# Patient Record
Sex: MALE | Race: WHITE | Employment: FULL TIME | URBAN - NONMETROPOLITAN AREA
[De-identification: names, ages, dates, MRNs, and addresses within clinical notes are randomized per-mention and may not be internally consistent; named-entity substitution may affect disease eponyms.]

---

## 2022-08-30 RX ORDER — TRAMADOL HYDROCHLORIDE 50 MG/1
50 TABLET ORAL EVERY 6 HOURS PRN
COMMUNITY
Start: 2022-07-27 | End: 2022-09-06

## 2022-08-30 RX ORDER — FENOFIBRATE 54 MG/1
54 TABLET ORAL DAILY
COMMUNITY
Start: 2022-07-14 | End: 2022-09-06

## 2022-09-06 ENCOUNTER — OFFICE VISIT (OUTPATIENT)
Dept: NEPHROLOGY | Age: 62
End: 2022-09-06
Payer: COMMERCIAL

## 2022-09-06 VITALS
DIASTOLIC BLOOD PRESSURE: 98 MMHG | OXYGEN SATURATION: 96 % | SYSTOLIC BLOOD PRESSURE: 168 MMHG | HEART RATE: 102 BPM | WEIGHT: 135 LBS

## 2022-09-06 DIAGNOSIS — I12.9 HYPERTENSIVE RENAL DISEASE, STAGE 1 THROUGH STAGE 4 OR UNSPECIFIED CHRONIC KIDNEY DISEASE: ICD-10-CM

## 2022-09-06 DIAGNOSIS — R80.9 PROTEINURIA, UNSPECIFIED TYPE: ICD-10-CM

## 2022-09-06 DIAGNOSIS — R79.89 ELEVATED SERUM CREATININE: Primary | ICD-10-CM

## 2022-09-06 PROBLEM — N18.30 CKD (CHRONIC KIDNEY DISEASE) STAGE 3, GFR 30-59 ML/MIN (HCC): Status: ACTIVE | Noted: 2022-01-01

## 2022-09-06 PROCEDURE — 99204 OFFICE O/P NEW MOD 45 MIN: CPT | Performed by: INTERNAL MEDICINE

## 2022-09-06 RX ORDER — CARVEDILOL 6.25 MG/1
6.25 TABLET ORAL 2 TIMES DAILY
Qty: 60 TABLET | Refills: 3 | Status: SHIPPED | OUTPATIENT
Start: 2022-09-06 | End: 2022-10-04

## 2022-09-06 NOTE — PROGRESS NOTES
[facility]  [address] [facility] [address]  Dept: [phone]  Loc: [phone]  Outpatient Consult Note  9/6/2022 1:52 PM        Pt Name:    Harini Vanegas:    1960  Primary Care Physician:  TONY Mendez     Chief Complaint: Elevated serum creatinine, HTN     Background Information/HPI   The patient is a 63 y.o. male who says he first was informed about his slow kidney and high BP when he went to get his hernia done. He was not following with any doctors until then. He reports he knew he had high BP but he had now seen anyone since 2005. He says it never used to be this high. Anyway, he has not had any medical care from 2005 until now. He was noticed to have creat of 1.6. He had 3+ protein last week, trace blood. He says he was put on cholesterol medication but he has not taken any. He reports some swelling of ankles. He used to drink in the past but none recently. He smokes 0.5 to 1 pack per day. No hx DM. No hx CVA. No known heart problems. No gross hematuria. No hx kidney stones. Takes ibuprofen. HE is in . 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 Allergies:  Patient has no known allergies.         Past Medical History:  Past Medical History:   Diagnosis Date    Bilateral inguinal hernia     CKD (chronic kidney disease) stage 3, GFR 30-59 ml/min ([facility])     Hypoalbuminemia         Past Surgical History:  Past Surgical History:   Procedure Laterality Date    APPENDECTOMY      COLONOSCOPY      HERNIA REPAIR      double    KNEE SURGERY Left     TONSILLECTOMY          Family History:  Family History   Problem Relation Age of Onset    Hypertension Mother     Hypertension Father     Cancer Father     Diabetes Paternal Grandmother         Social History:  Social History     Socioeconomic History    Marital status: Unknown     Spouse name: Not on file    Number of children: Not on file Years of education: Not on file    Highest education level: Not on file   Occupational History    Not on file   Tobacco Use    Smoking status: Every Day     Packs/day: 3.00     Types: Cigarettes    Smokeless tobacco: Never   Substance and Sexual Activity    Alcohol use: Not on file    Drug use: Not on file    Sexual activity: Not on file   Other Topics Concern    Not on file   Social History Narrative    Not on file     Social Determinants of Health     Financial Resource Strain: Not on file   Food Insecurity: Not on file   Transportation Needs: Not on file   Physical Activity: Not on file   Stress: Not on file   Social Connections: Not on file   Intimate Partner Violence: Not on file   Housing Stability: Not on file        Review of Systems:  Constitutional: no fever or chills  Head: No headaches  Eyes: no blurry vision, no discharge  Ears: no ear pain or hearing changes  Nose: no runny nose or epistaxis  Respiratory: no shortness of breath or cough or sputum production  Cardiovascular: no chest pain  GI: no nausea, no vomiting or diarrhea  : denies any discharge  Skin: no rash  Musculoskeletal: ++ joint stiffness, moves all ext  Neuro: no numbness or tingling, no slurred speech  Psychiatric: stable mood, no depression or insomnia    All other review of systems were reviewed and negative     Home Medications:  Prior to Admission medications    Not on File        Physical Examination:  VITALS:  BP (!) 168/98 (Site: Right Upper Arm, Position: Sitting, Cuff Size: Medium Adult)   Pulse (!) 102   Wt 135 lb (61.2 kg)   SpO2 96%   There is no height or weight on file to calculate BMI.   Wt Readings from Last 3 Encounters:   09/06/22 135 lb (61.2 kg)     Constitutional and General Appearance: alert and cooperative with exam, appears comfortable, no distress, not diaphoretic  Eyes: no icteric sclera in left eye or right eye  Ears and Nose: normal external appearance of left and right ear   Oral: moist oral mucus membranes  Neck: No jugular venous distention, appears symmetric, good ROM  Lungs: Air entry B/L, no crackles or rales, no use of accessory muscles or labored breathing  Heart: regular rate, S1, S2  Extremities: no pitting LE edema, no tenderness  GI: soft, non-tender, no guarding, no distention  Skin: no rash seen on exposed extremities, warm to touch  Musculo: moves all extremities  Neuro: no slurred speech  Psychiatric: Normal mood and affect, Not agitated     Laboratory & Diagnostics:  Old progress notes from referring physician reviewed. July 2022: Creat 1.63     Impression/Plan:   1. CKD III: based on old labs from July 2022. Will need further investigation- check UA, UPCR, Kidney US to evaluate echogenicity and kidney size. Advised smoking cessation, better BP control, low salt diet and low red meat intake. Advised to bring me some BP readings. 2. HTN: add coreg 6.25 mg BID. Advised to bring me some BP readings. 3. Smoking: cessation counseling  4. Proteinuria: ?etiology. Check Basic serologies including STEFANIA, Complement levels, SPEP and JAIRO   5. Dyslipidemia  6.. Proteinuria: ?GN disorder vs accelerated HTN. Advised low salt diet. Pt concerned about cost of these labs tests and kidney US. I advised him to check with his labs and he agreed. Orders Placed This Encounter   Procedures    US RENAL COMPLETE    Basic Metabolic Panel    CBC    Urinalysis    Protein / creatinine ratio, urine    PTH, Intact    Phosphorus    STEFANIA Screen with Reflex    C4 Complement    C3 Complement     Return in about 4 weeks (around 10/4/2022).     Thought process was discussed with the patient  Thank you for the referral  Please do not hesitate to contact me if you have any questions or concerns  I will make further recommendations depending on clinical course and lab/diagnostic results      Kaylee Zhao MD  [facility]

## 2022-09-08 ENCOUNTER — TELEPHONE (OUTPATIENT)
Dept: NEPHROLOGY | Age: 62
End: 2022-09-08

## 2022-09-08 NOTE — TELEPHONE ENCOUNTER
I called and informed pt to stop carvedilol. He will keep track of his BP twice per day along with pulse and call on Monday w/readings. Pt understood.

## 2022-09-08 NOTE — TELEPHONE ENCOUNTER
Pt called and states that since he started the carvedilol he has been lightheaded and dizzy while walking around. He states his BP is in the 269'E systolic and diastolic in the mid 33'A. His pulse rate is in the 80-90's. What do you want to do?

## 2022-09-12 NOTE — TELEPHONE ENCOUNTER
9/8/22 120/84 p 84  9/9/22 129/88 p 85  119/85 p 86  9/10/22 132/98 p 96  113/83 p 93  9/11/22 135/93 p 85  130/94 p 89  9/12/22 134/99 p 84

## 2022-09-13 NOTE — TELEPHONE ENCOUNTER
Patient said he was getting dizzy with the 6.25 mg of Coreg bid. Would you like him to cut it in half and take it afternoon and bedtime or just take 1 in the late evening before bed? He works day shift and cannot go in there dizzy.

## 2022-09-26 NOTE — TELEPHONE ENCOUNTER
Pt states he did stop the Coreg. He has an appt on 10/04/22. He will bring BP readings w/him at that time.

## 2022-10-04 ENCOUNTER — OFFICE VISIT (OUTPATIENT)
Dept: NEPHROLOGY | Age: 62
End: 2022-10-04
Payer: COMMERCIAL

## 2022-10-04 VITALS
OXYGEN SATURATION: 97 % | SYSTOLIC BLOOD PRESSURE: 147 MMHG | HEART RATE: 92 BPM | DIASTOLIC BLOOD PRESSURE: 96 MMHG | WEIGHT: 138 LBS

## 2022-10-04 DIAGNOSIS — R80.9 NEPHROTIC RANGE PROTEINURIA: ICD-10-CM

## 2022-10-04 DIAGNOSIS — F19.90 EXCESSIVE USE OF NONSTEROIDAL ANTI-INFLAMMATORY DRUG (NSAID): ICD-10-CM

## 2022-10-04 DIAGNOSIS — N18.32 STAGE 3B CHRONIC KIDNEY DISEASE (HCC): Primary | ICD-10-CM

## 2022-10-04 DIAGNOSIS — I12.9 HYPERTENSIVE RENAL DISEASE, STAGE 1 THROUGH STAGE 4 OR UNSPECIFIED CHRONIC KIDNEY DISEASE: ICD-10-CM

## 2022-10-04 PROCEDURE — 99214 OFFICE O/P EST MOD 30 MIN: CPT | Performed by: INTERNAL MEDICINE

## 2022-10-04 RX ORDER — LOSARTAN POTASSIUM 25 MG/1
25 TABLET ORAL DAILY
Qty: 90 TABLET | Refills: 1 | Status: SHIPPED | OUTPATIENT
Start: 2022-10-04 | End: 2022-10-26

## 2022-10-04 NOTE — PROGRESS NOTES
BP at home has been running 128/84. Has a rash on his body and wants to make sure that it is not his kidneys causing it. Primary doctor put his on antifungal medication and it did not help. He is using Smoothing Hydration Vaseline lotion for now for the itch.

## 2022-10-04 NOTE — PROGRESS NOTES
[facility]  [address] [facility] [address]  Dept: [phone]  Loc: [phone]  Office Progress Note  10/4/2022 1:35 PM      Pt Name:    Yamileth Camarillo  YOB: 1960  Primary Care Physician:  TONY Lowe     Chief Complaint:   Chief Complaint   Patient presents with    Chronic Kidney Disease     Stage 3        Background Information/Interval History:   The patient is a 63 y.o. male who says he first was informed about his slow kidney and high BP when he went to get his hernia done. He was not following with any doctors until then. He reports he knew he had high BP but he had now seen anyone since 2005. He says it never used to be this high. Anyway, he has not had any medical care from 2005 until now. He was noticed to have creat of 1.6. He had 3+ protein last week, trace blood. He says he was put on cholesterol medication but he has not taken any. Takes ibuprofen as needed. HE is in . 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 Pt is here for follow-up. He says he was dizzy with coreg and did not tolerate. BP at home is in 130-140 range per patient. He smokes about 0.75 pack per day. No leg swelling. He says he took 2 pills of ibuprofen this morning. He says he takes it seldomly. Pt says he has noticed rash diffusely present. Was put on anti-fungal by PCP with minimal relief. Now trying to see dermatology.       Past History:  Past Medical History:   Diagnosis Date    Bilateral inguinal hernia     CKD (chronic kidney disease) stage 3, GFR 30-59 ml/min ([facility])     Hypoalbuminemia      Past Surgical History:   Procedure Laterality Date    APPENDECTOMY      COLONOSCOPY      HERNIA REPAIR      double    KNEE SURGERY Left     TONSILLECTOMY          VITALS:  BP (!) 147/96 (Site: Right Upper Arm, Position: Sitting, Cuff Size: Medium Adult)   Pulse 92   Wt 138 lb (62.6 kg)   SpO2 97%   Wt Readings from Last 3 Encounters:   10/04/22 138 lb (62.6 kg)   09/06/22 135 lb (61.2 kg)     There is no height or weight on file to calculate BMI. General Appearance: alert and cooperative with exam, appears comfortable, no distress  Oral: moist oral mucus membranes  Neck: No jugular venous distention  Lungs: Air entry B/L, no crackles or rales, no use of accessory muscles  Heart: S1, S2 heard  GI: soft, non-tender, no guarding  Extremities: No sig LE edema     Medications:  Current Outpatient Medications   Medication Sig Dispense Refill    carvedilol (COREG) 6.25 MG tablet Take 1 tablet by mouth 2 times daily (Patient not taking: Reported on 10/4/2022) 60 tablet 3     No current facility-administered medications for this visit. Laboratory & Diagnostics:  Old progress notes from referring physician reviewed. July 2022: Creat 1.63    US: R 11.2, L 10.5  Sept 2022: UPCR 16 g/g  UA: 3-5 RBC, 3+ protein, 1+ blood, Hb 14.9  BMP: BUN 31, creat 2.2  STEFANIA: 1:40, small titer  C3, C4 normal     Impression/Plan:   1. CKD III: based on old labs from July 2022. He has nephrotic range proteinuria. Unclear etiology. ?FSGS vs Membranous vs other entities. I discussed in detail with patient. Will send all serological work-up and plan to proceed with kidney biopsy. Discussed risks, benefits, alternatives discussed with patient. Discussed indication of kidney biopsy. Advised to stop smoking. Will check lipid panel. 2. Nephrotic range proteinuria: send all serologies- see below, plan to set up kidney biopsy for him. Discussed in detail with patient. 3. HTN: add losartan 25 mg daily. He says he did not tolerate coreg. Advised low salt diet. 4. Smoking: advised cessation  5. Dyslipidemia: check lipid panel again. Pt says PCP prescribed cholesterol medication but he did not tolerate- lost weight and appetite.    6. Skin rash    Orders Placed This Encounter   Procedures    CT BIOPSY RENAL    Beta 2 Microglobulin, Serum Immunofixation electrophoresis    Kappa/Lambda Quantitative Free Light Chains, Serum    Electrophoresis Protein, Serum    Immunofixation, urine    Anti-Neutrophilic Cytoplasmic Antibody    Glomerular Basement Membrane (GBM) Antibody IgG    Anti-DNA Antibody, Double-Stranded    Hepatitis B Surface Antigen    Hepatitis C Antibody    Basic Metabolic Panel    Lipid Panel    Albumin    Hepatic Function Panel     Return in about 4 weeks (around 11/1/2022).         Venessa Holstein, MD  [facility]

## 2022-10-05 ENCOUNTER — TELEPHONE (OUTPATIENT)
Dept: NEPHROLOGY | Age: 62
End: 2022-10-05

## 2022-10-05 NOTE — TELEPHONE ENCOUNTER
Pt states he took the new BP medication and he went to work. He started to feel lightheaded and dizzy. The employees noticed that he was white. He took his BP several times over the next hour. 7:10 112/87  91  7:23 111/80  90  7:40 112/79  87  8:00 122/89  86 He got up and went the the bathroom. 8:15 108/83  89  8:30 110/86  89    What do you want to do?

## 2022-10-05 NOTE — TELEPHONE ENCOUNTER
He can try reducing dose of losartan 12.5 mg daily  And call with some BP readings.    BP is not that low   If he still has these symptoms then go to ED

## 2022-10-06 LAB — ANTIGEN INTERPRETATION: NEGATIVE

## 2022-10-07 ENCOUNTER — TELEPHONE (OUTPATIENT)
Dept: NEPHROLOGY | Age: 62
End: 2022-10-07

## 2022-10-07 NOTE — TELEPHONE ENCOUNTER
----- Message from Tamera Hernández MD sent at 10/6/2022  9:18 PM EDT -----  When is his kidney biopsy scheduled for ? I had ordered it when I saw him in office but dont see anything scheduled.      ----- Message -----  From: Malathi Dockery CMA  Sent: 10/6/2022   4:33 PM EDT  To: Tamera Hernández MD

## 2022-10-13 NOTE — RESULT ENCOUNTER NOTE
Pls inform patient that his labs show he has abnormal proteins made by blood disorder for which I'd like him to see hematologist/oncologist.   I will discuss with him further when I see him in 2 weeks but for now Please set him up to see hematology/oncology. He lives in [facility]. Please refer him to see [address] [place] [address] hematology/Oncology in [facility]. I believe Dr. Estefania Logan is seeing patients there. If he has any questions then please let me know.

## 2022-10-14 ENCOUNTER — TELEPHONE (OUTPATIENT)
Dept: NEPHROLOGY | Age: 62
End: 2022-10-14

## 2022-10-14 DIAGNOSIS — D47.2 MONOCLONAL GAMMOPATHY: Primary | ICD-10-CM

## 2022-10-14 NOTE — TELEPHONE ENCOUNTER
----- Message from Karmen Lui MD sent at 10/13/2022  7:49 PM EDT -----  Pls inform patient that his labs show he has abnormal proteins made by blood disorder for which I'd like him to see hematologist/oncologist.   I will discuss with him further when I see him in 2 weeks but for now Please set him up to see hematology/oncology. He lives in [facility]. [facility] in [facility]. I believe Dr. Mitzi Apple is seeing patients there. If he has any questions then please let me know.
Left message informing pt of the referral to Dr. Aixa Santiago Hematologist due to abnormal proteins made by blood disorder. Asked for a call back if he has any questions. Referral pending.
22-Nov-2020 09:59

## 2022-10-17 ENCOUNTER — TELEPHONE (OUTPATIENT)
Dept: NEPHROLOGY | Age: 62
End: 2022-10-17

## 2022-10-17 NOTE — TELEPHONE ENCOUNTER
Stop losartan completely  Pls let him know that his biopsy which is already scheduled for Oct 19 will tell us why he has so much proteinuria which is likely the reason he has some ankle swelling. But for now ok to stop losartan.  If his BP does not improve and he is still lightheaded after stopping losartan then he should come to ED for evaluation

## 2022-10-17 NOTE — TELEPHONE ENCOUNTER
Called and informed patient to stop Losartan and he voiced understanding. Patient agreeed to go to ER if Light headed continues once stopping the [place].  Patient voices understanding about the further testing and swelling

## 2022-10-17 NOTE — TELEPHONE ENCOUNTER
PT states that he was put on new medication called losartan for his BP. He states that he took his medication in the morning and got real lighted and white as a ghost at work, bp was 110/79. He went home after this. Started taking medication in afternoons instead of mornings. Sunday morning it was 87/62 p 93 at 9:30 am, highest it got Sunday 101/70 p 86 at 2:30 pm. His BP this morning was 129/91 p 97 at 4 am, went to work and started feeling bad 92/67 p 92 at 6:30 am, 105/65 p 90 at 7:30 am and he went home when his BP was 87/48 p 67 at 8:30 am. Since then his bp has been 9:30 am 103/72 p 87, and 10:30 am 110/81 p 86. He does not know what to do? He does not want to keep missing work due to BP is low and it will cause a hazard at work. Work mentioned to him about going on short term disability so he does not keep burning his vacation time. Last time he took a Losartan was Saturday at 5:30 pm. Also, his ankles are starting to swell up again.

## 2022-10-18 RX ORDER — SODIUM CHLORIDE 450 MG/100ML
INJECTION, SOLUTION INTRAVENOUS CONTINUOUS
Status: CANCELLED | OUTPATIENT
Start: 2022-10-18

## 2022-10-18 RX ORDER — MIDAZOLAM HYDROCHLORIDE 1 MG/ML
1 INJECTION INTRAMUSCULAR; INTRAVENOUS ONCE
Status: CANCELLED | OUTPATIENT
Start: 2022-10-18 | End: 2022-10-18

## 2022-10-18 RX ORDER — FENTANYL CITRATE 50 UG/ML
50 INJECTION, SOLUTION INTRAMUSCULAR; INTRAVENOUS ONCE
Status: CANCELLED | OUTPATIENT
Start: 2022-10-18 | End: 2022-10-18

## 2022-10-19 ENCOUNTER — HOSPITAL ENCOUNTER (OUTPATIENT)
Dept: CT IMAGING | Age: 62
Discharge: HOME | End: 2022-10-19

## 2022-10-19 DIAGNOSIS — R80.9 NEPHROTIC RANGE PROTEINURIA: ICD-10-CM

## 2022-10-19 DIAGNOSIS — N18.32 STAGE 3B CHRONIC KIDNEY DISEASE (HCC): ICD-10-CM

## 2022-10-19 NOTE — PROGRESS NOTES
Vipul Crowder ambulated into room for renal bx. Pt rights and responsibilities offered to pt. I asked pt if he had a ride for today and he states no, he states his rides are 3 hours away and he is living at home alone. I told him he needs to have a  today. He said \"fine reschedule me\". I called IR schedulers and told them he was not told that he needed a  he states. I talked with Kirill Eubanks RN in IR and let her know patient is rescheduling due to no .

## 2022-10-26 ENCOUNTER — HOSPITAL ENCOUNTER (OUTPATIENT)
Dept: CT IMAGING | Age: 62
Discharge: HOME | End: 2022-10-26
Payer: COMMERCIAL

## 2022-10-26 VITALS
OXYGEN SATURATION: 97 % | HEART RATE: 84 BPM | DIASTOLIC BLOOD PRESSURE: 82 MMHG | TEMPERATURE: 97.6 F | HEIGHT: 70 IN | RESPIRATION RATE: 14 BRPM | SYSTOLIC BLOOD PRESSURE: 113 MMHG | WEIGHT: 135 LBS | BODY MASS INDEX: 19.33 KG/M2

## 2022-10-26 DIAGNOSIS — N18.32 STAGE 3B CHRONIC KIDNEY DISEASE (HCC): ICD-10-CM

## 2022-10-26 DIAGNOSIS — R80.9 NEPHROTIC RANGE PROTEINURIA: ICD-10-CM

## 2022-10-26 LAB
ERYTHROCYTE [DISTWIDTH] IN BLOOD BY AUTOMATED COUNT: 19.2 % (ref 11.5–14.5)
ERYTHROCYTE [DISTWIDTH] IN BLOOD BY AUTOMATED COUNT: 63.5 FL (ref 35–45)
HCT VFR BLD CALC: 43.5 % (ref 42–52)
HEMOGLOBIN: 14.2 GM/DL (ref 14–18)
MCH RBC QN AUTO: 30.2 PG (ref 26–33)
MCHC RBC AUTO-ENTMCNC: 32.6 GM/DL (ref 32.2–35.5)
MCV RBC AUTO: 92.6 FL (ref 80–94)
PLATELET # BLD: 226 THOU/MM3 (ref 130–400)
PMV BLD AUTO: 11.7 FL (ref 9.4–12.4)
RBC # BLD: 4.7 MILL/MM3 (ref 4.7–6.1)
WBC # BLD: 5.5 THOU/MM3 (ref 4.8–10.8)

## 2022-10-26 PROCEDURE — 77012 CT SCAN FOR NEEDLE BIOPSY: CPT

## 2022-10-26 PROCEDURE — 6360000002 HC RX W HCPCS: Performed by: RADIOLOGY

## 2022-10-26 PROCEDURE — 88348 ELECTRON MICROSCOPY DX: CPT

## 2022-10-26 PROCEDURE — 50200 RENAL BIOPSY PERQ: CPT

## 2022-10-26 PROCEDURE — 85027 COMPLETE CBC AUTOMATED: CPT

## 2022-10-26 PROCEDURE — 88305 TISSUE EXAM BY PATHOLOGIST: CPT

## 2022-10-26 PROCEDURE — 88313 SPECIAL STAINS GROUP 2: CPT

## 2022-10-26 PROCEDURE — 88346 IMFLUOR 1ST 1ANTB STAIN PX: CPT

## 2022-10-26 PROCEDURE — 88350 IMFLUOR EA ADDL 1ANTB STN PX: CPT

## 2022-10-26 PROCEDURE — 2580000003 HC RX 258: Performed by: RADIOLOGY

## 2022-10-26 PROCEDURE — 6370000000 HC RX 637 (ALT 250 FOR IP): Performed by: RADIOLOGY

## 2022-10-26 RX ORDER — MIDAZOLAM HYDROCHLORIDE 1 MG/ML
1 INJECTION INTRAMUSCULAR; INTRAVENOUS ONCE
Status: COMPLETED | OUTPATIENT
Start: 2022-10-26 | End: 2022-10-26

## 2022-10-26 RX ORDER — SODIUM CHLORIDE 450 MG/100ML
INJECTION, SOLUTION INTRAVENOUS CONTINUOUS
Status: DISCONTINUED | OUTPATIENT
Start: 2022-10-26 | End: 2022-10-27 | Stop reason: HOSPADM

## 2022-10-26 RX ORDER — FENTANYL CITRATE 50 UG/ML
50 INJECTION, SOLUTION INTRAMUSCULAR; INTRAVENOUS ONCE
Status: DISCONTINUED | OUTPATIENT
Start: 2022-10-26 | End: 2022-10-26

## 2022-10-26 RX ORDER — BACITRACIN, NEOMYCIN, POLYMYXIN B 400; 3.5; 5 [USP'U]/G; MG/G; [USP'U]/G
OINTMENT TOPICAL ONCE
Status: COMPLETED | OUTPATIENT
Start: 2022-10-26 | End: 2022-10-26

## 2022-10-26 RX ADMIN — MIDAZOLAM 0.5 MG: 1 INJECTION INTRAMUSCULAR; INTRAVENOUS at 08:59

## 2022-10-26 RX ADMIN — SODIUM CHLORIDE: 4.5 INJECTION, SOLUTION INTRAVENOUS at 07:27

## 2022-10-26 RX ADMIN — BACITRACIN, NEOMYCIN, POLYMYXIN B 1 G: 400; 3.5; 5 OINTMENT TOPICAL at 09:33

## 2022-10-26 RX ADMIN — HYDROMORPHONE HYDROCHLORIDE 0.5 MG: 1 INJECTION, SOLUTION INTRAMUSCULAR; INTRAVENOUS; SUBCUTANEOUS at 08:59

## 2022-10-26 ASSESSMENT — PAIN SCALES - GENERAL
PAINLEVEL_OUTOF10: 0

## 2022-10-26 NOTE — PROGRESS NOTES
9986: Patient back from procedure, tolerated well. Vitals as charted. Bandaid clean, dry, intact. No redness or edema noted around site. Provided with snack and beverage. 1015:  Vitals as charted. Bandaid clean, dry, intact. No redness or edema noted around site. 1030: Vitals as charted. Bandaid clean, dry, intact. No redness or edema noted around site. 1045: Vitals as charted. Bandaid clean, dry, intact. No redness or edema noted around site. 1100: Vitals as charted. Bandaid clean, dry, intact. No redness or edema noted around site. 1115:Vitals as charted. Bandaid clean, dry, intact. No redness or edema noted around site. 1130: Vitals as charted. Bandaid clean, dry, intact. No redness or edema noted around site. 1200: Vitals as charted. Bandaid clean, dry, intact. No redness or edema noted around site. 1230: Vitals as charted. Bandaid clean, dry, intact. No redness or edema noted around site. 1300:Vitals as charted. Bandaid clean, dry, intact. No redness or edema noted around site. 1330: AVS reviewed with patient, voiced understanding. Patient discharged in wheelchair.

## 2022-10-26 NOTE — H&P
[address] [address]  Sedation/Analgesia History & Physical    Pt Name: Karlos Kang  MRN: [id number]  YOB: 1960  Provider Performing Procedure: Federico Romo MD, MD  Primary Care Physician: David Cavazos MD    PRE-PROCEDURE   DNR-CCA/DNR-CC []Yes [x]No  Brief History/Pre-Procedure Diagnosis: Nephrotic range proteinuria, chronic kidney disease          MEDICAL HISTORY  []CAD/Valve  []Liver Disease  []Lung Disease []Diabetes  []Hypertension []Renal Disease  []Additional information:       has a past medical history of Bilateral inguinal hernia, CKD (chronic kidney disease) stage 3, GFR 30-59 ml/min ([facility] [address] and Hypoalbuminemia. SURGICAL HISTORY   has a past surgical history that includes Hernia repair; knee surgery (Left); Appendectomy; Colonoscopy; and Tonsillectomy. Additional information:       ALLERGIES   Allergies as of 10/26/2022    (No Known Allergies)     Additional information:       MEDICATIONS   Coumadin Use Last 5 Days [x]No []Yes  Antiplatelet drug therapy use last 5 days  [x]No []Yes  Other anticoagulant use last 5 days  [x]No []Yes  No current outpatient medications on file.     Current Facility-Administered Medications:     0.45 % sodium chloride infusion, , IntraVENous, Continuous, Benny Vasquez DO, Last Rate: 20 mL/hr at 10/26/22 0727, New Bag at 10/26/22 0727    fentaNYL (SUBLIMAZE) injection 50 mcg, 50 mcg, IntraVENous, Once, Benny Vasquez DO    midazolam (VERSED) injection 1 mg, 1 mg, IntraVENous, Once, Benny Vasquez DO  Prior to Admission medications    Not on File     Additional information:       VITAL SIGNS   Vitals:    10/26/22 0852   BP: 113/84   Pulse: 84   Resp: 11   Temp:    SpO2: 97%       PHYSICAL:   Heart:  [x]Regular rate and rhythm  []Other:    Lungs:  [x]Clear    []Other:    Abdomen: [x]Soft    []Other:    Mental Status: [x]Alert & Oriented  []Other:      PLANNED PROCEDURE   [x]Biospy []Arteriogram              []Drainage   []Mediport Insertion  []Fistulogram []IV access       []Vertebroplasty / Augmentation  []IVC filter []Dialysis catheter []Biliary drainage  []Other: []CAPD Catheter []Nephrostomy Tube / Stent  SEDATION  Planned agent:[x]Midazolam []Meperidine []Sublimaze [x]Dilaudid []Morphine     []Diazepam  []Other:     ASA Classification:  []1 [x]2 []3 []4 []5  Class 1: A normal healthy patient  Class 2: Pt with mild to moderate systemic disease  Class 3: Severe systemic disease or disturbance  Class 4: Severe systemic disorders that are already life threatening. Class 5: Moribund pt with little chances of survival, for more than 24 hours. Mallampati I Airway Classification:   []1 [x]2 []3 []4    [x]Pre-procedure diagnostic studies complete and results available. Comment:    [x]Previous sedation/anesthesia experiences assessed. Comment:    [x]The patient is an appropriate candidate to undergo the planned procedure sedation and anesthesia. (Refer to nursing sedation/analgesia documentation record)  [x]Formulation and discussion of sedation/procedure plan, risks, and expectations with patient and/or responsible adult completed. [x]Patient examined immediately prior to the procedure.  (Refer to nursing sedation/analgesia documentation record)    Tolu Fletcher MD, MD  Electronically signed 10/26/2022 at 8:57 AM

## 2022-10-26 NOTE — PROGRESS NOTES
4862 Pt arrived to CT scan for CT guided random renal biopsy under conscious sedation. Girlfriend remains in OPN per patient. 5594 Procedure explained using teach back method. Pt states understanding. 4848 Dr Melody Cazares into assess patient and explain procedure. This procedure has been fully reviewed with the patient and written informed consent has been obtained. 6038 Patient assisted to table in prone position. Monitor attached to patient. Comfort ensured. 4751 Pre-procedure images obtained.  [id number] Procedure begins. 5073 Call placed to lab Elvia Alonso in histology) to notify that samples are being sent for analysis. 5 Samples obtained and sent to lab for analysis. 0930 Pathology ([facility]ah) called CT to verify samples are acceptable. 3183 Procedure complete. Collagen injected to left kidney per Dr Melody Cazares. 0932 Post-procedure images obtained. 2836 Monitor removed. 0132 Patient assisted to cart in semi fowlers position. Comfort ensured. 6465 Patient transported to [facility] in stable condition.

## 2022-10-26 NOTE — H&P
Formulation and discussion of sedation / procedure plans, risks, benefits, side effects and alternatives with patient and/or responsible adult completed. History and Physical reviewed and unchanged.     Electronically signed by Whitney Gutierrez MD on 10/26/22 at 8:57 AM EDT

## 2022-10-26 NOTE — PROGRESS NOTES
0700: Patient arrived ambulatory for Renal biopsy. Patient denies any blood thinner usage. Has been NPO since last night. Procedure explained to patient, voiced understanding. Patient rights and responsibilities offered to patient. Blood work collected and sent to lab. IV hydration started.                         _m___ Safety:       (Environmental)  [place] to environment  Ensure ID band is correct and in place/ allergy band as needed  Assess for fall risk  Initiate fall precautions as applicable (fall band, side rails, etc.)  Call light within reach  Bed in low position/ wheels locked    _m___ Pain:       Assess pain level and characteristics  Administer analgesics as ordered  Assess effectiveness of pain management and report to MD as needed    _m___ Knowledge Deficit:  Assess baseline knowledge  Provide teaching at level of understanding  Provide teaching via preferred learning method  Evaluate teaching effectiveness    _m___ Hemodynamic/Respiratory Status:       (Pre and Post Procedure Monitoring)  Assess/Monitor vital signs and LOC  Assess Baseline SpO2 prior to any sedation  Obtain weight/height  Assess vital signs/ LOC until patient meets discharge criteria  Monitor procedure site and notify MD of any issues

## 2022-10-26 NOTE — DISCHARGE INSTRUCTIONS
POST BIOPSY DISCHARGE INSTRUCTION SHEET    DIET:As tolerated    ACTIVITY:  Rest at home on sofa, bed or recliner today. Bathroom privileges only today. Limit any exertion (pushing or pulling) today. No lifting for 3 days. No driving today. Check biopsy site frequently today. Resume any blood thinners in 24 hours. Keep band aid clean & dry - replace as needed, may remove in 48 hours. RETURN TO NEAREST EMERGENCY ROOM IF YOU HAVE ANY OF THE FOLLOWING:  Sign of bleeding, swelling, drainage from biopsy site or severe pain (slight discomfort to be expected) around biopsy site. Repeated nausea/vomiting/abdominal pain. Elevated temperature above 101 degrees. Shortness of breath. Chest pain. Keep scheduled appointment with your physician. If sedation given, follow post sedation instruction sheet. SEDATION/ANALGESIA INFORMATION HOME GOING ADVICE    Review the following information with the patient prior to the procedure. Sedation/agalgesia is used during short medical procedures under controlled supervision. The medication will produce a strong relaxation. You will be able to hear, speak and follow instructions, but you memory and alertness will be decreased. You will be able to swallow and breathe on your own. During sedation/analgesia you blood pressure, hear and breathing will be watched closely. After the procedure, you may not remember what was said or done. Procedure:renal biopsy   Date:10/26/22  You may have the following effects from the medication. Drowsiness, dizziness, sleepiness or confusion. Difficulty remembering or delayed reaction times. Loss of fine muscle control or difficulty with your balance especially while walking. Difficulty focusing or blurred vision. You may not be aware of slight changes in your behavior and/or your reaction time because of the medication used during the procedure. Therefore you should follow these instructions.   Have someone responsible help you with your care. Do not drive for 24 hours. Do not operate equipment for 24 hours (lawnmowers, power tools, kitchen accessories, stove). Do not drink any alcoholic beverages for a minimum of 24 hours. Do not make important personal, legal or business decisions for 24 hours. You may experience dizziness or lightheadedness. Move slowly and carefully, do not make sudden position changes. Drink extra amounts of fluids today. Increase your diet as tolerated (unless you have received specific instructions from your doctor). If you feel nauseated, continue with liquids until nausea is gone  Notify your physician if you have not urinated within 8 hours after the procedure. Resume your medications unless otherwise instructed. contact your physician if you have any questions or concerns. I have been informed and understand how to care for myself/the patient at home. i know who to call if Cassie Len question or concerns.     F YOU REPORT TO AN EMERGENCY ROOM, DOCTOR'S OFFICE OR HOSPITAL WITHIN 24 HRS AFTER PROCEDURE, BRING THIS SHEET WITH YOU AND GIVE IT THE PHYSICIAN OR NURSE ATTENDING YOU.

## 2022-10-27 PROBLEM — D12.6 ADENOMATOUS COLON POLYP: Status: ACTIVE | Noted: 2022-09-20

## 2022-10-27 PROBLEM — K40.20 NON-RECURRENT BILATERAL INGUINAL HERNIA WITHOUT OBSTRUCTION OR GANGRENE: Status: ACTIVE | Noted: 2022-09-20

## 2022-10-27 NOTE — PROGRESS NOTES
10/27/22 @ [phone] Pt phoned for follow up post random renal biopsy. Phone rang, did not go to voicemail.

## 2022-11-01 ENCOUNTER — OFFICE VISIT (OUTPATIENT)
Dept: NEPHROLOGY | Age: 62
End: 2022-11-01
Payer: COMMERCIAL

## 2022-11-01 VITALS
DIASTOLIC BLOOD PRESSURE: 80 MMHG | HEART RATE: 87 BPM | SYSTOLIC BLOOD PRESSURE: 114 MMHG | OXYGEN SATURATION: 97 % | BODY MASS INDEX: 19.23 KG/M2 | WEIGHT: 134 LBS

## 2022-11-01 DIAGNOSIS — E85.81 AL AMYLOIDOSIS (HCC): Primary | ICD-10-CM

## 2022-11-01 DIAGNOSIS — N18.32 STAGE 3B CHRONIC KIDNEY DISEASE (HCC): ICD-10-CM

## 2022-11-01 DIAGNOSIS — D47.2 MONOCLONAL GAMMOPATHY: ICD-10-CM

## 2022-11-01 DIAGNOSIS — R80.9 NEPHROTIC RANGE PROTEINURIA: ICD-10-CM

## 2022-11-01 LAB — MISC REFERENCE: NORMAL

## 2022-11-01 PROCEDURE — 99214 OFFICE O/P EST MOD 30 MIN: CPT | Performed by: INTERNAL MEDICINE

## 2022-11-01 NOTE — PROGRESS NOTES
[facility]  [address] [facility] [address]  Dept: [phone]  Loc: [phone]  Office Progress Note  11/1/2022 12:27 PM      Pt Name:    Case Gallego  YOB: 1960  Primary Care Physician:  Alex Brown DO     Chief Complaint:   Chief Complaint   Patient presents with    Chronic Kidney Disease     Stage 3        Background Information/Interval History:   The patient is a 63 y.o. male who says he first was informed about his slow kidney and high BP when he went to get his hernia done. He was not following with any doctors until then. He reports he knew he had high BP but he had now seen anyone since 2005. He says it never used to be this high. Anyway, he has not had any medical care from 2005 until now. He was noticed to have creat of 1.6. He had 3+ protein. Has taken  ibuprofen as needed. He is here for follow-up. Work-up showed nephrotic rnage proteinuria and creat of over 2. 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 Here for follow-up. Awaiting to be seen by hematology. Feels weak and tired. No chest pain. No urinary complaints. Reports some weight loss. BP runs in 110 range at home.         Past History:  Past Medical History:   Diagnosis Date    Bilateral inguinal hernia     CKD (chronic kidney disease) stage 3, GFR 30-59 ml/min ([facility])     Hypoalbuminemia      Past Surgical History:   Procedure Laterality Date    APPENDECTOMY      COLONOSCOPY      CT BIOPSY RENAL  10/26/2022    CT BIOPSY RENAL [facility] CT SCAN    CT BIOPSY RENAL  10/26/2022    CT BIOPSY RENAL 10/26/2022 [facility] CT SCAN    HERNIA REPAIR      double    KNEE SURGERY Left     TONSILLECTOMY          VITALS:  /80 (Site: Right Upper Arm, Position: Sitting, Cuff Size: Medium Adult)   Pulse 87   Wt 134 lb (60.8 kg)   SpO2 97%   BMI 19.23 kg/m²   Wt Readings from Last 3 Encounters:   11/01/22 134 lb (60.8 kg)   10/26/22 135 lb (61.2 kg)   10/04/22 138 lb (62.6 kg)     Body mass index is 19.23 kg/m². General Appearance: alert and cooperative with exam, appears comfortable, no distress  Oral: moist oral mucus membranes  Neck: No jugular venous distention  Lungs: Air entry B/L, no crackles or rales, no use of accessory muscles  Heart: S1, S2 heard  GI: soft, non-tender, no guarding  Extremities: No sig LE edema     Medications:  No current outpatient medications on file. No current facility-administered medications for this visit. Laboratory & Diagnostics:  Old progress notes from referring physician reviewed. July 2022: Creat 1.63    US: R 11.2, L 10.5  Sept 2022: UPCR 16 g/g  UA: 3-5 RBC, 3+ protein, 1+ blood, Hb 14.9  BMP: BUN 31, creat 2.2  STEFANIA: 1:40, small titer  C3, C4 normal    Beta 2 microglobulin: 9.35  JAIRO: IgM lambda monoclonal  K/L ratio  Kidney Biopsy: AL amyloidosis  Oct 2022: Creat 2.3, K 4.3, Bicar 20, ca 8.5, Hb 14.2    Media Information  Document Information    Pathology Requisition   10.26.22 renal bx path report [facility]   10/31/2022 00:00   Attached To:   Abstract on 10/31/22 with Trish Hooks MD     Source Information    Cony Nails, NIKOLAI  [facility]        Impression/Plan:   1. CKD III: based on old labs from July 2022. He has nephrotic range proteinuria. Kidney biopsy shows AL amyloidosis secondary to likely monoclonal gammopathy. Kidney biopsy report is under media tab. JAIRO shows IgM monoclonal lambda protein. Needs to see hematology/oncology asap. Referral was given three weeks ago. He is scheduled to see hematology on Nov 21. We will request hematology to see him sooner. My office is trying to get him in to see hematology preferably this week or early next week. D/W patient what all of this means. He also understands that he is at risk of progressive CKD needing possibly dialysis in the future. 2. Monoclonal gammopathy: AL amyloidosis biopsy proven.  Elevated Beta 2 microglobulin. Send quantititative Ig Level. Needs to see hematology ASAP  3. Mild met acidosis: monitor, may need to add sodium bicarbonate  4. HTN: BP is stable. He is orthostatic at times which can be seen secondary to amyloidosis neuropathy. Amyloidosis can cause autonomic dysfunction  5. Smoking: advised cessation  6. Dyslipidemia: Pt says PCP prescribed cholesterol medication but he did not tolerate- lost weight and appetite. 7. Skin rash: resolved. Orders Placed This Encounter   Procedures    IgE    IgM    IgG    IgA    Immunofixation, Urine    CBC    Comprehensive Metabolic Panel    Magnesium    Phosphorus     Return in about 5 weeks (around 12/6/2022).     Shy Mendez MD  [facility]

## 2022-11-01 NOTE — PROGRESS NOTES
Having issues with lightheadedness, he checks his BP when this happens and it is usually 121/86. He is currently off work due to the lightheadedness. He has pain in abdomen, feels bloated and has no appetite.     Lipid panel, albumin and hepatic function labs were not done

## 2022-11-08 ENCOUNTER — HOSPITAL ENCOUNTER (OUTPATIENT)
Dept: INFUSION THERAPY | Age: 62
Discharge: HOME | End: 2022-11-08
Payer: COMMERCIAL

## 2022-11-08 ENCOUNTER — TELEMEDICINE (OUTPATIENT)
Dept: ONCOLOGY | Age: 62
End: 2022-11-08
Payer: COMMERCIAL

## 2022-11-08 ENCOUNTER — OFFICE VISIT (OUTPATIENT)
Dept: ONCOLOGY | Age: 62
End: 2022-11-08
Payer: COMMERCIAL

## 2022-11-08 VITALS
RESPIRATION RATE: 20 BRPM | TEMPERATURE: 97.3 F | DIASTOLIC BLOOD PRESSURE: 76 MMHG | BODY MASS INDEX: 18.64 KG/M2 | SYSTOLIC BLOOD PRESSURE: 103 MMHG | OXYGEN SATURATION: 97 % | HEIGHT: 70 IN | HEART RATE: 88 BPM | WEIGHT: 130.2 LBS

## 2022-11-08 VITALS
BODY MASS INDEX: 18.64 KG/M2 | WEIGHT: 130.2 LBS | TEMPERATURE: 97.3 F | RESPIRATION RATE: 20 BRPM | SYSTOLIC BLOOD PRESSURE: 103 MMHG | HEIGHT: 70 IN | OXYGEN SATURATION: 97 % | HEART RATE: 88 BPM | DIASTOLIC BLOOD PRESSURE: 76 MMHG

## 2022-11-08 DIAGNOSIS — E85.81 LIGHT CHAIN (AL) AMYLOIDOSIS (HCC): Primary | ICD-10-CM

## 2022-11-08 DIAGNOSIS — R80.9 NEPHROTIC RANGE PROTEINURIA: ICD-10-CM

## 2022-11-08 DIAGNOSIS — E85.81 LIGHT CHAIN (AL) AMYLOIDOSIS (HCC): ICD-10-CM

## 2022-11-08 DIAGNOSIS — D47.2 MONOCLONAL GAMMOPATHY: ICD-10-CM

## 2022-11-08 DIAGNOSIS — F19.90 EXCESSIVE USE OF NONSTEROIDAL ANTI-INFLAMMATORY DRUG (NSAID): ICD-10-CM

## 2022-11-08 DIAGNOSIS — N18.32 STAGE 3B CHRONIC KIDNEY DISEASE (HCC): ICD-10-CM

## 2022-11-08 DIAGNOSIS — E85.81 AL AMYLOIDOSIS (HCC): ICD-10-CM

## 2022-11-08 DIAGNOSIS — I12.9 HYPERTENSIVE RENAL DISEASE, STAGE 1 THROUGH STAGE 4 OR UNSPECIFIED CHRONIC KIDNEY DISEASE: ICD-10-CM

## 2022-11-08 LAB
ALBUMIN SERPL-MCNC: 2.4 G/DL (ref 3.5–5.1)
ALP BLD-CCNC: 421 U/L (ref 38–126)
ALT SERPL-CCNC: 34 U/L (ref 11–66)
ANION GAP SERPL CALCULATED.3IONS-SCNC: 12 MEQ/L (ref 8–16)
AST SERPL-CCNC: 42 U/L (ref 5–40)
BASOPHILS # BLD: 0.9 %
BASOPHILS ABSOLUTE: 0.1 THOU/MM3 (ref 0–0.1)
BILIRUB SERPL-MCNC: 0.4 MG/DL (ref 0.3–1.2)
BILIRUBIN DIRECT: < 0.2 MG/DL (ref 0–0.3)
BUN BLDV-MCNC: 37 MG/DL (ref 7–22)
CALCIUM SERPL-MCNC: 9.1 MG/DL (ref 8.5–10.5)
CHLORIDE BLD-SCNC: 98 MEQ/L (ref 98–111)
CO2: 21 MEQ/L (ref 23–33)
CREAT SERPL-MCNC: 2.7 MG/DL (ref 0.4–1.2)
EOSINOPHIL # BLD: 0.8 %
EOSINOPHILS ABSOLUTE: 0.1 THOU/MM3 (ref 0–0.4)
ERYTHROCYTE [DISTWIDTH] IN BLOOD BY AUTOMATED COUNT: 18 % (ref 11.5–14.5)
ERYTHROCYTE [DISTWIDTH] IN BLOOD BY AUTOMATED COUNT: 58.2 FL (ref 35–45)
GFR SERPL CREATININE-BSD FRML MDRD: 26 ML/MIN/1.73M2
GLUCOSE BLD-MCNC: 107 MG/DL (ref 70–108)
HCT VFR BLD CALC: 43 % (ref 42–52)
HEMOGLOBIN: 14.6 GM/DL (ref 14–18)
IMMATURE GRANS (ABS): 0.03 THOU/MM3 (ref 0–0.07)
IMMATURE GRANULOCYTES: 0.3 %
LYMPHOCYTES # BLD: 22 %
LYMPHOCYTES ABSOLUTE: 2 THOU/MM3 (ref 1–4.8)
MAGNESIUM: 2.2 MG/DL (ref 1.6–2.4)
MCH RBC QN AUTO: 30.1 PG (ref 26–33)
MCHC RBC AUTO-ENTMCNC: 34 GM/DL (ref 32.2–35.5)
MCV RBC AUTO: 88.7 FL (ref 80–94)
MONOCYTES # BLD: 5.1 %
MONOCYTES ABSOLUTE: 0.5 THOU/MM3 (ref 0.4–1.3)
NUCLEATED RED BLOOD CELLS: 0 /100 WBC
PHOSPHORUS: 5.6 MG/DL (ref 2.4–4.7)
PLATELET # BLD: 379 THOU/MM3 (ref 130–400)
PMV BLD AUTO: 12.3 FL (ref 9.4–12.4)
POTASSIUM SERPL-SCNC: 4.3 MEQ/L (ref 3.5–5.2)
RBC # BLD: 4.85 MILL/MM3 (ref 4.7–6.1)
SEG NEUTROPHILS: 70.9 %
SEGMENTED NEUTROPHILS ABSOLUTE COUNT: 6.5 THOU/MM3 (ref 1.8–7.7)
SODIUM BLD-SCNC: 131 MEQ/L (ref 135–145)
TOTAL PROTEIN: 5.9 G/DL (ref 6.1–8)
TROPONIN T: 0.27 NG/ML
WBC # BLD: 9.1 THOU/MM3 (ref 4.8–10.8)

## 2022-11-08 PROCEDURE — 84484 ASSAY OF TROPONIN QUANT: CPT

## 2022-11-08 PROCEDURE — 99999 PR OFFICE/OUTPT VISIT,PROCEDURE ONLY: CPT | Performed by: INTERNAL MEDICINE

## 2022-11-08 PROCEDURE — 83883 ASSAY NEPHELOMETRY NOT SPEC: CPT

## 2022-11-08 PROCEDURE — 99443 PR PHYS/QHP TELEPHONE EVALUATION 21-30 MIN: CPT | Performed by: INTERNAL MEDICINE

## 2022-11-08 PROCEDURE — 82232 ASSAY OF BETA-2 PROTEIN: CPT

## 2022-11-08 PROCEDURE — 99211 OFF/OP EST MAY X REQ PHY/QHP: CPT

## 2022-11-08 PROCEDURE — 82784 ASSAY IGA/IGD/IGG/IGM EACH: CPT

## 2022-11-08 PROCEDURE — 84100 ASSAY OF PHOSPHORUS: CPT

## 2022-11-08 PROCEDURE — 85025 COMPLETE CBC W/AUTO DIFF WBC: CPT

## 2022-11-08 PROCEDURE — 82785 ASSAY OF IGE: CPT

## 2022-11-08 PROCEDURE — 82248 BILIRUBIN DIRECT: CPT

## 2022-11-08 PROCEDURE — 80053 COMPREHEN METABOLIC PANEL: CPT

## 2022-11-08 PROCEDURE — 83735 ASSAY OF MAGNESIUM: CPT

## 2022-11-08 PROCEDURE — 84165 PROTEIN E-PHORESIS SERUM: CPT

## 2022-11-08 PROCEDURE — 84155 ASSAY OF PROTEIN SERUM: CPT

## 2022-11-08 NOTE — PROGRESS NOTES
[facility] CANCER CENTER  CANCER NETWORK OF [facility]  [facility] [address] [facility] [address]  [address] [place] [address]  [address]  Dept: [phone]  Dept Fax: [phone]: [phone]     Encounter Date: 11/8/2022     Primary Provider: Kirill Wu DO     HPI:  Delfina Carballo is a very pleasant 63 y.o. male   referred for evaluation of AL amyloidosis. Patient reports that he started having problems after his hernia surgery July 2022. He has lost approximately 35 pounds. Further work-up confirmed proteinuria for which he underwent kidney biopsy. Kidney biopsy 10/26/2022 confirmed AL amyloidosis lambda light chain restriction. Patient is here to discuss further management. He does report on and off shortness of breath, chest discomfort, ankle swelling. No alteration in bowel movements reported    Review of Systems  Constitutional: Negative. HENT: Negative. Eyes: Negative. Respiratory: Negative. Cardiovascular: Negative. Gastrointestinal: Negative. Genitourinary: Negative. Musculoskeletal: Negative. Skin: Negative. Neurological: Negative. Hematological: Negative. Psychiatric/Behavioral: Negative. Past Medical History   has a past medical history of Bilateral inguinal hernia, CKD (chronic kidney disease) stage 3, GFR 30-59 ml/min ([facility]), Hypoalbuminemia, and Monoclonal gammopathy. Surgical History   has a past surgical history that includes Hernia repair; knee surgery (Left); Appendectomy; Colonoscopy; Tonsillectomy; CT BIOPSY RENAL (10/26/2022); and CT BIOPSY RENAL (10/26/2022). Home Medications  currently has no medications in their medication list.    Allergies  No Known Allergies    Social History   reports that he has been smoking cigarettes. He has a 120.00 pack-year smoking history. He has never used smokeless tobacco. He reports that he does not currently use alcohol.  He reports that he does not use drugs. Family History  family history includes Cancer in his father; Diabetes in his paternal grandmother; Hypertension in his father and mother. Labs: Reviewed    Physical Exam     General: Non-ill appearing. HEENT: NC/AT,nonicteric, perrla,eom intact, no mucosal lesions     Assessment/Plan:   1. Light chain (AL) amyloidosis (HCC)    - External Referral To Interventional Radiology  - CBC with Auto Differential; Future  - POC PANEL BMP W/IOCA; Future  - Hepatic Function Panel; Future  - Electrophoresis Protein, Serum  - Kappa/Lambda Quantitative Free Light Chains, Serum; Future  - Immunofixation, Urine; Future  - Protein Electrophoresis, Urine  - Troponin; Future  - Beta 2 Microglobulin, Serum; Future  - PET CT Skull Base Mid Thigh Restage; Future  - ECHO Complete 2D W Doppler W Color; Future       Patient Instructions   Labs today  BM Biopsy  Echo  PET   RTC  2-3 weeks      Leah Martinez MD  11/8/2022      **This report has been created using voice recognition software. It may contain minor errors which are inherent in voice recognition technology. **

## 2022-11-09 LAB
IGA: 288 MG/DL (ref 70–400)
IGE: 53 IU/ML
IGG: 436 MG/DL (ref 700–1600)
IGM: 1185 MG/DL (ref 40–230)

## 2022-11-10 LAB — BETA-2 MICROGLOBULIN: 10.5 MG/L (ref 0.6–2.4)

## 2022-11-11 LAB — KAPPA/LAMBDA FREE LIGHT CHAINS: NORMAL

## 2022-11-12 LAB — PROTEIN ELECTROPHORESIS, SERUM: NORMAL

## 2022-11-16 ENCOUNTER — HOSPITAL ENCOUNTER (OUTPATIENT)
Dept: CT IMAGING | Age: 62
Discharge: HOME | End: 2022-11-16
Payer: COMMERCIAL

## 2022-11-16 VITALS
RESPIRATION RATE: 18 BRPM | SYSTOLIC BLOOD PRESSURE: 126 MMHG | WEIGHT: 130 LBS | OXYGEN SATURATION: 97 % | TEMPERATURE: 97.4 F | HEIGHT: 70 IN | DIASTOLIC BLOOD PRESSURE: 84 MMHG | BODY MASS INDEX: 18.61 KG/M2 | HEART RATE: 84 BPM

## 2022-11-16 DIAGNOSIS — E85.9 AMYLOIDOSIS, UNSPECIFIED TYPE (HCC): ICD-10-CM

## 2022-11-16 DIAGNOSIS — Z98.890 HISTORY OF BONE MARROW BIOPSY: ICD-10-CM

## 2022-11-16 LAB
CREAT SERPL-MCNC: 2.5 MG/DL (ref 0.4–1.2)
ERYTHROCYTE [DISTWIDTH] IN BLOOD BY AUTOMATED COUNT: 17.3 % (ref 11.5–14.5)
ERYTHROCYTE [DISTWIDTH] IN BLOOD BY AUTOMATED COUNT: 55.5 FL (ref 35–45)
GFR SERPL CREATININE-BSD FRML MDRD: 29 ML/MIN/1.73M2
HCT VFR BLD CALC: 43.5 % (ref 42–52)
HEMOGLOBIN: 14.9 GM/DL (ref 14–18)
MCH RBC QN AUTO: 30.1 PG (ref 26–33)
MCHC RBC AUTO-ENTMCNC: 34.3 GM/DL (ref 32.2–35.5)
MCV RBC AUTO: 87.9 FL (ref 80–94)
PLATELET # BLD: 374 THOU/MM3 (ref 130–400)
PMV BLD AUTO: 11.4 FL (ref 9.4–12.4)
RBC # BLD: 4.95 MILL/MM3 (ref 4.7–6.1)
SCAN OF BLOOD SMEAR: NORMAL
WBC # BLD: 7.2 THOU/MM3 (ref 4.8–10.8)

## 2022-11-16 PROCEDURE — 2580000003 HC RX 258: Performed by: RADIOLOGY

## 2022-11-16 PROCEDURE — 77012 CT SCAN FOR NEEDLE BIOPSY: CPT

## 2022-11-16 PROCEDURE — 38221 DX BONE MARROW BIOPSIES: CPT

## 2022-11-16 PROCEDURE — 6360000002 HC RX W HCPCS: Performed by: RADIOLOGY

## 2022-11-16 PROCEDURE — 6370000000 HC RX 637 (ALT 250 FOR IP): Performed by: RADIOLOGY

## 2022-11-16 RX ORDER — MIDAZOLAM HYDROCHLORIDE 1 MG/ML
1 INJECTION INTRAMUSCULAR; INTRAVENOUS ONCE
Status: COMPLETED | OUTPATIENT
Start: 2022-11-16 | End: 2022-11-16

## 2022-11-16 RX ORDER — SODIUM CHLORIDE 450 MG/100ML
INJECTION, SOLUTION INTRAVENOUS CONTINUOUS
Status: DISCONTINUED | OUTPATIENT
Start: 2022-11-16 | End: 2022-11-17 | Stop reason: HOSPADM

## 2022-11-16 RX ORDER — FENTANYL CITRATE 50 UG/ML
50 INJECTION, SOLUTION INTRAMUSCULAR; INTRAVENOUS ONCE
Status: COMPLETED | OUTPATIENT
Start: 2022-11-16 | End: 2022-11-16

## 2022-11-16 RX ORDER — IBUPROFEN 200 MG
TABLET ORAL ONCE
Status: COMPLETED | OUTPATIENT
Start: 2022-11-16 | End: 2022-11-16

## 2022-11-16 RX ADMIN — MIDAZOLAM 0.5 MG: 1 INJECTION INTRAMUSCULAR; INTRAVENOUS at 10:07

## 2022-11-16 RX ADMIN — FENTANYL CITRATE 25 MCG: 0.05 INJECTION, SOLUTION INTRAMUSCULAR; INTRAVENOUS at 10:07

## 2022-11-16 RX ADMIN — BACITRACIN ZINC, NEOMYCIN, POLYMYXIN B 0.9 G: 400; 3.5; 5 OINTMENT TOPICAL at 10:30

## 2022-11-16 RX ADMIN — SODIUM CHLORIDE: 4.5 INJECTION, SOLUTION INTRAVENOUS at 08:46

## 2022-11-16 ASSESSMENT — PAIN - FUNCTIONAL ASSESSMENT: PAIN_FUNCTIONAL_ASSESSMENT: NONE - DENIES PAIN

## 2022-11-16 ASSESSMENT — PAIN SCALES - GENERAL: PAINLEVEL_OUTOF10: 0

## 2022-11-16 NOTE — H&P
[address] [address]  Sedation/Analgesia History & Physical    Pt Name: Danisha Willis  MRN: [id number]  YOB: 1960  Provider Performing Procedure: Gayle Manuel MD, MD  Primary Care Physician: Hemalatha Jackman, DO    PRE-PROCEDURE   DNR-CCA/DNR-CC []Yes [x]No  Brief History/Pre-Procedure Diagnosis: Amyloidosis          MEDICAL HISTORY  []CAD/Valve  []Liver Disease  []Lung Disease []Diabetes  []Hypertension []Renal Disease  []Additional information:       has a past medical history of Bilateral inguinal hernia, CKD (chronic kidney disease) stage 3, GFR 30-59 ml/min ([facility] [address] Hypoalbuminemia, and Monoclonal gammopathy. SURGICAL HISTORY   has a past surgical history that includes Hernia repair; knee surgery (Left); Appendectomy; Colonoscopy; Tonsillectomy; CT BIOPSY RENAL (10/26/2022); and CT BIOPSY RENAL (10/26/2022). Additional information:       ALLERGIES   Allergies as of 11/16/2022    (No Known Allergies)     Additional information:       MEDICATIONS   Coumadin Use Last 5 Days [x]No []Yes  Antiplatelet drug therapy use last 5 days  [x]No []Yes  Other anticoagulant use last 5 days  [x]No []Yes  No current outpatient medications on file.     Current Facility-Administered Medications:     0.45 % sodium chloride infusion, , IntraVENous, Continuous, Gayle Manuel MD, Last Rate: 20 mL/hr at 11/16/22 0846, New Bag at 11/16/22 0846    fentaNYL (SUBLIMAZE) injection 50 mcg, 50 mcg, IntraVENous, Once, Gayle Manuel MD    midazolam (VERSED) injection 1 mg, 1 mg, IntraVENous, Once, Gayle Manuel MD    neomycin-bacitracin-polymyxin (NEOSPORIN) ointment, , Topical, Once, Gayle Manuel MD  Prior to Admission medications    Not on File     Additional information:       VITAL SIGNS   Vitals:    11/16/22 1003   BP: 109/71   Pulse: 82   Resp: 15   Temp:    SpO2: 100%       PHYSICAL:   Heart:  [x]Regular rate and rhythm  []Other:    Lungs:  [x]Clear []Other:    Abdomen: [x]Soft    []Other:    Mental Status: [x]Alert & Oriented  []Other:      PLANNED PROCEDURE   [x]Biospy []Arteriogram              []Drainage   []Mediport Insertion  []Fistulogram []IV access       []Vertebroplasty / Augmentation  []IVC filter []Dialysis catheter []Biliary drainage  []Other: []CAPD Catheter []Nephrostomy Tube / Stent  SEDATION  Planned agent:[x]Midazolam []Meperidine [x]Sublimaze []Dilaudid []Morphine     []Diazepam  []Other:     ASA Classification:  []1 [x]2 []3 []4 []5  Class 1: A normal healthy patient  Class 2: Pt with mild to moderate systemic disease  Class 3: Severe systemic disease or disturbance  Class 4: Severe systemic disorders that are already life threatening. Class 5: Moribund pt with little chances of survival, for more than 24 hours. Mallampati I Airway Classification:   []1 [x]2 []3 []4    [x]Pre-procedure diagnostic studies complete and results available. Comment:    [x]Previous sedation/anesthesia experiences assessed. Comment:    [x]The patient is an appropriate candidate to undergo the planned procedure sedation and anesthesia. (Refer to nursing sedation/analgesia documentation record)  [x]Formulation and discussion of sedation/procedure plan, risks, and expectations with patient and/or responsible adult completed. [x]Patient examined immediately prior to the procedure.  (Refer to nursing sedation/analgesia documentation record)    Carmela Turner MD, MD  Electronically signed 11/16/2022 at 10:08 AM

## 2022-11-16 NOTE — PROGRESS NOTES
4891 Pt in CT imaging for CT guided bone marrow biopsy. Discussed procedure with pt and friend and both verbalizes understanding. 8873 Dr Raman Zuluaga to speak to pt. Consent signed. 1001 Pt positioned prone on table and attached to monitor. Pre biopsy scans taken. 1022 Bone marrow aspirate obtained. 1025 Additional aspirate obtained. 1028 Biopsy complete. Pt tolerated well. 1029 Needle removed and pressure applied to site. Post images taken. 1030 Triple antibiotic ointment applied to site with band-aid. Site without redness, swelling or hematoma. 1035 Pt positioned on cart for comfort. Denies any pain. 1036 Transferred to [facility] per cart and report called to [facility].

## 2022-11-16 NOTE — DISCHARGE INSTRUCTIONS
SEDATION/ANALGESIA INFORMATION HOME GOING ADVICE    Review the following information with the patient prior to the procedure. Sedation/agalgesia is used during short medical procedures under controlled supervision. The medication will produce a strong relaxation. You will be able to hear, speak and follow instructions, but you memory and alertness will be decreased. You will be able to swallow and breathe on your own. During sedation/analgesia you blood pressure, hear and breathing will be watched closely. After the procedure, you may not remember what was said or done. Procedure: bone marrow biopsy        Date: 11/16/22  You may have the following effects from the medication. Drowsiness, dizziness, sleepiness or confusion. Difficulty remembering or delayed reaction times. Loss of fine muscle control or difficulty with your balance especially while walking. Difficulty focusing or blurred vision. You may not be aware of slight changes in your behavior and/or your reaction time because of the medication used during the procedure. Therefore you should follow these instructions. Have someone responsible help you with your care. Do not drive for 24 hours. Do not operate equipment for 24 hours (lawnmowers, power tools, kitchen accessories, stove). Do not drink any alcoholic beverages for a minimum of 24 hours. Do not make important personal, legal or business decisions for 24 hours. You may experience dizziness or lightheadedness. Move slowly and carefully, do not make sudden position changes. Drink extra amounts of fluids today. Increase your diet as tolerated (unless you have received specific instructions from your doctor). If you feel nauseated, continue with liquids until nausea is gone  Notify your physician if you have not urinated within 8 hours after the procedure. Resume your medications unless otherwise instructed. contact your physician if you have any questions or concerns.   I have been informed and understand how to care for myself/the patient at home. i know who to call if [place] question or concerns. The adult responsible for my discharge care is: family        You have received the sedation/analgesia medications/s: fentanyl,versed         IF YOU REPORT TO AN EMERGENCY ROOM, DOCTOR'S OFFICE OR HOSPITAL WITHIN 24 HRS AFTER PROCEDURE, BRING THIS SHEET WITH YOU AND GIVE IT THE PHYSICIAN OR NURSE ATTENDING YOU. POST BIOPSY DISCHARGE INSTRUCTION SHEET    DIET:  As tolerated    ACTIVITY:  Rest at home on sofa, bed or recliner today. Bathroom privileges only today. Limit any exertion (pushing or pulling) today. No lifting for 3 days. No driving today. Check biopsy site frequently today. Resume any blood thinners in 24 hours. Keep band aid clean & dry - replace as needed, may remove in 48 hours. RETURN TO NEAREST EMERGENCY ROOM IF YOU HAVE ANY OF THE FOLLOWING:  Sign of bleeding, swelling, drainage from biopsy site or severe pain (slight discomfort to be expected) around biopsy site. Repeated nausea/vomiting/abdominal pain. Elevated temperature above 101 degrees. Shortness of breath. Chest pain. Keep scheduled appointment with your physician. If sedation given, follow post sedation instruction sheet.       _

## 2022-11-16 NOTE — H&P
Formulation and discussion of sedation / procedure plans, risks, benefits, side effects and alternatives with patient and/or responsible adult completed. History and Physical reviewed and unchanged.     Electronically signed by Humberto Meng MD on 11/16/22 at 10:08 AM EST

## 2022-11-20 LAB — LEUKEMIA/LYMPHOMA PHENO BONE MARROW: NORMAL

## 2022-11-23 ENCOUNTER — HOSPITAL ENCOUNTER (OUTPATIENT)
Dept: PET IMAGING | Age: 62
Discharge: HOME | End: 2022-11-23
Payer: COMMERCIAL

## 2022-11-23 ENCOUNTER — HOSPITAL ENCOUNTER (OUTPATIENT)
Dept: NON INVASIVE DIAGNOSTICS | Age: 62
Discharge: HOME | End: 2022-11-23
Payer: COMMERCIAL

## 2022-11-23 ENCOUNTER — TELEPHONE (OUTPATIENT)
Dept: ONCOLOGY | Age: 62
End: 2022-11-23

## 2022-11-23 DIAGNOSIS — E85.81 LIGHT CHAIN (AL) AMYLOIDOSIS (HCC): ICD-10-CM

## 2022-11-23 PROCEDURE — 78815 PET IMAGE W/CT SKULL-THIGH: CPT

## 2022-11-23 PROCEDURE — A9552 F18 FDG: HCPCS | Performed by: INTERNAL MEDICINE

## 2022-11-23 PROCEDURE — 3430000000 HC RX DIAGNOSTIC RADIOPHARMACEUTICAL: Performed by: INTERNAL MEDICINE

## 2022-11-23 PROCEDURE — 93306 TTE W/DOPPLER COMPLETE: CPT

## 2022-11-23 RX ORDER — FLUDEOXYGLUCOSE F 18 200 MCI/ML
16.7 INJECTION, SOLUTION INTRAVENOUS
Status: COMPLETED | OUTPATIENT
Start: 2022-11-23 | End: 2022-11-23

## 2022-11-23 RX ADMIN — FLUDEOXYGLUCOSE F 18 16.7 MILLICURIE: 200 INJECTION, SOLUTION INTRAVENOUS at 08:08

## 2022-11-23 NOTE — TELEPHONE ENCOUNTER
Patient has been having stomach pain and swelling for a couple weeks. He's tried Tums and Gas-x with no relief. He's still having bowel movements like normal. Said he's not eating much anymore because of it. Please advise.

## 2022-11-30 ENCOUNTER — OFFICE VISIT (OUTPATIENT)
Dept: ONCOLOGY | Age: 62
End: 2022-11-30
Payer: COMMERCIAL

## 2022-11-30 ENCOUNTER — HOSPITAL ENCOUNTER (OUTPATIENT)
Dept: INFUSION THERAPY | Age: 62
Discharge: HOME | End: 2022-11-30
Payer: COMMERCIAL

## 2022-11-30 VITALS
OXYGEN SATURATION: 99 % | WEIGHT: 133.6 LBS | BODY MASS INDEX: 19.13 KG/M2 | SYSTOLIC BLOOD PRESSURE: 96 MMHG | RESPIRATION RATE: 20 BRPM | DIASTOLIC BLOOD PRESSURE: 66 MMHG | TEMPERATURE: 97.9 F | HEIGHT: 70 IN | HEART RATE: 86 BPM

## 2022-11-30 VITALS
RESPIRATION RATE: 20 BRPM | BODY MASS INDEX: 19.13 KG/M2 | WEIGHT: 133.6 LBS | SYSTOLIC BLOOD PRESSURE: 96 MMHG | DIASTOLIC BLOOD PRESSURE: 66 MMHG | TEMPERATURE: 97.9 F | OXYGEN SATURATION: 99 % | HEIGHT: 70 IN | HEART RATE: 86 BPM

## 2022-11-30 DIAGNOSIS — E85.81 LIGHT CHAIN (AL) AMYLOIDOSIS (HCC): Primary | ICD-10-CM

## 2022-11-30 PROCEDURE — 99215 OFFICE O/P EST HI 40 MIN: CPT | Performed by: INTERNAL MEDICINE

## 2022-11-30 PROCEDURE — 99211 OFF/OP EST MAY X REQ PHY/QHP: CPT

## 2022-11-30 NOTE — PROGRESS NOTES
[facility] CANCER CENTER  CANCER NETWORK OF [facility]  [facility] [address] [facility] [address]  [address] [facility] [address]  [address]  Dept: [phone]  Dept Fax: [phone]: [phone]     Encounter Date: 11/30/2022     Primary Provider: Damien Mccabe DO     HPI:  Massiel Ascencio is a very pleasant 63 y.o. male  initially referred for evaluation of AL amyloidosis. He has lost approximately 35 pounds over the last 3 months  Kidney biopsy 10/26/2022 confirmed AL amyloidosis lambda light chain restriction. He is having diarrhea ++,  Colonoscopy 8/10/22 . .. TA . Amyloid staining was not done     Subsequently Bone marrow biopsy 11/16/22 showed Normocellular marrow for age (approximately 30-40%) with trilineage hematopoiesis. The identical lambda light chain restriction among both the B-cell and plasma cell populations raises the possibility of a B-cell non-Hodgkin lymphoma with plasmacytic differentiation. MYD88 mutation was positive. 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 UPEP showing lambda free LC. Patient is here to discuss further management. Review of Systems  Constitutional: Negative. HENT: Negative. Eyes: Negative. Respiratory: Negative. Cardiovascular: Negative. Gastrointestinal: Negative. Genitourinary: Negative. Musculoskeletal: Negative. Skin: Negative. Neurological: Negative. Hematological: Negative. Psychiatric/Behavioral: Negative. Past Medical History   has a past medical history of Bilateral inguinal hernia, CKD (chronic kidney disease) stage 3, GFR 30-59 ml/min ([facility]), Hypoalbuminemia, and Monoclonal gammopathy. Surgical History   has a past surgical history that includes Hernia repair; knee surgery (Left); Appendectomy; Colonoscopy;  Tonsillectomy; CT BIOPSY RENAL (10/26/2022); CT BIOPSY RENAL (10/26/2022); and CT BIOPSY BONE MARROW (11/16/2022). Home Medications  currently has no medications in their medication list.    Allergies  No Known Allergies    Social History   reports that he has been smoking cigarettes. He has a 120.00 pack-year smoking history. He has never used smokeless tobacco. He reports that he does not currently use alcohol. He reports that he does not use drugs. Family History  family history includes Cancer in his father; Diabetes in his paternal grandmother; Hypertension in his father and mother. Labs: Reviewed    Physical Exam     General: Non-ill appearing. 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 PET unremarkable except ascites  Diarrhea ++ refuses repeat colonoscopy. Unclear if related to amyloid GI involvement   We had initially discussed potential use of VCD. BM bx has however confirmed MYD88 (+) LPL. We will refer pt to [facility] for second opinion   RTC  2 weeks         Patient Instructions   Refer to [address] [place] [address] UPEP (24 hr)   RTC  2 weeks     Tae Kay MD  11/30/2022      **This report has been created using voice recognition software. It may contain minor errors which are inherent in voice recognition technology. **

## 2022-12-06 ENCOUNTER — OFFICE VISIT (OUTPATIENT)
Dept: NEPHROLOGY | Age: 62
End: 2022-12-06
Payer: COMMERCIAL

## 2022-12-06 VITALS
DIASTOLIC BLOOD PRESSURE: 87 MMHG | OXYGEN SATURATION: 98 % | SYSTOLIC BLOOD PRESSURE: 120 MMHG | HEART RATE: 89 BPM | BODY MASS INDEX: 19.23 KG/M2 | WEIGHT: 134 LBS

## 2022-12-06 DIAGNOSIS — E87.20 METABOLIC ACIDOSIS: ICD-10-CM

## 2022-12-06 DIAGNOSIS — N08 RENAL AMYLOIDOSIS (HCC): ICD-10-CM

## 2022-12-06 DIAGNOSIS — E85.4 RENAL AMYLOIDOSIS (HCC): ICD-10-CM

## 2022-12-06 DIAGNOSIS — N18.4 CKD (CHRONIC KIDNEY DISEASE), STAGE IV (HCC): Primary | ICD-10-CM

## 2022-12-06 PROCEDURE — 99213 OFFICE O/P EST LOW 20 MIN: CPT | Performed by: INTERNAL MEDICINE

## 2022-12-06 RX ORDER — FUROSEMIDE 40 MG/1
40 TABLET ORAL DAILY
Qty: 3 TABLET | Refills: 0 | Status: SHIPPED | OUTPATIENT
Start: 2022-12-06

## 2022-12-06 RX ORDER — SODIUM BICARBONATE 650 MG/1
650 TABLET ORAL 2 TIMES DAILY
Qty: 60 TABLET | Refills: 3 | Status: SHIPPED | OUTPATIENT
Start: 2022-12-06

## 2022-12-06 NOTE — PROGRESS NOTES
[facility]  [address] [facility] [address]  Dept: [phone]  Loc: [phone]  Office Progress Note  12/6/2022 3:36 PM      Pt Name:    Fe Nunes  YOB: 1960  Primary Care Physician:  Raman Herrera DO     Chief Complaint: CKD, proteinuria, Amyloidosis       Background Information/Interval History:   The patient is a 63 y.o. male who says he first was informed about his slow kidney and high BP when he went to get his hernia done. He was not following with any doctors until then. He reports he knew he had high BP but he had now seen anyone since 2005. He says it never used to be this high. Anyway, he has not had any medical care from 2005 until now. He was noticed to have creat of 1.6. He had 3+ protein. Has taken  ibuprofen as needed. Work-up showed nephrotic rnage proteinuria and creat of over 2. 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 Was referred to [facility] hematology and they have referred him to [facility] oncology. Scheduled to see hematology at [facility] tomorrow. Feels weak, tired. Here with his daughter. Still smoking 0.5 pack per day.         Past History:  Past Medical History:   Diagnosis Date    Bilateral inguinal hernia     CKD (chronic kidney disease) stage 3, GFR 30-59 ml/min ([facility])     Hypoalbuminemia     Monoclonal gammopathy      Past Surgical History:   Procedure Laterality Date    APPENDECTOMY      COLONOSCOPY      CT BIOPSY RENAL  10/26/2022    CT BIOPSY RENAL [facility] CT SCAN    CT BIOPSY RENAL  10/26/2022    CT BIOPSY RENAL 10/26/2022 [facility] CT SCAN    CT BONE MARROW BIOPSY  11/16/2022    CT BONE MARROW BIOPSY 11/16/2022 [facility] CT SCAN    HERNIA REPAIR      double    KNEE SURGERY Left     TONSILLECTOMY          VITALS:  /87 (Site: Right Upper Arm, Position: Sitting, Cuff Size: Medium Adult)   Pulse 89   Wt 134 lb (60.8 kg)   SpO2 98%   BMI 19.23 kg/m²   Wt Readings from bicarbonate. 4. HTN: BP is stable  5. Smoking: advised cessation  6. Dyslipidemia: Pt says PCP prescribed cholesterol medication but he did not tolerate- lost weight and appetite. 7. Edema: lasix x 3 days  D/W daughter. Orders Placed This Encounter   Procedures    Basic Metabolic Panel    Basic Metabolic Panel    PTH, Intact    Phosphorus    Vitamin D 25 Hydroxy     Return in about 6 weeks (around 1/17/2023).       Adri Carter MD  [facility]

## 2022-12-13 ENCOUNTER — OFFICE VISIT (OUTPATIENT)
Dept: ONCOLOGY | Age: 62
End: 2022-12-13
Payer: COMMERCIAL

## 2022-12-13 ENCOUNTER — HOSPITAL ENCOUNTER (OUTPATIENT)
Dept: INFUSION THERAPY | Age: 62
Discharge: HOME | End: 2022-12-13
Payer: COMMERCIAL

## 2022-12-13 VITALS
RESPIRATION RATE: 16 BRPM | BODY MASS INDEX: 19.47 KG/M2 | OXYGEN SATURATION: 98 % | HEIGHT: 70 IN | SYSTOLIC BLOOD PRESSURE: 100 MMHG | WEIGHT: 136 LBS | TEMPERATURE: 97.6 F | HEART RATE: 90 BPM | DIASTOLIC BLOOD PRESSURE: 76 MMHG

## 2022-12-13 VITALS
TEMPERATURE: 97.6 F | DIASTOLIC BLOOD PRESSURE: 76 MMHG | RESPIRATION RATE: 16 BRPM | HEART RATE: 90 BPM | SYSTOLIC BLOOD PRESSURE: 100 MMHG

## 2022-12-13 DIAGNOSIS — E85.81 LIGHT CHAIN (AL) AMYLOIDOSIS (HCC): Primary | ICD-10-CM

## 2022-12-13 PROCEDURE — 99214 OFFICE O/P EST MOD 30 MIN: CPT | Performed by: INTERNAL MEDICINE

## 2022-12-13 PROCEDURE — 99211 OFF/OP EST MAY X REQ PHY/QHP: CPT

## 2022-12-13 RX ORDER — DEXAMETHASONE 0.5 MG/1
20 TABLET ORAL ONCE
OUTPATIENT
Start: 2023-01-09 | End: 2023-01-09

## 2022-12-13 RX ORDER — ACETAMINOPHEN 325 MG/1
650 TABLET ORAL ONCE
OUTPATIENT
Start: 2023-01-09 | End: 2023-01-09

## 2022-12-13 RX ORDER — MEPERIDINE HYDROCHLORIDE 50 MG/ML
12.5 INJECTION INTRAMUSCULAR; INTRAVENOUS; SUBCUTANEOUS PRN
OUTPATIENT
Start: 2023-01-09

## 2022-12-13 RX ORDER — SODIUM CHLORIDE 9 MG/ML
5-40 INJECTION INTRAVENOUS PRN
OUTPATIENT
Start: 2023-01-02

## 2022-12-13 RX ORDER — DIPHENHYDRAMINE HCL 25 MG
25 TABLET ORAL ONCE
OUTPATIENT
Start: 2022-12-19 | End: 2022-12-19

## 2022-12-13 RX ORDER — DIPHENHYDRAMINE HYDROCHLORIDE 50 MG/ML
50 INJECTION INTRAMUSCULAR; INTRAVENOUS
OUTPATIENT
Start: 2022-12-26

## 2022-12-13 RX ORDER — FAMOTIDINE 10 MG/ML
20 INJECTION, SOLUTION INTRAVENOUS
OUTPATIENT
Start: 2023-01-02

## 2022-12-13 RX ORDER — MEPERIDINE HYDROCHLORIDE 50 MG/ML
12.5 INJECTION INTRAMUSCULAR; INTRAVENOUS; SUBCUTANEOUS PRN
OUTPATIENT
Start: 2022-12-19

## 2022-12-13 RX ORDER — ACETAMINOPHEN 325 MG/1
650 TABLET ORAL
OUTPATIENT
Start: 2022-12-19

## 2022-12-13 RX ORDER — ONDANSETRON 2 MG/ML
8 INJECTION INTRAMUSCULAR; INTRAVENOUS
OUTPATIENT
Start: 2022-12-26

## 2022-12-13 RX ORDER — FAMOTIDINE 10 MG/ML
20 INJECTION, SOLUTION INTRAVENOUS
OUTPATIENT
Start: 2023-01-09

## 2022-12-13 RX ORDER — ACETAMINOPHEN 325 MG/1
650 TABLET ORAL
OUTPATIENT
Start: 2023-01-02

## 2022-12-13 RX ORDER — EPINEPHRINE 1 MG/ML
0.3 INJECTION, SOLUTION, CONCENTRATE INTRAVENOUS PRN
OUTPATIENT
Start: 2023-01-09

## 2022-12-13 RX ORDER — DIPHENHYDRAMINE HYDROCHLORIDE 50 MG/ML
50 INJECTION INTRAMUSCULAR; INTRAVENOUS
OUTPATIENT
Start: 2023-01-02

## 2022-12-13 RX ORDER — DIPHENHYDRAMINE HYDROCHLORIDE 50 MG/ML
50 INJECTION INTRAMUSCULAR; INTRAVENOUS
OUTPATIENT
Start: 2023-01-09

## 2022-12-13 RX ORDER — DEXAMETHASONE 0.5 MG/1
20 TABLET ORAL ONCE
OUTPATIENT
Start: 2022-12-19 | End: 2022-12-19

## 2022-12-13 RX ORDER — SODIUM CHLORIDE 9 MG/ML
5-250 INJECTION, SOLUTION INTRAVENOUS PRN
OUTPATIENT
Start: 2022-12-26

## 2022-12-13 RX ORDER — MONTELUKAST SODIUM 10 MG/1
10 TABLET ORAL ONCE
OUTPATIENT
Start: 2022-12-19 | End: 2022-12-19

## 2022-12-13 RX ORDER — EPINEPHRINE 1 MG/ML
0.3 INJECTION, SOLUTION, CONCENTRATE INTRAVENOUS PRN
OUTPATIENT
Start: 2023-01-02

## 2022-12-13 RX ORDER — ACYCLOVIR 400 MG/1
400 TABLET ORAL 2 TIMES DAILY
Qty: 60 TABLET | Refills: 3 | Status: SHIPPED | OUTPATIENT
Start: 2022-12-13 | End: 2023-01-12

## 2022-12-13 RX ORDER — ONDANSETRON 4 MG/1
8 TABLET, ORALLY DISINTEGRATING ORAL
OUTPATIENT
Start: 2023-01-09

## 2022-12-13 RX ORDER — DIPHENHYDRAMINE HCL 25 MG
25 TABLET ORAL ONCE
OUTPATIENT
Start: 2022-12-26 | End: 2022-12-26

## 2022-12-13 RX ORDER — ONDANSETRON 4 MG/1
8 TABLET, ORALLY DISINTEGRATING ORAL
OUTPATIENT
Start: 2022-12-19

## 2022-12-13 RX ORDER — ALBUTEROL SULFATE 90 UG/1
4 AEROSOL, METERED RESPIRATORY (INHALATION) PRN
OUTPATIENT
Start: 2022-12-26

## 2022-12-13 RX ORDER — EPINEPHRINE 1 MG/ML
0.3 INJECTION, SOLUTION, CONCENTRATE INTRAVENOUS PRN
OUTPATIENT
Start: 2022-12-26

## 2022-12-13 RX ORDER — PROCHLORPERAZINE MALEATE 10 MG
10 TABLET ORAL EVERY 6 HOURS PRN
Qty: 60 TABLET | Refills: 3 | Status: SHIPPED | OUTPATIENT
Start: 2022-12-13

## 2022-12-13 RX ORDER — ONDANSETRON 4 MG/1
8 TABLET, ORALLY DISINTEGRATING ORAL
OUTPATIENT
Start: 2022-12-26

## 2022-12-13 RX ORDER — SODIUM CHLORIDE 9 MG/ML
5-250 INJECTION, SOLUTION INTRAVENOUS PRN
OUTPATIENT
Start: 2022-12-19

## 2022-12-13 RX ORDER — ESOMEPRAZOLE MAGNESIUM 20 MG/1
20 FOR SUSPENSION ORAL DAILY
COMMUNITY

## 2022-12-13 RX ORDER — DEXAMETHASONE 0.5 MG/1
20 TABLET ORAL ONCE
OUTPATIENT
Start: 2023-01-02 | End: 2023-01-02

## 2022-12-13 RX ORDER — DIPHENHYDRAMINE HYDROCHLORIDE 50 MG/ML
50 INJECTION INTRAMUSCULAR; INTRAVENOUS
OUTPATIENT
Start: 2022-12-19

## 2022-12-13 RX ORDER — ACETAMINOPHEN 325 MG/1
650 TABLET ORAL ONCE
OUTPATIENT
Start: 2023-01-02 | End: 2023-01-02

## 2022-12-13 RX ORDER — ACETAMINOPHEN 325 MG/1
650 TABLET ORAL ONCE
OUTPATIENT
Start: 2022-12-26 | End: 2022-12-26

## 2022-12-13 RX ORDER — MEPERIDINE HYDROCHLORIDE 50 MG/ML
12.5 INJECTION INTRAMUSCULAR; INTRAVENOUS; SUBCUTANEOUS PRN
OUTPATIENT
Start: 2022-12-26

## 2022-12-13 RX ORDER — SODIUM CHLORIDE 9 MG/ML
INJECTION, SOLUTION INTRAVENOUS CONTINUOUS
OUTPATIENT
Start: 2023-01-09

## 2022-12-13 RX ORDER — SODIUM CHLORIDE 9 MG/ML
5-40 INJECTION INTRAVENOUS PRN
OUTPATIENT
Start: 2023-01-09

## 2022-12-13 RX ORDER — SODIUM CHLORIDE 9 MG/ML
5-40 INJECTION INTRAVENOUS PRN
OUTPATIENT
Start: 2022-12-26

## 2022-12-13 RX ORDER — DIPHENHYDRAMINE HCL 25 MG
25 TABLET ORAL ONCE
OUTPATIENT
Start: 2023-01-09 | End: 2023-01-09

## 2022-12-13 RX ORDER — ALBUTEROL SULFATE 90 UG/1
4 AEROSOL, METERED RESPIRATORY (INHALATION) PRN
OUTPATIENT
Start: 2023-01-02

## 2022-12-13 RX ORDER — SODIUM CHLORIDE 9 MG/ML
5-40 INJECTION INTRAVENOUS PRN
OUTPATIENT
Start: 2022-12-19

## 2022-12-13 RX ORDER — HEPARIN SODIUM (PORCINE) LOCK FLUSH IV SOLN 100 UNIT/ML 100 UNIT/ML
500 SOLUTION INTRAVENOUS PRN
OUTPATIENT
Start: 2022-12-26

## 2022-12-13 RX ORDER — SODIUM CHLORIDE 9 MG/ML
5-250 INJECTION, SOLUTION INTRAVENOUS PRN
OUTPATIENT
Start: 2023-01-09

## 2022-12-13 RX ORDER — SODIUM CHLORIDE 9 MG/ML
INJECTION, SOLUTION INTRAVENOUS CONTINUOUS
OUTPATIENT
Start: 2023-01-02

## 2022-12-13 RX ORDER — MEPERIDINE HYDROCHLORIDE 50 MG/ML
12.5 INJECTION INTRAMUSCULAR; INTRAVENOUS; SUBCUTANEOUS PRN
OUTPATIENT
Start: 2023-01-02

## 2022-12-13 RX ORDER — DIPHENHYDRAMINE HCL 25 MG
25 TABLET ORAL ONCE
OUTPATIENT
Start: 2023-01-02 | End: 2023-01-02

## 2022-12-13 RX ORDER — HEPARIN SODIUM (PORCINE) LOCK FLUSH IV SOLN 100 UNIT/ML 100 UNIT/ML
500 SOLUTION INTRAVENOUS PRN
OUTPATIENT
Start: 2023-01-02

## 2022-12-13 RX ORDER — ONDANSETRON 2 MG/ML
8 INJECTION INTRAMUSCULAR; INTRAVENOUS
OUTPATIENT
Start: 2023-01-09

## 2022-12-13 RX ORDER — ONDANSETRON 2 MG/ML
8 INJECTION INTRAMUSCULAR; INTRAVENOUS
OUTPATIENT
Start: 2022-12-19

## 2022-12-13 RX ORDER — HEPARIN SODIUM (PORCINE) LOCK FLUSH IV SOLN 100 UNIT/ML 100 UNIT/ML
500 SOLUTION INTRAVENOUS PRN
OUTPATIENT
Start: 2022-12-19

## 2022-12-13 RX ORDER — SODIUM CHLORIDE 9 MG/ML
INJECTION, SOLUTION INTRAVENOUS CONTINUOUS
OUTPATIENT
Start: 2022-12-19

## 2022-12-13 RX ORDER — ACETAMINOPHEN 325 MG/1
650 TABLET ORAL
OUTPATIENT
Start: 2022-12-26

## 2022-12-13 RX ORDER — SODIUM CHLORIDE 9 MG/ML
5-250 INJECTION, SOLUTION INTRAVENOUS PRN
OUTPATIENT
Start: 2023-01-02

## 2022-12-13 RX ORDER — ACETAMINOPHEN 325 MG/1
650 TABLET ORAL ONCE
OUTPATIENT
Start: 2022-12-19 | End: 2022-12-19

## 2022-12-13 RX ORDER — FAMOTIDINE 10 MG/ML
20 INJECTION, SOLUTION INTRAVENOUS
OUTPATIENT
Start: 2022-12-26

## 2022-12-13 RX ORDER — EPINEPHRINE 1 MG/ML
0.3 INJECTION, SOLUTION, CONCENTRATE INTRAVENOUS PRN
OUTPATIENT
Start: 2022-12-19

## 2022-12-13 RX ORDER — HEPARIN SODIUM (PORCINE) LOCK FLUSH IV SOLN 100 UNIT/ML 100 UNIT/ML
500 SOLUTION INTRAVENOUS PRN
OUTPATIENT
Start: 2023-01-09

## 2022-12-13 RX ORDER — ALBUTEROL SULFATE 90 UG/1
4 AEROSOL, METERED RESPIRATORY (INHALATION) PRN
OUTPATIENT
Start: 2023-01-09

## 2022-12-13 RX ORDER — ALBUTEROL SULFATE 90 UG/1
4 AEROSOL, METERED RESPIRATORY (INHALATION) PRN
OUTPATIENT
Start: 2022-12-19

## 2022-12-13 RX ORDER — ONDANSETRON 2 MG/ML
8 INJECTION INTRAMUSCULAR; INTRAVENOUS
OUTPATIENT
Start: 2023-01-02

## 2022-12-13 RX ORDER — ACETAMINOPHEN 325 MG/1
650 TABLET ORAL
OUTPATIENT
Start: 2023-01-09

## 2022-12-13 RX ORDER — FAMOTIDINE 20 MG/1
20 TABLET, FILM COATED ORAL ONCE
OUTPATIENT
Start: 2022-12-19 | End: 2022-12-19

## 2022-12-13 RX ORDER — ONDANSETRON 4 MG/1
8 TABLET, ORALLY DISINTEGRATING ORAL
OUTPATIENT
Start: 2023-01-02

## 2022-12-13 RX ORDER — DEXAMETHASONE 0.5 MG/1
20 TABLET ORAL ONCE
OUTPATIENT
Start: 2022-12-26 | End: 2022-12-26

## 2022-12-13 RX ORDER — SODIUM CHLORIDE 9 MG/ML
INJECTION, SOLUTION INTRAVENOUS CONTINUOUS
OUTPATIENT
Start: 2022-12-26

## 2022-12-13 RX ORDER — FAMOTIDINE 10 MG/ML
20 INJECTION, SOLUTION INTRAVENOUS
OUTPATIENT
Start: 2022-12-19

## 2022-12-13 NOTE — PROGRESS NOTES
New chemotherapy validation note:    Diagnosis for chemotherapy: Amloidosis    Regimen ordered: Perla-CyBor D      Reference or literature used for validation: Nccn     Date literature or guideline last updated 2022     Deviation from literature or guideline used: none    Summary of any verbal or telephone information obtained: n/a     Raul HADLEY Ph.  12/13/2022  4:32 PM

## 2022-12-13 NOTE — PROGRESS NOTES
[facility]  CANCER NETWORK OF [facility]  [facility] [address] [facility]  [address] [address]  Dept: [phone]  Dept Fax: [phone]: [phone]     Encounter Date: 12/13/2022    Primary Provider: Dom Mares DO     HPI:  Ankit Chu is a very pleasant 63 y.o. male seen in continued follow-up for amyloidosis. His major issue is fatigue. He feels worse towards the end of the day. He cannot lift weights as he used to   He denies orthopnea or PND. He denies chest pain or palpitations. He does endorse abdominal pain, \"foamy urine\". He has been having diarrhea. He has 3-6 BMs per day. Symptoms are better with imodium. Oncologic history  Pt started having problems after his hernia surgery July 2022. He has lost approximately 35 pounds. Further work-up confirmed proteinuria for which he underwent kidney biopsy. Kidney biopsy 10/26/2022 confirmed AL amyloidosis lambda light chain restriction. He was then referred to hematology for further evaluation. Labs done on 11/8/22: IgG 436, igM 1185, igE 53, IgA 288, B2 microglobulin 10.5 , M protein 0.83kappa 56, lambda 251, K/L ratio 0.23,  Bone marrow biopsy showed 10% PCs. FISH positive for MYD88    Review of Systems  Constitutional: Negative. HENT: Negative. Eyes: Negative. Respiratory: Negative. Cardiovascular: Negative. Gastrointestinal: Negative. Genitourinary: Negative. Musculoskeletal: Negative. Skin: Negative. Neurological: Negative. Hematological: Negative. Psychiatric/Behavioral: Negative. Past Medical History   has a past medical history of Bilateral inguinal hernia, CKD (chronic kidney disease) stage 3, GFR 30-59 ml/min ([facility]), Hypoalbuminemia, and Monoclonal gammopathy. Surgical History   has a past surgical history that includes Hernia repair; knee surgery (Left);  Appendectomy; Colonoscopy; Tonsillectomy; CT BIOPSY RENAL (10/26/2022); CT BIOPSY RENAL (10/26/2022); and CT BIOPSY BONE MARROW (11/16/2022). Home Medications  has a current medication list which includes the following prescription(s): esomeprazole magnesium, acyclovir, prochlorperazine, and sodium bicarbonate. Allergies  No Known Allergies    Social History   reports that he has been smoking cigarettes. He has a 20.00 pack-year smoking history. He has never used smokeless tobacco. He reports that he does not currently use alcohol. He reports that he does not use drugs. Family History  family history includes Cancer in his father; Diabetes in his paternal grandmother; Hypertension in his father and mother; Pancreatic Cancer in his brother. Labs: Reviewed    Physical Exam  Vitals:    12/13/22 0846   BP: 100/76   Pulse: 90   Resp: 16   Temp: 97.6 °F (36.4 °C)   SpO2: 98%      General: Non-ill appearing. HEENT: NC/AT,nonicteric, perrla,eom intact, no mucosal lesions  Neck: normal thyroid, no masses  Nodes: No adenopathy  Lungs/chest: clear, no rales,rhonchi or wheezing, lung bases clear  CV: rrr, no rubs ,gallops or murmurs  Abdomen: soft, non-tender,bowel sounds normal , no palpable hepatosplenomegaly  Back: normal curvature, No midline tenderness. flanks nontender  : Not Examined  Extremities: no cyanosis,clubbing or edema. Skin: unremarkable  Neuro: A and O x 4, CN exam nonfocal, Motor- no deficits, Sensory- no deficits, gait-nl, speech- fluent, no ataxia. Assessment/Plan:   Light chain (AL) amyloidosis (HCC) confirmed on Kidney biopsy 10/26/2022   Labs done on 11/8/22: IgG 436, igM 1185, igE 53, IgA 288, B2 microglobulin 10.5 , M protein 0.83kappa 56, lambda 251, K/L ratio 0.23,  Bone marrow biopsy showed 10% PCs. FISH positive for MYD88    Patient seen at [facility] 12/7/2022.   Recommendations reviewed  Suggest first-line therapy: Daratumumab/ cyclophosphamide / bortezomib /dexamethasone   Potential side effects discussed  We will proceed with getting authorization and start ASAP    Nutrition counseling  He may benefit from Megace or Marinol      Patient Instructions   Auth teach start daratumumab, cyclophosphamide, bortezomib and dexamethasone ASAP    RTC per treatment plan     Chris Cain MD  12/13/2022      **This report has been created using voice recognition software. It may contain minor errors which are inherent in voice recognition technology. **

## 2022-12-13 NOTE — PATIENT INSTRUCTIONS
Auth teach start daratumumab, cyclophosphamide, bortezomib and dexamethasone ASAP    RTC per treatment plan

## 2022-12-19 ENCOUNTER — TELEPHONE (OUTPATIENT)
Dept: INFUSION THERAPY | Age: 62
End: 2022-12-19

## 2022-12-19 ENCOUNTER — TELEPHONE (OUTPATIENT)
Dept: ONCOLOGY | Age: 62
End: 2022-12-19

## 2022-12-19 NOTE — TELEPHONE ENCOUNTER
Patient is continuing to have abdominal pain and bloating. He saw you about this on 11/30. He said it's terrible in the morning and he isn't eating because of it. Please advise.

## 2022-12-19 NOTE — TELEPHONE ENCOUNTER
Financial Navigator called patient and reviewed insurance, treatment, and any financial concerns. Fede Palma has [employer] and this is his only form of coverage. His insurance is effective 08-01-22 through 08-01-23. Fede Palma has an in network deductible of $800 and an OOP of $3500, both of which have been met. [facility] oncology treatment plan is still open with insurance and pending denial or approval.  Financial Navigator has sent high priority email to [employer] team to check on the status of his authorization. Fede Palma is a single and is not working at this time. He is receiving short term disability from his employer, [employer]. He receives $335 per week. Fede Palma is going to check with his employer if he could apply for  long term disability if needed once his short term is up. He rents his home and pays $450 per month. He does have a girlfriend that helps with costs. FN will be assisting Fede Palma with  applications to possibly receive Velcade and Darzalex at no cost.  There are no Amyloidosis grants open at this time but FN will continue to monitor.   Fede Palma will be signing applications at his 38-63

## 2022-12-19 NOTE — TELEPHONE ENCOUNTER
Signed        Financial Navigator called patient and reviewed insurance, treatment, and any financial concerns. Loraine Holstein has [employer] and this is his only form of coverage. His insurance is effective 08-01-22 through 08-01-23. Loraine Holstein has an in network deductible of $800 and an OOP of $3500, both of which have been met. [facility] treatment plan is still open with insurance and pending denial or approval.  Financial Navigator has sent high priority email to [employer] team to check on the status of his authorization. Loraine Holstein is a single and is not working at this time. He is receiving short term disability from his employer, [employer]. He receives $335 per week. Loraine Holstein is going to check with his employer if he could apply for  long term disability if needed once his short term is up. He rents his home and pays $450 per month. He does have a girlfriend that helps with costs. FN will be assisting Loraine Holstein with  applications to possibly receive Velcade and Darzalex at no cost.  There are no Amyloidosis grants open at this time but FN will continue to monitor. Loraine Holstein will be signing applications at his 27-13 chemo teaching appointment and will bring in proof of income. Referral made to Oncology SW for Medicaid application. Loraine Holstein has no other concerns or questions at this time. Understands insurance authorization process as well as application process.   FN will be monitoring for insurance authorization or denial.

## 2022-12-20 ENCOUNTER — HOSPITAL ENCOUNTER (OUTPATIENT)
Dept: INFUSION THERAPY | Age: 62
Discharge: HOME | End: 2022-12-20
Payer: COMMERCIAL

## 2022-12-20 VITALS — OXYGEN SATURATION: 95 % | SYSTOLIC BLOOD PRESSURE: 75 MMHG | HEART RATE: 70 BPM | DIASTOLIC BLOOD PRESSURE: 50 MMHG

## 2022-12-20 DIAGNOSIS — E85.81 LIGHT CHAIN (AL) AMYLOIDOSIS (HCC): Primary | ICD-10-CM

## 2022-12-20 DIAGNOSIS — E85.81 LIGHT CHAIN (AL) AMYLOIDOSIS (HCC): ICD-10-CM

## 2022-12-20 LAB
ABSOLUTE IMMATURE GRANULOCYTE: 0.01 THOU/MM3 (ref 0–0.07)
ALBUMIN SERPL-MCNC: 2 G/DL (ref 3.5–5.1)
ALP BLD-CCNC: 936 U/L (ref 38–126)
ALT SERPL-CCNC: 103 U/L (ref 11–66)
AST SERPL-CCNC: 46 U/L (ref 5–40)
BASINOPHIL, AUTOMATED: 0 % (ref 0–3)
BASOPHILS ABSOLUTE: 0 THOU/MM3 (ref 0–0.1)
BILIRUB SERPL-MCNC: < 0.2 MG/DL (ref 0.3–1.2)
BILIRUBIN DIRECT: < 0.2 MG/DL (ref 0–0.3)
BUN, WHOLE BLOOD: 65 MG/DL (ref 8–26)
CHLORIDE, WHOLE BLOOD: 110 MEQ/L (ref 98–109)
CREATININE, WHOLE BLOOD: 5.8 MG/DL (ref 0.5–1.2)
EOSINOPHILS ABSOLUTE: 0.1 THOU/MM3 (ref 0–0.4)
EOSINOPHILS RELATIVE PERCENT: 1 % (ref 0–4)
GFR, ESTIMATED ,CON: 10 ML/MIN/1.73M2
GLUCOSE, WHOLE BLOOD: 83 MG/DL (ref 70–108)
HCT VFR BLD CALC: 32.1 % (ref 42–52)
HEMOGLOBIN: 11.5 GM/DL (ref 14–18)
IMMATURE GRANULOCYTES: 0 %
IONIZED CALCIUM, WHOLE BLOOD: 1.01 MMOL/L (ref 1.12–1.32)
LYMPHOCYTES # BLD: 23 % (ref 15–47)
LYMPHOCYTES ABSOLUTE: 1.7 THOU/MM3 (ref 1–4.8)
MCH RBC QN AUTO: 30.4 PG (ref 26–33)
MCHC RBC AUTO-ENTMCNC: 35.8 GM/DL (ref 32.2–35.5)
MCV RBC AUTO: 85 FL (ref 80–94)
MONOCYTES ABSOLUTE: 0.3 THOU/MM3 (ref 0.4–1.3)
MONOCYTES: 5 % (ref 0–12)
PDW BLD-RTO: 18.1 % (ref 11.5–14.5)
PLATELET # BLD: 286 THOU/MM3 (ref 130–400)
PMV BLD AUTO: 11.7 FL (ref 9.4–12.4)
POTASSIUM, WHOLE BLOOD: 3.8 MEQ/L (ref 3.5–4.9)
RBC # BLD: 3.78 MILL/MM3 (ref 4.7–6.1)
SEG NEUTROPHILS: 71 % (ref 43–75)
SEGMENTED NEUTROPHILS ABSOLUTE COUNT: 5.1 THOU/MM3 (ref 1.8–7.7)
SODIUM, WHOLE BLOOD: 135 MEQ/L (ref 138–146)
TOTAL CO2, WHOLE BLOOD: 15 MEQ/L (ref 23–33)
TOTAL PROTEIN: 4.9 G/DL (ref 6.1–8)
WBC # BLD: 7.2 THOU/MM3 (ref 4.8–10.8)

## 2022-12-20 PROCEDURE — 80076 HEPATIC FUNCTION PANEL: CPT

## 2022-12-20 PROCEDURE — 80047 BASIC METABLC PNL IONIZED CA: CPT

## 2022-12-20 PROCEDURE — 99213 OFFICE O/P EST LOW 20 MIN: CPT

## 2022-12-20 PROCEDURE — 85025 COMPLETE CBC W/AUTO DIFF WBC: CPT

## 2022-12-20 NOTE — PROGRESS NOTES
Patient came in today for chemotherapy teaching. Patient complaining of fatigue, shortness of breath, abdominal pain and lightheadedness. Patient abdomen very distended, tight. States is having loose stools, passing gas. Skin is very pale warm and dry. States he was told to take tylenol for pain, but it is not working. Poor appetite. Chemotherapy teaching completed, see notes. Sherryle Addison R NP notified of above and hypotension. Labs ordered and drawn. Came and saw patient. Patient instructed to go to [facility] ER for evaluation due to increasing abdominal distention, increasing creatinine and hypotension. Spouse states she will drive him there. Discharged to go to ER. Report called to [address] Ramos RN at [facility] ER.

## 2022-12-20 NOTE — PLAN OF CARE
Problem: Safety - Adult  Goal: Free from fall injury  Outcome: Adequate for Discharge  Flowsheets (Taken 12/20/2022 1511)  Free From Fall Injury: Instruct family/caregiver on patient safety  Note: Patient free of falls this visit. Fall risks assessed. Precautions discussed. Call light within reach. Problem: Discharge Planning  Goal: Discharge to home or other facility with appropriate resources  Outcome: Adequate for Discharge  Flowsheets (Taken 12/20/2022 1511)  Discharge to home or other facility with appropriate resources:   Identify barriers to discharge with patient and caregiver   Arrange for needed discharge resources and transportation as appropriate  Note: Patient verbalizes understanding of discharge instructions, follow up appointment, and when to call physician if needed      Problem: Chronic Conditions and Co-morbidities  Goal: Patient's chronic conditions and co-morbidity symptoms are monitored and maintained or improved  Outcome: Adequate for Discharge  Flowsheets (Taken 12/20/2022 1511)  Care Plan - Patient's Chronic Conditions and Co-Morbidity Symptoms are Monitored and Maintained or Improved:   Monitor and assess patient's chronic conditions and comorbid symptoms for stability, deterioration, or improvement   Collaborate with multidisciplinary team to address chronic and comorbid conditions and prevent exacerbation or deterioration  Note: Patient verbalizes understanding to verbal information given on darzalex faspro, velcade and cytoxan, including action and possible side effects. Aware to call MD if develop complications.       Chemotherapy Teaching     What is Chemotherapy   Drug action [x]   Method of Administration [x]   Handouts given [x]     Side Effects  Nausea/vomiting [x]   Diarrhea [x]   Fatigue [x]   Signs / Symptoms of infection [x]   Neutropenia [x]   Thrombocytopenia [x]   Alopecia [x]   neuropathy [x]   [place] diet &  the importance of fluids [x]       Micellaneous  Importance of nutrition [x]   Importance of oral hygiene [x]   When to call the MD [x]   Monitoring labs [x]   Use of supportive services [x]     Explanation of Drug Regimen / Frequency  Darzalex faspro, cytoxan and velcade      Comments  Verbalized understanding to drug,action,side effects and when to call MD      Care plan reviewed with patient. Patient verbalizes understanding of the plan of care and contributes to goal setting.

## 2022-12-20 NOTE — PROGRESS NOTES
Chemotherapy/Immunotherapy Teaching Checklist    Treatment Plan: Velcade darzalex fast pro and cytoxan  Frequency: weekly  Patient accompanied by  spouse      Day of chemo instructions:  [x] Must have    [x] Eat light breakfast  [x] Bring pain medications/other routine medications scheduled during appointment time  [] Hold blood pressure medications morning of treatment    Treatment process:  [x] Flow of appointment  [x] IV access Peripheral start  or  PORT access process [x] EMLA cream  [x] Premedication/ Hydration  [x] Length of treatment  [x] Tour of clinic    Diet and hydration:  [x] Discuss [place] diet    [x] Eating Hints book provided  [x] Importance of hydration 48- 64 ounces daily   [x] Hydration handout provided     Side Effects:  [x] Chemotherapy and you book provided  [x] Side effects and management discussed   [x] Diarrhea[x]Nausea/Vomiting []home antiemetic [x]hair loss[x]Neuropathy[x] Constipation[x] Fatigue[x]Mouth sores[x] Dehydration[x] Skin/Nail Changes []Pneumonitis []Colitis [] Hepatitis []Thyroid changes  []Fertility issues (birth control, sperm/egg banking issues)    Labs:  [x]BMP- renal function and electrolytes discussed  [x] CBC- RBC, WBC with ANC, platelet counts discussed  [x]Understanding your Blood hand out given   [x]Neutropenia handout/Reduce infection handout [x]Thrombocytopenia handout   [x]Mt discussed    Complications that require immediate attention from physician:  [x]Fever 100.3 [x]signs of infection- chills, fever, burning with urination, worsening cough   [x]Uncontrolled vomiting [x]Uncontrolled diarrhea/constipation   [x]Unable to drink 48 ounces [x]Uncontrolled pain  [x] When to notify provider handout given  [x] After hours contact process discussed    Support Services:  [] Financial navigator   []RN navigator explained  []Local cancer society  []     Patient and family verbalized understanding and all  questions answered.  Encouraged to call for any concerns. Approximately 75 minutes spent with patient and family.

## 2022-12-21 ENCOUNTER — HOSPITAL ENCOUNTER (OUTPATIENT)
Dept: INFUSION THERAPY | Age: 62
End: 2022-12-21

## 2022-12-22 ENCOUNTER — TELEPHONE (OUTPATIENT)
Dept: ONCOLOGY | Age: 62
End: 2022-12-22

## 2022-12-22 NOTE — TELEPHONE ENCOUNTER
Oncology Social Work    Date: 12/22/2022  Time: 11:11 AM  Name: Antony Morocho  MRN: [id number]     Contact Type: Telephone    Note:    attempted phone call to the patient to discuss applying for Medicaid as  received a referral that the patient was interested in applying for Medicaid. Arjun Lopez answered the phone and stated that he was currently at [place] with the doctor.  to attempt to reach out to the patient on a better day.  will attempt to reach again. NEGRITO Marin, [facility]  Oncology Social Worker      Electronically signed by Souleymane Parks.  NEGRITO Cardenas, [place] on 12/22/2022 at 11:11 AM

## 2022-12-28 ENCOUNTER — HOSPITAL ENCOUNTER (OUTPATIENT)
Dept: INFUSION THERAPY | Age: 62
Discharge: HOME | End: 2022-12-28

## 2022-12-30 RX ORDER — SODIUM BICARBONATE 650 MG/1
TABLET ORAL
Qty: 60 TABLET | Refills: 3 | OUTPATIENT
Start: 2022-12-30

## 2023-01-01 ENCOUNTER — TELEPHONE (OUTPATIENT)
Dept: INFUSION THERAPY | Age: 63
End: 2023-01-01

## 2023-01-01 RX ORDER — MIDODRINE HYDROCHLORIDE 10 MG/1
TABLET ORAL
Qty: 90 TABLET | Refills: 6 | OUTPATIENT
Start: 2023-01-01

## 2023-01-16 ENCOUNTER — HOSPITAL ENCOUNTER (INPATIENT)
Age: 63
LOS: 5 days | Discharge: HOME | End: 2023-01-21
Attending: INTERNAL MEDICINE | Admitting: INTERNAL MEDICINE
Payer: COMMERCIAL

## 2023-01-16 PROBLEM — N17.9 ARF (ACUTE RENAL FAILURE) (HCC): Status: ACTIVE | Noted: 2023-01-01

## 2023-01-16 PROCEDURE — 2000000000 HC ICU R&B

## 2023-01-16 PROCEDURE — 2500000003 HC RX 250 WO HCPCS

## 2023-01-16 PROCEDURE — 87500 VANOMYCIN DNA AMP PROBE: CPT

## 2023-01-16 PROCEDURE — 87641 MR-STAPH DNA AMP PROBE: CPT

## 2023-01-16 PROCEDURE — 87070 CULTURE OTHR SPECIMN AEROBIC: CPT

## 2023-01-16 RX ADMIN — Medication 5 MCG/MIN: at 23:07

## 2023-01-16 ASSESSMENT — PAIN DESCRIPTION - LOCATION: LOCATION: ABDOMEN

## 2023-01-16 ASSESSMENT — PAIN SCALES - GENERAL: PAINLEVEL_OUTOF10: 2

## 2023-01-16 ASSESSMENT — PAIN DESCRIPTION - DESCRIPTORS: DESCRIPTORS: DISCOMFORT

## 2023-01-16 NOTE — PROGRESS NOTES
Patient is a 63year old from [facility] ED with Dr Clotilde Clemente transferring. Patient with history of hypotension, CKD on hemodialysis and requiring frequent paracentesis for ascites, arrived to them from their dialysis center with c/o hypotension. Patient is on midodrine 10mg TID (recently increased from 2.5mg) but also still on Coreg. BP upon arrival 61/46 with HR 80, K+ 3.2, calcium 7.5, liver function tests are elevated but at his baseline. Lactic normal, cxr normal, afebrile. Patient was given 500 ml NS, extra midodrine dose and hydrocortisone. Patient now is on Levophed gtt.  Accepted as an ICU transfer

## 2023-01-17 ENCOUNTER — APPOINTMENT (OUTPATIENT)
Dept: ULTRASOUND IMAGING | Age: 63
End: 2023-01-17
Attending: INTERNAL MEDICINE
Payer: COMMERCIAL

## 2023-01-17 PROBLEM — D63.1 ANEMIA IN ESRD (END-STAGE RENAL DISEASE) (HCC): Status: ACTIVE | Noted: 2023-01-17

## 2023-01-17 PROBLEM — N18.6 ESRD (END STAGE RENAL DISEASE) (HCC): Status: ACTIVE | Noted: 2023-01-01

## 2023-01-17 PROBLEM — N08 RENAL AMYLOIDOSIS (HCC): Status: ACTIVE | Noted: 2023-01-01

## 2023-01-17 PROBLEM — E85.4 RENAL AMYLOIDOSIS (HCC): Status: ACTIVE | Noted: 2023-01-17

## 2023-01-17 PROBLEM — I95.9 HYPOTENSION: Status: ACTIVE | Noted: 2023-01-16

## 2023-01-17 PROBLEM — K76.7 HEPATORENAL SYNDROME (HCC): Status: ACTIVE | Noted: 2023-01-16

## 2023-01-17 PROBLEM — N18.6 ANEMIA IN ESRD (END-STAGE RENAL DISEASE) (HCC): Status: ACTIVE | Noted: 2023-01-01

## 2023-01-17 LAB
ALBUMIN SERPL-MCNC: 2.1 G/DL (ref 3.5–5.1)
ALP BLD-CCNC: 2040 U/L (ref 38–126)
ALT SERPL-CCNC: 110 U/L (ref 11–66)
AMMONIA: 36 UMOL/L (ref 11–60)
ANION GAP SERPL CALCULATED.3IONS-SCNC: 17 MEQ/L (ref 8–16)
AST SERPL-CCNC: 67 U/L (ref 5–40)
BASOPHILS # BLD: 0.1 %
BASOPHILS ABSOLUTE: 0 THOU/MM3 (ref 0–0.1)
BILIRUB SERPL-MCNC: 1 MG/DL (ref 0.3–1.2)
BILIRUBIN DIRECT: 0.6 MG/DL (ref 0–0.3)
BUN BLDV-MCNC: 26 MG/DL (ref 7–22)
CALCIUM IONIZED: 0.94 MMOL/L (ref 1.12–1.32)
CALCIUM SERPL-MCNC: 7.7 MG/DL (ref 8.5–10.5)
CHLORIDE BLD-SCNC: 97 MEQ/L (ref 98–111)
CO2: 22 MEQ/L (ref 23–33)
CORTISOL COLLECTION INFO: NORMAL
CORTISOL: 61.41 UG/DL
CREAT SERPL-MCNC: 4.1 MG/DL (ref 0.4–1.2)
EKG ATRIAL RATE: 69 BPM
EKG ATRIAL RATE: 70 BPM
EKG P AXIS: 49 DEGREES
EKG P AXIS: 56 DEGREES
EKG P-R INTERVAL: 174 MS
EKG P-R INTERVAL: 184 MS
EKG Q-T INTERVAL: 502 MS
EKG Q-T INTERVAL: 594 MS
EKG QRS DURATION: 66 MS
EKG QRS DURATION: 70 MS
EKG QTC CALCULATION (BAZETT): 542 MS
EKG QTC CALCULATION (BAZETT): 636 MS
EKG R AXIS: 41 DEGREES
EKG R AXIS: 52 DEGREES
EKG T AXIS: 82 DEGREES
EKG T AXIS: 91 DEGREES
EKG VENTRICULAR RATE: 69 BPM
EKG VENTRICULAR RATE: 70 BPM
EOSINOPHIL # BLD: 0 %
EOSINOPHILS ABSOLUTE: 0 THOU/MM3 (ref 0–0.4)
ERYTHROCYTE [DISTWIDTH] IN BLOOD BY AUTOMATED COUNT: 19 % (ref 11.5–14.5)
ERYTHROCYTE [DISTWIDTH] IN BLOOD BY AUTOMATED COUNT: 57.8 FL (ref 35–45)
GFR SERPL CREATININE-BSD FRML MDRD: 16 ML/MIN/1.73M2
GLUCOSE BLD-MCNC: 133 MG/DL (ref 70–108)
HCT VFR BLD CALC: 31.6 % (ref 42–52)
HEMOGLOBIN: 11.1 GM/DL (ref 14–18)
IMMATURE GRANS (ABS): 0.04 THOU/MM3 (ref 0–0.07)
IMMATURE GRANULOCYTES: 0.4 %
INR BLD: 0.92 (ref 0.85–1.13)
LACTIC ACID: 2.2 MMOL/L (ref 0.5–2)
LYMPHOCYTES # BLD: 22.4 %
LYMPHOCYTES ABSOLUTE: 2.2 THOU/MM3 (ref 1–4.8)
MAGNESIUM: 2.7 MG/DL (ref 1.6–2.4)
MCH RBC QN AUTO: 30.9 PG (ref 26–33)
MCHC RBC AUTO-ENTMCNC: 35.1 GM/DL (ref 32.2–35.5)
MCV RBC AUTO: 88 FL (ref 80–94)
MONOCYTES # BLD: 6.2 %
MONOCYTES ABSOLUTE: 0.6 THOU/MM3 (ref 0.4–1.3)
MRSA SCREEN RT-PCR: NEGATIVE
NUCLEATED RED BLOOD CELLS: 1 /100 WBC
PHOSPHORUS: 5.2 MG/DL (ref 2.4–4.7)
PLATELET # BLD: 158 THOU/MM3 (ref 130–400)
PMV BLD AUTO: ABNORMAL FL (ref 9.4–12.4)
POTASSIUM REFLEX MAGNESIUM: 4.5 MEQ/L (ref 3.5–5.2)
RBC # BLD: 3.59 MILL/MM3 (ref 4.7–6.1)
SEG NEUTROPHILS: 70.9 %
SEGMENTED NEUTROPHILS ABSOLUTE COUNT: 7 THOU/MM3 (ref 1.8–7.7)
SODIUM BLD-SCNC: 136 MEQ/L (ref 135–145)
TOTAL PROTEIN: 5.5 G/DL (ref 6.1–8)
VANCOMYCIN RESISTANT ENTEROCOCCUS: NEGATIVE
WBC # BLD: 9.9 THOU/MM3 (ref 4.8–10.8)

## 2023-01-17 PROCEDURE — 82533 TOTAL CORTISOL: CPT

## 2023-01-17 PROCEDURE — 36415 COLL VENOUS BLD VENIPUNCTURE: CPT

## 2023-01-17 PROCEDURE — 85610 PROTHROMBIN TIME: CPT

## 2023-01-17 PROCEDURE — P9047 ALBUMIN (HUMAN), 25%, 50ML: HCPCS | Performed by: STUDENT IN AN ORGANIZED HEALTH CARE EDUCATION/TRAINING PROGRAM

## 2023-01-17 PROCEDURE — 99291 CRITICAL CARE FIRST HOUR: CPT | Performed by: INTERNAL MEDICINE

## 2023-01-17 PROCEDURE — 6360000002 HC RX W HCPCS: Performed by: STUDENT IN AN ORGANIZED HEALTH CARE EDUCATION/TRAINING PROGRAM

## 2023-01-17 PROCEDURE — 82330 ASSAY OF CALCIUM: CPT

## 2023-01-17 PROCEDURE — 6370000000 HC RX 637 (ALT 250 FOR IP): Performed by: NURSE PRACTITIONER

## 2023-01-17 PROCEDURE — 80048 BASIC METABOLIC PNL TOTAL CA: CPT

## 2023-01-17 PROCEDURE — 76705 ECHO EXAM OF ABDOMEN: CPT

## 2023-01-17 PROCEDURE — 83735 ASSAY OF MAGNESIUM: CPT

## 2023-01-17 PROCEDURE — 84100 ASSAY OF PHOSPHORUS: CPT

## 2023-01-17 PROCEDURE — 80076 HEPATIC FUNCTION PANEL: CPT

## 2023-01-17 PROCEDURE — 2140000000 HC CCU INTERMEDIATE R&B

## 2023-01-17 PROCEDURE — 82140 ASSAY OF AMMONIA: CPT

## 2023-01-17 PROCEDURE — 93010 ELECTROCARDIOGRAM REPORT: CPT | Performed by: INTERNAL MEDICINE

## 2023-01-17 PROCEDURE — 6370000000 HC RX 637 (ALT 250 FOR IP): Performed by: STUDENT IN AN ORGANIZED HEALTH CARE EDUCATION/TRAINING PROGRAM

## 2023-01-17 PROCEDURE — 2580000003 HC RX 258: Performed by: NURSE PRACTITIONER

## 2023-01-17 PROCEDURE — 85025 COMPLETE CBC W/AUTO DIFF WBC: CPT

## 2023-01-17 PROCEDURE — 99222 1ST HOSP IP/OBS MODERATE 55: CPT | Performed by: INTERNAL MEDICINE

## 2023-01-17 PROCEDURE — 93005 ELECTROCARDIOGRAM TRACING: CPT | Performed by: STUDENT IN AN ORGANIZED HEALTH CARE EDUCATION/TRAINING PROGRAM

## 2023-01-17 PROCEDURE — 6360000002 HC RX W HCPCS: Performed by: NURSE PRACTITIONER

## 2023-01-17 PROCEDURE — 83605 ASSAY OF LACTIC ACID: CPT

## 2023-01-17 RX ORDER — SODIUM CHLORIDE 0.9 % (FLUSH) 0.9 %
5-40 SYRINGE (ML) INJECTION PRN
Status: DISCONTINUED | OUTPATIENT
Start: 2023-01-17 | End: 2023-01-21 | Stop reason: HOSPADM

## 2023-01-17 RX ORDER — SODIUM CHLORIDE 0.9 % (FLUSH) 0.9 %
5-40 SYRINGE (ML) INJECTION EVERY 12 HOURS SCHEDULED
Status: DISCONTINUED | OUTPATIENT
Start: 2023-01-17 | End: 2023-01-21 | Stop reason: HOSPADM

## 2023-01-17 RX ORDER — HEPARIN SODIUM 5000 [USP'U]/ML
5000 INJECTION, SOLUTION INTRAVENOUS; SUBCUTANEOUS EVERY 8 HOURS SCHEDULED
Status: DISCONTINUED | OUTPATIENT
Start: 2023-01-17 | End: 2023-01-21 | Stop reason: HOSPADM

## 2023-01-17 RX ORDER — CARVEDILOL 6.25 MG/1
6.25 TABLET ORAL 2 TIMES DAILY
Status: DISCONTINUED | OUTPATIENT
Start: 2023-01-17 | End: 2023-01-21 | Stop reason: HOSPADM

## 2023-01-17 RX ORDER — SPIRONOLACTONE 25 MG/1
25 TABLET ORAL DAILY
Status: DISCONTINUED | OUTPATIENT
Start: 2023-01-17 | End: 2023-01-18

## 2023-01-17 RX ORDER — ONDANSETRON 4 MG/1
4 TABLET, ORALLY DISINTEGRATING ORAL EVERY 8 HOURS PRN
Status: DISCONTINUED | OUTPATIENT
Start: 2023-01-17 | End: 2023-01-21 | Stop reason: HOSPADM

## 2023-01-17 RX ORDER — ACETAMINOPHEN 325 MG/1
650 TABLET ORAL EVERY 6 HOURS PRN
Status: DISCONTINUED | OUTPATIENT
Start: 2023-01-17 | End: 2023-01-19

## 2023-01-17 RX ORDER — POLYETHYLENE GLYCOL 3350 17 G/17G
17 POWDER, FOR SOLUTION ORAL DAILY PRN
Status: DISCONTINUED | OUTPATIENT
Start: 2023-01-17 | End: 2023-01-21 | Stop reason: HOSPADM

## 2023-01-17 RX ORDER — CARVEDILOL 6.25 MG/1
6.25 TABLET ORAL 2 TIMES DAILY
Status: ON HOLD | COMMUNITY
Start: 2022-12-09 | End: 2023-01-21 | Stop reason: HOSPADM

## 2023-01-17 RX ORDER — ONDANSETRON 2 MG/ML
4 INJECTION INTRAMUSCULAR; INTRAVENOUS EVERY 6 HOURS PRN
Status: DISCONTINUED | OUTPATIENT
Start: 2023-01-17 | End: 2023-01-21 | Stop reason: HOSPADM

## 2023-01-17 RX ORDER — MAGNESIUM SULFATE IN WATER 40 MG/ML
2000 INJECTION, SOLUTION INTRAVENOUS ONCE
Status: COMPLETED | OUTPATIENT
Start: 2023-01-17 | End: 2023-01-17

## 2023-01-17 RX ORDER — SODIUM CHLORIDE 9 MG/ML
INJECTION, SOLUTION INTRAVENOUS PRN
Status: DISCONTINUED | OUTPATIENT
Start: 2023-01-17 | End: 2023-01-21 | Stop reason: HOSPADM

## 2023-01-17 RX ORDER — ESOMEPRAZOLE MAGNESIUM 20 MG/1
20 FOR SUSPENSION ORAL DAILY
Status: DISCONTINUED | OUTPATIENT
Start: 2023-01-17 | End: 2023-01-17

## 2023-01-17 RX ORDER — ACETAMINOPHEN 500 MG
1000 TABLET ORAL ONCE
Status: COMPLETED | OUTPATIENT
Start: 2023-01-17 | End: 2023-01-17

## 2023-01-17 RX ORDER — LOPERAMIDE HYDROCHLORIDE 2 MG/1
2 CAPSULE ORAL 2 TIMES DAILY PRN
Status: DISCONTINUED | OUTPATIENT
Start: 2023-01-17 | End: 2023-01-21 | Stop reason: HOSPADM

## 2023-01-17 RX ORDER — ESOMEPRAZOLE MAGNESIUM 20 MG/1
20 TABLET, DELAYED RELEASE ORAL DAILY
Status: ON HOLD | COMMUNITY
End: 2023-01-17

## 2023-01-17 RX ORDER — MIDODRINE HYDROCHLORIDE 5 MG/1
15 TABLET ORAL 3 TIMES DAILY
Status: ON HOLD | COMMUNITY
Start: 2022-12-26 | End: 2023-01-21 | Stop reason: HOSPADM

## 2023-01-17 RX ORDER — ALBUMIN (HUMAN) 12.5 G/50ML
25 SOLUTION INTRAVENOUS EVERY 6 HOURS
Status: COMPLETED | OUTPATIENT
Start: 2023-01-17 | End: 2023-01-18

## 2023-01-17 RX ORDER — LOPERAMIDE HYDROCHLORIDE 2 MG/1
2 CAPSULE ORAL PRN
Status: ON HOLD | COMMUNITY
Start: 2022-12-26 | End: 2023-01-21 | Stop reason: HOSPADM

## 2023-01-17 RX ORDER — ACETAMINOPHEN 650 MG/1
650 SUPPOSITORY RECTAL EVERY 6 HOURS PRN
Status: DISCONTINUED | OUTPATIENT
Start: 2023-01-17 | End: 2023-01-19

## 2023-01-17 RX ORDER — PANTOPRAZOLE SODIUM 40 MG/1
40 TABLET, DELAYED RELEASE ORAL
Status: DISCONTINUED | OUTPATIENT
Start: 2023-01-17 | End: 2023-01-21 | Stop reason: HOSPADM

## 2023-01-17 RX ADMIN — MIDODRINE HYDROCHLORIDE 15 MG: 10 TABLET ORAL at 15:07

## 2023-01-17 RX ADMIN — ALBUMIN (HUMAN) 25 G: 0.25 INJECTION, SOLUTION INTRAVENOUS at 13:30

## 2023-01-17 RX ADMIN — HEPARIN SODIUM 5000 UNITS: 5000 INJECTION INTRAVENOUS; SUBCUTANEOUS at 22:47

## 2023-01-17 RX ADMIN — MIDODRINE HYDROCHLORIDE 15 MG: 10 TABLET ORAL at 09:14

## 2023-01-17 RX ADMIN — ACETAMINOPHEN 1000 MG: 500 TABLET ORAL at 03:29

## 2023-01-17 RX ADMIN — SPIRONOLACTONE 25 MG: 25 TABLET ORAL at 13:24

## 2023-01-17 RX ADMIN — PANTOPRAZOLE SODIUM 40 MG: 40 TABLET, DELAYED RELEASE ORAL at 07:52

## 2023-01-17 RX ADMIN — MAGNESIUM SULFATE HEPTAHYDRATE 2000 MG: 40 INJECTION, SOLUTION INTRAVENOUS at 04:26

## 2023-01-17 RX ADMIN — SODIUM CHLORIDE, PRESERVATIVE FREE 10 ML: 5 INJECTION INTRAVENOUS at 20:08

## 2023-01-17 RX ADMIN — ALBUMIN (HUMAN) 25 G: 0.25 INJECTION, SOLUTION INTRAVENOUS at 20:09

## 2023-01-17 RX ADMIN — MIDODRINE HYDROCHLORIDE 15 MG: 10 TABLET ORAL at 22:05

## 2023-01-17 ASSESSMENT — PAIN SCALES - GENERAL
PAINLEVEL_OUTOF10: 0
PAINLEVEL_OUTOF10: 3
PAINLEVEL_OUTOF10: 3

## 2023-01-17 ASSESSMENT — PAIN DESCRIPTION - DESCRIPTORS
DESCRIPTORS: DISCOMFORT;HEAVINESS
DESCRIPTORS: DISCOMFORT
DESCRIPTORS: HEAVINESS

## 2023-01-17 ASSESSMENT — PAIN DESCRIPTION - LOCATION
LOCATION: ABDOMEN

## 2023-01-17 NOTE — PROGRESS NOTES
Patient arrived to unit from [facility] ED via EMS Transport stretcher. Patient transferred to ICU bed and placed on continuous ICU bedside monitor. Patient admitted for ARF (acute renal failure) ([facility] [address] [N17.9]. Vitals obtained. See flowsheets. Patient's IV access includes right and left 20 gauge iv's in the [facility]. Current infusions and rates of infusion include levophed at 1.5mcg/kg. Assessment completed by veronica sherwood rn. Two nurse skin assessment completed by veronica ta and latha rn. See flowsheets for assessment details. Policies and procedures of ICU able to be explained to patient at this time. Patient rights explained to family member(s)/representatives and patient, as able. Patient Declined to have physician notified of their admission. All questions posed by patient answered at this time.

## 2023-01-17 NOTE — CARE COORDINATION
Case Management Assessment  Initial Evaluation    Date/Time of Evaluation: 1/17/2023 2:51 PM  Assessment Completed by: Xavier Morrell RN    If patient is discharged prior to next notation, then this note serves as note for discharge by case management. Patient Name: Elvia Pyle                   YOB: 1960  Diagnosis: ARF (acute renal failure) ([facility] [address] [N17.9]                   Date / Time: 1/16/2023 10:55 PM  Location: [address] [place] [address]     Patient Admission Status: Inpatient   Readmission Risk (Low < 19, Mod (19-27), High > 27): Readmission Risk Score: 16.1    Current PCP: Radha Preciado, DO  PCP verified by CM? Yes    Chart Reviewed: Yes      History Provided by: Patient  Patient Orientation: Alert and Oriented    Patient Cognition: Alert    Hospitalization in the last 30 days (Readmission):  No    If yes, Readmission Assessment in CM Navigator will be completed. Advance Directives:      Code Status: Full Code   Patient's Primary Decision Maker is: Patient Declined (Legal Next of Kin Remains as Decision Maker)      Discharge Planning:    Patient lives with: Spouse/Significant Other Type of Home: House  Primary Care Giver: Self  Patient Support Systems include: Spouse/Significant Other, Children   Current Financial resources: Other (Comment) ([employer] Commercial)  Current community resources: Other (Comment) (HD at [address] chair time)  Current services prior to admission: [address] (Has cane and walker if needed)            Current DME: Yanet Aquino            Type of Home Care services:  None    ADLS  Prior functional level: Independent in ADLs/IADLs  Current functional level: Independent in ADLs/IADLs    Family can provide assistance at DC: Yes  Would you like Case Management to discuss the discharge plan with any other family members/significant others, and if so, who?  No  Plans to Return to Present Housing: Yes  Other Identified Issues/Barriers to RETURNING to current housing: none  Potential Assistance needed at discharge: N/A            Potential DME:    Patient expects to discharge to: [facility] for transportation at discharge: Other (see comment) (Girlfriend)    Financial    Payor: BCBS / Plan: BCBS - [place] PPO / Product Type: *No Product type* /     Does insurance require precert for SNF: Yes    Potential assistance Purchasing Medications: No  Meds-to-Beds request: Yes      [id number] [address] [facility], [address] [address]  Phone: [phone] Fax: [phone]      Notes:    Factors facilitating achievement of predicted outcomes: Family support, Cooperative, and Pleasant    Barriers to discharge: Medical complications and Medication managment    Additional Case Management Notes: Presented to [facility] ED from dialysis d/t hypotension. Received 500 ml fluid bolus and started on levophed drip. Transferred to [facility] ICU. Nephrology consulted for ESRD on HD. Plan for HD tomorrow. Levophed weaned off this morning. US GB/RUQ ordered. Afebrile. NSR. On room air. Ox4. SCDs. Midodrine, protonix, aldactone. Received 2g mag sulfate x1 today. BUN 26, Creat 4.1, AG 17, Ical 0.94, Mg 2.7, LA 2.2, phos 5.2, alb 2.1, alk phos 2040, alt 110, ast 67, direct bili 0.6, total pro 5.5, hgb 11.1. Procedure: none    The Plan for Transition of Care is related to the following treatment goals of ARF (acute renal failure) ([facility] [address] [N17.9]    Patient Goals/Plan/Treatment Preferences: Home with girlfriend. Denies needs, declines HH. Has cane and walker if needed. HD MWF at [facility] with [address] chair time. Transportation/Food Security/Housekeeping Addressed: No issues identified.      Anna Liriano RN  Case Management Department

## 2023-01-17 NOTE — H&P
CRITICAL CARE H & P    Patient:  Ras Brewster    Unit/Bed:4D-17/017-A  YOB: 1960  MRN: [id number]   PCP: Tejas Horton DO  Date of Admission: 1/16/2023  Chief Complaint:- Hypotension    Assessment and Plan:    Hypotension: MAP 50s prior to transfer, home midodrine increased from 10 to 15 TID prior to transfer to our care. Presumed to be multifactorial secondary to amyloidosis, renal failure, cannot rule out hepatorenal syndrome. Chronically on midodrine which is resumed. Increased to 15 mg TID prior to arrival at outside facility. Systemic Amyloidosis with Light Chain Disease: Follows outpatient, reports that he was supposed to start treatments in November 2022 but due to hypotension, oncologist did not start. Was sent to GI for cirrhosis workup. Concerns for hepatorenal now. ESRD on HD: Secondary to light chain amyloidosis, recently exacerbated with hypoperfusion, and possible hepatorenal syndrome. Gets dialysis prn per nephrology who have been consulted. Normocytic Anemia: Secondary to ACD. Hgb baseline ~11, continue to monitor with daily CBC. HAGMA 2/2 Lactic Acidosis: LA 2.2, in setting of hypotension and ESRD. Increase perfusion with midodrine. Nephro on board to dialyze 01/18  Hypocalcemia: Repleting. Decompensated Liver Disease, Undifferentiated: Presented with diffuse ascites, hypoalbuminemia, transaminitis, ALP >2000. Following outpatient with [facility] for workup, some concerns for hepatorenal syndrome at outside facility. No official imaging done yet. Chronically on midodrine. Dose was just increased from 10 mg TID to 15 mg TID at outside facility prior to transfer to our care. Plan for paracentesis with labs, albumin repletement, low dose aldactone, RUQ ultrasound.       INITIAL H AND P AND ICU COURSE:  Ras Brewster is a 63year old gentleman with hx systemic light chain amyloidosis complicated by multiorgan failure including ESRD, chronic hypotension, cirrhosis, following with Oncology in [place], [place] who was transferred to [facility] ICU overnight on 01/16 for evaluation and treatment of his hypotension. Patient reports he was approved for treatment for his amyloidosis back in November 2022 but due to his hypotension, treatment could not be started and he was referred to GI for further workup to assess for cirrhosis. He underwent two paracentesis, one week apart back in December 21, and 27 2022, has not had any since but clearly is in a decompensated state with hypoalbuminemia 2.0, ascites with diffuse fluid shift, hypotension. He has not been worked up for cirrhosis yet nor officially diagnosed for it but suspicion is high for a new hepatorenal. He denies a hx of heavy alcohol use. Does admit to 30-py smoking history. For his ESRD he gets MWF dialysis in [facility]. Patient is Aox3 on arrival doing well, little complaints or concerns besides abdominal pain, says he feels tight and asking for us to provide him a paracentesis. Otherwise no complaints or concerns. Denies chest pain, SOB, n/v/d, headache, diaphoresis. Past Medical History:  light chain amyloidosis complicated by multiorgan failure including ESRD, chronic hypotension, cirrhosis  Family History: Diabetes in father   Social History:  Drinks socially, 30 pack year smoking hx, quit December 2022    ROS   As noted per HPI. Scheduled Meds:   [Held by provider] carvedilol  6.25 mg Oral BID    midodrine  15 mg Oral TID    pantoprazole  40 mg Oral QAM AC     Continuous Infusions:   norepinephrine 3 mcg/min (01/17/23 0603)     PHYSICAL EXAMINATION:  T:  97.7 .  P:  69. RR:  14. B/P:  94/70 mmHg. O2 Sat:  94%. Body mass index is 19.87 kg/m². GCS:  15  General:  Age appropriate gentleman laying in bed comfortably in NAD  HEENT:  normocephalic and atraumatic. No scleral icterus. PERR  Neck: supple. No Thyromegaly. Lungs: clear to auscultation. No retractions  Cardiac: RRR. No JVD.   Abdomen: Taunt, mildly TTP, distended with positive fluid shift. Nontender. Extremities:  No clubbing, cyanosis, or edema x 4. Vasculature: capillary refill < 3 seconds. Palpable dorsalis pedis pulses. Skin:  warm and dry. Psych:  Alert and oriented x3. Affect appropriate  Lymph:  No supraclavicular adenopathy. Neurologic:  No focal deficit. No seizures. Data: (All radiographs, tracings, PFTs, and imaging are personally viewed and interpreted unless otherwise noted). Sodium sodium 136, potassium 4.5, chloride 97, CO2 22, BUN 26, creatinine 4.1, anion gap 17, ionized calcium 0.94, magnesium 2.7, GFR 16, glucose 133, phosphorus 5.2, total protein 5.5  Lactic acid 2.2  Albumin 2.1, ALP 2040, , AST 67, bilirubin 1.0  WBC 9.9, RBC 3.58, Hgb 11.1, MCV 88, platelets 557  Telemetry shows NSR    Seen with multidisciplinary ICU team.  Meets Continued ICU Level Care Criteria:    [x] Yes   [] No - Transfer Planned to listed location:  [] HOSPITALIST CONTACTED- DR     Case and plan discussed with Dr. Lux Valencia. Electronically signed by Kay Wells DO  [address]   Patient seen by me including key components of medical care. Case discussed with resident physician. See significant event. Italicized font, if present,  represents changes to the note made by me. CC time 35 minutes. Time was discontiguous. Time does not include procedure. Time does include my direct assessment of the patient and coordination of care. Time represents more than 50% of the time involved with patient care by the [address] [place] [address] team.  Electronically signed by Kathia Jones.  Lux Valencia MD.

## 2023-01-17 NOTE — SIGNIFICANT EVENT
norepinephrine Stopped (01/17/23 1049)      [Held by provider] carvedilol  6.25 mg Oral BID    midodrine  15 mg Oral TID    pantoprazole  40 mg Oral QAM AC    spironolactone  25 mg Oral Daily    albumin human  25 g IntraVENous Q6H    Patient seen by me including key components of medical care. Case discussed with resident physician. ESRD and dialysis dependent. Now with elevated liver enzymes. Renal US pending. Liver US pending. History of recurrent ascites with Renal biopsy results from 10/26/22 were sent to [place] for assessment. Pathology shows arterial nephrosclerosis and amyloid with lambda light chain disease. Admitted to ICU for hypotension. Levophed weaned off as midodrine increase. Adrenal insufficiency excluded. Italicized font, if present,  represents changes to the note made by me. CC time 35 minutes. Time was discontiguous. Time does not include procedure. Time does include my direct assessment of the patient and coordination of care. Time represents more than 50% of the time involved with patient care by the [address] [place] [address] team.  Electronically signed by Kathia Jones.  Lux Valencia MD.

## 2023-01-17 NOTE — PROGRESS NOTES
Pt was in bed and was alone at the time of the visit. He was hopeful and blessed. 01/17/23 1505   Encounter Summary   Encounter Overview/Reason  Initial Encounter   Service Provided For: Patient   Referral/Consult From: [facility]   Support System Significant other   Last Encounter  01/17/23   Complexity of Encounter Low   Begin Time 1056   End Time  1102   Total Time Calculated 6 min   Spiritual/Emotional needs   Type Spiritual Support   Assessment/Intervention/Outcome   Assessment Calm   Intervention Empowerment; Active listening

## 2023-01-17 NOTE — PLAN OF CARE
Problem: Discharge Planning  Goal: Discharge to home or other facility with appropriate resources  Outcome: Progressing  Flowsheets (Taken 1/17/2023 0122)  Discharge to home or other facility with appropriate resources:   Identify barriers to discharge with patient and caregiver   Refer to discharge planning if patient needs post-hospital services based on physician order or complex needs related to functional status, cognitive ability or social support system   Identify discharge learning needs (meds, wound care, etc)   Arrange for needed discharge resources and transportation as appropriate   Arrange for interpreters to assist at discharge as needed     Problem: Pain  Goal: Verbalizes/displays adequate comfort level or baseline comfort level  Outcome: Progressing  Flowsheets (Taken 1/17/2023 0122)  Verbalizes/displays adequate comfort level or baseline comfort level:   Encourage patient to monitor pain and request assistance   Administer analgesics based on type and severity of pain and evaluate response   Consider cultural and social influences on pain and pain management   Assess pain using appropriate pain scale   Notify Licensed Independent Practitioner if interventions unsuccessful or patient reports new pain   Implement non-pharmacological measures as appropriate and evaluate response     Problem: Skin/Tissue Integrity  Goal: Absence of new skin breakdown  Description: 1. Monitor for areas of redness and/or skin breakdown  2. Assess vascular access sites hourly  3. Every 4-6 hours minimum:  Change oxygen saturation probe site  4. Every 4-6 hours:  If on nasal continuous positive airway pressure, respiratory therapy assess nares and determine need for appliance change or resting period. Outcome: Progressing  Note: Reminding patient to turn q2 hours to offload pressure on coccyx.  Protective barrier as needed     Problem: Nutrition Deficit:  Goal: Optimize nutritional status  Outcome: Progressing  Flowsheets (Taken 1/17/2023 0122)  Nutrient intake appropriate for improving, restoring, or maintaining nutritional needs:   Assess nutritional status and recommend course of action   Recommend appropriate diets, oral nutritional supplements, and vitamin/mineral supplements   Recommend, monitor, and adjust tube feedings and TPN/PPN based on assessed needs   Monitor oral intake, labs, and treatment plans   Order, calculate, and assess calorie counts as needed   Provide specific nutrition education to patient or family as appropriate

## 2023-01-17 NOTE — CONSULTS
[facility]    [address]  [facility]  1/17/2023 9:24 AM    Pt Name:    Stephani Shi  MRN:     [id number]   [id number]  YOB: 1960  Admit Date:    1/16/2023 10:55 PM  Primary Care Physician:  Anahi Alfred DO    [facility] Number:   [id number]    Reason for Consult:  ESRD, dialysis  Requesting provider:  ICU    History:   The patient is a 63 y.o. with history of ESRD secondary to biopsy-proven amyloidosis secondary to light chain amyloidosis, chronic hypotension who was transferred from outside ER where he was sent from his dialysis unit for evaluation of hypotension. Patient apparently dropped his blood pressure in systolic 65U at the dialysis unit. He was given IV fluids with some improvement. However he continued to have blood pressure in the 80s. Patient also reported some abdominal discomfort from worsening ascites. He was sent to the emergency room. Upon arrival to outside emergency room patient blood pressure was systolic 82S. He was started on Levophed drip. He was then transferred to [facility] ICU. Patient is currently on room air. Nephrology consulted for management of ESRD and dialysis. Patient completed dialysis treatment yesterday. Currently on room air. Denies any chest pain or any shortness of breath. Reports some abdominal distention.     Past Medical History:  Past Medical History:   Diagnosis Date    Bilateral inguinal hernia     CKD (chronic kidney disease) stage 3, GFR 30-59 ml/min ([facility])     Hypoalbuminemia     Monoclonal gammopathy        Past Surgical History:  Past Surgical History:   Procedure Laterality Date    APPENDECTOMY      COLONOSCOPY      CT BIOPSY RENAL  10/26/2022    CT BIOPSY RENAL [facility] CT SCAN    CT BIOPSY RENAL  10/26/2022    CT BIOPSY RENAL 10/26/2022 [facility] CT SCAN    CT BONE MARROW BIOPSY  11/16/2022    CT BONE MARROW BIOPSY 11/16/2022 [facility] CT SCAN    HERNIA REPAIR      double    KNEE SURGERY Left     TONSILLECTOMY         Family History:  Family History   Problem Relation Age of Onset    Hypertension Mother     Hypertension Father     Cancer Father         liver    Pancreatic Cancer Brother     Diabetes Paternal Grandmother        Social History:  Social History     Socioeconomic History    Marital status: Single     Spouse name: Not on file    Number of children: Not on file    Years of education: Not on file    Highest education level: Not on file   Occupational History    Not on file   Tobacco Use    Smoking status: Every Day     Packs/day: 0.50     Years: 40.00     Pack years: 20.00     Types: Cigarettes    Smokeless tobacco: Never    Tobacco comments:     Weaning per self 12/20/22   Vaping Use    Vaping Use: Never used   Substance and Sexual Activity    Alcohol use: Not Currently    Drug use: Never    Sexual activity: Not on file   Other Topics Concern    Not on file   Social History Narrative    Not on file     Social Determinants of Health     Financial Resource Strain: Not on file   Food Insecurity: Not on file   Transportation Needs: Not on file   Physical Activity: Not on file   Stress: Not on file   Social Connections: Not on file   Intimate Partner Violence: Not on file   Housing Stability: Not on file       Home Meds:  Prior to Admission medications    Medication Sig Start Date End Date Taking?  Authorizing Provider   midodrine (PROAMATINE) 5 MG tablet Take 15 mg by mouth in the morning, at noon, and at bedtime 12/26/22  Yes Historical Provider, MD   carvedilol (COREG) 6.25 MG tablet Take 6.25 mg by mouth 2 times daily 12/9/22   Historical Provider, MD   loperamide (IMODIUM) 2 MG capsule Take 2 mg by mouth as needed 12/26/22   Historical Provider, MD   esomeprazole Magnesium (NEXIUM) 20 MG PACK Take 20 mg by mouth daily    Historical Provider, MD   prochlorperazine (COMPAZINE) 10 MG tablet Take 1 tablet by mouth every 6 hours as needed (nausea) 12/13/22 Govind Lopez MD   sodium bicarbonate 650 MG tablet Take 1 tablet by mouth 2 times daily 12/6/22   Kirby Ramirez MD       Review of Systems:  Constitutional: Positive for generalized weakness, fatigue, abdominal distention  Head: Negative for headaches  Eyes: Negative for blurry vision or discharge  Ears: Negative for ear pain or hearing changes  Nose: Negative for runny nose or epistaxis  Respiratory: Negative for shortness of breath. Negative for cough or sputum production. Negative for hemoptysis  Cardiovascular: Negative for chest pain  GI: Negative for nausea, vomiting and diarrhea. Negative for hematochezia and melena  Skin: Negative for rash  Musculoskeletal: Negative for joint pain, moves all ext  Neuro: Negative for numbness or tingling, negative for slurred speech  Psychiatric: Reports stable mood, negative for depression or insomnia    All other review of systems were reviewed and negative    Current Meds:  Infusion:    norepinephrine 1 mcg/min (01/17/23 0916)     Meds:    [Held by provider] carvedilol  6.25 mg Oral BID    midodrine  15 mg Oral TID    pantoprazole  40 mg Oral QAM AC     Meds prn: [Held by provider] loperamide     Allergies/Intolerances: ALLERGIES: Patient has no known allergies. 24HR INTAKE/OUTPUT:    Intake/Output Summary (Last 24 hours) at 1/17/2023 0924  Last data filed at 1/17/2023 0916  Gross per 24 hour   Intake 73.75 ml   Output --   Net 73.75 ml     I/O last 3 completed shifts: In: 60 [I.V.:20; IV Piggyback:40.1]  Out: -   I/O this shift:  In: 13.7 [I.V.:7.4; IV Piggyback:6.3]  Out: -   Admission weight: 138 lb 7.2 oz (62.8 kg)  Wt Readings from Last 3 Encounters:   01/16/23 138 lb 7.2 oz (62.8 kg)   12/13/22 136 lb (61.7 kg)   12/06/22 134 lb (60.8 kg)     Body mass index is 19.87 kg/m².     Physical Examination:  VITALS:   Vitals:    01/17/23 0545 01/17/23 0600 01/17/23 0730 01/17/23 0735   BP: 104/69  111/81    Pulse: 72 71 71    Resp: (!) 9 10 16    Temp:    97.8 °F (36.6 °C)   TempSrc:    Oral   SpO2: 93% 93% 96%    Weight:         Weight:   Wt Readings from Last 3 Encounters:   01/16/23 138 lb 7.2 oz (62.8 kg)   12/13/22 136 lb (61.7 kg)   12/06/22 134 lb (60.8 kg)     Constitutional and General Appearance: alert and cooperative with exam, appears comfortable, no distress, not diaphoretic, cachectic with some temporal muscle wasting  Eyes: no icteric sclera in left eye or right eye,  no pallor conjunctiva in left or right eye, no discharge seen from left eye or right eye  Ears and Nose: normal external appearance of left and right ear. Both ear lobules are nontender to palpation. Normal external appearance of nose. No active drainage from nose. Oral: moist oral mucus membranes  Neck: No jugular venous distention, appears symmetric, good ROM  Lungs: Air entry diminished, no use of accessory muscles or labored breathing  Chest: No chest wall tenderness  Heart: regular rate, S1, S2  Extremities: No pitting LE edema, no tenderness  GI: soft, non-tender, no guarding, no distention  Skin: no rash seen on exposed extremities, warm to touch  Musculo: moves all extremities  Neuro: no slurred speech, no facial drooping  Psychiatric: Normal mood and affect, Not agitated    Lab Data  CBC:   Recent Labs     01/17/23  0647   WBC 9.9   HGB 11.1*   HCT 31.6*        BMP:  Recent Labs     01/17/23  0647      K 4.5   CL 97*   CO2 22*   BUN 26*   CREATININE 4.1*   GLUCOSE 133*   CALCIUM 7.7*   MG 2.7*     Hepatic:   Recent Labs     01/17/23  0647   LABALBU 2.1*   AST 67*   *   BILITOT 1.0   ALKPHOS 2,040*           Old tests reviewed. Echo: LVEF 55 to 19%, grade 1 diastolic dysfunction        Impression and Plan:  ESRD secondary to AL amyloidosis. Biopsy-proven renal amyloidosis. Work-up sent by me as outpatient showed abnormal protein electrophoresis and free light chain assay. Subsequently kidney biopsy revealed amyloidosis AL type.   Was referred to hematology oncology. Currently dialysis dependent. Continue dialysis Mondays, Wednesdays and Fridays. Electrolytes and volume status stable at this time and there is no indication for urgent dialysis treatment today. Continue dialysis MWF  AL amyloidosis  Acute on chronic hypotension. Currently on Levophed, continue with midodrine  Anemia in ESRD. H&H stable  Electrolytes overall stable  Medications reviewed  Discussed with patient and RN  Thank you for the consult. Please feel free to call me if you have any questions. Kirby Ramirez MD  [facility]    This report has been created using voice recognition software. It may contain minor errors which are inherent in voice recognition technology.

## 2023-01-18 LAB
AEROBIC CULTURE: NORMAL
ALBUMIN FLUID: 0.7 GM/DL
ALBUMIN SERPL-MCNC: 2.9 G/DL (ref 3.5–5.1)
ALP BLD-CCNC: 1877 U/L (ref 38–126)
ALT SERPL-CCNC: 93 U/L (ref 11–66)
AMYLASE FLUID: 11 U/L
ANION GAP SERPL CALCULATED.3IONS-SCNC: 14 MEQ/L (ref 8–16)
AST SERPL-CCNC: 82 U/L (ref 5–40)
BASOPHILS # BLD: 0.1 %
BASOPHILS ABSOLUTE: 0 THOU/MM3 (ref 0–0.1)
BILIRUB SERPL-MCNC: 1.2 MG/DL (ref 0.3–1.2)
BUN BLDV-MCNC: 33 MG/DL (ref 7–22)
CALCIUM IONIZED: 0.86 MMOL/L (ref 1.12–1.32)
CALCIUM SERPL-MCNC: 7.8 MG/DL (ref 8.5–10.5)
CHLORIDE BLD-SCNC: 97 MEQ/L (ref 98–111)
CO2: 25 MEQ/L (ref 23–33)
CREAT SERPL-MCNC: 5 MG/DL (ref 0.4–1.2)
EKG ATRIAL RATE: 74 BPM
EKG P AXIS: 67 DEGREES
EKG P-R INTERVAL: 168 MS
EKG Q-T INTERVAL: 414 MS
EKG QRS DURATION: 70 MS
EKG QTC CALCULATION (BAZETT): 459 MS
EKG R AXIS: 26 DEGREES
EKG T AXIS: 81 DEGREES
EKG VENTRICULAR RATE: 74 BPM
EOSINOPHIL # BLD: 0.7 %
EOSINOPHILS ABSOLUTE: 0.1 THOU/MM3 (ref 0–0.4)
ERYTHROCYTE [DISTWIDTH] IN BLOOD BY AUTOMATED COUNT: 18.8 % (ref 11.5–14.5)
ERYTHROCYTE [DISTWIDTH] IN BLOOD BY AUTOMATED COUNT: 60.4 FL (ref 35–45)
GFR SERPL CREATININE-BSD FRML MDRD: 12 ML/MIN/1.73M2
GLUCOSE BLD-MCNC: 78 MG/DL (ref 70–108)
HCT VFR BLD CALC: 29.2 % (ref 42–52)
HEMOGLOBIN: 9.9 GM/DL (ref 14–18)
IMMATURE GRANS (ABS): 0.03 THOU/MM3 (ref 0–0.07)
IMMATURE GRANULOCYTES: 0.3 %
INR BLD: 0.96 (ref 0.85–1.13)
LD, FLUID: 74 U/L
LYMPHOCYTES # BLD: 23.1 %
LYMPHOCYTES ABSOLUTE: 2.1 THOU/MM3 (ref 1–4.8)
MAGNESIUM: 2.7 MG/DL (ref 1.6–2.4)
MCH RBC QN AUTO: 30.8 PG (ref 26–33)
MCHC RBC AUTO-ENTMCNC: 33.9 GM/DL (ref 32.2–35.5)
MCV RBC AUTO: 91 FL (ref 80–94)
MONOCYTES # BLD: 5.7 %
MONOCYTES ABSOLUTE: 0.5 THOU/MM3 (ref 0.4–1.3)
NUCLEATED RED BLOOD CELLS: 0 /100 WBC
PLATELET # BLD: 156 THOU/MM3 (ref 130–400)
PMV BLD AUTO: ABNORMAL FL (ref 9.4–12.4)
POTASSIUM SERPL-SCNC: 4 MEQ/L (ref 3.5–5.2)
PROTEIN FLUID: 1 GM/DL
RBC # BLD: 3.21 MILL/MM3 (ref 4.7–6.1)
SEG NEUTROPHILS: 70.1 %
SEGMENTED NEUTROPHILS ABSOLUTE COUNT: 6.4 THOU/MM3 (ref 1.8–7.7)
SODIUM BLD-SCNC: 136 MEQ/L (ref 135–145)
TOTAL PROTEIN: 5 G/DL (ref 6.1–8)
WBC # BLD: 9.2 THOU/MM3 (ref 4.8–10.8)

## 2023-01-18 PROCEDURE — 88112 CYTOPATH CELL ENHANCE TECH: CPT

## 2023-01-18 PROCEDURE — 49083 ABD PARACENTESIS W/IMAGING: CPT | Performed by: INTERNAL MEDICINE

## 2023-01-18 PROCEDURE — 83735 ASSAY OF MAGNESIUM: CPT

## 2023-01-18 PROCEDURE — 88108 CYTOPATH CONCENTRATE TECH: CPT

## 2023-01-18 PROCEDURE — 80053 COMPREHEN METABOLIC PANEL: CPT

## 2023-01-18 PROCEDURE — 85610 PROTHROMBIN TIME: CPT

## 2023-01-18 PROCEDURE — 93005 ELECTROCARDIOGRAM TRACING: CPT | Performed by: INTERNAL MEDICINE

## 2023-01-18 PROCEDURE — 2580000003 HC RX 258: Performed by: NURSE PRACTITIONER

## 2023-01-18 PROCEDURE — P9047 ALBUMIN (HUMAN), 25%, 50ML: HCPCS | Performed by: STUDENT IN AN ORGANIZED HEALTH CARE EDUCATION/TRAINING PROGRAM

## 2023-01-18 PROCEDURE — 36415 COLL VENOUS BLD VENIPUNCTURE: CPT

## 2023-01-18 PROCEDURE — 6370000000 HC RX 637 (ALT 250 FOR IP): Performed by: NURSE PRACTITIONER

## 2023-01-18 PROCEDURE — 2500000003 HC RX 250 WO HCPCS: Performed by: NURSE PRACTITIONER

## 2023-01-18 PROCEDURE — 85025 COMPLETE CBC W/AUTO DIFF WBC: CPT

## 2023-01-18 PROCEDURE — 87070 CULTURE OTHR SPECIMN AEROBIC: CPT

## 2023-01-18 PROCEDURE — 82042 OTHER SOURCE ALBUMIN QUAN EA: CPT

## 2023-01-18 PROCEDURE — 6360000002 HC RX W HCPCS: Performed by: STUDENT IN AN ORGANIZED HEALTH CARE EDUCATION/TRAINING PROGRAM

## 2023-01-18 PROCEDURE — 84157 ASSAY OF PROTEIN OTHER: CPT

## 2023-01-18 PROCEDURE — 2580000003 HC RX 258: Performed by: STUDENT IN AN ORGANIZED HEALTH CARE EDUCATION/TRAINING PROGRAM

## 2023-01-18 PROCEDURE — 99291 CRITICAL CARE FIRST HOUR: CPT | Performed by: INTERNAL MEDICINE

## 2023-01-18 PROCEDURE — 6370000000 HC RX 637 (ALT 250 FOR IP): Performed by: STUDENT IN AN ORGANIZED HEALTH CARE EDUCATION/TRAINING PROGRAM

## 2023-01-18 PROCEDURE — 0W9G3ZZ DRAINAGE OF PERITONEAL CAVITY, PERCUTANEOUS APPROACH: ICD-10-PCS

## 2023-01-18 PROCEDURE — 6360000002 HC RX W HCPCS: Performed by: NURSE PRACTITIONER

## 2023-01-18 PROCEDURE — 89050 BODY FLUID CELL COUNT: CPT

## 2023-01-18 PROCEDURE — 87205 SMEAR GRAM STAIN: CPT

## 2023-01-18 PROCEDURE — 82330 ASSAY OF CALCIUM: CPT

## 2023-01-18 PROCEDURE — 2100000000 HC CCU R&B

## 2023-01-18 PROCEDURE — 2500000003 HC RX 250 WO HCPCS

## 2023-01-18 PROCEDURE — 87075 CULTR BACTERIA EXCEPT BLOOD: CPT

## 2023-01-18 PROCEDURE — 93010 ELECTROCARDIOGRAM REPORT: CPT | Performed by: INTERNAL MEDICINE

## 2023-01-18 PROCEDURE — 6360000002 HC RX W HCPCS: Performed by: INTERNAL MEDICINE

## 2023-01-18 PROCEDURE — 99232 SBSQ HOSP IP/OBS MODERATE 35: CPT | Performed by: INTERNAL MEDICINE

## 2023-01-18 PROCEDURE — 83615 LACTATE (LD) (LDH) ENZYME: CPT

## 2023-01-18 PROCEDURE — 82150 ASSAY OF AMYLASE: CPT

## 2023-01-18 PROCEDURE — P9047 ALBUMIN (HUMAN), 25%, 50ML: HCPCS | Performed by: INTERNAL MEDICINE

## 2023-01-18 PROCEDURE — 88305 TISSUE EXAM BY PATHOLOGIST: CPT

## 2023-01-18 RX ORDER — LIDOCAINE HYDROCHLORIDE 10 MG/ML
INJECTION, SOLUTION EPIDURAL; INFILTRATION; INTRACAUDAL; PERINEURAL
Status: COMPLETED
Start: 2023-01-18 | End: 2023-01-18

## 2023-01-18 RX ORDER — ALBUMIN (HUMAN) 12.5 G/50ML
25 SOLUTION INTRAVENOUS ONCE
Status: COMPLETED | OUTPATIENT
Start: 2023-01-18 | End: 2023-01-18

## 2023-01-18 RX ADMIN — CALCIUM GLUCONATE 2000 MG: 20 INJECTION, SOLUTION INTRAVENOUS at 10:35

## 2023-01-18 RX ADMIN — PANTOPRAZOLE SODIUM 40 MG: 40 TABLET, DELAYED RELEASE ORAL at 05:48

## 2023-01-18 RX ADMIN — LIDOCAINE HYDROCHLORIDE: 10 INJECTION, SOLUTION EPIDURAL; INFILTRATION; INTRACAUDAL; PERINEURAL at 17:45

## 2023-01-18 RX ADMIN — HEPARIN SODIUM 5000 UNITS: 5000 INJECTION INTRAVENOUS; SUBCUTANEOUS at 14:05

## 2023-01-18 RX ADMIN — MIDODRINE HYDROCHLORIDE 15 MG: 10 TABLET ORAL at 21:23

## 2023-01-18 RX ADMIN — ACETAMINOPHEN 650 MG: 325 TABLET ORAL at 16:25

## 2023-01-18 RX ADMIN — EPOETIN ALFA-EPBX 2000 UNITS: 2000 INJECTION, SOLUTION INTRAVENOUS; SUBCUTANEOUS at 11:05

## 2023-01-18 RX ADMIN — ALBUMIN (HUMAN) 25 G: 0.25 INJECTION, SOLUTION INTRAVENOUS at 08:55

## 2023-01-18 RX ADMIN — SODIUM CHLORIDE, PRESERVATIVE FREE 10 ML: 5 INJECTION INTRAVENOUS at 21:23

## 2023-01-18 RX ADMIN — MIDODRINE HYDROCHLORIDE 15 MG: 10 TABLET ORAL at 05:48

## 2023-01-18 RX ADMIN — ALBUMIN (HUMAN) 25 G: 0.25 INJECTION, SOLUTION INTRAVENOUS at 10:00

## 2023-01-18 RX ADMIN — Medication 5 MCG/MIN: at 10:13

## 2023-01-18 RX ADMIN — MIDODRINE HYDROCHLORIDE 15 MG: 10 TABLET ORAL at 14:05

## 2023-01-18 RX ADMIN — HEPARIN SODIUM 5000 UNITS: 5000 INJECTION INTRAVENOUS; SUBCUTANEOUS at 21:28

## 2023-01-18 RX ADMIN — CEFTRIAXONE SODIUM 1000 MG: 1 INJECTION, POWDER, FOR SOLUTION INTRAMUSCULAR; INTRAVENOUS at 18:16

## 2023-01-18 RX ADMIN — HEPARIN SODIUM 5000 UNITS: 5000 INJECTION INTRAVENOUS; SUBCUTANEOUS at 05:47

## 2023-01-18 RX ADMIN — ALBUMIN (HUMAN) 25 G: 0.25 INJECTION, SOLUTION INTRAVENOUS at 11:10

## 2023-01-18 RX ADMIN — CALCIUM GLUCONATE 2000 MG: 20 INJECTION, SOLUTION INTRAVENOUS at 21:22

## 2023-01-18 RX ADMIN — ACETAMINOPHEN 650 MG: 325 TABLET ORAL at 08:50

## 2023-01-18 RX ADMIN — ALBUMIN (HUMAN) 25 G: 0.25 INJECTION, SOLUTION INTRAVENOUS at 02:37

## 2023-01-18 RX ADMIN — ALBUMIN (HUMAN) 25 G: 0.25 INJECTION, SOLUTION INTRAVENOUS at 18:45

## 2023-01-18 RX ADMIN — EPOETIN ALFA-EPBX 3000 UNITS: 3000 INJECTION, SOLUTION INTRAVENOUS; SUBCUTANEOUS at 11:05

## 2023-01-18 ASSESSMENT — PAIN - FUNCTIONAL ASSESSMENT
PAIN_FUNCTIONAL_ASSESSMENT: PREVENTS OR INTERFERES WITH MANY ACTIVE NOT PASSIVE ACTIVITIES
PAIN_FUNCTIONAL_ASSESSMENT: PREVENTS OR INTERFERES SOME ACTIVE ACTIVITIES AND ADLS
PAIN_FUNCTIONAL_ASSESSMENT: PREVENTS OR INTERFERES SOME ACTIVE ACTIVITIES AND ADLS

## 2023-01-18 ASSESSMENT — PAIN DESCRIPTION - LOCATION
LOCATION: ABDOMEN

## 2023-01-18 ASSESSMENT — PAIN SCALES - GENERAL
PAINLEVEL_OUTOF10: 10
PAINLEVEL_OUTOF10: 0
PAINLEVEL_OUTOF10: 7
PAINLEVEL_OUTOF10: 7

## 2023-01-18 ASSESSMENT — PAIN DESCRIPTION - FREQUENCY
FREQUENCY: CONTINUOUS

## 2023-01-18 ASSESSMENT — PAIN DESCRIPTION - ONSET
ONSET: ON-GOING

## 2023-01-18 ASSESSMENT — PAIN DESCRIPTION - PAIN TYPE
TYPE: CHRONIC PAIN

## 2023-01-18 ASSESSMENT — PAIN DESCRIPTION - DESCRIPTORS
DESCRIPTORS: OTHER (COMMENT)
DESCRIPTORS: OTHER (COMMENT)
DESCRIPTORS: ACHING;DISCOMFORT;DULL;PRESSURE

## 2023-01-18 ASSESSMENT — PAIN DESCRIPTION - ORIENTATION
ORIENTATION: RIGHT;LEFT;UPPER;LOWER
ORIENTATION: RIGHT;LEFT;UPPER;LOWER
ORIENTATION: RIGHT;LOWER;UPPER;LEFT;MID

## 2023-01-18 NOTE — SIGNIFICANT EVENT
At [id number], patient alarmed on telemetry monitor and demonstrated sustained ventricular tachycardia for greater than thirty seconds. Encouraged patient to bear down and hold breath. Patient converted to normal sinus rhythm with frequent PVC's. Pacer pads applied to patient and crash cart placed outside of patient room. STAT EKG and magnesium ordered. Patient states he \"felt his heart race\" but was otherwise asymptomatic. Levophed at 5mcg and calcium gluconate infusing at 50 ml/hour at time of incident. Delta Santiago updated on patient condition and will assess patient shortly.

## 2023-01-18 NOTE — PLAN OF CARE
Received from ICU: Patient is a [facility] on 1/16/2023. Patient has history of systemic light chain amyloidosis complicated by multiorgan failure, including ESRD, chronic hypotension, cirrhosis. He follows with oncology. Patient was transferred to [address] [place] [address] with hypotension. He typically receives dialysis on Monday Wednesday Friday. Patient was on Levophed which was discontinued this morning. Has required paracentesis in the past and is currently complaining of abdominal distention. Likely will need paracentesis tomorrow. Patient was assessed at bedside. Denies chest pain, shortness of breath, lightheadedness or dizziness, nausea/vomiting. Does report abdominal fullness and distention. Blood pressure while in the room was 91/62. Heart Rate 78. Agree with critical care note from same day. Right upper quadrant ultrasound does show ascites and gallbladder wall thickening which can be seen in acute and chronic cholecystitis. We will continue albumin and midodrine. Nephrology has been consulted with plans for dialysis tomorrow. Likely need GI consult with possible paracentesis tomorrow. Hospitalist to assume care. Notified approximately [phone] the patient was hypotensive, BP 79/55. Notified at approximately [phone], that patient was hypotensive, manual BP 72/50. Patient asymptomatic. Received dose of albumin around 8 pm, dose of midodrine given around 2205. Repeat manual blood pressure at approximately 2325 was 71/51. Spoke with ICU provider in regards to patient case. ICU provider will assume care again of this patient and order Levophed.

## 2023-01-18 NOTE — CARE COORDINATION
1/18/23, 4:46 PM EST    DISCHARGE ON GOING EVALUATION    [facility] day: 2  Location: 3A-10/010-A Reason for admit: ARF (acute renal failure) ([facility] [address] [N17.9]   Procedure:   1/17 US GB/RUQ: Gallbladder sludge and probable cholelithiasis; Thickened gallbladder wall which may be seen in acute and chronic cholecystitis; Ascites    Barriers to Discharge: HD today cancelled d/t patient having runs of sustained VTach prior to initiating dialysis. Plan for HD tomorrow. Afebrile. NSR. On room air. Ox4. Intensivist and Nephrology following. Telemetry, SCDs. Levo @ 2.5 mcg/min, Ca+ gluconate Q12H, sq retacrit, sq heparin, Midodrine, protonix. Received IV albumin today. BUN 33, Creat 5, Ical 0.86, Mg 2.7, alb 2.9, alk phos 1877, alt 93, ast 82, total pro 5, hgb 9.9. PCP: Dalia Calderon DO  Readmission Risk Score: 17.6%  Patient Goals/Plan/Treatment Preferences: Home with girlfriend. Denies needs, declines HH. Has cane and walker if needed. HD MWF at [address] [place] [address] with 3409 chair time.

## 2023-01-18 NOTE — PLAN OF CARE
Problem: Discharge Planning  Goal: Discharge to home or other facility with appropriate resources  Outcome: Progressing  Flowsheets  Taken 1/18/2023 0314  Discharge to home or other facility with appropriate resources:   Identify barriers to discharge with patient and caregiver   Arrange for needed discharge resources and transportation as appropriate  Taken 1/17/2023 2000  Discharge to home or other facility with appropriate resources:   Identify barriers to discharge with patient and caregiver   Arrange for needed discharge resources and transportation as appropriate     Problem: Pain  Goal: Verbalizes/displays adequate comfort level or baseline comfort level  Outcome: Progressing  Flowsheets (Taken 1/18/2023 0314)  Verbalizes/displays adequate comfort level or baseline comfort level:   Encourage patient to monitor pain and request assistance   Assess pain using appropriate pain scale     Problem: Skin/Tissue Integrity  Goal: Absence of new skin breakdown  Description: 1. Monitor for areas of redness and/or skin breakdown  2. Assess vascular access sites hourly  3. Every 4-6 hours minimum:  Change oxygen saturation probe site  4. Every 4-6 hours:  If on nasal continuous positive airway pressure, respiratory therapy assess nares and determine need for appliance change or resting period.   Outcome: Progressing     Problem: Nutrition Deficit:  Goal: Optimize nutritional status  Outcome: Progressing  Flowsheets (Taken 1/18/2023 0314)  Nutrient intake appropriate for improving, restoring, or maintaining nutritional needs:   Assess nutritional status and recommend course of action   Recommend appropriate diets, oral nutritional supplements, and vitamin/mineral supplements   Monitor oral intake, labs, and treatment plans     Problem: Safety - Adult  Goal: Free from fall injury  Outcome: Progressing  Flowsheets (Taken 1/18/2023 0314)  Free From Fall Injury:   Instruct family/caregiver on patient safety   Based on caregiver fall risk screen, instruct family/caregiver to ask for assistance with transferring infant if caregiver noted to have fall risk factors

## 2023-01-18 NOTE — PLAN OF CARE
Problem: Discharge Planning  Goal: Discharge to home or other facility with appropriate resources  1/18/2023 1528 by Perla Calle RN  Outcome: Progressing  Flowsheets (Taken 1/18/2023 0830)  Discharge to home or other facility with appropriate resources:   Identify barriers to discharge with patient and caregiver   Arrange for needed discharge resources and transportation as appropriate   Identify discharge learning needs (meds, wound care, etc)   Refer to discharge planning if patient needs post-hospital services based on physician order or complex needs related to functional status, cognitive ability or social support system     Problem: Pain  Goal: Verbalizes/displays adequate comfort level or baseline comfort level  1/18/2023 1528 by Perla Calle RN  Outcome: Progressing  Flowsheets (Taken 1/18/2023 0830)  Verbalizes/displays adequate comfort level or baseline comfort level:   Encourage patient to monitor pain and request assistance   Assess pain using appropriate pain scale   Administer analgesics based on type and severity of pain and evaluate response   Implement non-pharmacological measures as appropriate and evaluate response   Notify Licensed Independent Practitioner if interventions unsuccessful or patient reports new pain     Problem: Skin/Tissue Integrity  Goal: Absence of new skin breakdown  Description: 1. Monitor for areas of redness and/or skin breakdown  2. Assess vascular access sites hourly  3. Every 4-6 hours minimum:  Change oxygen saturation probe site  4. Every 4-6 hours:  If on nasal continuous positive airway pressure, respiratory therapy assess nares and determine need for appliance change or resting period.   1/18/2023 1528 by Perla Calle RN  Outcome: Progressing     Problem: Nutrition Deficit:  Goal: Optimize nutritional status  1/18/2023 1528 by Perla Calle RN  Outcome: Progressing     Problem: Safety - Adult  Goal: Free from fall injury  1/18/2023 1528 by Teresa Landa RN  Outcome: Progressing     Care plan reviewed with patient and family. Patient and family verbalize understanding of the plan of care and contribute to goal setting.

## 2023-01-18 NOTE — PROGRESS NOTES
CRITICAL CARE PROGRESS NOTE    Patient:  Natali Katz    Unit/Bed:3A-10/010-A  YOB: 1960  MRN: [id number]   PCP: Vesta Bravo DO  Date of Admission: 1/16/2023  Chief Complaint:- Hypotension    Assessment and Plan:    Hypotension: MAP 50s prior to transfer, home midodrine increased from 10 to 15 TID prior to transfer to our care. Presumed to be multifactorial secondary to amyloidosis, renal failure, cannot rule out hepatorenal syndrome. Chronically on midodrine which is resumed. Increased to 15 mg TID prior to arrival at outside facility. Systemic Amyloidosis with Light Chain Disease: Follows outpatient, reports that he was supposed to start treatments in November 2022 but due to hypotension, oncologist did not start. Was sent to GI for cirrhosis workup. Concerns for hepatorenal now. Has significant disease, has been having episodes of Vtach throughout stay, one as long as 20 seconds. EKG with extremely low voltage. Unfortunately a poor prognosis   ESRD on HD: Secondary to light chain amyloidosis, recently exacerbated with hypoperfusion, and possible hepatorenal syndrome. Gets dialysis prn per nephrology who have been consulted. Normocytic Anemia: Secondary to ACD. Hgb baseline ~11, continue to monitor with daily CBC. HAGMA 2/2 Lactic Acidosis: LA 2.2, in setting of hypotension and ESRD. Increase perfusion with midodrine. Nephro on board to dialyze 01/18  Hypocalcemia: Repleting. Decompensated Liver Disease, Undifferentiated: Presented with diffuse ascites, hypoalbuminemia, transaminitis, ALP >2000. Following outpatient with [facility] for workup, some concerns for hepatorenal syndrome at outside facility. Chronically on midodrine. Dose was just increased from 10 mg TID to 15 mg TID at outside facility prior to transfer to our care. Paracentesis with labs performed 01/18 at bedside with 4.5 L removed, albumin repletement, low dose aldactone.       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 Patient reports he was approved for treatment for his amyloidosis back in November 2022 but due to his hypotension, treatment could not be started and he was referred to GI for further workup to assess for cirrhosis. He underwent two paracentesis, one week apart back in December 21, and 27 2022, has not had any since but clearly is in a decompensated state with hypoalbuminemia 2.0, ascites with diffuse fluid shift, hypotension. He has not been worked up for cirrhosis yet nor officially diagnosed for it but suspicion is high for a new hepatorenal. He denies a hx of heavy alcohol use. Does admit to 30-py smoking history. For his ESRD he gets MWF dialysis in [facility]. Patient is Aox3 on arrival doing well, little complaints or concerns besides abdominal pain, says he feels tight and asking for us to provide him a paracentesis. Otherwise no complaints or concerns. Denies chest pain, SOB, n/v/d, headache, diaphoresis. Interval Hx (01/18/23): Patient was laying in bed in a lot of discomfort this morning from abdominal pain/ascites, having hiccups, shortness of breath. Some episodes of VT ranging 5-20 seconds secondary to his amyloidosis disease. Patient aware of his poor prognosis. Remains on 2-mcg levophed. Wants palliative paracentesis done. 4.5 L clear dark yellow ascites removed at bedside. Nephrology to dialyze tomorrow. Past Medical History:  light chain amyloidosis complicated by multiorgan failure including ESRD, chronic hypotension, cirrhosis  Family History: Diabetes in father   Social History:  Drinks socially, 30 pack year smoking hx, quit December 2022    ROS   As noted per HPI.      Scheduled Meds:   calcium gluconate IVPB  2,000 mg IntraVENous Q12H    epoetin jose-epbx  3,000 Units SubCUTAneous Once per day on Mon Wed Fri    And    epoetin jose-epbx  2,000 Units SubCUTAneous Once per day on Mon Wed Fri    lidocaine PF        [Held by provider] carvedilol  6.25 mg Oral BID    pantoprazole  40 mg Oral QAM AC    midodrine  15 mg Oral q8h    sodium chloride flush  5-40 mL IntraVENous 2 times per day    heparin (porcine)  5,000 Units SubCUTAneous 3 times per day     Continuous Infusions:   norepinephrine 2.5 mcg/min (01/18/23 1411)    sodium chloride       PHYSICAL EXAMINATION:  T:  97.9  P: 81  RR: 20  B/P:  109/82 mmHg. O2 Sat: 92%. Body mass index is 19.93 kg/m². GCS:  15  General:  Age appropriate gentleman laying in bed comfortably in NAD  HEENT:  normocephalic and atraumatic. No scleral icterus. PERR  Neck: supple. No Thyromegaly. Lungs: clear to auscultation. No retractions  Cardiac: RRR. No JVD. Abdomen: Taunt, mildly TTP, severely distended with positive fluid shift. Now s/p paracentesis. Extremities:  No clubbing, cyanosis, or edema x 4. Vasculature: capillary refill < 3 seconds. Palpable dorsalis pedis pulses. Skin:  warm and dry. Psych:  Alert and oriented x3. Affect appropriate  Lymph:  No supraclavicular adenopathy. Neurologic:  No focal deficit. No seizures. Data: (All radiographs, tracings, PFTs, and imaging are personally viewed and interpreted unless otherwise noted).    Sodium 136, potassium 4.0, chloride 97, CO2 25, BUN 33, creatinine 5.0, anion gap 14, ionized calcium 0.86, magnesium 2.7, GFR 12, glucose 78, phosphorus 5.2, total protein 5.0  Lactic acid 2.2  Albumin 2.9, ALP 1877, ALT 93, AST 82, bilirubin 1.2  WBC 9.2, RBC 3.21, Hgb 9.9, MCV 91, platelets 677  Telemetry shows NSR               Seen with multidisciplinary ICU team.  Meets Continued ICU Level Care Criteria:    [x] Yes   [] No - Transfer Planned to listed location:  [] HOSPITALIST CONTACTED-      Case and plan discussed with Dr. Corrinne Angry. Electronically signed by Yuliya Bello DO  [address]   Patient seen by me including key components of medical care. Case discussed with resident physician. Patient with abdominal pain to palpation. Start antibiotics. Paracentesis completed 1/18/23. Wean pressors. Italicized font, if present,  represents changes to the note made by me. CC time 35 minutes. Time was discontiguous. Time does not include procedure. Time does include my direct assessment of the patient and coordination of care. Time represents more than 50% of the time involved with patient care by the [address] [place] [address] team.  Electronically signed by [facility].  Corrinne Angry MD.

## 2023-01-18 NOTE — PROCEDURES
PARACENTESIS:    Risks and benefits to the procedure were discussed. Alternatives and their risks were discussed as well. INDICATION: Severe ascites, distention, pain    US Interpretation:  Ultrasound was utilized to identify pocket of fluid, and area was marked. COMPLICATIONS:  None    FLUID:  Dark yellow ascites, 4.5 L    PROCEDURE:  After informed consent was obtained, and area was marked for procedure, patient was prepped with chlorhexidine prep and draped with sterile or towels. Maintaining sterile technique with sterile gloves, patient received lidocaine locally for anesthesia. A small incision was made in the skin. Utilizing a number 8 Ethiopian catheter was advanced into the peritoneal cavity as noted by fluid return. Catheter was threaded over introducer needle. Introducer needle was removed leaving the catheter in place. Fluid was removed until no more flow occurred. Catheter was removed and Band-Aid was placed at the insertion site. Rocephin added         I proctored the resident throughout the procedure. Electronically signed by Yolanda Garland MD.

## 2023-01-18 NOTE — PROGRESS NOTES
[facility]   Nephrology progress note  1/18/2023, 9:15 AM      Pt Name:    Elvia Pyle  MRN:     [id number]     YOB: 1960  Admit Date:    1/16/2023 10:55 PM    Chief Complaint: Nephrology following for ESRD and hemodialysis    Subjective:  Patient was seen and examined this morning  No chest pain or shortness of breath  Feels okay      Objective:  24HR INTAKE/OUTPUT:    Intake/Output Summary (Last 24 hours) at 1/18/2023 0915  Last data filed at 1/18/2023 0438  Gross per 24 hour   Intake 509.81 ml   Output --   Net 509.81 ml         I/O last 3 completed shifts: In: 569.8 [P.O.:220; I.V.:28.8; IV Piggyback:321]  Out: -   No intake/output data recorded.    Admission weight: 138 lb 7.2 oz (62.8 kg)  Wt Readings from Last 3 Encounters:   01/18/23 138 lb 14.2 oz (63 kg)   12/13/22 136 lb (61.7 kg)   12/06/22 134 lb (60.8 kg)        Vitals :   Vitals:    01/18/23 0500 01/18/23 0530 01/18/23 0600 01/18/23 0830   BP: 91/64 90/66 86/62    Pulse: 69 71 72    Resp: 10 11 15 18   Temp:    98 °F (36.7 °C)   TempSrc:    Oral   SpO2:       Weight:   138 lb 14.2 oz (63 kg)        Physical examination  General Appearance: alert and cooperative with exam, appears comfortable, no distress  Chronically ill-appearing, cachectic, severe muscle wasting  Mouth/Throat: Oral mucosa moist  Neck: No JVD  Lungs: Air entry diminished, poor effort  Heart:  S1, S2 heard  GI: Some distention, no guarding  Extremities: No significant LE edema    Medications:  Infusion:    norepinephrine Stopped (01/18/23 0030)    sodium chloride       Meds:    calcium gluconate IVPB  2,000 mg IntraVENous Q12H    [Held by provider] carvedilol  6.25 mg Oral BID    pantoprazole  40 mg Oral QAM AC    spironolactone  25 mg Oral Daily    albumin human  25 g IntraVENous Q6H    midodrine  15 mg Oral q8h    sodium chloride flush  5-40 mL IntraVENous 2 times per day    heparin (porcine)  5,000 Units SubCUTAneous 3 times per day     Meds prn: [Held by provider] loperamide, sodium chloride flush, sodium chloride, ondansetron **OR** ondansetron, polyethylene glycol, acetaminophen **OR** acetaminophen     Lab Data :  CBC:   Recent Labs     01/17/23  0647 01/18/23 0416   WBC 9.9 9.2   HGB 11.1* 9.9*   HCT 31.6* 29.2*    156     CMP:  Recent Labs     01/17/23  0647 01/18/23 0416    136   K 4.5 4.0   CL 97* 97*   CO2 22* 25   BUN 26* 33*   CREATININE 4.1* 5.0*   GLUCOSE 133* 78   CALCIUM 7.7* 7.8*   MG 2.7*  --    PHOS 5.2*  --      Hepatic:   Recent Labs     01/17/23  0647 01/18/23 0416   LABALBU 2.1* 2.9*   AST 67* 82*   * 93*   BILITOT 1.0 1.2   ALKPHOS 2,040* 1,877*         Assessment and Plan:    1. ESRD on hemodialysis secondary to renal amyloidosis, biopsy-proven  Continue dialysis MWF  Plan hemodialysis treatment today with ultrafiltration as tolerated    2. Anemia in ESRD. Add Retacrit  3. Chronic hypotension. Patient with underlying amyloidosis. Cannot rule out autonomic dysfunction from amyloidosis itself. Continue with midodrine. We will give IV albumin before dialysis today. Stop Aldactone  4. AL amyloidosis  5. Renal osteodystrophy/hyperphosphatemia. Will monitor      D/W patient     Arturo Arriaga MD  [facility]    This report has been created using voice recognition software.  It may contain minor errors which are inherent in voice recognition technology

## 2023-01-18 NOTE — DIALYSIS
Spoke with Dr. Anamaria España regarding patient having sustained Vtach prior to initiating dialysis. Per Dr. Anamaria España will hold off on dialysis today unless something changes and run tomorrow 1/19/23. Primary RN updated.

## 2023-01-19 PROBLEM — I47.20 VENTRICULAR TACHYCARDIA (HCC): Status: ACTIVE | Noted: 2023-01-01

## 2023-01-19 LAB
ANION GAP SERPL CALCULATED.3IONS-SCNC: 17 MEQ/L (ref 8–16)
BODY FLUID RBC: 2000 /CUMM
BUN BLDV-MCNC: 37 MG/DL (ref 7–22)
CALCIUM SERPL-MCNC: 8.4 MG/DL (ref 8.5–10.5)
CHARACTER, BODY FLUID: CLEAR
CHLORIDE BLD-SCNC: 97 MEQ/L (ref 98–111)
CO2: 22 MEQ/L (ref 23–33)
COLOR: YELLOW
CREAT SERPL-MCNC: 5.9 MG/DL (ref 0.4–1.2)
ERYTHROCYTE [DISTWIDTH] IN BLOOD BY AUTOMATED COUNT: 18.9 % (ref 11.5–14.5)
ERYTHROCYTE [DISTWIDTH] IN BLOOD BY AUTOMATED COUNT: 59.9 FL (ref 35–45)
FERRITIN: 1475 NG/ML (ref 22–322)
GFR SERPL CREATININE-BSD FRML MDRD: 10 ML/MIN/1.73M2
GLUCOSE BLD-MCNC: 95 MG/DL (ref 70–108)
HCT VFR BLD CALC: 30.1 % (ref 42–52)
HEMOGLOBIN: 10.3 GM/DL (ref 14–18)
IRON SATURATION: 31 % (ref 20–50)
IRON: 28 UG/DL (ref 65–195)
MAGNESIUM: 2.5 MG/DL (ref 1.6–2.4)
MCH RBC QN AUTO: 30.9 PG (ref 26–33)
MCHC RBC AUTO-ENTMCNC: 34.2 GM/DL (ref 32.2–35.5)
MCV RBC AUTO: 90.4 FL (ref 80–94)
MESOTHELIAL CELLS BODY FLUID: NORMAL
MONONUCLEAR CELLS BODY FLUID: 92.9 %
PATHOLOGIST REVIEW: NORMAL
PHOSPHORUS: 5 MG/DL (ref 2.4–4.7)
PLATELET # BLD: 151 THOU/MM3 (ref 130–400)
PMV BLD AUTO: ABNORMAL FL (ref 9.4–12.4)
POLYMORPHONUCLEAR CELLS BODY FLUID: 7.1 %
POTASSIUM SERPL-SCNC: 3.6 MEQ/L (ref 3.5–5.2)
RBC # BLD: 3.33 MILL/MM3 (ref 4.7–6.1)
SCAN OF BLOOD SMEAR: NORMAL
SODIUM BLD-SCNC: 136 MEQ/L (ref 135–145)
SPECIMEN: NORMAL
TOTAL IRON BINDING CAPACITY: 89 UG/DL (ref 171–450)
TOTAL NUCLEATED CELLS BODY FLUID: 86 /CUMM (ref 0–500)
TOTAL VOLUME RECEIVED BODY FLUID: 22 ML
WBC # BLD: 10.6 THOU/MM3 (ref 4.8–10.8)

## 2023-01-19 PROCEDURE — P9047 ALBUMIN (HUMAN), 25%, 50ML: HCPCS | Performed by: INTERNAL MEDICINE

## 2023-01-19 PROCEDURE — 90935 HEMODIALYSIS ONE EVALUATION: CPT

## 2023-01-19 PROCEDURE — 6360000002 HC RX W HCPCS: Performed by: NURSE PRACTITIONER

## 2023-01-19 PROCEDURE — 80048 BASIC METABOLIC PNL TOTAL CA: CPT

## 2023-01-19 PROCEDURE — 6360000002 HC RX W HCPCS: Performed by: INTERNAL MEDICINE

## 2023-01-19 PROCEDURE — 83540 ASSAY OF IRON: CPT

## 2023-01-19 PROCEDURE — 84100 ASSAY OF PHOSPHORUS: CPT

## 2023-01-19 PROCEDURE — 99232 SBSQ HOSP IP/OBS MODERATE 35: CPT | Performed by: PHYSICIAN ASSISTANT

## 2023-01-19 PROCEDURE — 2140000000 HC CCU INTERMEDIATE R&B

## 2023-01-19 PROCEDURE — 99232 SBSQ HOSP IP/OBS MODERATE 35: CPT | Performed by: INTERNAL MEDICINE

## 2023-01-19 PROCEDURE — 99223 1ST HOSP IP/OBS HIGH 75: CPT | Performed by: NUCLEAR MEDICINE

## 2023-01-19 PROCEDURE — 83550 IRON BINDING TEST: CPT

## 2023-01-19 PROCEDURE — 5A1D70Z PERFORMANCE OF URINARY FILTRATION, INTERMITTENT, LESS THAN 6 HOURS PER DAY: ICD-10-PCS | Performed by: INTERNAL MEDICINE

## 2023-01-19 PROCEDURE — 6360000002 HC RX W HCPCS: Performed by: STUDENT IN AN ORGANIZED HEALTH CARE EDUCATION/TRAINING PROGRAM

## 2023-01-19 PROCEDURE — 2580000003 HC RX 258: Performed by: NURSE PRACTITIONER

## 2023-01-19 PROCEDURE — 36415 COLL VENOUS BLD VENIPUNCTURE: CPT

## 2023-01-19 PROCEDURE — 83735 ASSAY OF MAGNESIUM: CPT

## 2023-01-19 PROCEDURE — 6370000000 HC RX 637 (ALT 250 FOR IP): Performed by: STUDENT IN AN ORGANIZED HEALTH CARE EDUCATION/TRAINING PROGRAM

## 2023-01-19 PROCEDURE — 82728 ASSAY OF FERRITIN: CPT

## 2023-01-19 PROCEDURE — 85027 COMPLETE CBC AUTOMATED: CPT

## 2023-01-19 PROCEDURE — 6370000000 HC RX 637 (ALT 250 FOR IP): Performed by: NURSE PRACTITIONER

## 2023-01-19 RX ORDER — ALBUMIN (HUMAN) 12.5 G/50ML
25 SOLUTION INTRAVENOUS ONCE
Status: COMPLETED | OUTPATIENT
Start: 2023-01-19 | End: 2023-01-19

## 2023-01-19 RX ORDER — ACETAMINOPHEN 500 MG
500 TABLET ORAL EVERY 6 HOURS PRN
Status: DISCONTINUED | OUTPATIENT
Start: 2023-01-19 | End: 2023-01-21 | Stop reason: HOSPADM

## 2023-01-19 RX ORDER — LANOLIN ALCOHOL/MO/W.PET/CERES
3 CREAM (GRAM) TOPICAL NIGHTLY PRN
Status: DISCONTINUED | OUTPATIENT
Start: 2023-01-20 | End: 2023-01-21 | Stop reason: HOSPADM

## 2023-01-19 RX ADMIN — CALCIUM GLUCONATE 2000 MG: 20 INJECTION, SOLUTION INTRAVENOUS at 12:09

## 2023-01-19 RX ADMIN — ALBUMIN (HUMAN) 25 G: 0.25 INJECTION, SOLUTION INTRAVENOUS at 10:55

## 2023-01-19 RX ADMIN — HEPARIN SODIUM 5000 UNITS: 5000 INJECTION INTRAVENOUS; SUBCUTANEOUS at 14:29

## 2023-01-19 RX ADMIN — MIDODRINE HYDROCHLORIDE 15 MG: 10 TABLET ORAL at 14:29

## 2023-01-19 RX ADMIN — HEPARIN SODIUM 5000 UNITS: 5000 INJECTION INTRAVENOUS; SUBCUTANEOUS at 05:45

## 2023-01-19 RX ADMIN — SODIUM CHLORIDE, PRESERVATIVE FREE 10 ML: 5 INJECTION INTRAVENOUS at 09:30

## 2023-01-19 RX ADMIN — PANTOPRAZOLE SODIUM 40 MG: 40 TABLET, DELAYED RELEASE ORAL at 05:45

## 2023-01-19 RX ADMIN — SODIUM CHLORIDE, PRESERVATIVE FREE 10 ML: 5 INJECTION INTRAVENOUS at 21:00

## 2023-01-19 RX ADMIN — MIDODRINE HYDROCHLORIDE 15 MG: 10 TABLET ORAL at 23:22

## 2023-01-19 RX ADMIN — MIDODRINE HYDROCHLORIDE 15 MG: 10 TABLET ORAL at 05:45

## 2023-01-19 NOTE — PROGRESS NOTES
Critical Care Progress Note      Patient:  Amy Lloyd    Unit/Bed:3A-10/010-A  MRN: [id number]   PCP: Pedrito Humphrey DO  Date of Admission: 1/16/2023    Chief Complaint: Hypotension    Assessment and Plan (All pulmonary edema, renal failure, PE, and respiratory failure diagnoses are acute in nature unless otherwise specified):        Hypotension: Prior to transfer to ICU patient had MAP in 50s. Patient's home midodrine was increased from 10-15 3 times daily. Etiology assume multifactorial secondary to amyloidosis, renal failure, potential hepatorenal syndrome with acute increase in ascites. Patient required no Levophed overnight and has had no further episodes of ventricular tachycardia. Systemic amyloidosis with light chain disease: Patient follows outpatient with oncologist Dr. Leatha Hatch. Patient states that he was scheduled to begin chemotherapy in [place] starting this Tuesday. Previous attempts of starting chemotherapy had been delayed due to his hypotension. Patient has had nonsustained runs of ventricular tachycardia throughout his hospital stay with longest recorded 20 seconds. EKG shows very low voltage. ESRD on HD: Secondary to light chain amyloidosis, recently exacerbated with hypoperfusion and possible hepatorenal syndrome. Patient scheduled for dialysis MW. Patient states he began dialysis on December 23, 2022 and has scheduled follow-up in [facility]. Nephrology consulted and following with Dr. Lizandro Mosqueda who plans for dialysis today as patient was not a candidate yesterday due to ventricular tachycardia. HAG MA 2/2 lactic acidosis: LA 2.2, in setting of hypotension and ESRD. Increased perfusion with midodrine. Nephrology planning to dialyze today. Hypocalcemia: Replete per protocol ordered. Decompensated liver disease, undifferentiated: Presented with diffuse ascites, hypoalbuminemia, transaminitis, ALP greater than 2000.   Following outpatient with [facility] for work-up, some concerns for hepatorenal syndrome at outside facility. Chronically on midodrine. Dose was increased from 10 mg 3 times daily to 15 mg 3 times daily at outside facility prior to transfer to ICU. Paracentesis with labs performed 118 at bedside with 4.5 L straw-colored fluid removed, albumin repleted, low-dose Aldactone. Hospitalist team notified with recommendation for GI consult while patient is being taken care of in stepdown. Patient's abdomen soft, nontender, nondistended-significant symptomatic improvement status post paracentesis yesterday. HPI: \"David Khan is a 63year old gentleman with hx systemic light chain amyloidosis complicated by multiorgan failure including ESRD, chronic hypotension, cirrhosis, following with Oncology in [place], [place] who was transferred to [facility] ICU overnight on 01/16 for evaluation and treatment of his hypotension. Patient reports he was approved for treatment for his amyloidosis back in November 2022 but due to his hypotension, treatment could not be started and he was referred to GI for further workup to assess for cirrhosis. He underwent two paracentesis, one week apart back in December 21, and 27 2022, has not had any since but clearly is in a decompensated state with hypoalbuminemia 2.0, ascites with diffuse fluid shift, hypotension. He has not been worked up for cirrhosis yet nor officially diagnosed for it but suspicion is high for a new hepatorenal. He denies a hx of heavy alcohol use. Does admit to 30-py smoking history. For his ESRD he gets MWF dialysis in [facility]. Patient is Aox3 on arrival doing well, little complaints or concerns besides abdominal pain, says he feels tight and asking for us to provide him a paracentesis. Otherwise no complaints or concerns. Denies chest pain, SOB, n/v/d, headache, diaphoresis. Interval Hx (01/18/23): Patient was laying in bed in a lot of discomfort this morning from abdominal pain/ascites, having hiccups, shortness of breath.  Some episodes of VT ranging 5-20 seconds secondary to his amyloidosis disease. Patient aware of his poor prognosis. Remains on 2-mcg levophed. Wants palliative paracentesis done. 4.5 L clear dark yellow ascites removed at bedside. Nephrology to dialyze tomorrow. \"  Per initial H&P and previous ICU course. (01/19/23): Patient lying in bed in no acute distress this morning. Abdomen soft nontender nondistended. Patient has been off of Levophed with no episodes of ventricular tachycardia, no palpitations felt by patient. Spoke to Dr. Geo Morel patient to be dialyzed today. Patient request to see gastroenterology Dr. Guaman Child for scheduled follow-up ideally in [place] [id number]. Patient seen by Dr. Jenny Rey oncology scheduled to begin chemo in [place] starting Tuesday. Patient requested that his Nexium be prescribed by physician to decrease his financial burden previously prescribed by [facility].     ROS: Pertinent positives as noted in the HPI. All other systems reviewed and negative. PMH:        Diagnosis Date    Bilateral inguinal hernia     CKD (chronic kidney disease) stage 3, GFR 30-59 ml/min ([facility])     Hypoalbuminemia     Monoclonal gammopathy      SHX:        Procedure Laterality Date    APPENDECTOMY      COLONOSCOPY      CT BIOPSY RENAL  10/26/2022    CT BIOPSY RENAL [facility] CT SCAN    CT BIOPSY RENAL  10/26/2022    CT BIOPSY RENAL 10/26/2022 [facility] CT SCAN    CT BONE MARROW BIOPSY  11/16/2022    CT BONE MARROW BIOPSY 11/16/2022 [facility] CT SCAN    HERNIA REPAIR      double    KNEE SURGERY Left     TONSILLECTOMY       FHX:       Problem Relation Age of Onset    Hypertension Mother     Hypertension Father     Cancer Father         liver    Pancreatic Cancer Brother     Diabetes Paternal Grandmother      SOC:  reports that he has been smoking cigarettes. He has a 20.00 pack-year smoking history. He has never used smokeless tobacco. He reports that he does not currently use alcohol. He reports that he does not use drugs.   Allergies: Patient has no known allergies. Diet: ADULT DIET; Regular; Low Sodium (2 gm)    MEDICATIONS:  Scheduled Meds:   calcium gluconate IVPB  2,000 mg IntraVENous Q12H    epoetin jose-epbx  3,000 Units SubCUTAneous Once per day on Mon Wed Fri    And    epoetin jose-epbx  2,000 Units SubCUTAneous Once per day on Mon Wed Fri    cefTRIAXone (ROCEPHIN) IV  1,000 mg IntraVENous Q24H    [Held by provider] carvedilol  6.25 mg Oral BID    pantoprazole  40 mg Oral QAM AC    midodrine  15 mg Oral q8h    sodium chloride flush  5-40 mL IntraVENous 2 times per day    heparin (porcine)  5,000 Units SubCUTAneous 3 times per day     Continuous Infusions:   norepinephrine 2.5 mcg/min (01/19/23 0327)    sodium chloride           VITAL SIGNS:   Patient Vitals for the past 3 hrs:   BP Temp Temp src Pulse Resp SpO2 Weight   01/19/23 0630 98/65 -- -- 74 25 93 % --   01/19/23 0600 101/69 -- -- 76 (!) 9 94 % 134 lb 11.2 oz (61.1 kg)   01/19/23 0530 90/69 -- -- 80 16 93 % --   01/19/23 0500 100/68 -- -- 77 12 92 % --   01/19/23 0430 110/71 -- -- 78 17 92 % --   01/19/23 0400 117/88 98 °F (36.7 °C) Oral 72 (!) 9 94 % --      I/O last 3 completed shifts: In: 1120.1 [P.O.:220; I.V.:329.1; IV Piggyback:571]  Out: -   Wt Readings from Last 3 Encounters:   01/19/23 134 lb 11.2 oz (61.1 kg)   12/13/22 136 lb (61.7 kg)   12/06/22 134 lb (60.8 kg)     Body mass index is 19.33 kg/m². CAM-ICU: RASS 0  GCS: 15    PHYSICAL EXAMINATION:  General: Male appears stated age, lying supine in no acute distress. HEENT:  Normocephalic and atraumatic. No scleral icterus. PERR  Neck: Supple. No Thyromegaly. Lungs: Clear to auscultation bilaterally. No retractions  Cardiac: RRR. No JVD. Right subclavian temporary dialysis catheter in place dressing CDI. Abdomen: Soft. Nontender. Nondistended, much improved from yesterday. No leakage of ascites from site of paracentesis. Extremities:  No clubbing, cyanosis, or edema x 4.     Vasculature: Capillary refill < 3 seconds. Palpable dorsalis pedis pulses. Skin:  Warm and dry. Psych:  Alert and oriented x3. Affect appropriate. Lymph:  No supraclavicular adenopathy. Neurologic:  No focal deficit. No seizures.     CURRENT PARENTERAL VASOACTIVE / INOTROPIC AGENTS:  [x] None Vasopressors:  [] Norepinephrine  [] Vasopressin   [] Epinephrine   [] Dopamine  [] Phenylephrine  [] Other: Sedation/Pain: [] None  [] Dexmedetomidine (Precedex)  [] Propofol []PRN []Sched []gtt   [] Ketamine []PRN []Sched []gtt  [] Fentanyl []PRN []Sched []gtt  [] Dilaudid []PRN []Sched []gtt  [] Phenobarbital   [] Versed    [] Paralytics [] Nimbex Antihypertensives gtt  [] Ca Channel Antagonist:  [] Beta-blocker:  [] Nitroglycerin  [] Nitroprusside       SUPPORT DEVICES:  [] Nasal Canula  [] HFNC  [] CPAP  [] BILEVEL-PAP   [] ETT MODE:- []PRVC         Additional Respiratory Assessments  Heart Rate: 74  Resp: 25  SpO2: 93 %  Oxygen Delivery - O2 Flow Rate (L/min): 2 L/min    ABGs:   No results found for: PH, PCO2, PO2, HCO3, O2SAT  No results found for: IFIO2, MODE, SETTIDVOL, SETPEEP    NUTRITION:  [x] PO [] OG  [] NG  []PEG [] Tube Feeds  [] TPN    CENTRAL LINES/CHEMOPORT/TUNNEL CATH:    [] No   [] Yes   (Date )          If yes - [] Right IJ   [] Left IJ [] Right Subclavian [] Left Subclavian         [] Right Femoral   [] Left Femoral       [x] Temporary Dialysis Catheter Right subclavian [] Tunneled Dialysis Catheter [] Chemo Port  [] PICC Line  [] Right Brachial [] Left Brachial  [x] Peripheral IV access  [] Arterial Line [] Right Radial [] Left Radial [] Left Femoral [] Right Femoral   [] Right Brachial [] Left Brachial    CAPPS CATHETER:   [x] No  [] Yes  (Date  )     ICU PROPHYLAXIS/THERAPY:  VAP prophylaxis:  [] Peridex  Stress ulcer:  [] PPI Agent  [] H2RA [] Sucralfate [] Other:   VTE:   [] Enoxaparin    [] Warfarin [] NOAC [] SCD:Bilat LE           [] Heparin: [] Subcut / [] IV   Sleep: [] Zolpidem (Ambien) [] Trazodone [] Ziprasidone (Geodon)   [] Olanzapine (Zyprexa) [] Quetiapine (Seroquel)    LABS/RADIOLOGY:  Recent Labs     01/19/23  0347   WBC 10.6   HGB 10.3*   HCT 30.1*        Recent Labs     01/19/23  0347      K 3.6   CL 97*   CO2 22*   BUN 37*   CREATININE 5.9*   CALCIUM 8.4*   PHOS 5.0*     Recent Labs     01/17/23  0647 01/18/23  0416   AST 67* 82*   * 93*   BILIDIR 0.6*  --    BILITOT 1.0 1.2   ALKPHOS 2,040* 1,877*     Recent Labs     01/18/23  0416   INR 0.96     No results for input(s): CKTOTAL, TROPONINI in the last 72 hours. No results for input(s): PROCAL in the last 72 hours. Recent Labs     01/17/23  0647   LACTA 2.2*        MICROBIOLOGY:    Blood culture #1: No results found for: [facility]  Blood culture #2:No results found for: [facility] Bravo  Organism:No results found for: [facility]      Lab Results   Component Value Date/Time    LABGRAM  01/18/2023 05:00 PM     Rare segmented neutrophils observed. No organisms observed. performed on cytospun specimen     MRSA culture only:No results found for: [facility]  Urine culture: No results found for: LABURIN  Respiratory culture: No results found for: CULTRESP  Aerobic and Anaerobic :  Lab Results   Component Value Date/Time    LABAERO No Acinetobacter species isolated. 01/16/2023 11:15 PM     No results found for: LABANAE    URINALYSIS:   No results found for: NITRU, WBCUA, BACTERIA, RBCUA, BLOODU, SPECGRAV, GLUCOSEU    RADIOLOGY: (All radiographs, tracings, PFTs, and imaging are personally viewed and interpreted unless otherwise noted). US GALLBLADDER RUQ   Final Result   1. Gallbladder sludge and probable cholelithiasis. 2. Thickened gallbladder wall which may be seen in acute and chronic cholecystitis. 3. Ascites.       Final report electronically signed by Dr. Sally Buck on 1/17/2023 4:07 PM          CONSULTS:  [] Cardiology  [x] Nephrology  [] GI     [] Pulm [] Endocrine  [] ID  [] Heme/Onc [] Rad/Onc     [] Neuro   [] Neuro Surgery []CV Surgery   [] ENT    [] Gen Surgery [] Trauma Surgery [] Ortho [] Podiatry  [] Psych   [] Urology      [] Palliative  [] Hospice [] Pain management   []      []TCU (Rehab)   []RT [] PT/OT  [] SLP    OTHERS:    CODE STATUS:  Full Code     Seen with multidisciplinary ICU team.    Meets Continued ICU Level Care Criteria:    [] Yes   [x] No - Transfer Planned to listed location: Stepdown  [x] HOSPITALIST CONTACTED-  (TONY Raphael)    Parts of this transcriptions may have been dictated by use of voice recognition software and electronically transcribed. The transcription may contain errors not detected in proofreading. Please refer to my supervising physician's documentation if my documentation differs. Electronically signed by Pamella Bean MD on 1/19/2023 at 6:50 AM    Critical Care Service             Patient seen by me including key components of medical care. Case discussed with resident physician. Patient feels better since paracentesis. No abdominal pain. Ok to transfer to medical floor. Italicized font, if present,  represents changes to the note made by me. CC time 15 minutes. Time was discontiguous. Time does not include procedure. Time does include my direct assessment of the patient and coordination of care. Time represents more than 50% of the time involved with patient care by the [address] [place] [address] team.  Electronically signed by Valentino Friedlander.  Victor M Lopez MD.

## 2023-01-19 NOTE — PLAN OF CARE
Problem: Discharge Planning  Goal: Discharge to home or other facility with appropriate resources  1/19/2023 0716 by Tammy Sosa RN  Outcome: Progressing  Flowsheets (Taken 1/18/2023 0830 by Tavo Spencer, RN)  Discharge to home or other facility with appropriate resources:   Identify barriers to discharge with patient and caregiver   Arrange for needed discharge resources and transportation as appropriate   Identify discharge learning needs (meds, wound care, etc)   Refer to discharge planning if patient needs post-hospital services based on physician order or complex needs related to functional status, cognitive ability or social support system  1/18/2023 2315 by Rodrick Wan RN  Outcome: Progressing     Problem: Pain  Goal: Verbalizes/displays adequate comfort level or baseline comfort level  1/19/2023 0716 by Tammy Sosa RN  Outcome: Progressing  Flowsheets (Taken 1/18/2023 0830 by Tavo Spencer, RN)  Verbalizes/displays adequate comfort level or baseline comfort level:   Encourage patient to monitor pain and request assistance   Assess pain using appropriate pain scale   Administer analgesics based on type and severity of pain and evaluate response   Implement non-pharmacological measures as appropriate and evaluate response   Notify Licensed Independent Practitioner if interventions unsuccessful or patient reports new pain  1/18/2023 2315 by Rodrick Wan RN  Outcome: Progressing     Problem: Skin/Tissue Integrity  Goal: Absence of new skin breakdown  Description: 1. Monitor for areas of redness and/or skin breakdown  2. Assess vascular access sites hourly  3. Every 4-6 hours minimum:  Change oxygen saturation probe site  4. Every 4-6 hours:  If on nasal continuous positive airway pressure, respiratory therapy assess nares and determine need for appliance change or resting period.   1/19/2023 0716 by Tammy Sosa RN  Outcome: Progressing  Note: Skin is kept clean and dry.  1/18/2023 2315 by Betina Silverio RN  Outcome: Progressing     Problem: Nutrition Deficit:  Goal: Optimize nutritional status  1/19/2023 0716 by Fred Ferrer RN  Outcome: Progressing  Flowsheets (Taken 1/18/2023 0314 by Alma Rosa Hernández RN)  Nutrient intake appropriate for improving, restoring, or maintaining nutritional needs:   Assess nutritional status and recommend course of action   Recommend appropriate diets, oral nutritional supplements, and vitamin/mineral supplements   Monitor oral intake, labs, and treatment plans  1/18/2023 2315 by Betina Silverio RN  Outcome: Progressing     Problem: Safety - Adult  Goal: Free from fall injury  1/19/2023 0716 by Fred Ferrer RN  Outcome: Progressing  1/18/2023 2315 by Betina Silverio RN  Outcome: Progressing

## 2023-01-19 NOTE — PROGRESS NOTES
[facility]   Nephrology progress note  1/19/2023, 8:20 AM      Pt Name:    Stephani Shi  MRN:     [id number]     YOB: 1960  Admit Date:    1/16/2023 10:55 PM    Chief Complaint: Nephrology following for ESRD and hemodialysis    Subjective:  Patient was seen and examined this morning  Seen and examined earlier today during rounds  No chest pain  No shortness of breath        Objective:  24HR INTAKE/OUTPUT:    Intake/Output Summary (Last 24 hours) at 1/19/2023 0820  Last data filed at 1/19/2023 0717  Gross per 24 hour   Intake 1126.04 ml   Output --   Net 1126.04 ml           I/O last 3 completed shifts: In: 5040 [P.O.:250;  I.V.:351.2; IV Piggyback:704.8]  Out: -   I/O this shift:  In: 2 [I.V.:2]  Out: -    Admission weight: 138 lb 7.2 oz (62.8 kg)  Wt Readings from Last 3 Encounters:   01/19/23 134 lb 11.2 oz (61.1 kg)   12/13/22 136 lb (61.7 kg)   12/06/22 134 lb (60.8 kg)        Vitals :   Vitals:    01/19/23 0500 01/19/23 0530 01/19/23 0600 01/19/23 0630   BP: 100/68 90/69 101/69 98/65   Pulse: 77 80 76 74   Resp: 12 16 (!) 9 25   Temp:       TempSrc:       SpO2: 92% 93% 94% 93%   Weight:   134 lb 11.2 oz (61.1 kg)        Physical examination  General Appearance: alert and cooperative with exam, appears comfortable, no distress  Chronically ill-appearing, cachectic, severe muscle wasting  Mouth/Throat: Oral mucosa moist  Neck: No JVD  Lungs: Air entry diminished, poor effort  Heart:  S1, S2 heard  GI: Some distention, no guarding  Extremities: No significant LE edema    Medications:  Infusion:    sodium chloride       Meds:    calcium gluconate IVPB  2,000 mg IntraVENous Q12H    epoetin jose-epbx  3,000 Units SubCUTAneous Once per day on Mon Wed Fri    And    epoetin jose-epbx  2,000 Units SubCUTAneous Once per day on Mon Wed Fri    cefTRIAXone (ROCEPHIN) IV  1,000 mg IntraVENous Q24H    [Held by provider] carvedilol  6.25 mg Oral BID    pantoprazole  40 mg Oral QAM AC midodrine  15 mg Oral q8h    sodium chloride flush  5-40 mL IntraVENous 2 times per day    heparin (porcine)  5,000 Units SubCUTAneous 3 times per day     Meds prn: [Held by provider] loperamide, sodium chloride flush, sodium chloride, ondansetron **OR** ondansetron, polyethylene glycol, acetaminophen **OR** acetaminophen     Lab Data :  CBC:   Recent Labs     01/17/23  0647 01/18/23 0416 01/19/23 0347   WBC 9.9 9.2 10.6   HGB 11.1* 9.9* 10.3*   HCT 31.6* 29.2* 30.1*    156 151       CMP:  Recent Labs     01/17/23  0647 01/18/23  0416 01/18/23  1102 01/19/23 0347    136  --  136   K 4.5 4.0  --  3.6   CL 97* 97*  --  97*   CO2 22* 25  --  22*   BUN 26* 33*  --  37*   CREATININE 4.1* 5.0*  --  5.9*   GLUCOSE 133* 78  --  95   CALCIUM 7.7* 7.8*  --  8.4*   MG 2.7*  --  2.7* 2.5*   PHOS 5.2*  --   --  5.0*       Hepatic:   Recent Labs     01/17/23  0647 01/18/23 0416   LABALBU 2.1* 2.9*   AST 67* 82*   * 93*   BILITOT 1.0 1.2   ALKPHOS 2,040* 1,877*           Assessment and Plan:    1. ESRD on hemodialysis secondary to renal amyloidosis, biopsy-proven  Patient is doing well overall  Plan to dialyze him today  No acute distress  Hemodynamically more stable    2. Anemia in ESRD. On Retacrit  3. Chronic hypotension. Patient with underlying amyloidosis. Cannot rule out autonomic dysfunction from amyloidosis itself. Continue with midodrine. IV albumin if needed  4. AL amyloidosis  5. Renal osteodystrophy/hyperphosphatemia. Will monitor  6. Ascites status post paracentesis: Hospitalist has consulted GI. D/W patient     Roxana Guy MD  [facility]    This report has been created using voice recognition software.  It may contain minor errors which are inherent in voice recognition technology

## 2023-01-19 NOTE — FLOWSHEET NOTE
01/19/23 1145   Vital Signs   BP 87/64   Temp 97.7 °F (36.5 °C)   Heart Rate 76   Resp 19   SpO2 94 %   Weight 132 lb 15 oz (60.3 kg)   Weight Method Bed scale   Percent Weight Change -1.31   Post-Hemodialysis Assessment   Post-Treatment Procedures Blood returned;Catheter Capped, clamped with Saline x2 ports   Machine Disinfection Process Acid/Vinegar Clean;Heat Disinfect; Exterior Machine Disinfection   Blood Volume Processed (Liters) 77.8 l/min   Dialyzer Clearance Moderately streaked   Duration of Treatment (minutes) 210 minutes   Heparin Amount Administered During Treatment (mL) 0 mL   Hemodialysis Intake (ml) 400 ml   Hemodialysis Output (ml) 1200 ml   NET Removed (ml) 800   Tolerated Treatment Good     3.5 hour treatment completed. 800 ml of fluid removed. Patient hypotensive throughout treatment, albumin 25g given with dialysis. Cath lines flushed with 10 ml of 0.9 NS, clamped and tego secured. Report given to primary RN. Charting printed and placed in bin to be scanned into EMR.

## 2023-01-19 NOTE — CARE COORDINATION
1/19/23, 4:32 PM EST    DISCHARGE ON GOING EVALUATION    [facility] day: 3  Location: 3A-10/010-A Reason for admit: ARF (acute renal failure) ([address] [N17.9]   Procedure:   1/17 US GB/RUQ: Gallbladder sludge and probable cholelithiasis; Thickened gallbladder wall which may be seen in acute and chronic cholecystitis; Ascites  1/18 Paracentesis: 4.5L removed    Barriers to Discharge: Paracentesis done yesterday with 4.5L removed. Levo weaned off early this morning. HD today. Cardiology consulted for sustained monomorphic, polymorphic Vtach. EP consulted for arrhythmia in setting of systemic amyloidosis, consider ICD? GI consulted for liver failure, hx of amyloidosis, asictes. Plan for Liver biopsy. Echo done - not read yet. Patient transferred to stepdown status today. Afebrile. NSR. On room air. Ox4. Intensivist and Nephrology following. Telemetry, SCDs. Sq retacrit, sq heparin, Midodrine, protonix. Received IV albumin x1 today. PCP: Abdoulaye Jean DO  Readmission Risk Score: 17.4%  Patient Goals/Plan/Treatment Preferences: Home with girlfriend. Denies needs, declines HH. Has cane and walker if needed. HD MWF at [address] [place] [address] with [id number] chair time.

## 2023-01-19 NOTE — CONSULTS
[facility]    Patient's Name/Date of Birth: Howard Bowden / 1960 (63 y.o.)    Date: January 19, 2023     Referring Provider: Lazarus Isles, PA-C    CHIEF COMPLAINT: Hypotension, sustained monomorphic, polymorphic ventricular tachycardia      HPI: This is a pleasant 63 y.o. male with PMH of systemic amyloidosis with light chain disease, ESRD on HD, Decompensated liver disease who presents with hypotension. Patient previously was in the ICU requiring Levophed. Levophed was weaned yesterday and patient was moved to stepdown unit. On Wednesday morning at approximately 1040 telemetry showed sustained ventricular tachycardia for greater than 30 seconds. Patient then converted to normal sinus rhythm with frequent PVC's. Patient states that at that time he felt chest pressure for 10-15 seconds. Patient denies any palpitations at that time. Since that occurrence patient has had multiple episodes of nonsustained ventricular tachycardia. Patient denies any chest pressure, chest pain, or palpitations since the event on Wednesday. Prior to diagnosis with systemic amyloidosis, patient denies any previous cardiac history. Patient denies any rhythm irregularities previously. Patient denies any SOB post paracentesis yesterday. Echo: 11/23/2022  Summary   Left ventricle size is normal.   Moderately increased left ventricle wall thickness. Moderate concentric left ventricular hypertrophy. There were no regional wall motion abnormalities. Ejection fraction is visually estimated in the range of 55% to 60%. Doppler parameters were consistent with abnormal left ventricular relaxation (grade 1 diastolic dysfunction).    Moderately reduced Global longitudinal strain with some apical sparing-   Average GLS -11.5 %   Consider Cardiac Amyloidisis       All labs, EKG's, diagnostic testing and images as well as cardiac cath, stress testing were reviewed during this encounter    Past Medical History:   Diagnosis Date    Bilateral inguinal hernia     CKD (chronic kidney disease) stage 3, GFR 30-59 ml/min ([facility])     Hypoalbuminemia     Monoclonal gammopathy      Past Surgical History:   Procedure Laterality Date    APPENDECTOMY      COLONOSCOPY      CT BIOPSY RENAL  10/26/2022    CT BIOPSY RENAL [facility] SCAN    CT BIOPSY RENAL  10/26/2022    CT BIOPSY RENAL 10/26/2022 [facility] CT SCAN    CT BONE MARROW BIOPSY  11/16/2022    CT BONE MARROW BIOPSY 11/16/2022 [facility] CT SCAN    HERNIA REPAIR      double    KNEE SURGERY Left     TONSILLECTOMY       Current Facility-Administered Medications   Medication Dose Route Frequency Provider Last Rate Last Admin    acetaminophen (TYLENOL) tablet 500 mg  500 mg Oral Q6H PRN ESA Osei CNP        Or    acetaminophen (TYLENOL) suppository 325 mg  325 mg Rectal Q6H PRN ESA Osei CNP        epoetin jose-epbx (RETACRIT) injection 3,000 Units  3,000 Units SubCUTAneous Once per day on Mon Wed Fri Joy Main MD   3,000 Units at 01/18/23 1105    And    epoetin jose-epbx (RETACRIT) injection 2,000 Units  2,000 Units SubCUTAneous Once per day on Mon Wed Fri Joy Main MD   2,000 Units at 01/18/23 1105    [Held by provider] carvedilol (COREG) tablet 6.25 mg  6.25 mg Oral BID Palma Oh MD        [facility]] loperamide (IMODIUM) capsule 2 mg  2 mg Oral BID PRN Palma Oh MD        pantoprazole (PROTONIX) tablet 40 mg  40 mg Oral QAM AC Palma Oh MD   40 mg at 01/19/23 0545    midodrine (PROAMATINE) tablet 15 mg  15 mg Oral q8h ESA Jaeger - CNP   15 mg at 01/19/23 1429    sodium chloride flush 0.9 % injection 5-40 mL  5-40 mL IntraVENous 2 times per day ESA Jaeger - CNP   10 mL at 01/19/23 0930    sodium chloride flush 0.9 % injection 5-40 mL  5-40 mL IntraVENous PRN ESA Jaeger - CNP        0.9 % sodium chloride infusion   IntraVENous PRN Lillian Gutierrez ESA Silvestre CNP        ondansetron (ZOFRAN-ODT) disintegrating tablet 4 mg  4 mg Oral Q8H PRN ESA Richards CNP        Or    ondansetron (ZOFRAN) injection 4 mg  4 mg IntraVENous Q6H PRN ESA Richards CNP        polyethylene glycol (GLYCOLAX) packet 17 g  17 g Oral Daily PRN ESA Richards CNP        heparin (porcine) injection 5,000 Units  5,000 Units SubCUTAneous 3 times per day ESA Richards CNP   5,000 Units at 01/19/23 1429     Prior to Admission medications    Medication Sig Start Date End Date Taking?  Authorizing Provider   midodrine (PROAMATINE) 5 MG tablet Take 15 mg by mouth in the morning, at noon, and at bedtime 12/26/22  Yes Historical Provider, MD   carvedilol (COREG) 6.25 MG tablet Take 6.25 mg by mouth 2 times daily 12/9/22   Historical Provider, MD   loperamide (IMODIUM) 2 MG capsule Take 2 mg by mouth as needed 12/26/22   Historical Provider, MD   esomeprazole Magnesium (NEXIUM) 20 MG PACK Take 20 mg by mouth daily    Historical Provider, MD   prochlorperazine (COMPAZINE) 10 MG tablet Take 1 tablet by mouth every 6 hours as needed (nausea) 12/13/22   Nena Perez MD   sodium bicarbonate 650 MG tablet Take 1 tablet by mouth 2 times daily 12/6/22   [place] Jorge MD   Scheduled Meds:   epoetin jose-epbx  3,000 Units SubCUTAneous Once per day on Mon Wed Fri    And    epoetin jose-epbx  2,000 Units SubCUTAneous Once per day on Mon Wed Fri    [Held by provider] carvedilol  6.25 mg Oral BID    pantoprazole  40 mg Oral QAM AC    midodrine  15 mg Oral q8h    sodium chloride flush  5-40 mL IntraVENous 2 times per day    heparin (porcine)  5,000 Units SubCUTAneous 3 times per day     Continuous Infusions:   sodium chloride       PRN Meds:.acetaminophen **OR** acetaminophen, [Held by provider] loperamide, sodium chloride flush, sodium chloride, ondansetron **OR** ondansetron, polyethylene glycol    No Known Allergies  Family History   Problem Relation Age of Onset    Hypertension Mother     Hypertension Father     Cancer Father         liver    Pancreatic Cancer Brother     Diabetes Paternal Grandmother      Social History     Socioeconomic History    Marital status: Single     Spouse name: Not on file    Number of children: Not on file    Years of education: Not on file    Highest education level: Not on file   Occupational History    Not on file   Tobacco Use    Smoking status: Every Day     Packs/day: 0.50     Years: 40.00     Pack years: 20.00     Types: Cigarettes    Smokeless tobacco: Never    Tobacco comments:     Weaning per self 12/20/22   Vaping Use    Vaping Use: Never used   Substance and Sexual Activity    Alcohol use: Not Currently    Drug use: Never    Sexual activity: Not on file   Other Topics Concern    Not on file   Social History Narrative    Not on file     Social Determinants of Health     Financial Resource Strain: Not on file   Food Insecurity: Not on file   Transportation Needs: Not on file   Physical Activity: Not on file   Stress: Not on file   Social Connections: Not on file   Intimate Partner Violence: Not on file   Housing Stability: Not on file     ROS:   Constitutional: Denies any recent wt change. Eyes:  Denies any blurring or double vision, no glaucoma  Ears/Nose/Mouth/Throat:  Denies any chronic sinus/rhinitis, bleeding gums  Cardiovascular:  As described above. Respiratory:  Denies any frequent cough, wheezing or coughing up blood  Genitourinary:  Denies difficulty with urination and kidney stones  Gastrointestinal:  Denies any chronic problems with abdominal pain, nausea, vomiting or diarrhea  Musculoskeletal:  Denies any joint pain, back pain, or difficulty walking  Integumentary:  Denies any rash  Neurological:  No numbness or tingling  Endocrine:  Denies any polydipsia. Hematologic/Lymphatic:  Denies any hemorrhage or lymphatic drainage problems.   Labs:  CBC:   Recent Labs 01/17/23  0647 01/18/23  0416 01/19/23  0347   WBC 9.9 9.2 10.6   HGB 11.1* 9.9* 10.3*   HCT 31.6* 29.2* 30.1*   MCV 88.0 91.0 90.4    156 151     BMP:   Recent Labs     01/17/23  0647 01/18/23  0416 01/18/23  1102 01/19/23  0347    136  --  136   K 4.5 4.0  --  3.6   CL 97* 97*  --  97*   CO2 22* 25  --  22*   PHOS 5.2*  --   --  5.0*   BUN 26* 33*  --  37*   CREATININE 4.1* 5.0*  --  5.9*   MG 2.7*  --  2.7* 2.5*     Accucheck Glucoses: No results for input(s): POCGLU in the last 72 hours. Cardiac Enzymes: No results for input(s): CKTOTAL, CKMB, CKMBINDEX, TROPONINI in the last 72 hours. PT/INR:   Recent Labs     01/17/23  1020 01/18/23 0416   INR 0.92 0.96     APTT: No results for input(s): APTT in the last 72 hours.   Liver Profile:  Lab Results   Component Value Date/Time    AST 82 01/18/2023 04:16 AM    ALT 93 01/18/2023 04:16 AM    BILIDIR 0.6 01/17/2023 06:47 AM    BILITOT 1.2 01/18/2023 04:16 AM    ALKPHOS 1,877 01/18/2023 04:16 AM   No results found for: CHOL, HDL, TRIG  TSH: No results found for: TSH  UA:   Lab Results   Component Value Date/Time    COLORU YELLOW 01/18/2023 05:00 PM         Physical Exam:  Vitals:    01/19/23 1500   BP: 98/67   Pulse: 76   Resp: 13   Temp:    SpO2: 94%      Intake/Output Summary (Last 24 hours) at 1/19/2023 1532  Last data filed at 1/19/2023 1145  Gross per 24 hour   Intake 915.74 ml   Output 1200 ml   Net -284.26 ml      General:  Chronically ill appearing  Neck: Supple, no JVD, no carotid bruits  Heart: Regular rhythm normal S1 and S2, no rubs, murmurs or gallops  Lungs: clear to ascultation no rales, wheezes, or rhonchi  Abdomen: positive bowel sounds, soft, non-tender, non-distended, no bruits, no masses  Extremities:no clubbing, cyanosis or edema  Neurologic: alert and oriented x 3, cranial nerves 2-12 grossly intact, motor and sensory intact, moving all extremities  Skin: No rashes  Psych: AO x 3, no depression/татьяна, no pressured speech, normal affect  Lymph: No obvious LAD      Assessment & Plan:  Ventricular Tachycardia: Telemetry significant for multiple episodes of sustained monomorphic ventricular tachycardia and non-sustained polymorphic tachycardia. Patient denies any symptoms associated with these events. Echo on 11/23/2022 shows moderate concentric left ventricular hypertrophy and to consider cardiac amyloidosis. Continue low dose beta blocker as blood pressure allows. Electrophysiology consult for arrhythmia in setting of systemic amyloidosis, consider ICD. Systemic amyloidosis with light chain disease: Patient has not currently started therapy due to hypotension. Primary following. Hypotension: Continue midodrine. Primary following. ESRD on HD: Primary and nephrology following. Thank you for allowing us to participate in the care of this patient. Please do not hesitate to call us with questions. 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  Please don't hesitate to contact me regarding any further issues related to the patient care    Pari Muhammad MD

## 2023-01-19 NOTE — CONSULTS
Consult History & Physical      Patient:  Linda Nguyen  YOB: 1960  MRN: [id number]     Acct: [de-identified]    Chief Complaint:  Liver failure, h/o amyloidosis, ascites      Date of Service: Pt seen/examined in consultation on 1/19/2023    History Of Present Illness:      63 y.o. male who we are asked to see/evaluate by Med Escamilla PA-C for medical management of liver failure, h/o amyloidosis, ascites. HPI from patient and chart review. He was admitted 01/16/23 as transfer from OSH for hypotension. He was weaned off pressor support 01/18/23 and is to be transferred to a step down unit. He was diagnosed with light chain amyloidosis and follows with oncology in [facility]. He has not started chemotherapy yet due to his hypotension, but states he is supposed to start it this month. He has a history of of ESRD on HD secondary to renal amyloidosis which was proven by biopsy. He was referred to GI recently for ascites and elevated LFTs, established care 01/12/23. He was to have some blood work done and an [address] liver which he was unable to complete prior to this admission. He had 2 paracenteses in December and had a paracentesis yesterday with 4.5 L removed. Ascites fluid was negative for SBP. He is noted to have mildly elevated ALT & AST, bilirubin WNL. Alk phos is significantly elevated at 1877. Noted to have hypoalbuminemia, normal INR, and normal platelet count. US RUQ demonstrated ascites and a liver normal in echotexture. Today he denies abdominal pain, nausea, vomiting, constipation, melena, and hematochezia.  He has a \"little diarrhea\" which he states he always has when \"he is in the hospital.\"    Past Medical History:    Past Medical History:   Diagnosis Date    Bilateral inguinal hernia     CKD (chronic kidney disease) stage 3, GFR 30-59 ml/min ([facility])     Hypoalbuminemia     Monoclonal gammopathy        Home Medications:  Prior to Admission medications    Medication Sig Start Date End Date Taking? Authorizing Provider   midodrine (PROAMATINE) 5 MG tablet Take 15 mg by mouth in the morning, at noon, and at bedtime 12/26/22  Yes Historical Provider, MD   carvedilol (COREG) 6.25 MG tablet Take 6.25 mg by mouth 2 times daily 12/9/22   Historical Provider, MD   loperamide (IMODIUM) 2 MG capsule Take 2 mg by mouth as needed 12/26/22   Historical Provider, MD   esomeprazole Magnesium (NEXIUM) 20 MG PACK Take 20 mg by mouth daily    Historical Provider, MD   prochlorperazine (COMPAZINE) 10 MG tablet Take 1 tablet by mouth every 6 hours as needed (nausea) 12/13/22   Miri Do MD   sodium bicarbonate 650 MG tablet Take 1 tablet by mouth 2 times daily 12/6/22   Rain Pimentel MD       Surgical History:  Past Surgical History:   Procedure Laterality Date    APPENDECTOMY      COLONOSCOPY      CT BIOPSY RENAL  10/26/2022    CT BIOPSY RENAL [facility] CT SCAN    CT BIOPSY RENAL  10/26/2022    CT BIOPSY RENAL 10/26/2022 [facility] CT SCAN    CT BONE MARROW BIOPSY  11/16/2022    CT BONE MARROW BIOPSY 11/16/2022 [facility] CT SCAN    HERNIA REPAIR      double    KNEE SURGERY Left     TONSILLECTOMY         Family History:  Family History   Problem Relation Age of Onset    Hypertension Mother     Hypertension Father     Cancer Father         liver    Pancreatic Cancer Brother     Diabetes Paternal Grandmother        Past GI History:  Diverticulosis, colon polyp, colonoscopy, paracentesis, elevated LFTs  Last colonoscopy 08/2022- descending colon polyp which was removed, diverticulosis in the descending & sigmoid colon  Dr. Rose Mary Schofield patient    Allergies:  Patient has no known allergies. Social History:   TOBACCO:   reports that he has been smoking cigarettes. He has a 20.00 pack-year smoking history. He has never used smokeless tobacco.  ETOH:   reports that he does not currently use alcohol.     Review Of Systems  GENERAL: +weight loss  EYES:  No blurred vision, double vision   CARDIOVASCULAR: No chest pain or palpitations. RESPIRATORY:  No dyspnea or cough. GI:  See HPI  MUSCULOSKELETAL: No new painful or swollen joints or myalgias. :   No dysuria or hematuria. SKIN:  No rashes or jaundice. NEUROLOGIC:  No headaches or seizures, numbness or tingling of arms, or legs. PSYCH:  No anxiety or depression. ENDOCRINE:  No polyuria or polydipsia. BLOOD:  +anemia    PHYSICAL EXAM:  BP 88/61   Pulse 72   Temp 98.3 °F (36.8 °C) (Oral)   Resp 14   Wt 134 lb 11.2 oz (61.1 kg)   SpO2 96%   BMI 19.33 kg/m²     General appearance: Chronically ill appearing male, malnourished, cachectic   HEENT: Normal cephalic, atraumatic without obvious deformity. Pupils equal, round, and reactive to light. Neck: Supple, with full range of motion. No jugular venous distention. Trachea midline. Respiratory:  Normal respiratory effort. Rales noted in the right upper lobe. Cardiovascular: Regular rate and rhythm without murmurs, rubs or gallops. Abdomen: Soft, non-tender, non-distended with active bowel sounds. Musculoskeletal: No clubbing, cyanosis or edema bilaterally. Skin: Pink, warm, dry. No rashes or lesions.   Neurologic: Alert and oriented, thought content appropriate, normal insight    Labs:   Recent Labs     01/19/23  0347   WBC 10.6   HGB 10.3*   HCT 30.1*        Recent Labs     01/19/23  0347      K 3.6   CL 97*   CO2 22*   BUN 37*   CREATININE 5.9*   CALCIUM 8.4*   PHOS 5.0*     Recent Labs     01/17/23  0647 01/18/23  0416   AST 67* 82*   * 93*   BILIDIR 0.6*  --    BILITOT 1.0 1.2   ALKPHOS 2,040* 1,877*     Recent Labs     01/18/23  0416   INR 0.96      Latest Reference Range & Units 1/18/23 17:00   Character, Body Fluid  CLEAR   LD, Fluid U/L 74   Protein, Fluid gm/dl 1.0   Amylase, Fluid U/L 11   Albumin, Fluid gm/dl 0.7   Body Fluid RBC /cumm 2000   Mesothelial Cells Body Fluid  1+   Mononuclear Cells Body Fluid % 92.9   Polymorphonuclear Cells Body Fluid % 7.1   Total Nucleated cells Body Fluid 0 - 500 /cumm 86   Total Volume Received Body Fluid ml 22.0       Radiology:   US RUQ 01/17/23  FINDINGS: There is layering echogenic material in the lumen of the gallbladder and indistinct more defined echogenic structures. The gallbladder is contracted. The gallbladder wall is thickened, measuring 1.0 cm. The common bile duct is at the upper    limits of normal in size. There is no intrahepatic biliary ductal dilation. The liver is normal in echotexture. Ascites is present in the upper abdomen. Impression   1. Gallbladder sludge and probable cholelithiasis. 2. Thickened gallbladder wall which may be seen in acute and chronic cholecystitis. 3. Ascites. Code Status: Full Code    ASSESSMENT:  Acute on chronic hypotension- on Midodrine PTA, Midodrine increased this admission- autonomic dysfunction from amyloidosis ?   Recurrent ascites- s/p para 01/18/23 negative for SBP, SAAG 1.2, total protein 5.0 suspect cardiac in etiology  Elevated transaminases- cardiac vs cirrhosis  Elevated alk phos- cardiac vs cirrhosis  Light chain amyloidosis- follows with oncology, waiting to start chemotherapy  ESRD on HD 2/2 to renal amyloidosis proven with biopsy  Anemia in ESRD- on Retacrit, no GI bleeding noted  Hypoalbuminemia  Sustained ventricular tachycardia- cardiology consulted    PLAN:    Monitor H & H, transfuse prn  Monitor HFP  No further paracentesis at this time, patient will likely need to be on a weekly paracentesis schedule OP  Continue Midodrine TID  2g sodium diet, add 2000 mL fluid restriction  Daily weights  Strict I & O  Avoid hepatotoxic meds, okay for Tylenol 2g limit  Hepatopathy labs  Fractionated alk phos  Echocardiogram to evaluate for HF & cardiac amyloidosis   Hold Heparin for biopsy  Liver biopsy  Cardiology consulted by primary  Nephrology on board  Supportive care per primary team  Will follow       Case reviewed and impression/plan reviewed in collaboration with  Bill  Electronically signed by Gilford Sovereign, APRN - CNP on 1/19/2023 at 12:05 PM    [facility]  Thank you for the consultation.

## 2023-01-19 NOTE — PROGRESS NOTES
Hospitalist Progress Note    Patient:  Amaury Branch    Unit/Bed:3A-10/010-A  YOB: 1960  MRN: [id number]   Acct: [de-identified]   PCP: Adele Dozier DO  Code Status: Full Code    Date of Service: Pt seen/examined on 01/19/23    Chief Complaint: hypotension    Assessment/Plan:    Hypotension  Secondary to decompensated liver failure, possible dysautonomia secondary to amyloidosis  Weaned off Levophed overnight  Continue midodrine 15mg three times daily  Albumin prior to dialysis per nephrology  ESRD on HD  Secondary to biopsy proven amyloidosis  Dialysis MWF. Nephrology following. Dialysis deferred on 1/18/2023 due to ventricular tachycardia. Dialysis treatment 1/19/2023. Liver disease/Ascites  Laboratory workup signficant for transaminitis, hypoalbuminemia, ascites, elevated alk phos. Right upper quadrant ultrasound with normal liver parenchyma, gallbladder sludge, cholelithiasis, and findings consistent with acute/chronic cholecystitis. Patient has no right upper quadrant pain. No formal diagnosis of cirrhosis or hepatic amyloid involvement. Underwent paracentesis on 1/18/2023. Labs without evidence of SBP. GI consult. AL amyloidosis with renal involvement   Diagnosed on renal biopsy 10/2022. Follows with Dr. Spencer Farris. Evaluated at [facility] hematology who recommended initiation of multidrug chemotherapy including daratumumab, cyclophosphamide, bortezomib and dexamethasone. Anemia in ESRD  Hemoglobin 10.3 today. Retacrit added per nephrology. Sustained ventricular tachycardia  Telemetry significant for multiple episodes of sustained monomorphic ventricular tachycardia and non-sustained polymorphic tachycardia. Initial  ms on 1/17/2023. QTc 459 on 1/18/2023. Keep mag greater than 2 and potassium greater than 4. Replace calcium per protocol. Cardiology consult. Goals of care  Discussed with patient.  Wishes to remain full code at this time and deferred palliative care consultation. Hospital Course:  \"David Araiza is a 63year old gentleman with hx systemic light chain amyloidosis complicated by multiorgan failure including ESRD, chronic hypotension, cirrhosis, following with Oncology in [place], [place] who was transferred to [facility] ICU overnight on 01/16 for evaluation and treatment of his hypotension. Patient reports he was approved for treatment for his amyloidosis back in November 2022 but due to his hypotension, treatment could not be started and he was referred to GI for further workup to assess for cirrhosis. He underwent two paracentesis, one week apart back in December 21, and 27 2022, has not had any since but clearly is in a decompensated state with hypoalbuminemia 2.0, ascites with diffuse fluid shift, hypotension. He has not been worked up for cirrhosis yet nor officially diagnosed for it but suspicion is high for a new hepatorenal. He denies a hx of heavy alcohol use. Does admit to 30-py smoking history. For his ESRD he gets MWF dialysis in [facility]. Patient is Aox3 on arrival doing well, little complaints or concerns besides abdominal pain, says he feels tight and asking for us to provide him a paracentesis. Otherwise no complaints or concerns. Denies chest pain, SOB, n/v/d, headache, diaphoresis. \"     Interval history 1/19/23: The patient is doing well today. He was weaned off Levophed last evening. He underwent a paracentesis yesterday and states he had significant improvement in his abdominal pain. He denies any chest pain, palpitations, respiratory symptoms, abdominal pain, nausea, vomiting, or diarrhea today. The patient is currently undergoing dialysis. Review of Systems: Pertinent positives as noted in the HPI. All other systems reviewed and negative.     Past Medical History:        Diagnosis Date    Bilateral inguinal hernia     CKD (chronic kidney disease) stage 3, GFR 30-59 ml/min (HCC)     Hypoalbuminemia     Monoclonal gammopathy        Past Surgical History:        Procedure Laterality Date    APPENDECTOMY      COLONOSCOPY      CT BIOPSY RENAL  10/26/2022    CT BIOPSY RENAL [facility] CT SCAN    CT BIOPSY RENAL  10/26/2022    CT BIOPSY RENAL 10/26/2022 [facility] CT SCAN    CT BONE MARROW BIOPSY  11/16/2022    CT BONE MARROW BIOPSY 11/16/2022 [facility] CT SCAN    HERNIA REPAIR      double    KNEE SURGERY Left     TONSILLECTOMY         Home Medications:   No current facility-administered medications on file prior to encounter. Current Outpatient Medications on File Prior to Encounter   Medication Sig Dispense Refill    midodrine (PROAMATINE) 5 MG tablet Take 15 mg by mouth in the morning, at noon, and at bedtime      carvedilol (COREG) 6.25 MG tablet Take 6.25 mg by mouth 2 times daily      loperamide (IMODIUM) 2 MG capsule Take 2 mg by mouth as needed      esomeprazole Magnesium (NEXIUM) 20 MG PACK Take 20 mg by mouth daily      prochlorperazine (COMPAZINE) 10 MG tablet Take 1 tablet by mouth every 6 hours as needed (nausea) 60 tablet 3    sodium bicarbonate 650 MG tablet Take 1 tablet by mouth 2 times daily 60 tablet 3       Allergies:    Patient has no known allergies. Social History:    reports that he has been smoking cigarettes. He has a 20.00 pack-year smoking history. He has never used smokeless tobacco. He reports that he does not currently use alcohol. He reports that he does not use drugs. Family History:       Problem Relation Age of Onset    Hypertension Mother     Hypertension Father     Cancer Father         liver    Pancreatic Cancer Brother     Diabetes Paternal Grandmother        Diet:  ADULT DIET; Regular; Low Sodium (2 gm)      Physical Exam:  BP 98/65   Pulse 74   Temp 98 °F (36.7 °C) (Oral)   Resp 25   Wt 134 lb 11.2 oz (61.1 kg)   SpO2 93%   BMI 19.33 kg/m²   General: Chronically ill-appearing, cachectic, no acute distress  HEENT:  Normocephalic and atraumatic. Temporal muscle wasting. No scleral icterus.   Pupils equal, round, and reactive to light. External nares without deformity. External ears normal.  Lungs: On room air. Respiratory rate and effort normal.  Lungs clear to auscultation bilaterally. Cardiac: Regular rate and rhythm. No murmur, rubs, or gallops. Abdomen: Nondistended, soft, no tenderness to palpation, guarding, rigidity. Bowel sounds hypoactive. Extremities:  No clubbing, cyanosis, or lower extremity edema. Vasculature: capillary refill < 3 seconds. Lower extremity pulses 2+ bilaterally  Skin:  Warm and dry. No rashes or lesions. Psych: Mood normal.  Affect appropriate. Neurologic: Alert and oriented x4. No cranial nerve deficits. No extremity weakness. Data: (All radiographs, tracings, PFTs, and imaging are personally viewed and interpreted unless otherwise noted)  Labs:   Recent Labs     01/17/23  0647 01/18/23 0416 01/19/23  0347   WBC 9.9 9.2 10.6   HGB 11.1* 9.9* 10.3*   HCT 31.6* 29.2* 30.1*    156 151     Recent Labs     01/17/23  0647 01/18/23  0416 01/19/23  0347    136 136   K 4.5 4.0 3.6   CL 97* 97* 97*   CO2 22* 25 22*   BUN 26* 33* 37*   CREATININE 4.1* 5.0* 5.9*   CALCIUM 7.7* 7.8* 8.4*   PHOS 5.2*  --  5.0*     Recent Labs     01/17/23  0647 01/18/23  0416   AST 67* 82*   * 93*   BILIDIR 0.6*  --    BILITOT 1.0 1.2   ALKPHOS 2,040* 1,877*     Recent Labs     01/17/23  1020 01/18/23  0416   INR 0.92 0.96     No results for input(s): CKTOTAL, TROPONINI in the last 72 hours. Urinalysis:   No results found for: Tu Blare, BACTERIA, RBCUA, BLOODU, [address], [address] [place] [address]    Radiology:  US GALLBLADDER RUQ   Final Result   1. Gallbladder sludge and probable cholelithiasis. 2. Thickened gallbladder wall which may be seen in acute and chronic cholecystitis. 3. Ascites.       Final report electronically signed by Dr. Gricel Fraser on 1/17/2023 4:07 PM          Tele:   [x] yes             [] no      Thank you Anthony Sanon DO for the opportunity to be involved in this patient's care.     Electronically signed by Jan Carmona PA-C on 1/19/2023 at 9:17 AM

## 2023-01-19 NOTE — PLAN OF CARE
Problem: Discharge Planning  Goal: Discharge to home or other facility with appropriate resources  1/18/2023 2315 by Vikas Olivares RN  Outcome: Progressing  1/18/2023 1528 by Yuniel Alcala RN  Outcome: Progressing  Flowsheets (Taken 1/18/2023 0830)  Discharge to home or other facility with appropriate resources:   Identify barriers to discharge with patient and caregiver   Arrange for needed discharge resources and transportation as appropriate   Identify discharge learning needs (meds, wound care, etc)   Refer to discharge planning if patient needs post-hospital services based on physician order or complex needs related to functional status, cognitive ability or social support system     Problem: Pain  Goal: Verbalizes/displays adequate comfort level or baseline comfort level  1/18/2023 2315 by Vikas Olivares RN  Outcome: Progressing  1/18/2023 1528 by Yuniel Alcala RN  Outcome: Progressing  Flowsheets (Taken 1/18/2023 0830)  Verbalizes/displays adequate comfort level or baseline comfort level:   Encourage patient to monitor pain and request assistance   Assess pain using appropriate pain scale   Administer analgesics based on type and severity of pain and evaluate response   Implement non-pharmacological measures as appropriate and evaluate response   Notify Licensed Independent Practitioner if interventions unsuccessful or patient reports new pain     Problem: Skin/Tissue Integrity  Goal: Absence of new skin breakdown  Description: 1. Monitor for areas of redness and/or skin breakdown  2. Assess vascular access sites hourly  3. Every 4-6 hours minimum:  Change oxygen saturation probe site  4. Every 4-6 hours:  If on nasal continuous positive airway pressure, respiratory therapy assess nares and determine need for appliance change or resting period.   1/18/2023 2315 by Vikas Olivares RN  Outcome: Progressing  1/18/2023 1528 by Yuniel Alcala RN  Outcome: Progressing     Problem: Nutrition Deficit:  Goal: Optimize nutritional status  1/18/2023 2315 by Eduard Sandra RN  Outcome: Progressing  1/18/2023 1528 by Rose Pierce RN  Outcome: Progressing     Problem: Safety - Adult  Goal: Free from fall injury  1/18/2023 2315 by Eduard Sandra RN  Outcome: Progressing  1/18/2023 1528 by Rose Pierce RN  Outcome: Progressing

## 2023-01-20 LAB
25(OH)D3 SERPL-MCNC: 8 NG/ML (ref 30–100)
A1AT SERPL-MCNC: 162 MG/DL (ref 90–200)
ALBUMIN SERPL BCG-MCNC: 3 G/DL (ref 3.5–5.1)
ALP SERPL-CCNC: 1759 U/L (ref 38–126)
ALT SERPL W/O P-5'-P-CCNC: 75 U/L (ref 11–66)
ANION GAP SERPL CALC-SCNC: 14 MEQ/L (ref 8–16)
AST SERPL-CCNC: 59 U/L (ref 5–40)
BACTERIA SPEC ANAEROBE CULT: NORMAL
BACTERIA SPEC BFLD CULT: NORMAL
BASOPHILS ABSOLUTE: 0 THOU/MM3 (ref 0–0.1)
BASOPHILS NFR BLD AUTO: 0.2 %
BILIRUB SERPL-MCNC: 1.8 MG/DL (ref 0.3–1.2)
BUN SERPL-MCNC: 19 MG/DL (ref 7–22)
CA-I BLD ISE-SCNC: 1.03 MMOL/L (ref 1.12–1.32)
CALCIUM SERPL-MCNC: 8.3 MG/DL (ref 8.5–10.5)
CHLORIDE SERPL-SCNC: 100 MEQ/L (ref 98–111)
CO2 SERPL-SCNC: 24 MEQ/L (ref 23–33)
CREAT SERPL-MCNC: 3.9 MG/DL (ref 0.4–1.2)
DEPRECATED RDW RBC AUTO: 61.9 FL (ref 35–45)
EOSINOPHIL NFR BLD AUTO: 0.8 %
EOSINOPHILS ABSOLUTE: 0.1 THOU/MM3 (ref 0–0.4)
ERYTHROCYTE [DISTWIDTH] IN BLOOD BY AUTOMATED COUNT: 19.3 % (ref 11.5–14.5)
GFR SERPL CREATININE-BSD FRML MDRD: 17 ML/MIN/1.73M2
GGT SERPL-CCNC: 373 U/L (ref 8–69)
GLUCOSE SERPL-MCNC: 83 MG/DL (ref 70–108)
GRAM STN SPEC: NORMAL
HAV IGM SER QL: NEGATIVE
HBV CORE IGM SERPL QL IA: NEGATIVE
HBV SURFACE AG SERPL QL IA: NEGATIVE
HCT VFR BLD AUTO: 32 % (ref 42–52)
HCV IGG SERPL QL IA: NEGATIVE
HGB BLD-MCNC: 10.9 GM/DL (ref 14–18)
IMM GRANULOCYTES # BLD AUTO: 0.03 THOU/MM3 (ref 0–0.07)
IMM GRANULOCYTES NFR BLD AUTO: 0.3 %
IRON SATN MFR SERPL: 30 % (ref 20–50)
IRON SERPL-MCNC: 32 UG/DL (ref 65–195)
LV EF: 50 %
LVEF MODALITY: NORMAL
LYMPHOCYTES ABSOLUTE: 1.9 THOU/MM3 (ref 1–4.8)
LYMPHOCYTES NFR BLD AUTO: 20.3 %
MAGNESIUM SERPL-MCNC: 2.4 MG/DL (ref 1.6–2.4)
MCH RBC QN AUTO: 31 PG (ref 26–33)
MCHC RBC AUTO-ENTMCNC: 34.1 GM/DL (ref 32.2–35.5)
MCV RBC AUTO: 90.9 FL (ref 80–94)
MONOCYTES ABSOLUTE: 0.5 THOU/MM3 (ref 0.4–1.3)
MONOCYTES NFR BLD AUTO: 5.1 %
NEUTROPHILS NFR BLD AUTO: 73.3 %
NRBC BLD AUTO-RTO: 0 /100 WBC
PATHOLOGIST REVIEW: ABNORMAL
PHOSPHATE SERPL-MCNC: 3.4 MG/DL (ref 2.4–4.7)
PLATELET # BLD AUTO: 141 THOU/MM3 (ref 130–400)
PLATELET BLD QL SMEAR: ADEQUATE
PMV BLD AUTO: ABNORMAL FL (ref 9.4–12.4)
POLYCHROMASIA BLD QL SMEAR: ABNORMAL
POTASSIUM SERPL-SCNC: 3.7 MEQ/L (ref 3.5–5.2)
PROT SERPL-MCNC: 5.3 G/DL (ref 6.1–8)
RBC # BLD AUTO: 3.52 MILL/MM3 (ref 4.7–6.1)
SCAN OF BLOOD SMEAR: NORMAL
SEGMENTED NEUTROPHILS ABSOLUTE COUNT: 6.7 THOU/MM3 (ref 1.8–7.7)
SODIUM SERPL-SCNC: 138 MEQ/L (ref 135–145)
TARGETS BLD QL SMEAR: ABNORMAL
TIBC SERPL-MCNC: 108 UG/DL (ref 171–450)
WBC # BLD AUTO: 9.2 THOU/MM3 (ref 4.8–10.8)

## 2023-01-20 PROCEDURE — 99232 SBSQ HOSP IP/OBS MODERATE 35: CPT | Performed by: STUDENT IN AN ORGANIZED HEALTH CARE EDUCATION/TRAINING PROGRAM

## 2023-01-20 PROCEDURE — 82103 ALPHA-1-ANTITRYPSIN TOTAL: CPT

## 2023-01-20 PROCEDURE — P9047 ALBUMIN (HUMAN), 25%, 50ML: HCPCS | Performed by: INTERNAL MEDICINE

## 2023-01-20 PROCEDURE — 6370000000 HC RX 637 (ALT 250 FOR IP): Performed by: NURSE PRACTITIONER

## 2023-01-20 PROCEDURE — 2580000003 HC RX 258: Performed by: NURSE PRACTITIONER

## 2023-01-20 PROCEDURE — 6360000002 HC RX W HCPCS: Performed by: INTERNAL MEDICINE

## 2023-01-20 PROCEDURE — 2060000000 HC ICU INTERMEDIATE R&B

## 2023-01-20 PROCEDURE — 84080 ASSAY ALKALINE PHOSPHATASES: CPT

## 2023-01-20 PROCEDURE — 82105 ALPHA-FETOPROTEIN SERUM: CPT

## 2023-01-20 PROCEDURE — 84100 ASSAY OF PHOSPHORUS: CPT

## 2023-01-20 PROCEDURE — 93307 TTE W/O DOPPLER COMPLETE: CPT

## 2023-01-20 PROCEDURE — 99221 1ST HOSP IP/OBS SF/LOW 40: CPT | Performed by: INTERNAL MEDICINE

## 2023-01-20 PROCEDURE — 86376 MICROSOMAL ANTIBODY EACH: CPT

## 2023-01-20 PROCEDURE — 6370000000 HC RX 637 (ALT 250 FOR IP): Performed by: STUDENT IN AN ORGANIZED HEALTH CARE EDUCATION/TRAINING PROGRAM

## 2023-01-20 PROCEDURE — 6370000000 HC RX 637 (ALT 250 FOR IP)

## 2023-01-20 PROCEDURE — 86256 FLUORESCENT ANTIBODY TITER: CPT

## 2023-01-20 PROCEDURE — 85025 COMPLETE CBC W/AUTO DIFF WBC: CPT

## 2023-01-20 PROCEDURE — 82330 ASSAY OF CALCIUM: CPT

## 2023-01-20 PROCEDURE — 84075 ASSAY ALKALINE PHOSPHATASE: CPT

## 2023-01-20 PROCEDURE — 99232 SBSQ HOSP IP/OBS MODERATE 35: CPT | Performed by: INTERNAL MEDICINE

## 2023-01-20 PROCEDURE — 36415 COLL VENOUS BLD VENIPUNCTURE: CPT

## 2023-01-20 PROCEDURE — 80053 COMPREHEN METABOLIC PANEL: CPT

## 2023-01-20 PROCEDURE — 83550 IRON BINDING TEST: CPT

## 2023-01-20 PROCEDURE — 86255 FLUORESCENT ANTIBODY SCREEN: CPT

## 2023-01-20 PROCEDURE — 83516 IMMUNOASSAY NONANTIBODY: CPT

## 2023-01-20 PROCEDURE — 83735 ASSAY OF MAGNESIUM: CPT

## 2023-01-20 PROCEDURE — 80074 ACUTE HEPATITIS PANEL: CPT

## 2023-01-20 PROCEDURE — 99232 SBSQ HOSP IP/OBS MODERATE 35: CPT | Performed by: PHYSICIAN ASSISTANT

## 2023-01-20 PROCEDURE — 82977 ASSAY OF GGT: CPT

## 2023-01-20 PROCEDURE — 82306 VITAMIN D 25 HYDROXY: CPT

## 2023-01-20 PROCEDURE — 83540 ASSAY OF IRON: CPT

## 2023-01-20 RX ORDER — ERGOCALCIFEROL 1.25 MG/1
50000 CAPSULE ORAL WEEKLY
Status: DISCONTINUED | OUTPATIENT
Start: 2023-01-20 | End: 2023-01-21 | Stop reason: HOSPADM

## 2023-01-20 RX ORDER — MIDODRINE HYDROCHLORIDE 10 MG/1
10 TABLET ORAL ONCE
Status: COMPLETED | OUTPATIENT
Start: 2023-01-20 | End: 2023-01-20

## 2023-01-20 RX ORDER — ALBUMIN (HUMAN) 12.5 G/50ML
25 SOLUTION INTRAVENOUS
Status: COMPLETED | OUTPATIENT
Start: 2023-01-20 | End: 2023-01-20

## 2023-01-20 RX ORDER — ERGOCALCIFEROL 1.25 MG/1
50000 CAPSULE ORAL WEEKLY
Status: DISCONTINUED | OUTPATIENT
Start: 2023-01-20 | End: 2023-01-20

## 2023-01-20 RX ORDER — LANOLIN ALCOHOL/MO/W.PET/CERES
3 CREAM (GRAM) TOPICAL ONCE
Status: COMPLETED | OUTPATIENT
Start: 2023-01-20 | End: 2023-01-20

## 2023-01-20 RX ADMIN — MIDODRINE HYDROCHLORIDE 10 MG: 10 TABLET ORAL at 10:54

## 2023-01-20 RX ADMIN — Medication 3 MG: at 00:37

## 2023-01-20 RX ADMIN — POTASSIUM BICARBONATE 20 MEQ: 782 TABLET, EFFERVESCENT ORAL at 11:58

## 2023-01-20 RX ADMIN — MIDODRINE HYDROCHLORIDE 15 MG: 10 TABLET ORAL at 06:29

## 2023-01-20 RX ADMIN — ALBUMIN (HUMAN) 25 G: 0.25 INJECTION, SOLUTION INTRAVENOUS at 12:36

## 2023-01-20 RX ADMIN — ALBUMIN (HUMAN) 25 G: 0.25 INJECTION, SOLUTION INTRAVENOUS at 13:44

## 2023-01-20 RX ADMIN — ERGOCALCIFEROL 50000 UNITS: 1.25 CAPSULE ORAL at 18:08

## 2023-01-20 RX ADMIN — PANTOPRAZOLE SODIUM 40 MG: 40 TABLET, DELAYED RELEASE ORAL at 06:29

## 2023-01-20 RX ADMIN — SODIUM CHLORIDE, PRESERVATIVE FREE 10 ML: 5 INJECTION INTRAVENOUS at 20:05

## 2023-01-20 RX ADMIN — MIDODRINE HYDROCHLORIDE 15 MG: 10 TABLET ORAL at 18:08

## 2023-01-20 RX ADMIN — SODIUM CHLORIDE, PRESERVATIVE FREE 10 ML: 5 INJECTION INTRAVENOUS at 09:00

## 2023-01-20 RX ADMIN — MIDODRINE HYDROCHLORIDE 15 MG: 10 TABLET ORAL at 22:34

## 2023-01-20 ASSESSMENT — PAIN SCALES - GENERAL
PAINLEVEL_OUTOF10: 0

## 2023-01-20 NOTE — CARE COORDINATION
1/20/23, 8:55 AM EST    DISCHARGE ON GOING EVALUATION    [facility] day: 4  Location: 3A-10/010-A Reason for admit: ARF (acute renal failure) ([facility] [address] [N17.9]   Procedure:   1/17 US GB/RUQ: Gallbladder sludge and probable cholelithiasis; Thickened gallbladder wall which may be seen in acute and chronic cholecystitis; Ascites  1/18 Paracentesis: 4.5L removed  1/20 ECHO  Barriers to Discharge: Nephrology following for known HD, Cardiology, EP, GI consults, Midodrine, Protonix. PCP: Roseanne Morales DO  Readmission Risk Score: 16.7%  Patient Goals/Plan/Treatment Preferences:  Home with girlfriend. Denies needs, declines HH. Has cane and walker if needed. HD MWF at [address] [place] [address] with 3287 chair time.

## 2023-01-20 NOTE — PROGRESS NOTES
[facility]   Nephrology progress note  1/20/2023, 10:57 AM      Pt Name:    Tristan Munoz  MRN:     [id number]     YOB: 1960  Admit Date:    1/16/2023 10:55 PM    Chief Complaint: Nephrology following for ESRD and hemodialysis    Subjective:  Patient was seen and examined this morning  Seen and examined earlier today during rounds  No chest pain  No shortness of breath    Objective:  24HR INTAKE/OUTPUT:    Intake/Output Summary (Last 24 hours) at 1/20/2023 1057  Last data filed at 1/20/2023 0105  Gross per 24 hour   Intake 770 ml   Output 1200 ml   Net -430 ml           I/O last 3 completed shifts: In: 1285.7 [P.O.:500; I.V.:32.9; IV Piggyback:352.8]  Out: 1200   No intake/output data recorded.    Admission weight: 138 lb 7.2 oz (62.8 kg)  Wt Readings from Last 3 Encounters:   01/19/23 132 lb 15 oz (60.3 kg)   12/13/22 136 lb (61.7 kg)   12/06/22 134 lb (60.8 kg)        Vitals :   Vitals:    01/20/23 0300 01/20/23 0400 01/20/23 0500 01/20/23 0600   BP: 105/73 97/71 103/73 94/68   Pulse: 77 75 78 79   Resp: 15 11 18 13   Temp:  97.3 °F (36.3 °C)     TempSrc:  Oral     SpO2: 95% 93% 95% 93%   Weight:           Physical examination  General Appearance: alert and cooperative with exam, appears comfortable, no distress  Chronically ill-appearing, cachectic, severe muscle wasting  Mouth/Throat: Oral mucosa moist  Neck: No JVD  Lungs: No use of accessory muscle use, no labored breathing  GI: Some distention, no guarding  Extremities: No significant LE edema    Medications:  Infusion:    sodium chloride       Meds:    potassium bicarb-citric acid  20 mEq Oral Once    [Held by provider] epoetin jose-epbx  3,000 Units SubCUTAneous Once per day on Mon Wed Fri    And    [Held by provider] epoetin jose-epbx  2,000 Units SubCUTAneous Once per day on Mon Wed Fri    [Held by provider] carvedilol  6.25 mg Oral BID    pantoprazole  40 mg Oral QAM AC    midodrine  15 mg Oral q8h    sodium chloride flush  5-40 mL IntraVENous 2 times per day    [Held by provider] heparin (porcine)  5,000 Units SubCUTAneous 3 times per day     Meds prn: acetaminophen **OR** acetaminophen, melatonin, [Held by provider] loperamide, sodium chloride flush, sodium chloride, ondansetron **OR** ondansetron, polyethylene glycol     Lab Data :  CBC:   Recent Labs     01/18/23 0416 01/19/23 0347 01/20/23  0408   WBC 9.2 10.6 9.2   HGB 9.9* 10.3* 10.9*   HCT 29.2* 30.1* 32.0*    151 141       CMP:  Recent Labs     01/18/23  0416 01/18/23  1102 01/19/23 0347 01/20/23  0408     --  136 138   K 4.0  --  3.6 3.7   CL 97*  --  97* 100   CO2 25  --  22* 24   BUN 33*  --  37* 19   CREATININE 5.0*  --  5.9* 3.9*   GLUCOSE 78  --  95 83   CALCIUM 7.8*  --  8.4* 8.3*   MG  --  2.7* 2.5* 2.4   PHOS  --   --  5.0*  --        Hepatic:   Recent Labs     01/18/23 0416 01/20/23  0408   LABALBU 2.9* 3.0*   AST 82* 59*   ALT 93* 75*   BILITOT 1.2 1.8*   ALKPHOS 1,877* 1,759*           Assessment and Plan:    1. ESRD on hemodialysis secondary to renal amyloidosis, biopsy-proven  Patient is doing better overall  Continue dialysis Mondays, Wednesdays and Fridays  For hemodialysis treatment today    2. Anemia in ESRD. On Retacrit  3. Chronic hypotension. Patient with underlying amyloidosis. Cannot rule out autonomic dysfunction from amyloidosis itself. Continue with midodrine. IV albumin if needed  4. AL amyloidosis  5. Renal osteodystrophy/hyperphosphatemia. Will monitor  6. Ascites status post paracentesis: GI following. They suspect possible cardiac etiology     D/W patient and nursing staff    Keshav Ramos MD  [facility]    This report has been created using voice recognition software.  It may contain minor errors which are inherent in voice recognition technology

## 2023-01-20 NOTE — PROGRESS NOTES
Updated dialysis RN patient bp given dose of midodrine now and will monitor bp to eval for treatment today.

## 2023-01-20 NOTE — FLOWSHEET NOTE
01/20/23 1225 01/20/23 1619   Vital Signs   BP (!) 89/51 86/61   Temp 97.4 °F (36.3 °C) 98.7 °F (37.1 °C)   Heart Rate 76 86   Resp 18 19   SpO2 97 % 96 %   Weight 129 lb 10.1 oz (58.8 kg) 129 lb 10.1 oz (58.8 kg)   Weight Method Bed scale Bed scale   Percent Weight Change -2.49 0   Post-Hemodialysis Assessment   Post-Treatment Procedures  --  Blood returned;Catheter Capped, clamped with Saline x2 ports   Machine Disinfection Process  --  Acid/Vinegar Clean;Heat Disinfect; Exterior Machine Disinfection   Blood Volume Processed (Liters)  --  69.4 l/min   Dialyzer Clearance  --  Moderately streaked   Duration of Treatment (minutes)  --  210 minutes   Heparin Amount Administered During Treatment (mL)  --  0 mL   Hemodialysis Intake (ml)  --  600 ml   Hemodialysis Output (ml)  --  0 ml   NET Removed (ml)  --  -600   Tolerated Treatment  --  Good   stable 3.5 hour treatment. 0 net uf as he is under edw. 50 gm albumin administered with treatment. report called to primary nurse.

## 2023-01-20 NOTE — PROGRESS NOTES
Retacrit Monitoring    Current Retacrit Dose: 5,000 units MWF    Recent Labs     01/18/23  0416 01/19/23  0347 01/20/23  0408   HGB 9.9* 10.3* 10.9*   HCT 29.2* 30.1* 32.0*    151 141     Retacrit not appropriate due to Hgb 10.9, notified provider Dr. Hermilo Damon held per protocol.     Desire Acosta RPh  1/20/2023 8:03 AM

## 2023-01-20 NOTE — PROGRESS NOTES
Gastroenterology Progress Note:     Patient Name:  Joi Dumas   MRN: [id number]  [id number]  YOB: 1960  Admit Date: 1/16/2023 10:55 PM  Primary Care Physician: Juan F Willingham DO   3A-10/010-A     Patient seen and examined. 24 hours events and chart reviewed. Subjective: Patient off the floor for dialysis, daughter in the room. Daughter updated on plan of care & all questions answered. Patient seen in dialysis. Denies abd pain, n/v. He states he was \"up this morning to go to the bathroom. \" Liver biopsy discussed. Objective:  BP 94/68   Pulse 79   Temp 97.3 °F (36.3 °C) (Oral)   Resp 13   Wt 132 lb 15 oz (60.3 kg)   SpO2 93%   BMI 19.07 kg/m²     Physical Exam:    General:  Chronically ill appearing male, cachectic   HEENT: Atraumatic, normocephalic. Moist oral mucous membranes. Neck: Supple without adenopathy, JVD, thyromegaly or masses. Trachea midline. CV: Heart RRR, no murmurs, rubs, gallops. Resp: Even, easy without cough or accessory use. Lungs clear to ascultation bilaterally. Abd: Round, soft, non-tender. No hepatosplenomegaly or mass present. Active bowel sounds heard. Soft distention noted. Ext:  Without cyanosis, clubbing, edema. Skin: Pink, warm, dry  Neuro:  Alert, oriented x 3 with no obvious deficits.        Rectal: deferred    Labs:   CBC:   Lab Results   Component Value Date/Time    WBC 9.2 01/20/2023 04:08 AM    HGB 10.9 01/20/2023 04:08 AM    HCT 32.0 01/20/2023 04:08 AM    MCV 90.9 01/20/2023 04:08 AM     01/20/2023 04:08 AM     BMP:   Lab Results   Component Value Date/Time     01/20/2023 04:08 AM    K 3.7 01/20/2023 04:08 AM     01/20/2023 04:08 AM    CO2 24 01/20/2023 04:08 AM    PHOS 3.4 01/20/2023 04:08 AM    BUN 19 01/20/2023 04:08 AM    CREATININE 3.9 01/20/2023 04:08 AM    CALCIUM 8.3 01/20/2023 04:08 AM     PT/INR:   Lab Results   Component Value Date/Time    INR 0.96 01/18/2023 04:16 AM     Lipids:   Lab Results   Component Value Date/Time    [facility] 1,759 01/20/2023 04:08 AM    ALT 75 01/20/2023 04:08 AM    AST 59 01/20/2023 04:08 AM    BILITOT 1.8 01/20/2023 04:08 AM    BILIDIR 0.6 01/17/2023 06:47 AM    LABALBU 3.0 01/20/2023 04:08 AM     Significant Diagnostic Studies: none    Current Meds:  Scheduled Meds:   albumin human  25 g IntraVENous Q30 Min    [Held by provider] epoetin jose-epbx  3,000 Units SubCUTAneous Once per day on Mon Wed Fri    And    [Held by provider] epoetin jose-epbx  2,000 Units SubCUTAneous Once per day on Mon Wed Fri    [Held by provider] carvedilol  6.25 mg Oral BID    pantoprazole  40 mg Oral QAM AC    midodrine  15 mg Oral q8h    sodium chloride flush  5-40 mL IntraVENous 2 times per day    [Held by provider] heparin (porcine)  5,000 Units SubCUTAneous 3 times per day     Continuous Infusions:   sodium chloride         Assessment:  62 yo M admitted 01/16/23 as a transfer from OSH for hypotension. H/O light chain amyloidosis, follows with Dr. Aaron Shannon in [facility], waiting to start chemo. H/O ESRD on HD secondary to renal amyloidosis which was proven by biopsy. GI consulted for elevated LFTs & recurring ascites. US RUQ demonstrated ascites and a liver normal in echotexture.      Acute on chronic hypotension- on Midodrine PTA, Midodrine increased this admission- most likely autonomic dysfunction from amyloidosis, NOT hepatorenal syndrome   Recurrent ascites- s/p para 01/18/23 negative for SBP, SAAG 1.2, total protein 5.0- cardiac etiology  Elevated transaminases- suspect amyloidosis in the liver vs cardiac vs less likely cirrhosis  Elevated alk phos- suspect amyloidosis in the liver vs cardiac vs less likely cirrhosis  Light chain amyloidosis- follows with oncology, waiting to start chemotherapy  ESRD on HD 2/2 to renal amyloidosis proven with biopsy  Anemia in ESRD- on Retacrit, no GI bleeding noted  Hypoalbuminemia  Sustained ventricular tachycardia- cardiology consulted  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  Poor prognosis    Plan:    Monitor H & H, transfuse prn  Monitor HFP  No further paracentesis at this time, patient will likely need to be on a weekly paracentesis schedule OP  Continue Midodrine TID  2g sodium diet, add 2000 mL fluid restriction  Daily weights  Strict I & O  Avoid hepatotoxic meds, okay for Tylenol 2g limit  Hepatopathy labs pending  Fractionated alk phos pending  Echocardiogram pending  Hold Heparin for biopsy  Liver biopsy  Cardiology & nephrology on board  Supportive care per primary team  Will follow    Case reviewed and impression/plan reviewed in collaboration with Dr. Heber Wilhelm  Electronically signed by ESA Meyers - CNP on 1/20/2023 at 1:10 PM    [address]    If there are any questions or concerns this weekend, please call Dr. Polina Roldan as he is covering for [address].

## 2023-01-20 NOTE — PROGRESS NOTES
Pt admitted to  45 via cart/stretcher. Complaints: hypotension. IV none infusing into the antecubital right, condition patent and no redness at a rate of 0 mls/ hour with about 0   mls in the bag still. IV site free of s/s of infection or infiltration. Vital signs obtained. Assessment and data collection initiated. Two nurse skin assessment performed by Jonathon Ramirez RN RN and Holden Veloz RN. Oriented to room. Policies and procedures for  explained. All questions answered with no further questions at this time. Fall prevention and safety brochure discussed with patient. Bed alarm on. Call light in reach. Would you like your Primary Care Physician notified? yes  The best day to schedule a follow up Dr appointment is:  Monday p.m.

## 2023-01-20 NOTE — FLOWSHEET NOTE
01/20/23 1744   Safe Environment   Safety Measures Other (comment)  (Virtual nurse round complete)   Virtual nurse introduced via audio. Patient permits camera. Patient in bed, awake. Family at bedside. Patient recent transfer. Did complete pending admission documents with patient. Patient alert, oriented and cooperative. Virtual nurse services explained to patient. Patient agrees to services. Denies any needs at this time. Staff in to room at the end of this call.

## 2023-01-20 NOTE — PROGRESS NOTES
Cardiology Progress Note      Patient:  Kvng Chen  YOB: 1960  MRN: [id number]   Acct: [de-identified]  Admit Date:  1/16/2023  Primary Cardiologist:  none    Note per dr Germán Bentley \"CHIEF COMPLAINT: Hypotension, sustained monomorphic, polymorphic ventricular tachycardia        HPI: This is a pleasant 63 y.o. male with PMH of systemic amyloidosis with light chain disease, ESRD on HD, Decompensated liver disease who presents with hypotension. Patient previously was in the ICU requiring Levophed. Levophed was weaned yesterday and patient was moved to stepdown unit. On Wednesday morning at approximately 1040 telemetry showed sustained ventricular tachycardia for greater than 30 seconds. Patient then converted to normal sinus rhythm with frequent PVC's. Patient states that at that time he felt chest pressure for 10-15 seconds. Patient denies any palpitations at that time. Since that occurrence patient has had multiple episodes of nonsustained ventricular tachycardia. Patient denies any chest pressure, chest pain, or palpitations since the event on Wednesday. Prior to diagnosis with systemic amyloidosis, patient denies any previous cardiac history. Patient denies any rhythm irregularities previously. Patient denies any SOB post paracentesis yesterday. \"    Subjective (Events in last 24 hours): pt awake and alert. NAD. No cp or sob. No edema or orthopnea.   No complaints  On RA      Objective:   BP 94/68   Pulse 79   Temp 97.3 °F (36.3 °C) (Oral)   Resp 13   Wt 132 lb 15 oz (60.3 kg)   SpO2 93%   BMI 19.07 kg/m²        TELEMETRY: nsr, no vt overnight    Physical Exam:  General Appearance: alert and oriented to person, place and time, in no acute distress  Cardiovascular: normal rate, regular rhythm, normal S1 and S2, no murmurs, rubs, clicks, or gallops, distal pulses intact, no carotid bruits, no JVD  Pulmonary/Chest: clear to auscultation bilaterally- no wheezes, rales or rhonchi, normal air movement, no respiratory distress  Abdomen: soft, non-tender, non-distended, normal bowel sounds, no masses Extremities: no cyanosis, clubbing or edema, pulse   Skin: warm and dry  Head: normocephalic and atraumatic  Eyes: pupils equal, round, and reactive to light  Neck: supple and non-tender without mass, no thyromegaly   Neurological: alert, oriented, normal speech, no focal findings or movement disorder noted    Medications:    [Held by provider] epoetin jose-epbx  3,000 Units SubCUTAneous Once per day on Mon Wed Fri    And    [Held by provider] epoetin jose-epbx  2,000 Units SubCUTAneous Once per day on Mon Wed Fri    [Held by provider] carvedilol  6.25 mg Oral BID    pantoprazole  40 mg Oral QAM AC    midodrine  15 mg Oral q8h    sodium chloride flush  5-40 mL IntraVENous 2 times per day    [Held by provider] heparin (porcine)  5,000 Units SubCUTAneous 3 times per day      sodium chloride       acetaminophen, 500 mg, Q6H PRN   Or  acetaminophen, 325 mg, Q6H PRN  melatonin, 3 mg, Nightly PRN  [Held by provider] loperamide, 2 mg, BID PRN  sodium chloride flush, 5-40 mL, PRN  sodium chloride, , PRN  ondansetron, 4 mg, Q8H PRN   Or  ondansetron, 4 mg, Q6H PRN  polyethylene glycol, 17 g, Daily PRN        Lab Data:    Cardiac Enzymes:  No results for input(s): CKTOTAL, CKMB, CKMBINDEX, TROPONINI in the last 72 hours.     CBC:   Lab Results   Component Value Date/Time    WBC 9.2 01/20/2023 04:08 AM    RBC 3.52 01/20/2023 04:08 AM    HGB 10.9 01/20/2023 04:08 AM    HCT 32.0 01/20/2023 04:08 AM     01/20/2023 04:08 AM       CMP:    Lab Results   Component Value Date/Time     01/20/2023 04:08 AM    K 3.7 01/20/2023 04:08 AM     01/20/2023 04:08 AM    CO2 24 01/20/2023 04:08 AM    BUN 19 01/20/2023 04:08 AM    CREATININE 3.9 01/20/2023 04:08 AM    LABGLOM 17 01/20/2023 04:08 AM    GLUCOSE 83 01/20/2023 04:08 AM    CALCIUM 8.3 01/20/2023 04:08 AM       Hepatic Function Panel:    Lab Results   Component Value Date/Time    ALKPHOS 1,759 01/20/2023 04:08 AM    ALT 75 01/20/2023 04:08 AM    AST 59 01/20/2023 04:08 AM    PROT 5.3 01/20/2023 04:08 AM    BILITOT 1.8 01/20/2023 04:08 AM    BILIDIR 0.6 01/17/2023 06:47 AM    LABALBU 3.0 01/20/2023 04:08 AM       Magnesium:    Lab Results   Component Value Date/Time    MG 2.4 01/20/2023 04:08 AM       PT/INR:    Lab Results   Component Value Date/Time    INR 0.96 01/18/2023 04:16 AM       HgBA1c:  No results found for: LABA1C    FLP:  No results found for: TRIG, HDL, LDLCALC, LDLDIRECT, LABVLDL    TSH:  No results found for: TSH      Assessment:    VT/NSVT  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

## 2023-01-20 NOTE — CONSULTS
[address] ([place]) [address] [facility]  Dept: [phone]  Cardiac Electrophysiology: Inpatient Consultation Note  Patient's demographics:  Date:   1/20/2023  Patient name:              Kvng Chen  YOB: 1960  Sex:    male   MRN:   [id number]    Primary Care Physician:  Tyson Moreno DO    Consulting Physician:  Rebel Collazo MD    Reason for Consultation:  Ventricular tachycardia    Clinical Summary:  62/M was instantly noted to have normal kidney functions. July 2022 during perioperative evaluation of bilateral inguinal hernia raphe. Upon evaluation he was noted to have significant proteinuria, end-stage renal disease requiring dialysis via right subclavian catheter and biopsy confirmed amyloidosis of the kidneys as a cause of his renal failure. He saw an oncologist at [facility] status to hospital and was planned to have chemotherapy (not received yet) and had a discussion about stem cell transplantation. The patient was admitted with recurrent abdominal swelling from ascites from underlying liver disease, presumed to be from amyloidosis was noted to have runs of runs of wide QRS complex tachycardia, morphological features suggestive of VT (longest run around 28 seconds). The patient apparently was lying in the bed at that time he did not report any symptoms. His electrolytes were within normal limits so was the QT interval.  Cardiac biomarkers were negative. Echocardiogram showed moderate left medical hypertrophy with features suggestive of cardiac amyloidosis, small pericardial effusion and preserved LVEF. Patient was resting comfortably in the bed. Appears masticated. Never had any cardiac issues in the past.  Denies chest pain, lower extremity swelling, palpitation or syncope. Does report shortness of breath with exertion but denies orthopnea or paroxysmal nocturnal dyspnea.  Medical Hx: Monoclonal gammopathy, AL amyloidosis, bx proven renal amyloid with ESRD on  hemodialysis via right subclavian catheter, HFrEF chronic hypotension (? Autonomic neuropathy), on midodrine, liver disease (likely amyloid related) with ascitics, chronic anemia and malnutrition. Review of systems:  Constitutional: Negative for chills and fever. Loss of appetite. HENT: Negative for congestion, sinus pressure, sneezing and sore throat. Eyes: Negative for pain, discharge, redness and itching. Respiratory: Negative for apnea, cough  Gastrointestinal: Positive for abdominal distension. Endocrine: Negative for cold intolerance, heat intolerance, polydipsia. Genitourinary: Negative for dysuria, enuresis, flank pain and hematuria. Musculoskeletal: Negative for arthralgias, joint swelling and neck pain. Neurological: Negative for numbness and headaches. Psychiatric/Behavioral: Negative for agitation, confusion, decreased concentration and dysphoric mood.       Past Medical History[de-identified]  Past Medical History:   Diagnosis Date    Bilateral inguinal hernia     CKD (chronic kidney disease) stage 3, GFR 30-59 ml/min ([facility])     Hypoalbuminemia     Monoclonal gammopathy        Past Surgical History:  Past Surgical History:   Procedure Laterality Date    APPENDECTOMY      COLONOSCOPY      CT BIOPSY RENAL  10/26/2022    CT BIOPSY RENAL [facility] CT SCAN    CT BIOPSY RENAL  10/26/2022    CT BIOPSY RENAL 10/26/2022 [facility] CT SCAN    CT BONE MARROW BIOPSY  11/16/2022    CT BONE MARROW BIOPSY 11/16/2022 [facility] CT SCAN    HERNIA REPAIR      double    KNEE SURGERY Left     TONSILLECTOMY         Family History:  Family History   Problem Relation Age of Onset    Hypertension Mother     Hypertension Father     Cancer Father         liver    Pancreatic Cancer Brother     Diabetes Paternal Grandmother         Social History:  Social History     Socioeconomic History    Marital status: Single     Spouse name: None    Number of children: None    Years of education: None Highest education level: None   Tobacco Use    Smoking status: Every Day     Packs/day: 0.50     Years: 40.00     Pack years: 20.00     Types: Cigarettes    Smokeless tobacco: Never    Tobacco comments:     Weaning per self 12/20/22   Vaping Use    Vaping Use: Never used   Substance and Sexual Activity    Alcohol use: Not Currently    Drug use: Never        Allergies:  No Known Allergies     Medications:  Current Facility-Administered Medications   Medication Dose Route Frequency Provider Last Rate Last Admin    acetaminophen (TYLENOL) tablet 500 mg  500 mg Oral Q6H PRN ESA Carvajal - CNP        Or    acetaminophen (TYLENOL) suppository 325 mg  325 mg Rectal Q6H PRN Kayleigh Ax, APRN - CNP        melatonin tablet 3 mg  3 mg Oral Nightly PRN Gildardo Duane, APRN - CNP        [Held by provider] epoetin jose-epbx (RETACRIT) injection 3,000 Units  3,000 Units SubCUTAneous Once per day on Mon Wed Fri Samantha Ricks MD   3,000 Units at 01/18/23 1105    And    [Held by provider] epoetin jose-epbx (RETACRIT) injection 2,000 Units  2,000 Units SubCUTAneous Once per day on Mon Wed Fri Samantha Ricks MD   2,000 Units at 01/18/23 1105    [Held by provider] carvedilol (COREG) tablet 6.25 mg  6.25 mg Oral BID Radha Quintana MD        [facility] [place] by provider] loperamide (IMODIUM) capsule 2 mg  2 mg Oral BID PRN Radha Quintana MD        pantoprazole (PROTONIX) tablet 40 mg  40 mg Oral QAM AC Radha Quintana MD   40 mg at 01/20/23 0629    midodrine (PROAMATINE) tablet 15 mg  15 mg Oral q8h ESA Welch CNP   15 mg at 01/20/23 1734    sodium chloride flush 0.9 % injection 5-40 mL  5-40 mL IntraVENous 2 times per day ESA Welch CNP   10 mL at 01/20/23 0900    sodium chloride flush 0.9 % injection 5-40 mL  5-40 mL IntraVENous PRN ESA Welch - CNP        0.9 % sodium chloride infusion   IntraVENous PRN ESA Welch CNP        ondansetron (ZOFRAN-ODT) disintegrating tablet 4 mg  4 mg Oral Q8H PRN ESA Gonzales CNP        Or    ondansetron (ZOFRAN) injection 4 mg  4 mg IntraVENous Q6H PRN ESA Gonzales CNP        polyethylene glycol (GLYCOLAX) packet 17 g  17 g Oral Daily PRN ESA Gonzales CNP        [Held by provider] heparin (porcine) injection 5,000 Units  5,000 Units SubCUTAneous 3 times per day ESA Gonzales CNP   5,000 Units at 01/19/23 1429       Physical Examination:  BP (!) 89/51   Pulse 76   Temp 97.4 °F (36.3 °C)   Resp 18   Wt 129 lb 10.1 oz (58.8 kg)   SpO2 97%   BMI 18.60 kg/m²     Intake/Output Summary (Last 24 hours) at 1/20/2023 1541  Last data filed at 1/20/2023 0105  Gross per 24 hour   Intake 370 ml   Output --   Net 370 ml     Patient Vitals for the past 96 hrs (Last 3 readings):   Weight   01/20/23 1225 129 lb 10.1 oz (58.8 kg)   01/19/23 1145 132 lb 15 oz (60.3 kg)   01/19/23 0600 134 lb 11.2 oz (61.1 kg)     GENERAL: Alert and oriented. No distress. Undernourished  EYES: No pallor or icterus. ENT: No cyanosis. No thyromegaly or cervical LAP. VESSELS: No jugular venous distension or carotid bruits. HEART: Normal S1/S2. LVS4 present. No murmur, rub or gallop. LUNGS: Clear to auscultation. ABDOMEN: Distension with positive shifting dullness. Non-tender and no palpable organomegaly. EXTREMITIES: No lower extremity edema. Feet are warm.    NEUROLOGICAL: Grossly normal.     Laboratory And Diagnostic Data  I have personally reviewed and interpreted the results of the following diagnostic testing    Lab Results   Component Value Date    WBC 9.2 01/20/2023    HGB 10.9 (L) 01/20/2023    HCT 32.0 (L) 01/20/2023     01/20/2023    ALT 75 (H) 01/20/2023    AST 59 (H) 01/20/2023     01/20/2023    K 3.7 01/20/2023     01/20/2023    CREATININE 3.9 (HH) 01/20/2023    BUN 19 01/20/2023    CO2 24 01/20/2023    INR 0.96 01/18/2023       Echocardiogram LVEF 50%, LVWT 1.4 cm, LAD/MAGGIE 24. No significant valvular abnormalities. ECG Sinus rhythm, low voltage ECG. Telemetry events reviewed. Wide QRS tachycardia, suggestive of VT (rate 220 BPM)      Impression:  Nonsustained monomorphic ventricular tachycardia, longest run 28 seconds. HFpEF, high suspicion of cardiac sarcoidosis. Systemic AL amyloidosis, following oncologist interval history resting hospital.  Plan to have chemotherapy and possible stem cell transplantation. Renal amyloidosis with ESRD on dialysis. Chronic hypertension, possible amyloid elated to autonomic neuropathy. Liver disease with ascites, likely related to amyloidosis. Undernourished. Assessment And Recommendations: The patient's ventricular arrhythmia is most likely related to underlying cardiac sarcoidosis. Monomorphic scar related. He is at risk of sudden cardiac death and has an indication for AICD implantation. However, the patient appears to have multisystem involvement from amyloidosis and will consider ICD implantation only if his life expectancy is more than a year. Unfortunately, he is not a candidate for class Ic or III antiarrhythmic agents. We will try metoprolol tartrate small dose if his hemodynamics have. Patient be discharged home on LifeVest and decision regarding ICD implantation will be made during his outpatient visit with me in the EP clinic after he discusses the overall prognosis and management of his amyloidosis. Discussed the plan with the patient and his daughter. He was in agreement. Questions answered. Thank you for allowing me to participate in the care of your patient. Please call me if you have any questions. **This report has been created using voice recognition software. It may contain minor errors which are inherent in voice recognition technology. **       Kianna Crandall MD, MRCP, [facility], [facility] on 1/20/2023 at 6:16 PM

## 2023-01-21 VITALS
SYSTOLIC BLOOD PRESSURE: 91 MMHG | HEIGHT: 70 IN | HEART RATE: 80 BPM | RESPIRATION RATE: 17 BRPM | OXYGEN SATURATION: 96 % | TEMPERATURE: 98.1 F | WEIGHT: 129.8 LBS | DIASTOLIC BLOOD PRESSURE: 65 MMHG | BODY MASS INDEX: 18.58 KG/M2

## 2023-01-21 PROBLEM — E43 SEVERE MALNUTRITION (HCC): Chronic | Status: ACTIVE | Noted: 2023-01-01

## 2023-01-21 PROBLEM — E85.3: Status: ACTIVE | Noted: 2023-01-01

## 2023-01-21 PROBLEM — R18.8 OTHER ASCITES: Status: ACTIVE | Noted: 2023-01-21

## 2023-01-21 LAB
AFP SERPL-MCNC: 1.3 UG/L
ALBUMIN SERPL BCG-MCNC: 3.1 G/DL (ref 3.5–5.1)
ALP SERPL-CCNC: 1641 U/L (ref 38–126)
ALT SERPL W/O P-5'-P-CCNC: 72 U/L (ref 11–66)
ANION GAP SERPL CALC-SCNC: 11 MEQ/L (ref 8–16)
AST SERPL-CCNC: 62 U/L (ref 5–40)
BASOPHILS ABSOLUTE: 0 THOU/MM3 (ref 0–0.1)
BASOPHILS NFR BLD AUTO: 0.2 %
BILIRUB SERPL-MCNC: 2 MG/DL (ref 0.3–1.2)
BUN SERPL-MCNC: 12 MG/DL (ref 7–22)
CALCIUM SERPL-MCNC: 7.9 MG/DL (ref 8.5–10.5)
CHLORIDE SERPL-SCNC: 100 MEQ/L (ref 98–111)
CO2 SERPL-SCNC: 27 MEQ/L (ref 23–33)
CREAT SERPL-MCNC: 2.8 MG/DL (ref 0.4–1.2)
DEPRECATED RDW RBC AUTO: 63 FL (ref 35–45)
EOSINOPHIL NFR BLD AUTO: 0.7 %
EOSINOPHILS ABSOLUTE: 0.1 THOU/MM3 (ref 0–0.4)
ERYTHROCYTE [DISTWIDTH] IN BLOOD BY AUTOMATED COUNT: 19.9 % (ref 11.5–14.5)
GFR SERPL CREATININE-BSD FRML MDRD: 25 ML/MIN/1.73M2
GLUCOSE SERPL-MCNC: 83 MG/DL (ref 70–108)
HCT VFR BLD AUTO: 30.5 % (ref 42–52)
HGB BLD-MCNC: 10.6 GM/DL (ref 14–18)
IMM GRANULOCYTES # BLD AUTO: 0.03 THOU/MM3 (ref 0–0.07)
IMM GRANULOCYTES NFR BLD AUTO: 0.4 %
LKM AB TITR SER IF: NORMAL {TITER}
LYMPHOCYTES ABSOLUTE: 1.6 THOU/MM3 (ref 1–4.8)
LYMPHOCYTES NFR BLD AUTO: 19 %
MAGNESIUM SERPL-MCNC: 2.1 MG/DL (ref 1.6–2.4)
MCH RBC QN AUTO: 31.9 PG (ref 26–33)
MCHC RBC AUTO-ENTMCNC: 34.8 GM/DL (ref 32.2–35.5)
MCV RBC AUTO: 91.9 FL (ref 80–94)
MONOCYTES ABSOLUTE: 0.5 THOU/MM3 (ref 0.4–1.3)
MONOCYTES NFR BLD AUTO: 6.1 %
NEUTROPHILS NFR BLD AUTO: 73.6 %
NRBC BLD AUTO-RTO: 0 /100 WBC
PHOSPHATE SERPL-MCNC: 2.4 MG/DL (ref 2.4–4.7)
PLATELET # BLD AUTO: 130 THOU/MM3 (ref 130–400)
PLATELET BLD QL SMEAR: ADEQUATE
PMV BLD AUTO: ABNORMAL FL (ref 9.4–12.4)
POTASSIUM SERPL-SCNC: 4.1 MEQ/L (ref 3.5–5.2)
POTASSIUM SERPL-SCNC: 4.1 MEQ/L (ref 3.5–5.2)
PROT SERPL-MCNC: 4.9 G/DL (ref 6.1–8)
RBC # BLD AUTO: 3.32 MILL/MM3 (ref 4.7–6.1)
SCAN OF BLOOD SMEAR: NORMAL
SEGMENTED NEUTROPHILS ABSOLUTE COUNT: 6.3 THOU/MM3 (ref 1.8–7.7)
SODIUM SERPL-SCNC: 138 MEQ/L (ref 135–145)
TARGETS BLD QL SMEAR: ABNORMAL
VARIANT LYMPHS BLD QL SMEAR: ABNORMAL %
WBC # BLD AUTO: 8.5 THOU/MM3 (ref 4.8–10.8)

## 2023-01-21 PROCEDURE — 99232 SBSQ HOSP IP/OBS MODERATE 35: CPT | Performed by: INTERNAL MEDICINE

## 2023-01-21 PROCEDURE — 85025 COMPLETE CBC W/AUTO DIFF WBC: CPT

## 2023-01-21 PROCEDURE — 6370000000 HC RX 637 (ALT 250 FOR IP): Performed by: STUDENT IN AN ORGANIZED HEALTH CARE EDUCATION/TRAINING PROGRAM

## 2023-01-21 PROCEDURE — 84100 ASSAY OF PHOSPHORUS: CPT

## 2023-01-21 PROCEDURE — 36415 COLL VENOUS BLD VENIPUNCTURE: CPT

## 2023-01-21 PROCEDURE — 2580000003 HC RX 258: Performed by: NURSE PRACTITIONER

## 2023-01-21 PROCEDURE — 6370000000 HC RX 637 (ALT 250 FOR IP): Performed by: NURSE PRACTITIONER

## 2023-01-21 PROCEDURE — 83735 ASSAY OF MAGNESIUM: CPT

## 2023-01-21 PROCEDURE — 80053 COMPREHEN METABOLIC PANEL: CPT

## 2023-01-21 PROCEDURE — 99239 HOSP IP/OBS DSCHRG MGMT >30: CPT | Performed by: STUDENT IN AN ORGANIZED HEALTH CARE EDUCATION/TRAINING PROGRAM

## 2023-01-21 RX ORDER — ERGOCALCIFEROL 1.25 MG/1
50000 CAPSULE ORAL WEEKLY
Qty: 5 CAPSULE | Refills: 1 | Status: SHIPPED | OUTPATIENT
Start: 2023-01-27 | End: 2023-02-26

## 2023-01-21 RX ORDER — MIDODRINE HYDROCHLORIDE 5 MG/1
15 TABLET ORAL EVERY 8 HOURS
Qty: 90 TABLET | Refills: 3 | Status: SHIPPED | OUTPATIENT
Start: 2023-01-21

## 2023-01-21 RX ADMIN — MIDODRINE HYDROCHLORIDE 15 MG: 10 TABLET ORAL at 15:23

## 2023-01-21 RX ADMIN — PANTOPRAZOLE SODIUM 40 MG: 40 TABLET, DELAYED RELEASE ORAL at 06:13

## 2023-01-21 RX ADMIN — MIDODRINE HYDROCHLORIDE 15 MG: 10 TABLET ORAL at 06:13

## 2023-01-21 RX ADMIN — SODIUM CHLORIDE, PRESERVATIVE FREE 10 ML: 5 INJECTION INTRAVENOUS at 09:07

## 2023-01-21 NOTE — PLAN OF CARE
Problem: Discharge Planning  Goal: Discharge to home or other facility with appropriate resources  Outcome: Progressing  Flowsheets (Taken 1/21/2023 0135)  Discharge to home or other facility with appropriate resources:   Identify barriers to discharge with patient and caregiver   Arrange for needed discharge resources and transportation as appropriate   Identify discharge learning needs (meds, wound care, etc)   Refer to discharge planning if patient needs post-hospital services based on physician order or complex needs related to functional status, cognitive ability or social support system     Problem: Pain  Goal: Verbalizes/displays adequate comfort level or baseline comfort level  Outcome: Progressing  Flowsheets (Taken 1/21/2023 0135)  Verbalizes/displays adequate comfort level or baseline comfort level:   Encourage patient to monitor pain and request assistance   Assess pain using appropriate pain scale   Administer analgesics based on type and severity of pain and evaluate response   Implement non-pharmacological measures as appropriate and evaluate response     Problem: Skin/Tissue Integrity  Goal: Absence of new skin breakdown  Description: 1. Monitor for areas of redness and/or skin breakdown  2. Assess vascular access sites hourly  3. Every 4-6 hours minimum:  Change oxygen saturation probe site  4. Every 4-6 hours:  If on nasal continuous positive airway pressure, respiratory therapy assess nares and determine need for appliance change or resting period.   Outcome: Progressing  Note: No new skin breakdown noted     Problem: Nutrition Deficit:  Goal: Optimize nutritional status  Outcome: Progressing  Flowsheets (Taken 1/21/2023 0135)  Nutrient intake appropriate for improving, restoring, or maintaining nutritional needs:   Assess nutritional status and recommend course of action   Recommend appropriate diets, oral nutritional supplements, and vitamin/mineral supplements   Order, calculate, and assess calorie counts as needed     Problem: Safety - Adult  Goal: Free from fall injury  Outcome: Progressing  Flowsheets (Taken 1/21/2023 0135)  Free From Fall Injury: Instruct family/caregiver on patient safety

## 2023-01-21 NOTE — PROGRESS NOTES
Internal Medicine Resident Progress Note    Patient:  Srinivas Beaver    YOB: 1960  Unit/Bed:4K-25/025-A  MRN: [id number]    Acct: [de-identified]   PCP: Janae Henley DO    Date of Admission: 1/16/2023      Assessment/Plan:  Asymptomatic hypotension, present on admission, requiring pressor support, improved  - Likely secondary to autonomic dysfunction secondary to amyloidosis affecting cardiac muscle  - Patient requiring pressor support and transferred to [facility]; pressors discontinued  - Patient is now on midodrine 10 mg 3 times daily  - Patient received one-time dose of additional 10 mg to facilitate dialysis on 01/20  - Patient likely to be discharged on midodrine with follow-up in the outpatient setting with cardiology and electrophysiology for advanced care recommendations  - Recommendation to keep magnesium greater than 2 and potassium greater than 4 in the context of regularly scheduled dialysis  - Previous documentation indicates that patient had been on carvedilol 6.25 mg oral twice daily in the past    Repeated episodes of sustained ventricular tachycardia  -01/18/2023 patient had episode of sustained ventricular tachycardia for greater than 30 seconds at which point patient felt his heart racing but denied any other symptoms  -Patient seen and evaluated by cardiology on 01/19 who assessed the rhythm aberrations to be secondary to amyloid hypertrophic cardiomyopathy with recommendation for EP evaluation and use of beta-blockers as blood pressure allows  - Patient seen and evaluated by electrophysiology on 01/20 who assessed arrhythmia to be secondary to underlying cardiac sarcoidosis with a risk of sudden cardiac death with recommendation for AICD implantation.  - Patient is not a candidate for class Ic or III antiarrhythmics  - Recommendation for metoprolol tartrate if hemodynamically stable  - Recommendation for patient to be discharged on LifeVest with outpatient follow-up with EP clinic to decide about ICD implantation in the context of patient's amyloidosis  - Recommendation to keep magnesium greater than 2 and potassium greater than 4 in the context of regularly scheduled dialysis    Newly diagnosed diastolic heart failure with preserved ejection fraction, EF 55-60%  - Not in acute exacerbation  - Identified on 11/23/2022 echo with accompanying concentric left ventricular hypertrophy    Acute normocytic anemia  Iron deficiency anemia  - Likely secondary to chronic disease  - Retacrit dosing per nephrology and pharmacy team  - Monitor for signs and symptoms of bleeding  - Consider transfusion per hematology, nephrology recommendations in the context of planned interventions by cardiology, gastroenterology  - Outpatient iron supplementation per hematology/oncology    Recurrent ascites  - Patient has had multiple paracentesis since December 2022  - Ascitic fluid from previous paracentesis negative for SBP  - Concurrent alkaline phosphatase elevation noted with hypoalbuminemia, normal INR, normal platelet count  - Gastroenterology concern for possible amyloidosis in the liver  - Patient seen and evaluated by gastroenterology on 01/19 with recommendation for weekly paracentesis in the outpatient setting, continuation of midodrine 3 times daily  - Hepatopathy labs ordered by GI and sent out  - Patient's heparin held for liver biopsy; SCDs in place    ESRD, s/p tunneled dialysis catheter placement December 2022, MWF schedule  - Likely secondary to renal amyloidosis, biopsy-proven  - Patient following with Dr. Quang Gallardo with MWF schedule for dialysis  - Dialysis last performed on 01/20 with no net ultrafiltrate  - Continue to replace electrolytes in the context of dialysis per nephrology recommendations  - Nephrology recommending to continue midodrine dosing IV albumin as needed  - Trend BMP  - Retacrit dosing per nephrology and inpatient pharmacy team    Vitamin D deficiency  - Likely secondary to renal dysfunction  - Vitamin D level 8  - Supplementation ordered    Hypoalbuminemia  - Likely secondary to poor oral nutrition as well as chronic disease  - Albumin infusion per nephrologist  - Dietitian following with recommendations in place    AL amyloidosis  - Confirmed on biopsy in October 2022  - Patient following with Dr. Emily Lim outpatient  - Patient has not started any chemotherapy regimen  - We will refrain from consulting oncology in the inpatient setting and recommend follow-up outpatient for advanced care recommendations after stabilization    Hypertrophic cardiomyopathy  - 01/20 limited echo showing mild to moderate concentric left ventricular hypertrophy, EF 50%  - Previous consideration from 11/23/2022 echo  - Cardiology and electrophysiology following    Tobacco use disorder  - Patient counseled on tobacco abstinence at discharge  Cholelithiasis  - Identified on gallbladder ultrasound 01/17/2023  Hx hyperlipidemia   Hx appendectomy  - Noted    Physical Deconditioning, Suspected  - PT/OT evaluation prior to discharge  - Dietitian evaluation prior to discharge     Pertinent labs From Multiple Sources  - 01/20/2023 alpha 1 antitrypsin 162  - Hepatitis panel negative  - Gamma   - Outside facility 12/7/2022 albumin 2.5, alpha-1 0.4, alpha-2 0.8, beta 1.1, gamma 0.4 (interpretation stated that there were 2 prominent zones of restriction in the beta region with a marked decrease in the remaining polyclonal immunoglobulins, pattern suggestive of monoclonal gammopathy)  - 12/7/2022 monoclonal 1 IgM lambda 338, IgA kappa 62.4  - 12/7/2022 beta-2 microglobulin 14.10  - 12/7/2022 [place] free light chain 77.3, lambda free light chains to 73.5, kappa/lambda ratio 0.28  - 12/7/2022 IgG 384, IgA 251, IgM 1157      OSH Reviewed Info:  - Patient was admitted to [facility] from 12/21/2022 until 12/26/2022 for SEDA which was suspected to be secondary to light chain amyloidosis  - Creatinine was 6 at time of admission  - S/p tunneled dialysis catheter placement on 12/25/2022 with Monday Wednesday Friday schedule  -Patient had ascites that developed during admission at outside facility  - S/p paracentesis 12/21 with 6 L removed, 12/26 with 5 L removed    Expected discharge date: 01/21/2023    Disposition:   [x] Home  [] TCU  [] Rehab  [] Psych  [] SNF  [] [place]  [] Other-    ===================================================================      Chief Complaint: Abnormal blood pressure    Hospital Course: This is a 27-year-old male with a previously diagnosed systemic light chain amyloidosis was hospitalized at [facility] on 01/16/2023 with concerns for hepatorenal syndrome (note: not a formal diagnosis). He went to the hospital from his outpatient hemodialysis center with hypotension. Systolic blood pressure was in the 80s and he was given 1 L of IV crystalloid fluid without improvement. He was then brought to the emergency department with a blood pressure of 61/46 which was not responsive to fluids. He was started on Levophed and was transferred to [facility] [place] [address] documentation indicates that the patient also received additional normal saline, midodrine, and hydrocortisone and was transferred to the ICU directly on 01/16. Documentation indicates that the patient was on the midodrine 3 times daily 10 mg which was escalated to 15 prior to transfer. The patient was maintained in the ICU for pressor support. Patient's hospital course was complicated by episodes of sustained ventricular tachycardia. Cardiology was consulted and recommended electrophysiology evaluation. Patient was seen and evaluated by gastroenterology who did not believe that the patient had had a renal syndrome and sent labs and ordered a liver biopsy to confirm the diagnosis. Patient underwent a repeat paracentesis at bedside on 01/18 with 4.5 L removed without complication.   Patient's nephrologist was also consulted to ensure that patient stayed on regular hemodialysis schedule when feasible. Patient was transferred out of the ICU on 01/19/2023. Patient was seen and evaluated by electrophysiology on 01/20 with recommendation for ICD if patient's prognosis and medical therapy allotted. Otherwise patient was recommended to be discharged with a LifeVest and to follow-up in the outpatient setting for advanced care recommendations with electrophysiology. Patient had dialysis on 01/20 with no ultrafiltrate without complication. Subjective (past 24 hours):   Patient seen and evaluated at bedside in no acute distress on 01/20/2023. He endorses intermittent cough with sputum production. He endorses improvement in his urination after the paracentesis he had performed. He denies chest pain, fever, chills, nausea, vomiting, or diarrhea. He is tolerating his current medication regimen. He is eager to transition back to the outpatient setting for advanced care recommendations regarding his amyloidosis. Case discussed extensively with pharmacy team as well as attending IM physician. Case signed out to night team.  Case also discussed with primary RN who called to find out time for liver biopsy which has been postponed until Monday secondary to patient getting dialysis. Case discussed with patient's daughter in the evening. All questions were fully answered and she verbalized understanding of plan of care. Patient's questions were also answered at the bedside and he verbalized understanding. He is agreeable to the plan of care. Case discussed with patient's night RN from 01/19 as well. ROS: reviewed complete ROS unchanged unless otherwise stated in hospital course/subjective portion.        Medications:  Reviewed    Infusion Medications    sodium chloride       Scheduled Medications    vitamin D  50,000 Units Oral Weekly    [Held by provider] epoetin jose-epbx  3,000 Units SubCUTAneous Once per day on Mon Wed Fri    And    [Held by provider] epoetin jose-epbx  2,000 Units SubCUTAneous Once per day on Mon Wed Fri    [Held by provider] carvedilol  6.25 mg Oral BID    pantoprazole  40 mg Oral QAM AC    midodrine  15 mg Oral q8h    sodium chloride flush  5-40 mL IntraVENous 2 times per day    [Held by provider] heparin (porcine)  5,000 Units SubCUTAneous 3 times per day     PRN Meds: acetaminophen **OR** acetaminophen, melatonin, [Held by provider] loperamide, sodium chloride flush, sodium chloride, ondansetron **OR** ondansetron, polyethylene glycol        Intake/Output Summary (Last 24 hours) at 1/20/2023 2209  Last data filed at 1/20/2023 1619  Gross per 24 hour   Intake 720 ml   Output 0 ml   Net 720 ml       Exam:  BP 93/70   Pulse 80   Temp 97.9 °F (36.6 °C) (Oral)   Resp 18   Ht 5' 10\" (1.778 m)   Wt 129 lb 10.1 oz (58.8 kg)   SpO2 94%   BMI 18.60 kg/m²     General: No distress, appears stated age. Eyes:  PERRL. Conjunctivae/corneas clear. HENT: Head normal appearing. Nares normal. Oral mucosa moist.  Hearing intact. Neck: Supple, with full range of motion. Trachea midline. No gross JVD appreciated. Respiratory:  Normal effort. Clear to auscultation, without rales or wheezes or rhonchi. Cardiovascular: Normal rate, regular rhythm with normal S1/S2 without murmurs. No lower extremity edema. Abdomen: Soft, non-tender, non-distended with normal bowel sounds. Musculoskeletal: No joint swelling or tenderness. Normal tone. No abnormal movements. Skin: Warm and dry. No rashes or lesions. Neurologic:  No focal sensory/motor deficits in the upper or lower extremities. Cranial nerves:  grossly non-focal 2-12. Psychiatric: Alert and oriented, normal insight and thought content. Capillary Refill: Brisk,< 3 seconds. Peripheral Pulses: +2 palpable, equal bilaterally.        Labs:   Recent Labs     01/18/23  0416 01/19/23  0347 01/20/23  0408   WBC 9.2 10.6 9.2   HGB 9.9* 10.3* 10.9*   HCT 29.2* 30.1* 32.0*    151 141 Recent Labs     01/18/23  0416 01/19/23  0347 01/20/23  0408    136 138   K 4.0 3.6 3.7   CL 97* 97* 100   CO2 25 22* 24   BUN 33* 37* 19   CREATININE 5.0* 5.9* 3.9*   CALCIUM 7.8* 8.4* 8.3*   PHOS  --  5.0* 3.4     Recent Labs     01/18/23  0416 01/20/23  0408   AST 82* 59*   ALT 93* 75*   BILITOT 1.2 1.8*   ALKPHOS 1,877* 1,759*     Recent Labs     01/18/23  0416   INR 0.96     No results for input(s): TROPONINT in the last 72 hours. No results for input(s): PROCAL in the last 72 hours. No results found for: [facility], BACTERIA, RBCUA, BLOODU, [address], [address] [place] [address]    Radiology (48 hours):  No results found. DVT prophylaxis:    [] Lovenox  [x] SCDs  [] SQ Heparin  [] Encourage ambulation   [] Already on Anticoagulation       Diet: ADULT DIET; Regular;  Low Sodium (2 gm); 2000 ml  Code Status: Full Code      Electronically signed by Hua Cuellar MD on 1/20/2023 at 10:09 PM    Case was discussed with Attending, Dr. Rosendo Burger

## 2023-01-21 NOTE — CARE COORDINATION
Notification received patient is planned for discharge today and needs life vest ordered. Required documentation faxed to [facility]. Phone message left for [facility] [phone]. Electronically signed by Gill Triana RN on 1/21/23 at 9:11 AM EST     Phone call received from [facility]. She will review the order and documentation. She will update this writer.  Electronically signed by Gill Triana RN on 1/21/23 at 9:14 AM EST

## 2023-01-21 NOTE — PROGRESS NOTES
Baljeet Livingstonrj is ready to leave, he did not want to discuss how his stay was as he had many complaints. Prayer is offered and accepted. 01/21/23 1545   Encounter Summary   Encounter Overview/Reason  Spiritual/Emotional Needs   Service Provided For: Patient   Referral/Consult From: [facility]   Last Encounter  01/21/23   Complexity of Encounter Low   Spiritual/Emotional needs   Type Spiritual Support   Assessment/Intervention/Outcome   Assessment Coping   Intervention Prayer (assurance of)/[place]   Outcome Comfort   Care Plan:  Continue spiritual and emotional care for patient and family. Leaving soon.

## 2023-01-21 NOTE — PROGRESS NOTES
Kal Lee from [employer] notified that patient is being discharged and will need the life vest to be fitted at his home when approved by insurance.

## 2023-01-21 NOTE — PROGRESS NOTES
Gastroenterology Progress Note:     Patient Name:  Young Fam   MRN: [id number]  [id number]  YOB: 1960  Admit Date: 1/16/2023 10:55 PM  Primary Care Physician: Jonel Bowen DO   4K-25/025-A     Patient seen and examined. 24 hours events and chart reviewed. Subjective: Patient resting in bed. He wants to go home if his liver biopsy cannot be done over the weekend. He has dialysis setup Monday near home. He has appt for chemotherapy mid next week. He states he is \"getting sicker\" here- does not like the food and cannot sleep well. He is requesting outpatient liver biopsy. Objective:  BP (!) 83/57   Pulse 80   Temp 98.9 °F (37.2 °C) (Oral)   Resp 16   Ht 5' 10\" (1.778 m)   Wt 129 lb 12.8 oz (58.9 kg)   SpO2 94%   BMI 18.62 kg/m²     Physical Exam:    General:  Chronically ill appearing male, cachectic   HEENT: Atraumatic, normocephalic. Moist oral mucous membranes. Neck: Supple without adenopathy, JVD, thyromegaly or masses. Trachea midline. CV: Heart RRR, no murmurs, rubs, gallops. Resp: Even, easy without cough or accessory use. Lungs clear to ascultation bilaterally. Abd: Round, soft, non-tender. No hepatosplenomegaly or mass present. Active bowel sounds heard. Soft distention noted. Ext:  Without cyanosis, clubbing, edema. Skin: Pink, warm, dry  Neuro:  Alert, oriented x 3 with no obvious deficits.        Rectal: deferred    Labs:   CBC:   Lab Results   Component Value Date/Time    WBC 9.2 01/20/2023 04:08 AM    HGB 10.6 01/21/2023 04:35 AM    HCT 30.5 01/21/2023 04:35 AM    MCV 91.9 01/21/2023 04:35 AM     01/21/2023 04:35 AM     BMP:   Lab Results   Component Value Date/Time     01/21/2023 04:35 AM    K 4.1 01/21/2023 04:35 AM     01/21/2023 04:35 AM    CO2 27 01/21/2023 04:35 AM    PHOS 2.4 01/21/2023 04:35 AM    BUN 12 01/21/2023 04:35 AM    CREATININE 2.8 01/21/2023 04:35 AM    CALCIUM 7.9 01/21/2023 04:35 AM     PT/INR:   Lab Results   Component Value Date/Time    INR 0.96 01/18/2023 04:16 AM     Lipids:   Lab Results   Component Value Date/Time    [facility] SOUTH 1,641 01/21/2023 04:35 AM    ALT 72 01/21/2023 04:35 AM    AST 62 01/21/2023 04:35 AM    BILITOT 2.0 01/21/2023 04:35 AM    BILIDIR 0.6 01/17/2023 06:47 AM    LABALBU 3.1 01/21/2023 04:35 AM     Significant Diagnostic Studies:   Echo 1/20/23   Summary   Ejection fraction is visually estimated at 50%. Mild to moderate concentric left ventricular hypertrophy. Moderately increased right ventricle wall thickness. Small to moderate circumferential pericardial effusion with no tamponade   physiology. Signature    Current Meds:  Scheduled Meds:   vitamin D  50,000 Units Oral Weekly    [Held by provider] epoetin jose-epbx  3,000 Units SubCUTAneous Once per day on Mon Wed Fri    And    [Held by provider] epoetin jose-epbx  2,000 Units SubCUTAneous Once per day on Mon Wed Fri    [Held by provider] carvedilol  6.25 mg Oral BID    pantoprazole  40 mg Oral QAM AC    midodrine  15 mg Oral q8h    sodium chloride flush  5-40 mL IntraVENous 2 times per day    [Held by provider] heparin (porcine)  5,000 Units SubCUTAneous 3 times per day     Continuous Infusions:   sodium chloride         Assessment:  64 yo M admitted 01/16/23 as a transfer from OSH for hypotension. H/O light chain amyloidosis, follows with Dr. Krunal Valles in [facility], waiting to start chemo. H/O ESRD on HD secondary to renal amyloidosis which was proven by biopsy. GI consulted for elevated LFTs & recurring ascites. US RUQ demonstrated ascites and a liver normal in echotexture. a1A, AFP, Hep A/B/C negative.     Acute on chronic hypotension- on Midodrine PTA, Midodrine increased this admission- most likely autonomic dysfunction from amyloidosis, do NOT suspect hepatorenal syndrome   Recurrent ascites- s/p para 01/18/23 negative for SBP, SAAG 1.2, total protein 5.0- cardiac etiology  Elevated transaminases- suspect amyloidosis in the liver vs cardiac vs less likely cirrhosis  Elevated alk phos- suspect amyloidosis in the liver vs cardiac vs less likely cirrhosis  Light chain amyloidosis- follows with oncology, waiting to start chemotherapy  ESRD on HD 2/2 to renal amyloidosis proven with biopsy  Anemia in ESRD- on Retacrit, no GI bleeding noted  Hypoalbuminemia  Sustained ventricular tachycardia- cardiology consulted  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  Poor prognosis    Plan:    Monitor H & H, transfuse prn  Monitor HFP  No inpatient paracentesis needed today. Order for weekly paracentesis initiated outpatient 1/12. Patient will need to schedule at [facility] 170 if not all ready done. Continue Midodrine TID  2g sodium diet, add 2000 mL fluid restriction  Daily weights  Strict I & O  Avoid hepatotoxic meds, okay for Tylenol 2g limit  Hepatopathy labs pending- (SMAb, LKM-1 Ab, ANCA, AMAb, remainder resulted and wnl)  Fractionated alk phos pending  Liver biopsy- OK with GI to complete outpatient if patient choosing to go home over the weekend. Would prefer inpatient to complete sooner. Office to send order for outpatient liver biopsy if needed on Monday 1/23. Will need to hold any at home anticoagulation for biopsy. Hold Heparin for biopsy inpatient. Cardiology & nephrology on board  EP consulted, planning for lifevest at discharge. Supportive care per primary team    Will follow if remains inpatient.      Case reviewed and impression/plan reviewed in collaboration with Dr. Kulwinder Mclean  Electronically signed by ESA Mccracken - CNP on 1/21/2023 at 7:18 AM    [facility]

## 2023-01-21 NOTE — SIGNIFICANT EVENT
Patient and patient patient's family members multiple times requested discharged without Rozella Shaggy was recommended by cardiology/electrophysiology service, prevent sudden cardiac death or major Cardiac events including V. Tach, A. Fib. Cardiology General service also recommended LifeVest.   Cardiology service, primary hospitalist service Dr. Rylan Atkinson MD and bedside RN explained to patient regarding LifeVest and indications. However patient denied at this point having  LifeVest.  He verbalizes back understanding of risks risk is of not having LifeVest at discharge including sudden cardiac death, heart arrhythmias ventricular tachycardia ventricular arrhythmia ventricular fibrillation, may lead to death. Also patient and his family expresses that they are seeking liver biopsy per GI as outpatient setting. Patient will be discharged without LifeVest, due to patient's refusal of having LifeVest and understanding consequences risks including sudden cardiac death, V. tach V. fib that may lead to sudden  death or other major cardiovascular adverse event.

## 2023-01-21 NOTE — DISCHARGE SUMMARY
Hospital Medicine Discharge Summary      Patient Identification:   Donna Lopez   : 1960  MRN: [id number]   Account: [de-identified]      Patient's PCP: Adrian Christensen DO    Admit Date: 2023     Discharge Date: 2023    Admitting Physician: Sarah Mijares MD     Discharge Physician: Urszula Rubalcava DO     Discharge Diagnoses: The patient was seen and examined on day of discharge and this discharge summary is in conjunction with any daily progress note from day of discharge. Hospital Course: Donna Lopez is a 63 y.o. male admitted to [facility] on 2023 for low blood pressure. This patient was diagnosed systemic light chain amyloidosis was hospitalized at [facility] on 2023 with concerns for hepatorenal syndrome (note: not a formal diagnosis). He went to the hospital from his outpatient hemodialysis center with hypotension. Systolic blood pressure was in the 80s and he was given 1 L of IV crystalloid fluid without improvement. He was then brought to the emergency department with a blood pressure of 61/46 which was not responsive to fluids. He was started on Levophed and [facility] documentation indicates that the patient also received additional normal saline, midodrine, and hydrocortisone and was transferred to the ICU directly on . Documentation indicates that the patient was on the midodrine 3 times daily 10 mg which was escalated to 15 prior to transfer. The patient was maintained in the ICU for pressor support. Patient's hospital course was complicated by episodes of sustained ventricular tachycardia. Cardiology was consulted and recommended electrophysiology evaluation. Patient was seen and evaluated by gastroenterology who did not believe that the patient had had a renal syndrome and sent labs and ordered a liver biopsy to confirm the diagnosis.   Patient underwent a repeat paracentesis at bedside on  with 4.5 L removed without complication. Patient's nephrologist was also consulted to ensure that patient stayed on regular hemodialysis schedule when feasible. Patient was transferred out of the ICU on 01/19/2023. Patient was seen and evaluated by electrophysiology on 01/20 with recommendation for ICD if patient's prognosis and medical therapy allotted. Otherwise patient was recommended to be discharged with a LifeVest and to follow-up in the outpatient setting for advanced care recommendations with electrophysiology. Patient had dialysis on 01/20 with no ultrafiltrate without complication  Patient consulted and seen and followed by nephrology service, continued hemodialysis sessions, ultrafiltration. Patient is seen and followed by gastroenterology service, recommendations received. Patient will need liver biopsy by GI service and it can be done as outpatient in near future. Patient understands given recommendation and wants to do liver biopsy around his home after hospital discharge as outpatient setting. Patient is seen and followed by general cardiology and electrophysiology/cardiology, appreciate recommendations, at the end, patient recommended to having LifeVest due to high risk for sudden cardiac death/V. tach/V. Fib. However patient refused it having LifeVest, despite multiple recommendations and advises and meeting with the family and conversations, patient and his family replied back understanding refusal from [address] including risks including sudden cardiac death, ventricular tachycardia ventricular fibrillation and other major cardiac events that may lead to death. Patient and family refused a LifeVest and discharged home. Per general cardiology over phone on discharge recommendations, beta-blocker stopped with due to hypotension and patient is not tolerating beta-blocker, cardiology aware and will be follow-up as outpatient after hospital discharge.   patient is stable, being discharged home. Patient and his family again advised regarding LifeVest and he refused it and replied back understanding given opportunity and recommendations and they are willing to take risks not having LifeVest that may include sudden cardiac death, ventricular fibrillation ventricular tachycardia or other heart arrhythmias or other heart diseases or other major cardiac events they may lead to death. Patient will need further PCP follow-up, close cardiology follow-up, cardiology/electrophysiology follow-up, gastroenterology follow-up with a liver biopsy, nephrology follow-up for  regular dialysis sessions. Exam:     Vitals:  Vitals:    01/20/23 2238 01/21/23 0305 01/21/23 0821 01/21/23 1139   BP: (!) 82/59 (!) 83/57 96/72 91/65   Pulse: 86 80 74 80   Resp: 16 16 16 17   Temp: 97.9 °F (36.6 °C) 98.9 °F (37.2 °C) 98.7 °F (37.1 °C) 98.1 °F (36.7 °C)   TempSrc: Axillary Oral Oral Oral   SpO2: 94% 94% 95% 96%   Weight:  129 lb 12.8 oz (58.9 kg)     Height:         Weight: Weight: 129 lb 12.8 oz (58.9 kg)     24 hour intake/output:  Intake/Output Summary (Last 24 hours) at 1/21/2023 1900  Last data filed at 1/21/2023 0800  Gross per 24 hour   Intake 300 ml   Output 50 ml   Net 250 ml         General appearance:  No apparent distress, appears thin and chronically ill looking. Thin appearance, looks older than stated age. Cooperative. HEENT:  Normal cephalic, atraumatic without obvious deformity. Pupils equal, round, and reactive to light. Extra ocular muscles intact. Conjunctivae/corneas clear. Neck: Supple, with full range of motion. No jugular venous distention. Trachea midline. Respiratory:  Normal respiratory effort. Clear to auscultation, bilaterally without Rales/Wheezes/Rhonchi. Cardiovascular:  Regular rate and rhythm with normal S1/S2 without murmurs, rubs or gallops. Abdomen: Soft, non-tender, non-distended with normal bowel sounds. Musculoskeletal:  No clubbing, cyanosis or edema bilaterally. Full range of motion without deformity. Skin: Dry and scaly. Neurologic:  Neurovascularly intact without any focal sensory/motor deficits. Cranial nerves: II-XII intact, grossly non-focal.  Psychiatric:  Alert and oriented, thought content appropriate, normal insight  Capillary Refill: Brisk,< 3 seconds   Peripheral Pulses: +2 palpable, equal bilaterally       Labs: For convenience and continuity at follow-up the following most recent labs are provided:      CBC:    Lab Results   Component Value Date/Time    WBC 8.5 01/21/2023 04:35 AM    HGB 10.6 01/21/2023 04:35 AM    HCT 30.5 01/21/2023 04:35 AM     01/21/2023 04:35 AM       Renal:    Lab Results   Component Value Date/Time     01/21/2023 04:35 AM    K 4.1 01/21/2023 04:35 AM    K 4.1 01/21/2023 04:35 AM     01/21/2023 04:35 AM    CO2 27 01/21/2023 04:35 AM    BUN 12 01/21/2023 04:35 AM    CREATININE 2.8 01/21/2023 04:35 AM    CALCIUM 7.9 01/21/2023 04:35 AM    PHOS 2.4 01/21/2023 04:35 AM         Significant Diagnostic Studies    Radiology:   US GALLBLADDER RUQ   Final Result   1. Gallbladder sludge and probable cholelithiasis. 2. Thickened gallbladder wall which may be seen in acute and chronic cholecystitis. 3. Ascites. Final report electronically signed by Dr. Jv Rock on 1/17/2023 4:07 PM      US BIOPSY LIVER PERCUTANEOUS    (Results Pending)          Consults:     IP CONSULT TO NEPHROLOGY  IP CONSULT TO CARDIOLOGY  IP CONSULT TO GI  IP CONSULT TO ELECTROPHYSIOLOGY    Disposition: Home  Condition at Discharge: Stable    Code Status:  Prior     Patient Instructions:    Discharge lab work:    Activity: activity as tolerated  Diet: No diet orders on file      Follow-up visits:   Jonel Bowen, [address] [facility]  [phone]    Follow up in 1 week(s)      Owen Riley MD  [address] [facility] [address]  [phone]    Follow up in 2 week(s)      [facility], [address]  [phone]  Follow up in 2 week(s)  To arrange liver biopsy and follow-up after hospital discharge    Cory Cortes MD  [address] [place] [address]    Follow up  As needed. Follow as scheduled and keep dialysis schedule. Discharge Medications:        Medication List        START taking these medications      Vitamin D (Ergocalciferol) 12409 units Caps  Take 50,000 Units by mouth once a week  Start taking on: January 27, 2023            CHANGE how you take these medications      midodrine 5 MG tablet  Commonly known as: PROAMATINE  Take 3 tablets by mouth every 8 (eight) hours  What changed: when to take this            CONTINUE taking these medications      sodium bicarbonate 650 MG tablet  Take 1 tablet by mouth 2 times daily            STOP taking these medications      carvedilol 6.25 MG tablet  Commonly known as: COREG     esomeprazole Magnesium 20 MG Pack  Commonly known as: NEXIUM     loperamide 2 MG capsule  Commonly known as: IMODIUM     prochlorperazine 10 MG tablet  Commonly known as: COMPAZINE               Where to Get Your Medications        These medications were sent to [address] [place] [address], [facility] [address] [phone]  [address] [place] [address]      Phone: [phone]   midodrine 5 MG tablet  Vitamin D (Ergocalciferol) 57442 units Caps         Time Spent on discharge is more than  35min. s  in the examination, evaluation, counseling and review of medications and discharge plan. Signed: Thank you Mira Edmond DO for the opportunity to be involved in this patient's care. Discussed with and reviewed with Dr. Cortney Ching.   MD  Electronically signed by Mark Rayo DO, PGY-2 on 1/21/2023 at 7:00 PM

## 2023-01-21 NOTE — PROGRESS NOTES
[facility]   Nephrology progress note  1/21/2023, 11:05 AM      Pt Name:    Young Fam  MRN:     [id number]     YOB: 1960  Admit Date:    1/16/2023 10:55 PM    Chief Complaint: Nephrology following for ESRD and hemodialysis    Subjective:  Patient was seen and examined   Seen and examined earlier today during rounds  No chest pain  No shortness of breath overall stable    Objective:  24HR INTAKE/OUTPUT:    Intake/Output Summary (Last 24 hours) at 1/21/2023 1105  Last data filed at 1/21/2023 1157  Gross per 24 hour   Intake 600 ml   Output 50 ml   Net 550 ml           I/O last 3 completed shifts: In: 970 [P.O.:250; IV Piggyback:120]  Out: 50 [Urine:50]  No intake/output data recorded.    Admission weight: 138 lb 7.2 oz (62.8 kg)  Wt Readings from Last 3 Encounters:   01/21/23 129 lb 12.8 oz (58.9 kg)   12/13/22 136 lb (61.7 kg)   12/06/22 134 lb (60.8 kg)        Vitals :   Vitals:    01/20/23 1929 01/20/23 2238 01/21/23 0305 01/21/23 0821   BP: 93/70 (!) 82/59 (!) 83/57 96/72   Pulse: 80 86 80 74   Resp: 18 16 16 16   Temp: 97.9 °F (36.6 °C) 97.9 °F (36.6 °C) 98.9 °F (37.2 °C) 98.7 °F (37.1 °C)   TempSrc: Oral Axillary Oral Oral   SpO2: 94% 94% 94% 95%   Weight:   129 lb 12.8 oz (58.9 kg)    Height:           Physical examination  General Appearance: alert and cooperative with exam, appears comfortable, no distress  Chronically ill-appearing, cachectic, severe muscle wasting  Mouth/Throat: Oral mucosa moist  Neck: No JVD  Lungs: No use of accessory muscle use, no labored breathing  GI: Some distention, no guarding  Extremities: No significant LE edema    Medications:  Infusion:    sodium chloride       Meds:    vitamin D  50,000 Units Oral Weekly    [Held by provider] epoetin jose-epbx  3,000 Units SubCUTAneous Once per day on Mon Wed Fri    And    [Held by provider] epoetin jose-epbx  2,000 Units SubCUTAneous Once per day on Mon Wed Fri    [Held by provider] carvedilol  6.25 mg Oral BID    pantoprazole  40 mg Oral QAM AC    midodrine  15 mg Oral q8h    sodium chloride flush  5-40 mL IntraVENous 2 times per day    [Held by provider] heparin (porcine)  5,000 Units SubCUTAneous 3 times per day     Meds prn: acetaminophen **OR** acetaminophen, melatonin, [Held by provider] loperamide, sodium chloride flush, sodium chloride, ondansetron **OR** ondansetron, polyethylene glycol     Lab Data :  CBC:   Recent Labs     01/19/23 0347 01/20/23 0408 01/21/23 0435   WBC 10.6 9.2 8.5   HGB 10.3* 10.9* 10.6*   HCT 30.1* 32.0* 30.5*    141 130       CMP:  Recent Labs     01/19/23 0347 01/20/23 0408 01/21/23 0435    138 138   K 3.6 3.7 4.1  4.1   CL 97* 100 100   CO2 22* 24 27   BUN 37* 19 12   CREATININE 5.9* 3.9* 2.8*   GLUCOSE 95 83 83   CALCIUM 8.4* 8.3* 7.9*   MG 2.5* 2.4 2.1   PHOS 5.0* 3.4 2.4       Hepatic:   Recent Labs     01/20/23 0408 01/21/23 0435   LABALBU 3.0* 3.1*   AST 59* 62*   ALT 75* 72*   BILITOT 1.8* 2.0*   ALKPHOS 1,759* 1,641*           Assessment and Plan:    1. ESRD on hemodialysis secondary to renal amyloidosis, biopsy-proven  Patient is doing better overall  Continue dialysis Mondays, Wednesdays and Fridays  Acute need for dialysis today    Electrolytes appear to be within normal limits potassium 4.1    2. Anemia in ESRD. On Retacrit  3. Chronic hypotension. Patient with underlying amyloidosis. Cannot rule out autonomic dysfunction from amyloidosis itself. Continue with midodrine. IV albumin if needed  4. AL amyloidosis  5. Renal osteodystrophy/hyperphosphatemia. 6.  Ascites status post paracentesis: GI following. They suspect possible cardiac etiology     D/W patient and nursing staff    Kranthi Moncada MD  [facility]    This report has been created using voice recognition software.  It may contain minor errors which are inherent in voice recognition technology

## 2023-01-21 NOTE — CARE COORDINATION
Phone call placed to Kenji Mays, [id number] liaison. [facility] [employer], must give authorization before they can release a life vest. Most likely approval will not be before Monday due to weekend. Update to RN, Madonna Rowe.  Electronically signed by Angela Adair RN on 1/21/23 at 12:30 PM EST

## 2023-01-21 NOTE — PROGRESS NOTES
Comprehensive Nutrition Assessment    Type and Reason for Visit:  Initial, Positive Nutrition Screen (poor oral intake, unplanned weight loss)    Nutrition Recommendations/Plan:   Consider renal MVI, folbee plus. ONS: Nepro daily. Home ONS use reported as well. Continue current diet-reinforced with patient and significant other. Patient is followed outpatient closely by dialysis dietitian. Malnutrition Assessment:  Malnutrition Status:  Severe malnutrition (01/21/23 1410)    Context:  Chronic Illness     Findings of the 6 clinical characteristics of malnutrition:  Energy Intake:  75% or less estimated energy requirements for 1 month or longer  Weight Loss:  Unable to assess (patient with ascites, receives paracentesis and dialysis. Patient reports weight down 16.2% loss from usual weight.)     Body Fat Loss:  Severe body fat loss Orbital, Triceps, Fat Overlying Ribs   Muscle Mass Loss:  Severe muscle mass loss Temples (temporalis), Clavicles (pectoralis & deltoids), Thigh (quadraceps), Calf (gastrocnemius)  Fluid Accumulation:    Ascites   Strength:  Not Performed    Nutrition Assessment:     Pt. severely malnourished AEB criteria listed above. At risk for further nutritional compromise r/t admit with acute on chronic hypotension, vtach, new CHF, recurrent ascites-s/p paracentesis 1/18/23-4.5 liters, dislike of hospital food, increased nutrient needs to support known losses with dialysis, skin integrity issues, low BMI,  and underlying medical condition (amyloidosis-planning to start chemotherapy, ESRD-hemodialysis started 12/2022, recurrent ascites-s/p paracentesis 12/21/22-6 liters, 12/26/22-5 liters, double hernia repair, smoking). Nutrition Related Findings:    Pt. Report/Treatments/Miscellaneous: Patient and significant other seen. Decline in appetite/intake since 9/2022, patient reports ongoing medical issues since then. States he gets so full from ascites he can't eat much at all.   Home ONS use reported \"tries to get in 1/2 of one per day\". States significant other brought him in lunch today, he dislikes hospital food. He really wants to go home today but awaiting life vest. He states his chemotherapy has been delayed 2 months d/t hypotension. Possible scheduled routine paracentesis as outpatient per pt and in need of liver biopsy yet. GI Status: 2BM's in the last 24 hours  Pertinent Labs: 1/21/23: Sodium 138, Potassium 4.1, BUN 12, Creatinine 2.8, Mg 2.1, Glucose 83, Phos 2.4, Alk Phos 164, ALT 72, AST 62, Bili 2  Pertinent Meds: midadrine, protonix, vitamin D     Wound Type: Stage I (coccyx)       Current Nutrition Intake & Therapies:    Average Meal Intake: % (only 1 meal recorded, typically eats only a few bites at meals)  Average Supplements Intake:  (will drink)  ADULT DIET; Regular; Low Sodium (2 gm); 2000 ml  ADULT ORAL NUTRITION SUPPLEMENT; Lunch; Renal Oral Supplement    Anthropometric Measures:  Height: 5' 10\" (177.8 cm)  Ideal Body Weight (IBW): 166 lbs (75 kg)    Admission Body Weight: 138 lb 7.2 oz (62.8 kg) (1/16/23 ascites, hemodialysis)  Current Body Weight: 129 lb 12.8 oz (58.9 kg) (1/21/23 ascites/hemodialysis),     Current BMI (kg/m2): 18.6  Usual Body Weight:  (~ 155# per pt. Per EMr: 9/6/22: 135#, 11/8/22: 130#3oz)                       BMI Categories: Normal Weight (BMI 18.5-24. 9)    Estimated Daily Nutrient Needs:  Energy Requirements Based On: Kcal/kg  Weight Used for Energy Requirements:  (59 kg)  Energy (kcal/day): ~ 9070-8414 (30-35 kcals/kg/day)  Weight Used for Protein Requirements:  (59 kg)  Protein (g/day): ~ 89 grams or more (1.5 grams/kg/day)     Fluid (ml/day): 2000 ml fluid restriction per Provider    Nutrition Diagnosis:   Severe malnutrition, In context of chronic illness related to inadequate protein-energy intake as evidenced by Criteria as identified in malnutrition assessment    Nutrition Interventions:   Food and/or Nutrient Delivery: Continue Current Diet, Continue Oral Nutrition Supplement  Nutrition Education/Counseling: Education initiated (Encouraged small frequent meals, good protein sources, and ONS use. Reinforced low sodium diet and fluid restriction to assist with ascites, CHF, and kidney disease.)  Coordination of Nutrition Care: Continue to monitor while inpatient       Goals:     Goals: PO intake 75% or greater, by next RD assessment       Nutrition Monitoring and Evaluation:      Food/Nutrient Intake Outcomes: Food and Nutrient Intake, Supplement Intake, Vitamin/Mineral Intake  Physical Signs/Symptoms Outcomes: Biochemical Data, GI Status, Fluid Status or Edema, Nutrition Focused Physical Findings, Weight, Skin    Discharge Planning:     Too soon to determine     Daivandana Malhotra, RD, LD  Contact: [phone]

## 2023-01-22 LAB
ANCA AB PATTERN SER IF-IMP: NORMAL
ANCA IGG TITR SER IF: NORMAL {TITER}

## 2023-01-22 NOTE — CARE COORDINATION
1/22/23, 1:45 PM EST    Patient goals/plan/ treatment preferences discussed by  and . Patient goals/plan/ treatment preferences reviewed with patient/ family. Patient/ family verbalize understanding of discharge plan and are in agreement with goal/plan/treatment preferences. Understanding was demonstrated using the teach back method. AVS provided by RN at time of discharge, which includes all necessary medical information pertaining to the patients current course of illness, treatment, post-discharge goals of care, and treatment preferences.      Services At/After Discharge: None          Insurance authorization needed for [employer].

## 2023-01-23 LAB
ALKALINE PHOSPHATASE ISOENZYMES: NORMAL
BACTERIA SPEC ANAEROBE CULT: NORMAL
BACTERIA SPEC BFLD CULT: NORMAL
GRAM STN SPEC: NORMAL
MITOCHONDRIAL M2 AB, IGG: < 0.5 U/ML (ref 0–4)
SMOOTH MUSCLE IGG TITR SER: NORMAL {TITER}

## 2023-01-24 ENCOUNTER — HOSPITAL ENCOUNTER (OUTPATIENT)
Dept: INFUSION THERAPY | Age: 63
Discharge: HOME | End: 2023-01-24
Payer: COMMERCIAL

## 2023-01-24 ENCOUNTER — OFFICE VISIT (OUTPATIENT)
Dept: ONCOLOGY | Age: 63
End: 2023-01-24
Payer: COMMERCIAL

## 2023-01-24 VITALS
WEIGHT: 132.2 LBS | RESPIRATION RATE: 20 BRPM | HEIGHT: 70 IN | OXYGEN SATURATION: 95 % | HEART RATE: 71 BPM | TEMPERATURE: 97.4 F | BODY MASS INDEX: 18.92 KG/M2

## 2023-01-24 VITALS
TEMPERATURE: 97.4 F | OXYGEN SATURATION: 95 % | WEIGHT: 132.2 LBS | SYSTOLIC BLOOD PRESSURE: 85 MMHG | HEIGHT: 70 IN | DIASTOLIC BLOOD PRESSURE: 57 MMHG | HEART RATE: 66 BPM | RESPIRATION RATE: 20 BRPM | BODY MASS INDEX: 18.92 KG/M2

## 2023-01-24 DIAGNOSIS — E85.81 LIGHT CHAIN (AL) AMYLOIDOSIS (HCC): Primary | ICD-10-CM

## 2023-01-24 DIAGNOSIS — E85.9 AMYLOIDOSIS, UNSPECIFIED TYPE (HCC): Primary | ICD-10-CM

## 2023-01-24 DIAGNOSIS — E85.81 LIGHT CHAIN (AL) AMYLOIDOSIS (HCC): ICD-10-CM

## 2023-01-24 DIAGNOSIS — E85.9 AMYLOIDOSIS, UNSPECIFIED TYPE (HCC): ICD-10-CM

## 2023-01-24 LAB
ALBUMIN SERPL BCG-MCNC: 2.8 G/DL (ref 3.5–5.1)
ALP SERPL-CCNC: 1698 U/L (ref 38–126)
ALT SERPL W/O P-5'-P-CCNC: 62 U/L (ref 11–66)
ANISOCYTOSIS BLD QL SMEAR: PRESENT
ANTIBODY SCREEN: NORMAL
AST SERPL-CCNC: 48 U/L (ref 5–40)
BASOPHILS ABSOLUTE: 0 THOU/MM3 (ref 0–0.1)
BASOPHILS NFR BLD AUTO: 0.5 %
BILIRUB CONJ SERPL-MCNC: 1.1 MG/DL (ref 0–0.3)
BILIRUB SERPL-MCNC: 1.6 MG/DL (ref 0.3–1.2)
BUN BLDP-MCNC: 25 MG/DL (ref 8–26)
CALCIUM SERPL-MCNC: 7.7 MG/DL (ref 8.5–10.5)
CHLORIDE BLD-SCNC: 105 MEQ/L (ref 98–109)
CREAT BLD-MCNC: 4.5 MG/DL (ref 0.5–1.2)
DEPRECATED RDW RBC AUTO: 72.6 FL (ref 35–45)
EOSINOPHIL NFR BLD AUTO: 0.9 %
EOSINOPHILS ABSOLUTE: 0.1 THOU/MM3 (ref 0–0.4)
ERYTHROCYTE [DISTWIDTH] IN BLOOD BY AUTOMATED COUNT: 21.4 % (ref 11.5–14.5)
GFR SERPL CREATININE-BSD FRML MDRD: 14 ML/MIN/1.73M2
GLUCOSE BLD-MCNC: 64 MG/DL (ref 70–108)
HBV SURFACE AB SER QL IA: NEGATIVE
HBV SURFACE AG SERPL QL IA: NEGATIVE
HCT VFR BLD AUTO: 34 % (ref 42–52)
HGB BLD-MCNC: 11.3 GM/DL (ref 14–18)
IMM GRANULOCYTES # BLD AUTO: 0.03 THOU/MM3 (ref 0–0.07)
IMM GRANULOCYTES NFR BLD AUTO: 0.4 %
IONIZED CALCIUM, WHOLE BLOOD: 1.05 MMOL/L (ref 1.12–1.32)
LYMPHOCYTES ABSOLUTE: 1.7 THOU/MM3 (ref 1–4.8)
LYMPHOCYTES NFR BLD AUTO: 21.3 %
MCH RBC QN AUTO: 31.7 PG (ref 26–33)
MCHC RBC AUTO-ENTMCNC: 33.2 GM/DL (ref 32.2–35.5)
MCV RBC AUTO: 95.2 FL (ref 80–94)
MONOCYTES ABSOLUTE: 0.6 THOU/MM3 (ref 0.4–1.3)
MONOCYTES NFR BLD AUTO: 7.2 %
NEUTROPHILS NFR BLD AUTO: 69.7 %
NRBC BLD AUTO-RTO: 0 /100 WBC
PLATELET # BLD AUTO: 158 THOU/MM3 (ref 130–400)
PMV BLD AUTO: ABNORMAL FL (ref 9.4–12.4)
POTASSIUM BLD-SCNC: 3.4 MEQ/L (ref 3.5–4.9)
PROT SERPL-MCNC: 5.3 G/DL (ref 6.1–8)
RBC # BLD AUTO: 3.57 MILL/MM3 (ref 4.7–6.1)
REASON FOR REJECTION: NORMAL
REJECTED TEST: NORMAL
SEGMENTED NEUTROPHILS ABSOLUTE COUNT: 5.7 THOU/MM3 (ref 1.8–7.7)
SODIUM BLD-SCNC: 140 MEQ/L (ref 138–146)
TOTAL CO2, WHOLE BLOOD: 24 MEQ/L (ref 23–33)
WBC # BLD AUTO: 8.2 THOU/MM3 (ref 4.8–10.8)

## 2023-01-24 PROCEDURE — 80076 HEPATIC FUNCTION PANEL: CPT

## 2023-01-24 PROCEDURE — 87340 HEPATITIS B SURFACE AG IA: CPT

## 2023-01-24 PROCEDURE — 86850 RBC ANTIBODY SCREEN: CPT

## 2023-01-24 PROCEDURE — 85025 COMPLETE CBC W/AUTO DIFF WBC: CPT

## 2023-01-24 PROCEDURE — 96401 CHEMO ANTI-NEOPL SQ/IM: CPT

## 2023-01-24 PROCEDURE — 99211 OFF/OP EST MAY X REQ PHY/QHP: CPT

## 2023-01-24 PROCEDURE — 86704 HEP B CORE ANTIBODY TOTAL: CPT

## 2023-01-24 PROCEDURE — 6370000000 HC RX 637 (ALT 250 FOR IP): Performed by: INTERNAL MEDICINE

## 2023-01-24 PROCEDURE — 96367 TX/PROPH/DG ADDL SEQ IV INF: CPT

## 2023-01-24 PROCEDURE — 99214 OFFICE O/P EST MOD 30 MIN: CPT | Performed by: INTERNAL MEDICINE

## 2023-01-24 PROCEDURE — 96413 CHEMO IV INFUSION 1 HR: CPT

## 2023-01-24 PROCEDURE — 82310 ASSAY OF CALCIUM: CPT

## 2023-01-24 PROCEDURE — 86706 HEP B SURFACE ANTIBODY: CPT

## 2023-01-24 PROCEDURE — 80047 BASIC METABLC PNL IONIZED CA: CPT

## 2023-01-24 PROCEDURE — 96361 HYDRATE IV INFUSION ADD-ON: CPT

## 2023-01-24 PROCEDURE — A4216 STERILE WATER/SALINE, 10 ML: HCPCS | Performed by: INTERNAL MEDICINE

## 2023-01-24 PROCEDURE — 2580000003 HC RX 258: Performed by: INTERNAL MEDICINE

## 2023-01-24 PROCEDURE — 6360000002 HC RX W HCPCS: Performed by: INTERNAL MEDICINE

## 2023-01-24 RX ORDER — ACETAMINOPHEN 325 MG/1
650 TABLET ORAL ONCE
Status: COMPLETED | OUTPATIENT
Start: 2023-01-24 | End: 2023-01-24

## 2023-01-24 RX ORDER — FAMOTIDINE 20 MG/1
20 TABLET, FILM COATED ORAL ONCE
Status: COMPLETED | OUTPATIENT
Start: 2023-01-24 | End: 2023-01-24

## 2023-01-24 RX ORDER — ONDANSETRON 4 MG/1
8 TABLET, ORALLY DISINTEGRATING ORAL
Status: COMPLETED | OUTPATIENT
Start: 2023-01-24 | End: 2023-01-24

## 2023-01-24 RX ORDER — SODIUM CHLORIDE 9 MG/ML
INJECTION, SOLUTION INTRAVENOUS ONCE
Status: COMPLETED | OUTPATIENT
Start: 2023-01-24 | End: 2023-01-24

## 2023-01-24 RX ORDER — DIPHENHYDRAMINE HCL 25 MG
25 TABLET ORAL ONCE
Status: COMPLETED | OUTPATIENT
Start: 2023-01-24 | End: 2023-01-24

## 2023-01-24 RX ORDER — LOPERAMIDE HYDROCHLORIDE 2 MG/1
4 CAPSULE ORAL ONCE
Status: COMPLETED | OUTPATIENT
Start: 2023-01-24 | End: 2023-01-24

## 2023-01-24 RX ORDER — MONTELUKAST SODIUM 10 MG/1
10 TABLET ORAL ONCE
Status: COMPLETED | OUTPATIENT
Start: 2023-01-24 | End: 2023-01-24

## 2023-01-24 RX ADMIN — CALCIUM GLUCONATE 1000 MG: 98 INJECTION, SOLUTION INTRAVENOUS at 12:55

## 2023-01-24 RX ADMIN — ONDANSETRON 8 MG: 4 TABLET, ORALLY DISINTEGRATING ORAL at 11:02

## 2023-01-24 RX ADMIN — LOPERAMIDE HYDROCHLORIDE 4 MG: 2 CAPSULE ORAL at 12:17

## 2023-01-24 RX ADMIN — DEXAMETHASONE SODIUM PHOSPHATE 20 MG: 4 INJECTION, SOLUTION INTRAMUSCULAR; INTRAVENOUS at 09:16

## 2023-01-24 RX ADMIN — DIPHENHYDRAMINE HYDROCHLORIDE 25 MG: 25 TABLET ORAL at 11:02

## 2023-01-24 RX ADMIN — DARATUMUMAB AND HYALURONIDASE-FIHJ (HUMAN RECOMBINANT) 1800 MG: 1800; 30000 INJECTION SUBCUTANEOUS at 11:35

## 2023-01-24 RX ADMIN — MONTELUKAST SODIUM 10 MG: 10 TABLET ORAL at 11:02

## 2023-01-24 RX ADMIN — ACETAMINOPHEN 650 MG: 325 TABLET ORAL at 11:02

## 2023-01-24 RX ADMIN — SODIUM CHLORIDE 2.25 MG: 9 INJECTION INTRAMUSCULAR; INTRAVENOUS; SUBCUTANEOUS at 14:18

## 2023-01-24 RX ADMIN — CYCLOPHOSPHAMIDE 250 MG: 200 INJECTION, SOLUTION INTRAVENOUS at 12:16

## 2023-01-24 RX ADMIN — FAMOTIDINE 20 MG: 20 TABLET, FILM COATED ORAL at 11:02

## 2023-01-24 RX ADMIN — SODIUM CHLORIDE: 9 INJECTION, SOLUTION INTRAVENOUS at 09:13

## 2023-01-24 NOTE — PROGRESS NOTES
[facility]  [facility]  [facility] [address] [facility]  [address] [address]  Dept: [phone]  Dept Fax: [phone]: [phone]     Encounter Date: 1/24/2023     Primary Provider: Vernell Luis DO     HPI:  Sandra Sampson is a very pleasant 63 y.o. male seen in continued follow-up for amyloidosis. His major issue is fatigue. He has been in and out of hospitals due to low BP. Now on midodrine. He reports abd distention. He has had paracentesis x 3 since 12/23/23    Appetite is affected by abd distention       Oncologic history  Further work-up confirmed proteinuria for which he underwent kidney biopsy. Kidney biopsy 10/26/2022 confirmed AL amyloidosis lambda light chain restriction. He was then referred to hematology for further evaluation. Labs done on 11/8/22: IgG 436, igM 1185, igE 53, IgA 288, B2 microglobulin 10.5 , M protein 0.83kappa 56, lambda 251, K/L ratio 0.23,  Bone marrow biopsy showed 10% PCs. FISH positive for MYD88    Review of Systems  Constitutional: Negative. HENT: Negative. Eyes: Negative. Respiratory: Negative. Cardiovascular: Negative. Gastrointestinal: Negative. Genitourinary: Negative. Musculoskeletal: Negative. Skin: Negative. Neurological: Negative. Hematological: Negative. Psychiatric/Behavioral: Negative. Past Medical History   has a past medical history of Bilateral inguinal hernia, CKD (chronic kidney disease) stage 3, GFR 30-59 ml/min ([facility]), Hypoalbuminemia, and Monoclonal gammopathy. Surgical History   has a past surgical history that includes Hernia repair; knee surgery (Left); Appendectomy; Colonoscopy; Tonsillectomy; CT BIOPSY RENAL (10/26/2022); CT BIOPSY RENAL (10/26/2022); and CT BIOPSY BONE MARROW (11/16/2022).     Home Medications  has a current medication list which includes the following prescription(s): midodrine, [START ON 1/27/2023] vitamin d (ergocalciferol), sodium bicarbonate, and multiple vitamin, and the following Facility-Administered Medications: dexamethasone (DECADRON) 20 mg in sodium chloride 0.9 % 50 mL IVPB and sodium chloride. Allergies  No Known Allergies    Social History   reports that he has been smoking cigarettes. He has a 20.00 pack-year smoking history. He has never used smokeless tobacco. He reports that he does not currently use alcohol. He reports that he does not use drugs. Family History  family history includes Cancer in his father; Diabetes in his paternal grandmother; Hypertension in his father and mother; Pancreatic Cancer in his brother. Labs: Reviewed    Physical Exam  Vitals:    01/24/23 0830   Pulse: 71   Resp: 20   Temp: 97.4 °F (36.3 °C)   SpO2: 95%      General: Non-ill appearing. HEENT: NC/AT,nonicteric, perrla,eom intact, no mucosal lesions  Neck: normal thyroid, no masses  Nodes: No adenopathy  Lungs/chest: clear, no rales,rhonchi or wheezing, lung bases clear  CV: rrr, no rubs ,gallops or murmurs  Abdomen: soft, non-tender,bowel sounds normal , no palpable hepatosplenomegaly  Back: normal curvature, No midline tenderness. flanks nontender  : Not Examined  Extremities: no cyanosis,clubbing or edema. Skin: unremarkable  Neuro: A and O x 4, CN exam nonfocal, Motor- no deficits, Sensory- no deficits, gait-nl, speech- fluent, no ataxia. Assessment/Plan:   Light chain (AL) amyloidosis (HCC) confirmed on Kidney biopsy 10/26/2022   Labs done on 11/8/22: IgG 436, igM 1185, igE 53, IgA 288, B2 microglobulin 10.5 , M protein 0.83kappa 56, lambda 251, K/L ratio 0.23,  Bone marrow biopsy showed 10% PCs. FISH positive for MYD88     Patient seen at [facility] 12/7/2022. Recommendations reviewed    Unfortunately patient has not been able to start treatment due to hypotension. He is on midodrine now. Systolic BP ranges 22-65.   Diastolic BP ~87    Today despite borderline blood pressure he feels well. No dizziness, vision changes. He is requesting a paracentesis    Patient Instructions   500 cc IVF. Change dex to IV. 50% dose reduce cytoxan due to CrCl  Proceed with treatment  Paracentesis weekly. Limit to 2l. Give albumin infusions with fluid removal  RTC with next treatment     Katie Noyola MD  1/24/2023      **This report has been created using voice recognition software. It may contain minor errors which are inherent in voice recognition technology. **

## 2023-01-24 NOTE — PROGRESS NOTES
Patient assessed for the following post chemotherapy:    Dizziness   No  Lightheadedness  No      Acute nausea/vomiting No  Headache   No  Chest pain/pressure  No  Rash/itching   No  Shortness of breath  No    Patient kept for 3 hours observation post chemotherapy. Refused final hour of observation due to appointment to get Life Vest  Patient tolerated chemotherapy treatment darzalex velcade and cytoxan without any complications. Last vital signs:   BP (!) 82/58   Pulse 65   Temp 97.4 °F (36.3 °C) (Oral)   Resp 20   Ht 5' 10\" (1.778 m)   Wt 132 lb 3.2 oz (60 kg)   SpO2 95%   BMI 18.97 kg/m²         Patient instructed if experience any of the above symptoms following today's infusion,he/she is to notify MD immediately or go to the emergency department. Discharge instructions given to patient. Verbalizes understanding. Ambulated off unit per self with belongings.

## 2023-01-24 NOTE — DISCHARGE INSTRUCTIONS
Please contact your Oncologist if you have any questions regarding the chemotherapy darzalex, velcade and cytoxan that you received today. Patient instructed if experience any of the symptoms following today's chemotherapy / to notify MD immediately or go to emergency department.     * dizziness/lightheadedness  *acute nausea/vomiting - not relieved with medication  *headache - not relieved from Tylenol/pain medication  *chest pain/pressure  *rash/itching  *shortness of breath        Drink fluids - 48oz fluids daily  Call if develop fever/ chills/ signs or symptoms of infection

## 2023-01-24 NOTE — PROGRESS NOTES
After Visit Summary   8/28/2017    Vikki Medina    MRN: [id number]           Patient Information     Date Of Birth          1965        Visit Information        Provider Department      8/28/2017 2:20 PM Edenilson Hayes PA-C [facility]        Today's Diagnoses     Type 2 diabetes mellitus without complication, without long-term current use of insulin (H)    -  1    Morbid obesity, unspecified obesity type (H)        Essential hypertension        Smoker        External hemorrhoids        Screening for diabetic retinopathy        Screening for diabetic peripheral neuropathy           Follow-ups after your visit        Additional Services     DIABETES EDUCATOR REFERRAL       DIABETES SELF MANAGEMENT TRAINING (DSMT)      Your provider has referred you to Diabetes Education: [facility]: Diabetes Education - All [facility] [phone]   [email or web address]    Type of training and number of hours: New Diagnosis: Initial group DSMT - 10 hours.      Medicare covers: 10 hours of initial DSMT in 12 month period from the time of first visit, plus 2 hours of follow-up DSMT annually, and additional hours as requested for insulin training.    Diabetes Type: Type 2 - On Oral Medication             Diabetes Co-Morbidities: hypertension               A1C Goal:  <7.0       A1C is: Lab Results       Component                Value               Date                       A1C                      6.6                 08/28/2017              If an urgent visit is needed or A1C is above 12, Care Team to call the Diabetes Education Team at [phone] or send an In Basket message to the Diabetes Education Pool (P DIAB ED-PATIENT CARE).    Diabetes Education Topics: Comprehensive Knowledge Assessment and Instruction and Medication Start: Oral Medication: Type/Dose/Timing: Metformin QHS    Special Educational Needs Requiring Individual DSMT:  Chemotherapy Dose Adjustment Documentation    Day 1 of Cycle 1    Deviation from validated regimen: Decrease Cytoxan by 50 % to 250 mg    Reason for deviation: SCr = 2.8 CrCl ~ 24 ml/min    Summary of any verbal, telephone, or guideline information obtained: Discussed with Dr. Mariposa Adams UpToDate recommends 75 % reduction for CrCl 10-29 ml/min.  She chose reduction to be 50 %    Validated on 1/24/2023 by tm None       MEDICAL NUTRITION THERAPY (MNT) for Diabetes    Medical Nutrition Therapy with a Registered Dietitian can be provided in coordination with Diabetes Self-Management Training to assist in achieving optimal diabetes management.    MNT Type and Hours: Do not initiate MNT at this time.                       Medicare will cover: 3 hours initial MNT in 12 month period after first visit, plus 2 hours of follow-up MNT annually    Please be aware that coverage of these services is subject to the terms and limitations of your health insurance plan.  Call member services at your health plan to determine Diabetes Self-Management Training benefits and ask which blood glucose monitor brands are covered by your plan.      Please bring the following with you to your appointment:    (1)  List of current medications   (2)  List of Blood Glucose Monitor brands that are covered by your insurance plan  (3)  Blood Glucose Monitor and log book  (4)   Food records for the 3 days prior to your visit    The Certified Diabetes Educator may make diabetes medication adjustments per the CDE Protocol and Collaborative Practice Agreement.            GASTROENTEROLOGY ADULT REF CONSULT ONLY       Preferred Location: [facility] [phone]      Please be aware that coverage of these services is subject to the terms and limitations of your health insurance plan.  Call member services at your health plan with any benefit or coverage questions.  Any procedures must be performed at a [place] facility OR coordinated by your clinic's referral office.    Please bring the following with you to your appointment:    (1) Any X-Rays, CTs or MRIs which have been performed.  Contact the facility where they were done to arrange for  prior to your scheduled appointment.    (2) List of current medications   (3) This referral request   (4) Any documents/labs given to you for this referral            OPTOMETRY REFERRAL       Your provider has referred  you to:  ATIYAG:  [facility] [phone]   [email or web address]    Please be aware that coverage of these services is subject to the terms and limitations of your health insurance plan.  Call member services at your health plan with any benefit or coverage questions.      Please bring the following to your appointment:  >>   Any x-rays, CTs or MRIs which have been performed.  Contact the facility where they were done to arrange for  prior to your scheduled appointment.  Any new CT, MRI or other procedures ordered by your specialist must be performed at a [place] facility or coordinated by your clinic's referral office.    >>   List of current medications   >>   This referral request   >>   Any documents/labs given to you for this referral                  Follow-up notes from your care team     Return in about 1 month (around 9/28/2017).      Who to contact     If you have questions or need follow up information about today's clinic visit or your schedule please contact [facility] directly at [phone].  Normal or non-critical lab and imaging results will be communicated to you by MyChart, letter or phone within 4 business days after the clinic has received the results. If you do not hear from us within 7 days, please contact the clinic through [employer] or phone. If you have a critical or abnormal lab result, we will notify you by phone as soon as possible.  Submit refill requests through [employer] or call your pharmacy and they will forward the refill request to us. Please allow 3 business days for your refill to be completed.          Additional Information About Your Visit        [employer] Information     [employer] gives you secure access to your electronic health record. If you see a primary care provider, you can also send messages to your care team and make appointments. If you have questions, please call your primary care clinic.  If you do not have a primary  "care provider, please call [phone] and they will assist you.        Care EveryWhere ID     This is your Care EveryWhere ID. This could be used by other organizations to access your [facility] medical records  [id number]        Your Vitals Were     Pulse Temperature Respirations Height BMI (Body Mass Index)       80 98.8  F (37.1  C) (Oral) 20 5' 1.25\" (1.556 m) 36.17 kg/m2        Blood Pressure from Last 3 Encounters:   08/28/17 (!) 170/106   12/15/16 (!) 160/100   12/19/14 124/84    Weight from Last 3 Encounters:   08/28/17 193 lb (87.5 kg)   12/15/16 184 lb (83.5 kg)   12/19/14 197 lb (89.4 kg)              We Performed the Following     Albumin Random Urine Quantitative with Creat Ratio     CBC with platelets     Comprehensive metabolic panel     DIABETES EDUCATOR REFERRAL     FOOT EXAM  NO CHARGE [03474.114]     GASTROENTEROLOGY ADULT REF CONSULT ONLY     Hemoglobin A1c     JUST IN CASE     OPTOMETRY REFERRAL     TSH WITH FREE T4 REFLEX          Today's Medication Changes          These changes are accurate as of: 8/28/17  3:26 PM.  If you have any questions, ask your nurse or doctor.               Start taking these medicines.        Dose/Directions    aspirin 81 MG tablet   Used for:  Type 2 diabetes mellitus without complication, without long-term current use of insulin (H)   Started by:  Edenilson Hayes PA-C        Dose:  81 mg   Take 1 tablet (81 mg) by mouth daily   Quantity:  90 tablet   Refills:  0       lisinopril 10 MG tablet   Commonly known as:  PRINIVIL/ZESTRIL   Used for:  Essential hypertension   Started by:  Edenilson Hayes PA-C        Dose:  10 mg   Take 1 tablet (10 mg) by mouth daily   Quantity:  30 tablet   Refills:  1       metFORMIN 500 MG tablet   Commonly known as:  GLUCOPHAGE   Used for:  Type 2 diabetes mellitus without complication, without long-term current use of insulin (H)   Started by:  Edenilson Hayes PA-C        Dose:  500 mg   Take 1 tablet (500 mg) by " mouth daily (with dinner)   Quantity:  30 tablet   Refills:  1       * nicotine 14 MG/24HR 24 hr patch   Commonly known as:  NICODERM CQ   Used for:  Smoker   Started by:  Edenilson Hayes PA-C        Dose:  1 patch   Place 1 patch onto the skin every 24 hours   Quantity:  30 patch   Refills:  0       * nicotine 7 MG/24HR 24 hr patch   Commonly known as:  NICODERM CQ   Used for:  Smoker   Started by:  Edenilson Hayes PA-C        Dose:  1 patch   Place 1 patch onto the skin every 24 hours   Quantity:  30 patch   Refills:  0       * Notice:  This list has 2 medication(s) that are the same as other medications prescribed for you. Read the directions carefully, and ask your doctor or other care provider to review them with you.         Where to get your medicines      These medications were sent to [id number] - [place], [place] - [address]  [id number], [facility] [address]     Phone:  [phone]     lisinopril 10 MG tablet    metFORMIN 500 MG tablet    nicotine 14 MG/24HR 24 hr patch    nicotine 7 MG/24HR 24 hr patch         Some of these will need a paper prescription and others can be bought over the counter.  Ask your nurse if you have questions.     You don't need a prescription for these medications     aspirin 81 MG tablet                Primary Care Provider Office Phone # Fax #    Tata Belle [phone]       [id number]  [address]   [facility] [address]        Equal Access to Services     [facility] AH: Hadii aad ku hadasho Soomaali, waaxda luqadaha, qaybta kaalmada adeegyada, waxay daniel hayhollyn ashley reeves la'aan ah. So [facility] [phone].    ATENCIÓN: Si habla español, tiene a nieto disposición servicios gratuitos de asistencia lingüística. Llame al [phone].    We comply with applicable federal civil rights laws and [place] laws. We do not discriminate on the basis of race, color, national origin, age, disability sex, sexual orientation or  gender identity.            Thank you!     Thank you for choosing [facility]  for your care. Our goal is always to provide you with excellent care. Hearing back from our patients is one way we can continue to improve our services. Please take a few minutes to complete the written survey that you may receive in the mail after your visit with us. Thank you!             Your Updated Medication List - Protect others around you: Learn how to safely use, store and throw away your medicines at www.disposemymeds.org.          This list is accurate as of: 8/28/17  3:26 PM.  Always use your most recent med list.                   Brand Name Dispense Instructions for use Diagnosis    aspirin 81 MG tablet     90 tablet    Take 1 tablet (81 mg) by mouth daily    Type 2 diabetes mellitus without complication, without long-term current use of insulin (H)       lisinopril 10 MG tablet    PRINIVIL/ZESTRIL    30 tablet    Take 1 tablet (10 mg) by mouth daily    Essential hypertension       metFORMIN 500 MG tablet    GLUCOPHAGE    30 tablet    Take 1 tablet (500 mg) by mouth daily (with dinner)    Type 2 diabetes mellitus without complication, without long-term current use of insulin (H)       * nicotine 14 MG/24HR 24 hr patch    NICODERM CQ    30 patch    Place 1 patch onto the skin every 24 hours    Smoker       * nicotine 7 MG/24HR 24 hr patch    NICODERM CQ    30 patch    Place 1 patch onto the skin every 24 hours    Smoker       * Notice:  This list has 2 medication(s) that are the same as other medications prescribed for you. Read the directions carefully, and ask your doctor or other care provider to review them with you.

## 2023-01-24 NOTE — PATIENT INSTRUCTIONS
500 cc IVF. Change dex to IV. 50% dose reduce cytoxan due to CrCl  Proceed with treatment  Paracentesis weekly. Limit to 2l.  Give albumin infusions with fluid removal  RTC with next treatment

## 2023-01-24 NOTE — PLAN OF CARE
Care plan reviewed with patient. Patient  verbalized understanding of the plan of care and contribute to goal setting. Problem: Safety - Adult  Goal: Free from fall injury  Outcome: Adequate for Discharge  Flowsheets (Taken 1/24/2023 1237)  Free From Fall Injury:   Instruct family/caregiver on patient safety   Based on caregiver fall risk screen, instruct family/caregiver to ask for assistance with transferring infant if caregiver noted to have fall risk factors  Note: Free from falls while in infusion center. Verbalized understanding of fall prevention and to ask for assistance with ambulation      Problem: Discharge Planning  Goal: Discharge to home or other facility with appropriate resources  Outcome: Adequate for Discharge  Flowsheets (Taken 1/24/2023 1237)  Discharge to home or other facility with appropriate resources:   Identify barriers to discharge with patient and caregiver   Identify discharge learning needs (meds, wound care, etc)   Arrange for needed discharge resources and transportation as appropriate  Note: Verbalized understanding of discharge instructions, follow ups and when to call doctor      Problem: Chronic Conditions and Co-morbidities  Goal: Patient's chronic conditions and co-morbidity symptoms are monitored and maintained or improved  Outcome: Adequate for Discharge  Flowsheets (Taken 1/24/2023 1237)  Care Plan - Patient's Chronic Conditions and Co-Morbidity Symptoms are Monitored and Maintained or Improved:   Monitor and assess patient's chronic conditions and comorbid symptoms for stability, deterioration, or improvement   Collaborate with multidisciplinary team to address chronic and comorbid conditions and prevent exacerbation or deterioration  Note: Patient verbalizes understanding to verbal information given on cytoxan darzalex velcade and calcium replacement,action and possible side effects. Aware to call MD if develop complications.

## 2023-01-25 LAB — HBV CORE IGG+IGM SERPL QL IA: NONREACTIVE

## 2023-01-26 NOTE — ADT AUTH CERT
Liver Disease Complications - Care Day 5 (1/20/2023) by Effie Dan RN       Review Status Review Entered   Completed 1/26/2023 0931       Created By   Effie Dan RN      Criteria Review      Care Day: 5 Care Date: 1/20/2023 Level of Care: Intermediate Care    Guideline Day 2    Clinical Status    (X) * Hypoxemia absent    1/26/2023 9:31 AM EST by Angi Oakley      SPO2 95% 94% 93% RA    ( ) * Hemodynamic stability    1/26/2023 9:31 AM EST by Angi Oakley      HR 86 81 80  BP 83/57 89/51 79/56    (X) * Ascites absent, stable, or improved    1/26/2023 9:31 AM EST by Angi Oakley      Abd: Round, soft, non-tender.    (X) * Mental status at baseline or improved    1/26/2023 9:31 AM EST by Merline Sherry and oriented    Activity    (X) Activity as tolerated    1/26/2023 9:31 AM EST by Angi Oakley      Up with assistance    Routes    ( ) * Clear liquid diet    1/26/2023 9:31 AM EST by [employer] DIET; Regular;  Low Sodium (2 gm); 2000 ml    (X) IV fluids and medication    1/26/2023 9:31 AM EST by Angi Oakley      albumin human 25 % IV solution 25 g  EVERY 30 MIN IV    Interventions    (X) WBC    1/26/2023 9:31 AM EST by Angi Oakley      WBC: 9.2    ( ) Observe for alcohol withdrawal if indicated    1/26/2023 9:31 AM EST by Angi Oakley      None noted    Medications    ( ) Possible diuretics and antibiotics    1/26/2023 9:31 AM EST by Angi Oakley      None noted       Definitions for Care Day 5    Hypoxemia absent    (X) Hypoxemia absent, as indicated by  1 or more  of the following  (1):       (X) Arterial oxygen saturation (SaO2) of 90% or greater or arterial partial pressure of oxygen       (PO2) of 60 mm Hg (8.0 kPa) or greater on room air [A]       Hemodynamic stability    ( ) Hemodynamic stability, as indicated by  1 or more  of the following :       ( ) Patient hemodynamically stable, as indicated by  ALL  of the following  (1) (2) (3) (4) (5):          (X) Hypotension absent       Hypotension absent    (X) Hypotension absent, as indicated by  1 or more  of the following  (1) (2) (3) (4):       (X) SBP greater than or equal to 90 mm Hg and without recent decrease greater than 40 mm Hg from       baseline in adult or child 10 years or older       * Milestone   Additional Notes   DATE: 1/20/2023         PERTINENT UPDATES:   S/P HD 3.5 hour treatment.    + hypotension   + cachectic          VITALS:  TEMP 98.7 (37.1) ORAL RR 19 HR 86  BP 79/56 SPO2 93% RA          ABNL/PERTINENT LABS/RADIOLOGY/DIAGNOSTIC STUDIES:   ECHO    Summary    Ejection fraction is visually estimated at 50%. Mild to moderate concentric left ventricular hypertrophy. Moderately increased right ventricle wall thickness. Small to moderate circumferential pericardial effusion with no tamponade    physiology. Creatinine: 3.9 (HH)   Calcium, Ionized: 1.03 (L)   Est, Glom Filt Rate: 17 ! CALCIUM, SERUM, 087251: 8.3 (L)   Total Protein: 5.3 (L)   Albumin: 3.0 (L)   Alk Phos: 1,759 (H)   ALT: 75 (H)   AST: 59 (H)   Bilirubin: 1.8 (H)   GGT: 373 (H)   Vit D, 25-Hydroxy: 8 (L)   RBC: 3.52 (L)   Hemoglobin Quant: 10.9 (L)   Hematocrit: 32.0 (L)   RDW-CV: 19.3 (H)   RDW-SD: 61.9 (H)   Iron: 32 (L)   TIBC: 108 (L)         PHYSICAL EXAM:   Abd: Round, soft, non-tender. No hepatosplenomegaly or mass present. Active bowel sounds heard. Soft distention noted         MD CONSULTS/ASSESSMENT AND PLAN:   CARDIO   Assessment And Recommendations: The patient's ventricular arrhythmia is most likely related to underlying cardiac sarcoidosis. Monomorphic scar related. He is at risk of sudden cardiac death and has an indication for AICD implantation. However, the patient appears to have multi system involvement from amyloidosis and will consider ICD implantation only if his life expectancy is more than a year.   Unfortunately, he is not a candidate for class Ic or III antiarrhythmic agents. We will try metoprolol tartrate small dose if his hemodynamics have. Patient be discharged home on LifeVest and decision regarding ICD implantation will be made during his outpatient visit with me in the EP clinic after he discusses the overall prognosis and management of his amyloidosis. Discussed the plan with the patient and his daughter. He was in agreement. GASTRO   RUQ demonstrated ascites and a liver normal in echotexture. 1. Acute on chronic hypotension- on Midodrine PTA, Midodrine increased this admission- most likely autonomic dysfunction from amyloidosis, NOT hepatorenal syndrome    2. Recurrent ascites- s/p para negative for SBP, SAAG 1.2, total protein 5.0- cardiac etiology   3. Elevated transaminases- suspect amyloidosis in the liver vs cardiac vs less likely cirrhosis   4. Elevated alk phos- suspect amyloidosis in the liver vs cardiac vs less likely cirrhosis   5. Light chain amyloidosis- follows with oncology, waiting to start chemotherapy   6. 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 Anemia in ESRD- on Retacrit, no GI bleeding noted   8. Hypoalbuminemia   9. Sustained ventricular tachycardia- cardiology consulted   10. 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 Poor prognosis   Plan:     Monitor H & H, transfuse prn   Monitor HFP   No further paracentesis at this time, patient will likely need to be on a weekly paracentesis schedule OP   Continue Midodrine TID   2g sodium diet, add 2000 mL fluid restriction   Daily weights   Strict I & O   Avoid hepatotoxic meds, okay for Tylenol 2g limit   Hepatopathy labs pending   Fractionated alk phos pending   Echocardiogram pending   Hold Heparin for biopsy   Liver biopsy      NEPHROLOGY   Assessment and Plan:   1. ESRD on hemodialysis secondary to renal amyloidosis, biopsy-proven   Patient is doing better overall   Continue dialysis Mondays, Wednesdays and Fridays   For hemodialysis treatment today   2.   Anemia in ESRD.  On Retacrit   3. Chronic hypotension. Patient with underlying amyloidosis. Cannot rule out autonomic dysfunction from amyloidosis itself. Continue with midodrine. IV albumin if needed   4. AL amyloidosis   5. Renal osteodystrophy/hyperphosphatemia. 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   Ef 55-60 with grade 1 DDfx per TTE    Mod LV thickness, mod concentric LVH   Acute on chronic hypotension - on midodrine   Recurrent ascites - s/p paracentesis   Elevated LFTs - GI following   ESRD - on HD - secondary to renal amyloidosis, biopsy proven   Light chain amyloidosis - follows oncology outpt - waiting to start chemo   Plan:   Keep mag >2 and K >4, replace per renal recs   Arrhythmia likely secondary LVH   Unable to add BB due to hypotension   EP consult placed and sindy notified          ORDERS:   PT eval and treat    OT eval and treat          PT/OT/SLP/CM ASSESSMENT OR NOTES:   CM   Barriers to Discharge: Nephrology following for known HD, Cardiology, EP, GI consults, Midodrine, Protonix. Liver Disease Complications - Care Day 3 (1/18/2023) by Angel Marcos RN       Review Status Review Entered   Completed 1/26/2023 0903       Created By   Angel Marcos RN      Criteria Review      Care Day: 3 Care Date: 1/18/2023 Level of Care: Intermediate Care    Guideline Day 2    Clinical Status    (X) * Hypoxemia absent    1/26/2023 9:03 AM EST by Pink Ringer      SPO2 92% 91% 90% RA    ( ) * Hemodynamic stability    1/26/2023 9:03 AM EST by Freeland Ringer      HR 83 81 80  BP 96/58 76/55 70/50    ( ) * Ascites absent, stable, or improved    1/26/2023 9:03 AM EST by Pink Ringer      severely distended with positive fluid shift    (X) * Mental status at baseline or improved    1/26/2023 9:03 AM EST by Amadeo Escort and oriented x3.     Activity    (X) Activity as tolerated    1/26/2023 9:03 AM EST by Teresa Villafana Flori      Up with assistance    Routes    ( ) * Clear liquid diet    1/26/2023 9:03 AM EST by Moshe [address] [place] [address]; Lunch; Renal Oral Supplement    (X) IV fluids and medication    1/26/2023 9:03 AM EST by Matias Marshall      albumin human 25 % IV solution 25 g ONCE IV  albumin human 25 % IV solution 25 g ONCE IV  calcium gluconate 2000mg in sodium chloride 100mL EVERY 12 HOURS IV    Interventions    (X) WBC    1/26/2023 9:03 AM EST by Matias Marshall      WBC: 9.2    ( ) Observe for alcohol withdrawal if indicated    1/26/2023 9:03 AM EST by Matias Marshall      None noted    Medications    (X) Possible diuretics and antibiotics    1/26/2023 9:03 AM EST by Matias Marshall      (ROCEPHIN) 1,000 mg in dextrose 5 % 50 mL EVERY 24 HOURS IV       Definitions for Care Day 3    Hypoxemia absent    (X) Hypoxemia absent, as indicated by  1 or more  of the following  (1):       (X) Arterial oxygen saturation (SaO2) of 90% or greater or arterial partial pressure of oxygen       (PO2) of 60 mm Hg (8.0 kPa) or greater on room air [A]       Hemodynamic stability    ( ) Hemodynamic stability, as indicated by  1 or more  of the following :       ( ) Patient hemodynamically stable, as indicated by  ALL  of the following  (1) (2) (3) (4) (5):          (X) Tachycardia absent       Tachycardia absent    (X) Tachycardia absent, as indicated by  1 or more  of the following  (1) (2):       (X) Heart rate less than or equal to 100 beats per minute in adult or child 6 years or older       * Milestone   Additional Notes   DATE: 1/18/2023         PERTINENT UPDATES:   Remains on IV antibiotics   S/P PARACENTESIS with dark yellow ascites, 4.5 L   + Tachypnea 26 25 24   With episode of hypotension         VITALS: TEMP 98 (36.7) ORAL RR 26 HR 83  BP 70/50 SPO2 90% RA          ABNL/PERTINENT LABS/RADIOLOGY/DIAGNOSTIC STUDIES:   Chloride: 97 (L)   BUN,BUNPL: 33 (H)   Creatinine: 5.0 (HH)   Calcium, Ionized: 0.86 (L)   Est, Glom Filt Rate: 12 ! CALCIUM, SERUM, 736242: 7.8 (L)   Magnesium: 2.7 (H)   Total Protein: 5.0 (L)   Albumin: 2.9 (L)   Alk Phos: 1,877 (H)   ALT: 93 (H)   AST: 82 (H)   Total Protein: 5.0 (L)   RBC: 3.21 (L)   Hemoglobin Quant: 9.9 (L)   Hematocrit: 29.2 (L)   RDW-CV: 18.8 (H)   RDW-SD: 60.4 (H)         PHYSICAL EXAM:   Abdomen: Taunt, mildly TTP, severely distended with positive fluid shift. Now s/p paracentesis. MD CONSULTS/ASSESSMENT AND PLAN:   CRITICAL CARE   Assessment and Plan:     1. Hypotension: MAP 50s prior to transfer, home midodrine increased from 10 to 15 TID prior to transfer to our care. Presumed to be multifactorial secondary to amyloidosis, renal failure, cannot rule out hepatorenal syndrome. Chronically on midodrine which is resumed. Increased to 15 mg TID prior to arrival at outside facility. 2. Systemic Amyloidosis with Light Chain Disease: Follows outpatient, reports that he was supposed to start treatments but due to hypotension, oncologist did not start. Was sent to GI for cirrhosis workup. Concerns for hepatorenal now. Has significant disease, has been having episodes of Vtach throughout stay, one as long as 20 seconds. EKG with extremely low voltage. Unfortunately a poor prognosis    3. ESRD on HD: Secondary to light chain amyloidosis, recently exacerbated with hypoperfusion, and possible hepatorenal syndrome. Gets dialysis prn per nephrology who have been consulted. 4. Normocytic Anemia: Secondary to ACD. Hgb baseline, continue to monitor with daily CBC. 5. HAGMA Lactic Acidosis: LA 2.2, in setting of hypotension and ESRD. Increase perfusion with midodrine. Nephro on board to dialyze   6. Hypocalcemia: Repleting. 7. Decompensated Liver Disease, Undifferentiated: Presented with diffuse ascites, hypoalbuminemia, transaminitis, ALP >2000. Following outpatient with [facility] for workup, some concerns for hepatorenal syndrome at outside facility. Chronically on midodrine. Dose was just increased from 10 mg TID to 15 mg TID at outside facility prior to transfer to our care. Paracentesis with labs performed at bedside with 4.5 L removed, albumin repletement, low dose aldactone. NEPHROLOGY   Assessment and Plan:   1. ESRD on hemodialysis secondary to renal amyloidosis, biopsy-proven   Continue dialysis MWF   Plan hemodialysis treatment today with ultrafiltration as tolerated   2. Anemia in ESRD. Add Retacrit   3. Chronic hypotension. Patient with underlying amyloidosis. Cannot rule out autonomic dysfunction from amyloidosis itself. Continue with midodrine. We will give IV albumin before dialysis today. Stop Aldactone   4. AL amyloidosis   5. Renal osteodystrophy/hyperphosphatemia. Will monitor      CRITICAL CARE PROCEDURE NOTE   PROCEDURE:  After informed consent was obtained, and area was marked for procedure, patient was prepped with chlorhexidine prep and draped with sterile or towels. Maintaining sterile technique with sterile gloves, patient received lidocaine locally for anesthesia. A small incision was made in the skin. Utilizing a number 8 Arabic catheter was advanced into the peritoneal cavity as noted by fluid return. Catheter was threaded over introducer needle. Introducer needle was removed leaving the catheter in place. Fluid was removed until no more flow occurred. Catheter was removed and Band-Aid was placed at the insertion site.           MEDICATIONS:   (RETACRIT) injection 3,000 Units Once per day on Mon Wed Fri SC   (RETACRIT) injection 2,000 Units Once per day on Mon Wed Fri SC      (LEVOPHED) 16 mg in dextrose 5% 250 mL 0.9-93.8 mL/hr CONTINUOUS IV   (TYLENOL) tablet 650 mg EVERY 6 HOURS PRN PO x2         ORDERS:   Transfer patient STRZ ICU STEPDOWN TELEMETRY 4K   Intake and output   Daily weights          PT/OT/SLP/CM ASSESSMENT OR NOTES:   CMBarriers to Discharge: HD today cancelled d/t patient having runs of sustained VTach prior to initiating dialysis.  Plan for HD tomorrow

## 2023-01-27 NOTE — TELEPHONE ENCOUNTER
Follow up phone call after chemotherapy, patient denies fever, chills or pain. Has all prescriptions that are needed. Eating and drinking adequately. Denies constipation or diarrhea. Will call if any further questions.

## 2023-01-31 ENCOUNTER — HOSPITAL ENCOUNTER (OUTPATIENT)
Dept: INFUSION THERAPY | Age: 63
Discharge: HOME | End: 2023-01-31
Payer: COMMERCIAL

## 2023-01-31 ENCOUNTER — OFFICE VISIT (OUTPATIENT)
Dept: ONCOLOGY | Age: 63
End: 2023-01-31
Payer: COMMERCIAL

## 2023-01-31 VITALS
DIASTOLIC BLOOD PRESSURE: 52 MMHG | SYSTOLIC BLOOD PRESSURE: 91 MMHG | RESPIRATION RATE: 20 BRPM | TEMPERATURE: 97.4 F | HEART RATE: 70 BPM | WEIGHT: 133.8 LBS | HEIGHT: 70 IN | OXYGEN SATURATION: 93 % | BODY MASS INDEX: 19.15 KG/M2

## 2023-01-31 VITALS
HEART RATE: 68 BPM | SYSTOLIC BLOOD PRESSURE: 96 MMHG | DIASTOLIC BLOOD PRESSURE: 63 MMHG | TEMPERATURE: 97.5 F | RESPIRATION RATE: 18 BRPM | OXYGEN SATURATION: 98 %

## 2023-01-31 DIAGNOSIS — R19.7 DIARRHEA, UNSPECIFIED TYPE: ICD-10-CM

## 2023-01-31 DIAGNOSIS — E85.81 LIGHT CHAIN (AL) AMYLOIDOSIS (HCC): Primary | ICD-10-CM

## 2023-01-31 DIAGNOSIS — R18.8 OTHER ASCITES: ICD-10-CM

## 2023-01-31 DIAGNOSIS — E85.9 AMYLOIDOSIS, UNSPECIFIED TYPE (HCC): Primary | ICD-10-CM

## 2023-01-31 DIAGNOSIS — E85.81 LIGHT CHAIN (AL) AMYLOIDOSIS (HCC): ICD-10-CM

## 2023-01-31 PROCEDURE — 6360000002 HC RX W HCPCS: Performed by: INTERNAL MEDICINE

## 2023-01-31 PROCEDURE — 2580000003 HC RX 258: Performed by: INTERNAL MEDICINE

## 2023-01-31 PROCEDURE — 96413 CHEMO IV INFUSION 1 HR: CPT

## 2023-01-31 PROCEDURE — 6370000000 HC RX 637 (ALT 250 FOR IP): Performed by: INTERNAL MEDICINE

## 2023-01-31 PROCEDURE — A4216 STERILE WATER/SALINE, 10 ML: HCPCS | Performed by: INTERNAL MEDICINE

## 2023-01-31 PROCEDURE — 99213 OFFICE O/P EST LOW 20 MIN: CPT | Performed by: NURSE PRACTITIONER

## 2023-01-31 PROCEDURE — 96401 CHEMO ANTI-NEOPL SQ/IM: CPT

## 2023-01-31 PROCEDURE — 99211 OFF/OP EST MAY X REQ PHY/QHP: CPT

## 2023-01-31 RX ORDER — ONDANSETRON 4 MG/1
8 TABLET, ORALLY DISINTEGRATING ORAL
Status: COMPLETED | OUTPATIENT
Start: 2023-01-31 | End: 2023-01-31

## 2023-01-31 RX ORDER — SODIUM CHLORIDE 9 MG/ML
5-250 INJECTION, SOLUTION INTRAVENOUS PRN
Status: DISCONTINUED | OUTPATIENT
Start: 2023-01-31 | End: 2023-02-01 | Stop reason: HOSPADM

## 2023-01-31 RX ORDER — SODIUM CHLORIDE 9 MG/ML
5-40 INJECTION INTRAVENOUS PRN
Status: DISCONTINUED | OUTPATIENT
Start: 2023-01-31 | End: 2023-02-01 | Stop reason: HOSPADM

## 2023-01-31 RX ORDER — ACETAMINOPHEN 325 MG/1
650 TABLET ORAL ONCE
Status: COMPLETED | OUTPATIENT
Start: 2023-01-31 | End: 2023-01-31

## 2023-01-31 RX ORDER — DIPHENHYDRAMINE HCL 25 MG
25 TABLET ORAL ONCE
Status: COMPLETED | OUTPATIENT
Start: 2023-01-31 | End: 2023-01-31

## 2023-01-31 RX ORDER — DEXAMETHASONE 4 MG/1
20 TABLET ORAL ONCE
Status: COMPLETED | OUTPATIENT
Start: 2023-01-31 | End: 2023-01-31

## 2023-01-31 RX ORDER — LOPERAMIDE HYDROCHLORIDE 2 MG/1
2 CAPSULE ORAL PRN
Qty: 180 CAPSULE | Refills: 1 | Status: SHIPPED | OUTPATIENT
Start: 2023-01-31 | End: 2023-05-01

## 2023-01-31 RX ADMIN — DEXAMETHASONE 20 MG: 4 TABLET ORAL at 10:57

## 2023-01-31 RX ADMIN — CYCLOPHOSPHAMIDE 250 MG: 200 INJECTION, SOLUTION INTRAVENOUS at 12:42

## 2023-01-31 RX ADMIN — ONDANSETRON 8 MG: 4 TABLET, ORALLY DISINTEGRATING ORAL at 10:57

## 2023-01-31 RX ADMIN — ACETAMINOPHEN 650 MG: 325 TABLET ORAL at 10:57

## 2023-01-31 RX ADMIN — SODIUM CHLORIDE 2.25 MG: 9 INJECTION INTRAMUSCULAR; INTRAVENOUS; SUBCUTANEOUS at 11:34

## 2023-01-31 RX ADMIN — DARATUMUMAB AND HYALURONIDASE-FIHJ (HUMAN RECOMBINANT) 1800 MG: 1800; 30000 INJECTION SUBCUTANEOUS at 12:29

## 2023-01-31 RX ADMIN — DIPHENHYDRAMINE HYDROCHLORIDE 25 MG: 25 TABLET ORAL at 10:57

## 2023-01-31 RX ADMIN — SODIUM CHLORIDE 25 ML/HR: 9 INJECTION, SOLUTION INTRAVENOUS at 11:36

## 2023-01-31 NOTE — PATIENT INSTRUCTIONS
Labs reviewed OK for treatment  Return to clinic for treatment, pending labs, weekly X 2  Return to clinic for follow in 3 weeks with Dr. Mello Bravo labs prior (send orders with patient)

## 2023-01-31 NOTE — PROGRESS NOTES
Patient assessed for the following post chemotherapy:    Dizziness   No  Lightheadedness  No      Acute nausea/vomiting No  Headache   No  Chest pain/pressure  No  Rash/itching   No  Shortness of breath  No    Patient kept for 20 minutes observation post infusion chemotherapy. Patient tolerated chemotherapy treatment with darzalex cytoxin and velcade without any complications. Last vital signs:   BP 96/63   Pulse 68   Temp 97.5 °F (36.4 °C) (Oral)   Resp 18   SpO2 98%       Patient instructed if experience any of the above symptoms following today's infusion,he is to notify MD immediately or go to the emergency department. Discharge instructions given to patient. Verbalizes understanding. Off unit via wheelchair accompanied by wife and  with belongings.

## 2023-01-31 NOTE — PROGRESS NOTES
[address] [address]  [address] [address]  Dept: [phone]  Loc: [phone]       Visit Date:1/31/2023     Terri Gayle is a 63 y.o. male who presents today for:   Chief Complaint   Patient presents with    Follow-up     Amyloidosis, unspecified type [facility])        HPI:   Terri Gayle is a 63 y.o. male with amyloidosis with renal involvement. The patient also has a history of HTN, hyperlipidemia and CKD III. He is a current half pack to 1 ppd cigarette smoker. The patient was noted to have no PCP since 2005.      06/30/2022 the patient was referred to Dr. Rocio Da Silva for evaluation and treatment of an inguinal hernia. 07/08/2022 serum creatinine elevated at 1.63 (0.66-1.25) and GFR 45.    07/27/2022 the patient underwent bilateral inguinal herniorrhaphy with mesh plugs. 08/29/2022 urinalysis results revealed 3+ protein and trace blood. 09/06/2022 the patient was seen for evaluation by [facility]) kidney and hypertension specialist, Dr. Amado Horton regarding his elevated BP and kidney function. 09/20/2022 the patient was seen by PCP for evaluation of a rash which started on his right shoulder on 09/06/2022. The rash began to increase in size and spread to his BLEs. He complained of itching. The patient was recommended dermatology consult and possible biopsy. He was treated with fluconazole. 09/23/2022 renal ultrasound revealed increased cortical echogenicity of the kidneys, suspicious for underlying chronic medical renal disease. No evidence of hydronephrosis. Small amount of upper abdomen ascites. 09/27/2022 patient was again seen for follow-up on his rash, no improvement. Referral was placed to [facility] dermatology    10/06/2022 beta-2 microglobulin level elevated at 9.35.   [place] free light chains elevated at 37.7 (3.3-19.4), lambda free light chains elevated at 197.9 (5.7-26.3) and kappa/lambda free light chain ratio low at 0.19 (0.26-1.65). SPEP interpretation reveals a restricted band (M-spike) migrating in the beta 1 globulin region. Serum immunoelectrophoresis revealed IgA within normal limits, IgG low 302 (600-1640), IgM elevated 1022 (). Urine immunofixation revealed abnormal free lambda light chains present    10/26/2022 Renal biopsy results revealed lambda light chain associated amyloidosis and arterial nephrosclerosis. 11/08/2022 the patient was seen for initial evaluation with Dr. Anabell Fields. 11/11/2022 24-hour urine results revealed abnormal free lambda light chains present, no abnormal peaks detected on protein electrophoresis. 11/16/2022 bone marrow biopsy results show normocellular marrow for age (approximately 30-40%) with trilineage hematopoiesis. The identical lambda light chain restriction among both the B-cell and plasma cell populations raises the possibility of a B-cell non-Hodgkin lymphoma with plasmacytic differentiation. MYD88 mutation was positive. 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 11/23/2022 PET CT scan results revealed no FDG avid malignancy, free fluid noted throughout the abdomen and pelvis. Lab results revealed Hyponatremia, hypochloremia, acute renal failure complicated by chronic kidney disease, hypermagnesemia, hypocalcemia and hypoalbuminemia. 11/30/2022 the patient was referred to [facility] for a second opinion. 12/07/2022 the patient was seen for evaluation of his plasma cell dyscrasia by Dr. Deepa English. The patient was advised plan of care would include induction chemotherapy with a multidrug regimen followed by consideration of autologous stem cell transplant. The patient was recommended treatment with Daratumumab/ cyclophosphamide / bortezomib /dexamethasone. Recommended prophylaxis treatment with Acyclovir 400 mg by mouth twice daily.      12/20/2022 the patient was seen for chemotherapy teaching and complained of abdominal pain with bloating. The patient was transported to [facility] for evaluation. 12/21/2022 the patient requested transfer to [address] [place] [address] but no beds were available and he was eventually transferred to [facility] in [place] for admission. 12/26/2022 the patient was seen for evaluation with nephrology for acute renal failure. The patient underwent placement of CVC tunneled catheter/port for hemodialysis. He was treated with an ultrasound-guided paracentesis which returned 5 L of fluid. The patient is currently on hemodialysis every M-W-F. He also receives treatment with Venofer and Mircera with [facility] to manage his anemia.    01/16/2023 the patient was transferred [facility] from hemodialysis for evaluation of hypotension. Systolic blood pressure was noted in the 80s. The patient received IV hydration. He was on treatment with midodrine 10 mg by mouth 3 times daily. The patient presented with a BP 61/46. No improvement with fluid challenge so treatment was initiated with Levophed. The patient was transferred to [address] [place] [address].      01/24/2023 treatment was delayed due to to multiple hospital admissions for hypotension. BP 85/57. The patient initiated treatment with [facility] recommended regimen. Darzalex Faspro 1800 mg by SQ injection, cyclophosphamide 300 mg/m2 IV, bortezomib 1.3 mg/m2 by SQ injection with dexamethasone weekly. The patient was also prescribed weekly paracentesis limited to 2 L and albumin infusions with fluid removal.     01/24/2023 WBCs 8.09, Hgb 11.3, HCT 34% and platelet count 684,335. Creatinine elevated at 5.32. Serum calcium low at 8.1 iron study results reveal iron level 26, TIBC 131, iron saturation 20% and ferritin level 824.    01/27/2023 the patient underwent successful therapeutic paracentesis with 2 L of clear yellow-tinged ascites removed.     01/30/2023 The patient receives treatment with Venofer and Mircera with [facility] for management of his anemia. Interval History 2/1/2023: The patient returns for follow-up on his diagnosis of amyloidosis with renal involvement. The patient complains of abdominal swelling which causes discomfort and SOB. He denies any fevers or night sweats. His appetite is poor. He complains of occasional dizziness. No recent falls or trauma. He uses a wheelchair to assist with mobility. PMH, SH, and FH:  I reviewed the patients medication list and allergy list as noted on the electronic medical record. The PMH, SH and FH were also reviewed as noted on the EMR. Review of Systems:   Review of Systems   Pertinent review of systems noted in HPI, all other ROS negative. Objective:   Physical Exam   BP (!) 91/52 (Site: Right Lower Arm, Position: Sitting, Cuff Size: Medium Adult)   Pulse 70   Temp 97.4 °F (36.3 °C) (Oral)   Resp 20   Ht 5' 10\" (1.778 m)   Wt 133 lb 12.8 oz (60.7 kg)   SpO2 93%   BMI 19.20 kg/m²    General appearance: No apparent distress, calm and cooperative. HEENT: Pupils equal, round, and reactive to light. Conjunctivae/corneas clear. Oral mucosa intact  Neck: Supple, with full range of motion. Trachea midline. Respiratory:  Normal respiratory effort. Clear to auscultation, bilaterally without Rales/Wheezes/Rhonchi. Cardiovascular: Regular rate and rhythm with normal S1/S2 without murmurs, rubs or gallops. Abdomen: Firm, non-tender, distended with active bowel sounds. Musculoskeletal: No clubbing, cyanosis or edema bilaterally. Skin: Skin color, texture, turgor normal.  No visible rashes or lesions. Neurologic:  Neurovascularly intact without any focal sensory/motor deficits.    Psychiatric: Alert and oriented, thought content appropriate, normal insight  Capillary Refill: Brisk,< 3 seconds   Peripheral Pulses: +2 palpable, equal bilaterally       Imaging Studies and Labs:   CBC:   Lab Results   Component Value Date    WBC 8.2 01/24/2023    HGB 11.3 (L) 01/24/2023    HCT 34.0 (L) 01/24/2023    MCV 95.2 (H) 01/24/2023     01/24/2023     BMP:   Lab Results   Component Value Date/Time     01/24/2023 09:54 AM     01/21/2023 04:35 AM    K 3.4 01/24/2023 09:54 AM    K 4.1 01/21/2023 04:35 AM    K 4.1 01/21/2023 04:35 AM     01/21/2023 04:35 AM    CO2 27 01/21/2023 04:35 AM    BUN 12 01/21/2023 04:35 AM    CREATININE 4.5 01/24/2023 09:54 AM    CREATININE 2.8 01/21/2023 04:35 AM    GLUCOSE 83 01/21/2023 04:35 AM    CALCIUM 7.7 01/24/2023 09:54 AM      LFT:   Lab Results   Component Value Date    ALT 62 01/24/2023    AST 48 (H) 01/24/2023     (H) 01/20/2023    ALKPHOS 1,698 (H) 01/24/2023    BILITOT 1.6 (H) 01/24/2023         Assessment and Plan:   1. Amyloidosis, unspecified type ([address]  - CBC with Auto Differential; Standing  - Comprehensive Metabolic Panel; Standing    2. Diarrhea, unspecified type  Imodium prescription    3. Other ascites  Paracentesis scheduled Thursday    Return in about 3 weeks (around 2/21/2023). All patient questions answered. Pt voiced understanding. Patient agreed with treatment plan. Follow up as directed. Patient instructed to call for questions or concerns.        Electronically signed by   ESA Carrignton - LANCE

## 2023-01-31 NOTE — PROGRESS NOTES
Chemotherapy Administration    Pre-assessment Data: Antineoplastic Agents  See toxicity flow sheet for assessment                                          [x]       Chemotherapy given iv and subcutaneous  Interventions:   Chemotherapy SQ injection given [x]   Taxol administered-VS per protocol []   Blood pressure meds held 12 hours prior to Rituxan/Ruxience []   Rituxan/Ruxience administered- VS and precautions per guidelines []   Emergency drugs available as appropriate [x]   Anaphylaxis assessment completed [x]   Pre-medications administered as ordered [x]   Blood return noted upon initiation of chemotherapy [x]   Blood return noted each 1-2ml of a vesicant medication if given IV push []   Navelbine, Vincristine and Velban given as a monitored wide open drip, blood return noted before during and after infusion.  []   Blood return noted each 2-3ml of a non-vesicant medication if given IV push []   Patient aware of potential Immunotherapy toxicities []   Monitor for signs / symptoms of hypersensitivity reaction [x]   Chemotherapy orders (drug/dose/rate) verified by 2 Chemo certified RNs [x]   Monitor IV site and blood return throughout the infusion of the medication [x]   Document IV site checks on the IV assessment form [x]   Document chemotherapy teaching on the Patient Education tab [x]   Document patient verbalizes understanding of medications being administered [x]   If IV infiltration, see ONS Guidelines []   Other:      []

## 2023-01-31 NOTE — PLAN OF CARE
Problem: Safety - Adult  Goal: Free from fall injury  Outcome: Adequate for Discharge  Flowsheets (Taken 1/31/2023 1535)  Free From Fall Injury: Instruct family/caregiver on patient safety  Note: No falls this admission      Problem: Discharge Planning  Goal: Discharge to home or other facility with appropriate resources  Outcome: Adequate for Discharge  Flowsheets (Taken 1/31/2023 1535)  Discharge to home or other facility with appropriate resources:   Identify barriers to discharge with patient and caregiver   Arrange for needed discharge resources and transportation as appropriate   Identify discharge learning needs (meds, wound care, etc)   Refer to discharge planning if patient needs post-hospital services based on physician order or complex needs related to functional status, cognitive ability or social support system  Note: Patient and family member able to teach back follow up appointments and when to call the doctor. Patient offers no questions at this time Discharge instructions given and reviewed with patient. All questions answered.  Patient verbalized understanding      Problem: Chronic Conditions and Co-morbidities  Goal: Patient's chronic conditions and co-morbidity symptoms are monitored and maintained or improved  Outcome: Adequate for Discharge  Flowsheets (Taken 1/31/2023 1535)  Care Plan - Patient's Chronic Conditions and Co-Morbidity Symptoms are Monitored and Maintained or Improved:   Monitor and assess patient's chronic conditions and comorbid symptoms for stability, deterioration, or improvement   Collaborate with multidisciplinary team to address chronic and comorbid conditions and prevent exacerbation or deterioration  Note: Chemotherapy Teaching     What is Chemotherapy   Drug action [x]   Method of Administration [x]   Handouts given []     Side Effects  Nausea/vomiting [x]   Diarrhea [x]   Fatigue [x]   Signs / Symptoms of infection [x]   Neutropenia [x]   Thrombocytopenia [x] Alopecia [x]   neuropathy [x]   Hamer diet &  the importance of fluids [x]       Micellaneous  Importance of nutrition [x]   Importance of oral hygiene [x]   When to call the MD [x]   Monitoring labs [x]   Use of supportive services []     Explanation of Drug Regimen / Frequency  Darzalex velcade and cytoxin     Comments  Verbalized understanding to drug,action,side effects and when to call MD       Care plan reviewed with patient and wife. Patient and wife verbalized understanding of the plan of care and contributed to goal setting.

## 2023-02-06 ENCOUNTER — APPOINTMENT (OUTPATIENT)
Dept: GENERAL RADIOLOGY | Age: 63
End: 2023-02-06
Attending: INTERNAL MEDICINE
Payer: COMMERCIAL

## 2023-02-06 ENCOUNTER — APPOINTMENT (OUTPATIENT)
Dept: ULTRASOUND IMAGING | Age: 63
End: 2023-02-06
Attending: INTERNAL MEDICINE
Payer: COMMERCIAL

## 2023-02-06 ENCOUNTER — HOSPITAL ENCOUNTER (INPATIENT)
Age: 63
LOS: 12 days | Discharge: ANOTHER HOSPITAL | End: 2023-02-18
Attending: INTERNAL MEDICINE | Admitting: INTERNAL MEDICINE
Payer: COMMERCIAL

## 2023-02-06 PROBLEM — I95.9 HYPOTENSION, UNSPECIFIED HYPOTENSION TYPE: Status: ACTIVE | Noted: 2023-01-01

## 2023-02-06 LAB
ALBUMIN SERPL BCG-MCNC: 2.9 G/DL (ref 3.5–5.1)
ALP SERPL-CCNC: 2432 U/L (ref 38–126)
ALT SERPL W/O P-5'-P-CCNC: 135 U/L (ref 11–66)
ANION GAP SERPL CALC-SCNC: 19 MEQ/L (ref 8–16)
AST SERPL-CCNC: 81 U/L (ref 5–40)
BILIRUB CONJ SERPL-MCNC: 3.7 MG/DL (ref 0–0.3)
BILIRUB SERPL-MCNC: 4.6 MG/DL (ref 0.3–1.2)
BUN SERPL-MCNC: 43 MG/DL (ref 7–22)
CALCIUM SERPL-MCNC: 8 MG/DL (ref 8.5–10.5)
CHLORIDE SERPL-SCNC: 98 MEQ/L (ref 98–111)
CO2 SERPL-SCNC: 20 MEQ/L (ref 23–33)
CREAT SERPL-MCNC: 5.7 MG/DL (ref 0.4–1.2)
GFR SERPL CREATININE-BSD FRML MDRD: 11 ML/MIN/1.73M2
GLUCOSE SERPL-MCNC: 193 MG/DL (ref 70–108)
LACTATE SERPL-SCNC: 2.4 MMOL/L (ref 0.5–2)
MAGNESIUM SERPL-MCNC: 2.2 MG/DL (ref 1.6–2.4)
MRSA DNA SPEC QL NAA+PROBE: NEGATIVE
PHOSPHATE SERPL-MCNC: 6.6 MG/DL (ref 2.4–4.7)
POTASSIUM SERPL-SCNC: 4.5 MEQ/L (ref 3.5–5.2)
PROCALCITONIN SERPL IA-MCNC: 4.68 NG/ML (ref 0.01–0.09)
PROT SERPL-MCNC: 5.2 G/DL (ref 6.1–8)
SCAN OF BLOOD SMEAR: NORMAL
SODIUM SERPL-SCNC: 137 MEQ/L (ref 135–145)
VANA ISLT/SPM QL: NEGATIVE

## 2023-02-06 PROCEDURE — 84145 PROCALCITONIN (PCT): CPT

## 2023-02-06 PROCEDURE — 87641 MR-STAPH DNA AMP PROBE: CPT

## 2023-02-06 PROCEDURE — 87040 BLOOD CULTURE FOR BACTERIA: CPT

## 2023-02-06 PROCEDURE — 71045 X-RAY EXAM CHEST 1 VIEW: CPT

## 2023-02-06 PROCEDURE — 2500000003 HC RX 250 WO HCPCS

## 2023-02-06 PROCEDURE — 6360000002 HC RX W HCPCS

## 2023-02-06 PROCEDURE — 83735 ASSAY OF MAGNESIUM: CPT

## 2023-02-06 PROCEDURE — 82248 BILIRUBIN DIRECT: CPT

## 2023-02-06 PROCEDURE — 84100 ASSAY OF PHOSPHORUS: CPT

## 2023-02-06 PROCEDURE — 99291 CRITICAL CARE FIRST HOUR: CPT | Performed by: INTERNAL MEDICINE

## 2023-02-06 PROCEDURE — 80053 COMPREHEN METABOLIC PANEL: CPT

## 2023-02-06 PROCEDURE — 87070 CULTURE OTHR SPECIMN AEROBIC: CPT

## 2023-02-06 PROCEDURE — 2000000000 HC ICU R&B

## 2023-02-06 PROCEDURE — 87500 VANOMYCIN DNA AMP PROBE: CPT

## 2023-02-06 PROCEDURE — 36415 COLL VENOUS BLD VENIPUNCTURE: CPT

## 2023-02-06 PROCEDURE — 83605 ASSAY OF LACTIC ACID: CPT

## 2023-02-06 PROCEDURE — 93005 ELECTROCARDIOGRAM TRACING: CPT

## 2023-02-06 PROCEDURE — 85025 COMPLETE CBC W/AUTO DIFF WBC: CPT

## 2023-02-06 PROCEDURE — 2580000003 HC RX 258

## 2023-02-06 RX ORDER — SODIUM CHLORIDE 9 MG/ML
INJECTION, SOLUTION INTRAVENOUS PRN
Status: DISCONTINUED | OUTPATIENT
Start: 2023-02-06 | End: 2023-02-19 | Stop reason: HOSPADM

## 2023-02-06 RX ORDER — ACETAMINOPHEN 650 MG/1
650 SUPPOSITORY RECTAL EVERY 6 HOURS PRN
Status: DISCONTINUED | OUTPATIENT
Start: 2023-02-06 | End: 2023-02-16

## 2023-02-06 RX ORDER — NOREPINEPHRINE BIT/0.9 % NACL 16MG/250ML
INFUSION BOTTLE (ML) INTRAVENOUS
Status: COMPLETED
Start: 2023-02-06 | End: 2023-02-06

## 2023-02-06 RX ORDER — ERGOCALCIFEROL 1.25 MG/1
50000 CAPSULE ORAL WEEKLY
Status: DISCONTINUED | OUTPATIENT
Start: 2023-02-10 | End: 2023-02-19 | Stop reason: HOSPADM

## 2023-02-06 RX ORDER — SODIUM CHLORIDE 0.9 % (FLUSH) 0.9 %
5-40 SYRINGE (ML) INJECTION PRN
Status: DISCONTINUED | OUTPATIENT
Start: 2023-02-06 | End: 2023-02-19 | Stop reason: HOSPADM

## 2023-02-06 RX ORDER — SODIUM CHLORIDE 0.9 % (FLUSH) 0.9 %
5-40 SYRINGE (ML) INJECTION EVERY 12 HOURS SCHEDULED
Status: DISCONTINUED | OUTPATIENT
Start: 2023-02-06 | End: 2023-02-19 | Stop reason: HOSPADM

## 2023-02-06 RX ORDER — SODIUM BICARBONATE 650 MG/1
650 TABLET ORAL 2 TIMES DAILY
Status: DISCONTINUED | OUTPATIENT
Start: 2023-02-06 | End: 2023-02-19 | Stop reason: HOSPADM

## 2023-02-06 RX ORDER — POLYETHYLENE GLYCOL 3350 17 G/17G
17 POWDER, FOR SOLUTION ORAL DAILY PRN
Status: DISCONTINUED | OUTPATIENT
Start: 2023-02-06 | End: 2023-02-19 | Stop reason: HOSPADM

## 2023-02-06 RX ORDER — ONDANSETRON 2 MG/ML
4 INJECTION INTRAMUSCULAR; INTRAVENOUS EVERY 6 HOURS PRN
Status: DISCONTINUED | OUTPATIENT
Start: 2023-02-06 | End: 2023-02-06

## 2023-02-06 RX ORDER — ACETAMINOPHEN 325 MG/1
650 TABLET ORAL EVERY 6 HOURS PRN
Status: DISCONTINUED | OUTPATIENT
Start: 2023-02-06 | End: 2023-02-16

## 2023-02-06 RX ORDER — ALBUTEROL SULFATE 2.5 MG/3ML
2.5 SOLUTION RESPIRATORY (INHALATION) EVERY 4 HOURS PRN
Status: DISCONTINUED | OUTPATIENT
Start: 2023-02-06 | End: 2023-02-19 | Stop reason: HOSPADM

## 2023-02-06 RX ORDER — NOREPINEPHRINE BIT/0.9 % NACL 16MG/250ML
1-100 INFUSION BOTTLE (ML) INTRAVENOUS CONTINUOUS
Status: DISCONTINUED | OUTPATIENT
Start: 2023-02-06 | End: 2023-02-14

## 2023-02-06 RX ORDER — NOREPINEPHRINE BIT/0.9 % NACL 16MG/250ML
1-100 INFUSION BOTTLE (ML) INTRAVENOUS CONTINUOUS
Status: DISCONTINUED | OUTPATIENT
Start: 2023-02-06 | End: 2023-02-06

## 2023-02-06 RX ORDER — ONDANSETRON 2 MG/ML
4 INJECTION INTRAMUSCULAR; INTRAVENOUS EVERY 4 HOURS
Status: DISCONTINUED | OUTPATIENT
Start: 2023-02-06 | End: 2023-02-07

## 2023-02-06 RX ADMIN — ONDANSETRON 4 MG: 2 INJECTION INTRAMUSCULAR; INTRAVENOUS at 17:53

## 2023-02-06 RX ADMIN — ONDANSETRON 4 MG: 2 INJECTION INTRAMUSCULAR; INTRAVENOUS at 21:22

## 2023-02-06 RX ADMIN — SODIUM CHLORIDE, PRESERVATIVE FREE 10 ML: 5 INJECTION INTRAVENOUS at 21:27

## 2023-02-06 RX ADMIN — AMIODARONE HYDROCHLORIDE 1 MG/MIN: 50 INJECTION, SOLUTION INTRAVENOUS at 17:49

## 2023-02-06 RX ADMIN — CEFTRIAXONE 2000 MG: 2 INJECTION, POWDER, FOR SOLUTION INTRAMUSCULAR; INTRAVENOUS at 23:07

## 2023-02-06 RX ADMIN — Medication 4 MCG/MIN: at 17:27

## 2023-02-06 RX ADMIN — AMIODARONE HYDROCHLORIDE 300 MG: 1.5 INJECTION, SOLUTION INTRAVENOUS at 17:32

## 2023-02-06 ASSESSMENT — PAIN DESCRIPTION - LOCATION: LOCATION: ABDOMEN

## 2023-02-06 ASSESSMENT — PAIN DESCRIPTION - DESCRIPTORS: DESCRIPTORS: ACHING;DISCOMFORT

## 2023-02-06 ASSESSMENT — PAIN SCALES - GENERAL
PAINLEVEL_OUTOF10: 0
PAINLEVEL_OUTOF10: 8

## 2023-02-06 NOTE — H&P
CRITICAL CARE H&P       Patient:  Sandra Sampson    Unit/Bed:4D-14/014-A  YOB: 1960  MRN: [id number]   PCP: Vernell Luis DO  Date of Admission: 2/6/2023  Chief Complaint:- hypotension    Assessment and Plan:    Septic Shock. Transferred from outside facility, on arrival hypotensive and ventricular tachyarrhythmia 140s. Recently diagnosed with systemic amyloidosis. liver cirrhosis with ascites. The ESRD on HD. Suspicion for SBP. Started antibiotics ceftriaxone 2 g daily and paracentesis planned for tomorrow morning with the fluid analysis. Monitoring daily CBC BMP, lactic Q2. Asymptomatic ventricular tachyarrhythmia. Given magnesium IV in outside facility per report. Started amiodarone bolus 300 and following amiodarone drip 0.5/h. Telemetry, pulse ox. Pacer Outside of the room. Consulted cardiology. Strict MARIA C's, daily weights. Will Keep Mg>2, K>4.   hypotension: Multifactorial.  On home midodrine. Suspected sepsis. ESRD on HD. Undifferentiated liver disease. Having amyloidosis. HAGMA. Lactic acidosis last uremia due to ESRD on HD. Gentle fluids, 50 mill per hour. Antibiotics for source suspected SBP. Monitoring, lactic acid every 2 and BMP/CBC daily. Started home bicarb p.o. ESRD on HD: Noncompliance to medications including p.o. bicarb, fluid intake and other medical interventions/visits. Gets 3 times weekly hemodialysis outpatient. Consulted nephrology for resuming inpatient home dialysis while patient is inpatient stay. CHF. Not in Exacerbation. HFpEF: Monitoring, telemetry, pulse ox Daily weights, strict MARIA C's  Suspected liver cirrhosis. He related to systemic amyloidosis. Waiting liver biopsy for past 2 months this. Elevated transaminase, alk phos and hypoalbuminemia. Patient gets regular paracentesis drainage. Systemic amyloidosis: Diagnosed in September 2022, waiting liver biopsy. Compliance to medications, and treatments and visit.    Mild Chronic normocytic anemia: Due to multiple chronic disease including ESRD on HD, liver disease, systemic amyloidosis. Current, Daily CBC . Malnutrition: Due to multiple comorbidities including systemic amyloidosis, liver disease, ESRD on HD. Conditioning and debility: 2 multiple counties including systemic amyloidosis, ESRD on HD, malnutrition, liver disease, ventricular tachycardia  . Mild chronic thrombocytopenia. Waited to suspected liver cirrhosis secondary due to systemic amyloidosis. Signs of external bleeding. Monitoring daily CBC. INITIAL H AND P AND ICU COURSE:  Patient is 63years old male, brought from outside facility due to hypotension. Patient has past medical history of recently diagnosed with systemic amyloidosis, ESRD on hemodialysis, undifferentiated liver disease, newly diagnosis CHF preserved EF, nonsustained episodes of ventricular tachycardia. Patient states he felt sick and nauseous and went to the local hospital and transferred to [facility] for further evaluation and management. Currently patient alert and oriented X3, complains of nausea, episodes of vomiting, feeling sick abdominal distention due to ascites. However he denies chest pain, heart palpitations, productive cough, dizziness, fevers, chills lightheadedness, dysuria/urinary frequency    Past 24 Hr Progress    Past Medical History: Systemic amyloidosis, CHF, ESRD, liver disease. Family History: HTN. Social History: Former smoker, has 34+ years of pack history of smoking, but he states he quit in December 2022. Denies illicit medications, alcohol    ROS   Per HPI    Scheduled Meds:   norepinephrine-sodium chloride         Continuous Infusions:   norepinephrine         PHYSICAL EXAMINATION:  T: 97 5. P: 70. RR: 16. B/P: 94-61. FiO2: Room air. O2 Sat: 96%.   I/O:    There is no height or weight on file to calculate BMI.   GCS:   15  Mode: PVC+ Rate:   Pressure support:   PEEP:  FiO2   General: Malnourished, thin appearance, chronically ill looking. Patient appears older than stated age, pale appearance, slightly icterus on scleral  HEENT:  normocephalic and atraumatic. Mild scleral icterus. PERR. Poor oral hygiene. Mucous membranes dry, lips cracked  Neck: supple. No Thyromegaly. Lungs: clear to auscultation. No retractions. On chest wall, seen on the right side hemodialysis catheter  Cardiac: RRR. No JVD. Abdomen: soft. Tender mildly. Distended, fluid wave appreciated. POCUS ultrasound shows large amount of ascites  Extremities: Thin, multiple bruises, pale. Vasculature: capillary refill < 3 seconds. Palpable dorsalis pedis pulses. Skin: Pale and dry  Psych:  Alert and oriented x3. Affect appropriate  Lymph:  No supraclavicular adenopathy. Neurologic:  No focal deficit. No seizures. Data: (All radiographs, tracings, PFTs, and imaging are personally viewed and interpreted unless otherwise noted). Sodium 137, potassium 4.5, CO2 20, BUN 43, creatinine 5.7, anion gap 19, lactic 2.4, GFR 11  WBC-10.8, RBC 3.9, hemoglobin 12, hematocrit 35, MCV 87. Albumin 2.9, alk phos 2432, , AST 81, bilirubin 4.6, direct bilirubin 3.7  Telemetry shows currently sinus rhythm with frequent PVCs. Chest x-ray demonstrates small left lateral effusion and smallbasal atelectasis  Echo dated January 2023, shows EF 50%, LVH, small circumferential pericardial effusion without tamponade physiology        Seen with multidisciplinary ICU team .  Meets Continued ICU Level Care Criteria:    [x] Yes   [] No - Transfer Planned to listed location:  [] HOSPITALIST CONTACTED-      Case and plan discussed with Dr. Yadira Campoverde MD.  Eduardo Goodrich       Electronically signed by Marcel Meckel, DO, PGY-2 IM  Patient seen by me including key components of medical care. Case discussed with resident physician. Suspected SBP. On Rocephin. On pressors. Will need paracentesis. Runs of V-tach noted. Started on amiodarone. Critically ill. Italicized font, if present,  represents changes to the note made by me. CC time 35 minutes. Time was discontiguous. Time does not include procedure. Time does include my direct assessment of the patient and coordination of care. Time represents more than 50% of the time involved with patient care by the [address] [place] [address] team.  Electronically signed by Delia Shankar.  Navya Munoz MD.

## 2023-02-06 NOTE — FLOWSHEET NOTE
4 Eyes Skin Assessment     NAME:  Treva Motta  YOB: 1960  MEDICAL RECORD NUMBER:  [id number]    The patient is being assessed for  Admission    I agree that One RN have performed a thorough Head to Toe Skin Assessment on the patient. ALL assessment sites listed below have been assessed. Areas assessed by both nurses:    Head, Face, Ears, Shoulders, Back, Chest, Arms, Elbows, Hands, Sacrum. Buttock, Coccyx, Ischium, and Legs. Feet and Heels  Pt has red spot on coccyx   has several bony prominences, scattered bruising ans scabbing        Does the Patient have a Wound? No noted wound(s)       Juan Prevention initiated by RN: Yes   Wound Care Orders initiated by RN: No    Pressure Injury (Stage 3,4, Unstageable, DTI, NWPT, and Complex wounds) if present place referral order by RN under : No    New and Established Ostomies, if present place, referral order under : No      Nurse 1 eSignature: Electronically signed by Josefina Young RN on 2/6/23 at 5:08 PM EST    **SHARE this note so that the co-signing nurse is able to place an eSignature**    Nurse 2 eSignature:  Aaron BOSTON

## 2023-02-07 ENCOUNTER — HOSPITAL ENCOUNTER (OUTPATIENT)
Dept: INFUSION THERAPY | Age: 63
End: 2023-02-07

## 2023-02-07 PROBLEM — E87.20 METABOLIC ACIDOSIS: Status: ACTIVE | Noted: 2023-02-07

## 2023-02-07 LAB
ACINETOBACTER CALCOACETICUS-BAUMANNII BY PCR: NOT DETECTED
ALBUMIN FLD-MCNC: 1.1 GM/DL
ALBUMIN SERPL BCG-MCNC: 2.7 G/DL (ref 3.5–5.1)
ALBUMIN SERPL BCG-MCNC: 2.9 G/DL (ref 3.5–5.1)
ALP SERPL-CCNC: 1960 U/L (ref 38–126)
ALP SERPL-CCNC: 2255 U/L (ref 38–126)
ALT SERPL W/O P-5'-P-CCNC: 107 U/L (ref 11–66)
ALT SERPL W/O P-5'-P-CCNC: 125 U/L (ref 11–66)
AMYLASE FLD-CCNC: 14 U/L
AMYLASE SERPL-CCNC: 47 U/L (ref 20–104)
ANION GAP SERPL CALC-SCNC: 21 MEQ/L (ref 8–16)
ANION GAP SERPL CALC-SCNC: 21 MEQ/L (ref 8–16)
ANISOCYTOSIS BLD QL SMEAR: PRESENT
APTT PPP: 40.7 SECONDS (ref 22–38)
AST SERPL-CCNC: 54 U/L (ref 5–40)
AST SERPL-CCNC: 72 U/L (ref 5–40)
BASOPHILS ABSOLUTE: 0 THOU/MM3 (ref 0–0.1)
BASOPHILS ABSOLUTE: 0 THOU/MM3 (ref 0–0.1)
BASOPHILS NFR BLD AUTO: 0.1 %
BASOPHILS NFR BLD AUTO: 0.2 %
BILIRUB CONJ SERPL-MCNC: 3.1 MG/DL (ref 0–0.3)
BILIRUB SERPL-MCNC: 3.5 MG/DL (ref 0.3–1.2)
BILIRUB SERPL-MCNC: 3.8 MG/DL (ref 0.3–1.2)
BLACTX-M ISLT/SPM QL: NORMAL
BLAIMP ISLT/SPM QL: NORMAL
BLAKPC ISLT/SPM QL: NORMAL
BLAOXA-48-LIKE ISLT/SPM QL: NORMAL
BLAVIM ISLT/SPM QL: NORMAL
BUN SERPL-MCNC: 46 MG/DL (ref 7–22)
BUN SERPL-MCNC: 50 MG/DL (ref 7–22)
C PNEUM DNA LOWER RESP QL NAA+NON-PROBE: NOT DETECTED
CA-I BLD ISE-SCNC: 0.95 MMOL/L (ref 1.12–1.32)
CA-I BLD ISE-SCNC: 1.06 MMOL/L (ref 1.12–1.32)
CALCIUM SERPL-MCNC: 7.9 MG/DL (ref 8.5–10.5)
CALCIUM SERPL-MCNC: 8.6 MG/DL (ref 8.5–10.5)
CHARACTER, BODY FLUID: CLEAR
CHLORIDE SERPL-SCNC: 100 MEQ/L (ref 98–111)
CHLORIDE SERPL-SCNC: 99 MEQ/L (ref 98–111)
CO2 SERPL-SCNC: 19 MEQ/L (ref 23–33)
CO2 SERPL-SCNC: 20 MEQ/L (ref 23–33)
COLOR FLD: YELLOW
CREAT SERPL-MCNC: 6.1 MG/DL (ref 0.4–1.2)
CREAT SERPL-MCNC: 6.5 MG/DL (ref 0.4–1.2)
DEPRECATED RDW RBC AUTO: 63.7 FL (ref 35–45)
DEPRECATED RDW RBC AUTO: 67.5 FL (ref 35–45)
DEPRECATED RDW RBC AUTO: 69 FL (ref 35–45)
EKG ATRIAL RATE: 67 BPM
EKG P AXIS: 45 DEGREES
EKG P-R INTERVAL: 156 MS
EKG Q-T INTERVAL: 354 MS
EKG QRS DURATION: 86 MS
EKG QTC CALCULATION (BAZETT): 374 MS
EKG R AXIS: -27 DEGREES
EKG T AXIS: 82 DEGREES
EKG VENTRICULAR RATE: 67 BPM
ENTEROBACTER CLOACAE COMPLEX BY PCR: NOT DETECTED
EOSINOPHIL NFR BLD AUTO: 0 %
EOSINOPHIL NFR BLD AUTO: 0 %
EOSINOPHILS ABSOLUTE: 0 THOU/MM3 (ref 0–0.4)
EOSINOPHILS ABSOLUTE: 0 THOU/MM3 (ref 0–0.4)
ERYTHROCYTE [DISTWIDTH] IN BLOOD BY AUTOMATED COUNT: 21.9 % (ref 11.5–14.5)
ERYTHROCYTE [DISTWIDTH] IN BLOOD BY AUTOMATED COUNT: 22 % (ref 11.5–14.5)
ERYTHROCYTE [DISTWIDTH] IN BLOOD BY AUTOMATED COUNT: 22.2 % (ref 11.5–14.5)
ESCHERICHIA COLI BY PCR: NOT DETECTED
FLUAV RNA LOWER RESP QL NAA+NON-PROBE: NOT DETECTED
FLUBV RNA LOWER RESP QL NAA+NON-PROBE: NOT DETECTED
GFR SERPL CREATININE-BSD FRML MDRD: 10 ML/MIN/1.73M2
GFR SERPL CREATININE-BSD FRML MDRD: 9 ML/MIN/1.73M2
GLUCOSE BLD STRIP.AUTO-MCNC: 112 MG/DL (ref 70–108)
GLUCOSE FLD-MCNC: 147 MG/DL
GLUCOSE SERPL-MCNC: 124 MG/DL (ref 70–108)
GLUCOSE SERPL-MCNC: 185 MG/DL (ref 70–108)
GRANULOCYTES NFR FLD AUTO: 20.3 %
HADV DNA LOWER RESP QL NAA+NON-PROBE: NOT DETECTED
HAEMOPHILUS INFLUENZAE BY PCR: NOT DETECTED
HCOV RNA LOWER RESP QL NAA+NON-PROBE: NOT DETECTED
HCT VFR BLD AUTO: 28.8 % (ref 42–52)
HCT VFR BLD AUTO: 32.1 % (ref 42–52)
HCT VFR BLD AUTO: 34.6 % (ref 42–52)
HGB BLD-MCNC: 10.4 GM/DL (ref 14–18)
HGB BLD-MCNC: 11.4 GM/DL (ref 14–18)
HGB BLD-MCNC: 12.5 GM/DL (ref 14–18)
HMPV RNA LOWER RESP QL NAA+NON-PROBE: NOT DETECTED
HPIV RNA LOWER RESP QL NAA+NON-PROBE: NOT DETECTED
IMM GRANULOCYTES # BLD AUTO: 0.05 THOU/MM3 (ref 0–0.07)
IMM GRANULOCYTES # BLD AUTO: 0.05 THOU/MM3 (ref 0–0.07)
IMM GRANULOCYTES NFR BLD AUTO: 0.5 %
IMM GRANULOCYTES NFR BLD AUTO: 0.5 %
INR PPP: 1.1 (ref 0.85–1.13)
KLEBSIELLA AEROGENES BY PCR: NOT DETECTED
KLEBSIELLA OXYTOCA BY PCR: NOT DETECTED
KLEBSIELLA PNEUMONIAE GROUP BY PCR: NOT DETECTED
L PNEUMO DNA LOWER RESP QL NAA+NON-PROBE: NOT DETECTED
LACTATE SERPL-SCNC: 2.5 MMOL/L (ref 0.5–2)
LACTATE SERPL-SCNC: 3.2 MMOL/L (ref 0.5–2)
LACTATE SERPL-SCNC: 3.2 MMOL/L (ref 0.5–2)
LDH FLD L TO P-CCNC: 96 U/L
LDH SERPL L TO P-CCNC: 316 U/L (ref 100–190)
LYMPHOCYTES ABSOLUTE: 1 THOU/MM3 (ref 1–4.8)
LYMPHOCYTES ABSOLUTE: 1.1 THOU/MM3 (ref 1–4.8)
LYMPHOCYTES NFR BLD AUTO: 11 %
LYMPHOCYTES NFR BLD AUTO: 9.9 %
M PNEUMO DNA LOWER RESP QL NAA+NON-PROBE: NOT DETECTED
MCH RBC QN AUTO: 31.6 PG (ref 26–33)
MCH RBC QN AUTO: 32.4 PG (ref 26–33)
MCH RBC QN AUTO: 32.7 PG (ref 26–33)
MCHC RBC AUTO-ENTMCNC: 35.5 GM/DL (ref 32.2–35.5)
MCHC RBC AUTO-ENTMCNC: 36.1 GM/DL (ref 32.2–35.5)
MCHC RBC AUTO-ENTMCNC: 36.1 GM/DL (ref 32.2–35.5)
MCV RBC AUTO: 87.6 FL (ref 80–94)
MCV RBC AUTO: 90.6 FL (ref 80–94)
MCV RBC AUTO: 91.2 FL (ref 80–94)
MONOCYTES ABSOLUTE: 0.2 THOU/MM3 (ref 0.4–1.3)
MONOCYTES ABSOLUTE: 0.3 THOU/MM3 (ref 0.4–1.3)
MONOCYTES NFR BLD AUTO: 1.4 %
MONOCYTES NFR BLD AUTO: 3.3 %
MONONUC CELLS NFR FLD AUTO: 79.7 %
MORAXELLA CATARRHALIS BY PCR: NOT DETECTED
NEUTROPHILS NFR BLD AUTO: 85.1 %
NEUTROPHILS NFR BLD AUTO: 88 %
NRBC BLD AUTO-RTO: 1 /100 WBC
NRBC BLD AUTO-RTO: 2 /100 WBC
NUC CELL # FLD AUTO: 72 /CUMM (ref 0–500)
PATHOLOGIST REVIEW: ABNORMAL
PATHOLOGIST REVIEW: NORMAL
PLATELET # BLD AUTO: 77 THOU/MM3 (ref 130–400)
PLATELET # BLD AUTO: 79 THOU/MM3 (ref 130–400)
PLATELET # BLD AUTO: 89 THOU/MM3 (ref 130–400)
PMV BLD AUTO: ABNORMAL FL (ref 9.4–12.4)
POIKILOCYTES: ABNORMAL
POTASSIUM SERPL-SCNC: 4.5 MEQ/L (ref 3.5–5.2)
POTASSIUM SERPL-SCNC: 4.5 MEQ/L (ref 3.5–5.2)
PROT FLD-MCNC: 1.7 GM/DL
PROT SERPL-MCNC: 4.7 G/DL (ref 6.1–8)
PROT SERPL-MCNC: 4.9 G/DL (ref 6.1–8)
PROTEUS SPECIES BY PCR: NOT DETECTED
PSEUDOMONAS AERUGINOSA BY PCR: NOT DETECTED
RBC # BLD AUTO: 3.18 MILL/MM3 (ref 4.7–6.1)
RBC # BLD AUTO: 3.52 MILL/MM3 (ref 4.7–6.1)
RBC # BLD AUTO: 3.95 MILL/MM3 (ref 4.7–6.1)
RBC # FLD AUTO: < 2000 /CUMM
RESISTANT GENE MECA/C & MREJ BY PCR: NORMAL
RESISTANT GENE NDM BY PCR: NORMAL
RSV RNA LOWER RESP QL NAA+NON-PROBE: NOT DETECTED
RV+EV RNA LOWER RESP QL NAA+NON-PROBE: NOT DETECTED
SCAN OF BLOOD SMEAR: NORMAL
SCHISTOCYTES BLD QL SMEAR: ABNORMAL
SEGMENTED NEUTROPHILS ABSOLUTE COUNT: 8 THOU/MM3 (ref 1.8–7.7)
SEGMENTED NEUTROPHILS ABSOLUTE COUNT: 9.5 THOU/MM3 (ref 1.8–7.7)
SERRATIA MARCESCENS BY PCR: NOT DETECTED
SODIUM SERPL-SCNC: 140 MEQ/L (ref 135–145)
SODIUM SERPL-SCNC: 140 MEQ/L (ref 135–145)
SOURCE: NORMAL
SPECIMEN ACCEPTABILITY: NORMAL
SPECIMEN: NORMAL
STAPH AUREUS BY PCR: NOT DETECTED
STREP AGALACTIAE BY PCR: NOT DETECTED
STREP PNEUMONIAE BY PCR: NOT DETECTED
STREP PYOGENES BY PCR: NOT DETECTED
TARGETS BLD QL SMEAR: ABNORMAL
TOTAL VOLUME RECEIVED BODY FLUID: 24 ML
WBC # BLD AUTO: 10.3 THOU/MM3 (ref 4.8–10.8)
WBC # BLD AUTO: 10.8 THOU/MM3 (ref 4.8–10.8)
WBC # BLD AUTO: 9.4 THOU/MM3 (ref 4.8–10.8)

## 2023-02-07 PROCEDURE — P9047 ALBUMIN (HUMAN), 25%, 50ML: HCPCS

## 2023-02-07 PROCEDURE — 85730 THROMBOPLASTIN TIME PARTIAL: CPT

## 2023-02-07 PROCEDURE — 87070 CULTURE OTHR SPECIMN AEROBIC: CPT

## 2023-02-07 PROCEDURE — 88112 CYTOPATH CELL ENHANCE TECH: CPT

## 2023-02-07 PROCEDURE — 83605 ASSAY OF LACTIC ACID: CPT

## 2023-02-07 PROCEDURE — 87541 LEGION PNEUMO DNA AMP PROB: CPT

## 2023-02-07 PROCEDURE — 6360000002 HC RX W HCPCS: Performed by: INTERNAL MEDICINE

## 2023-02-07 PROCEDURE — 87631 RESP VIRUS 3-5 TARGETS: CPT

## 2023-02-07 PROCEDURE — 49083 ABD PARACENTESIS W/IMAGING: CPT | Performed by: INTERNAL MEDICINE

## 2023-02-07 PROCEDURE — 83615 LACTATE (LD) (LDH) ENZYME: CPT

## 2023-02-07 PROCEDURE — 88108 CYTOPATH CONCENTRATE TECH: CPT

## 2023-02-07 PROCEDURE — 82945 GLUCOSE OTHER FLUID: CPT

## 2023-02-07 PROCEDURE — 87075 CULTR BACTERIA EXCEPT BLOOD: CPT

## 2023-02-07 PROCEDURE — 87040 BLOOD CULTURE FOR BACTERIA: CPT

## 2023-02-07 PROCEDURE — 87486 CHLMYD PNEUM DNA AMP PROBE: CPT

## 2023-02-07 PROCEDURE — 85027 COMPLETE CBC AUTOMATED: CPT

## 2023-02-07 PROCEDURE — 90935 HEMODIALYSIS ONE EVALUATION: CPT | Performed by: INTERNAL MEDICINE

## 2023-02-07 PROCEDURE — 87106 FUNGI IDENTIFICATION YEAST: CPT

## 2023-02-07 PROCEDURE — 87798 DETECT AGENT NOS DNA AMP: CPT

## 2023-02-07 PROCEDURE — 05HN33Z INSERTION OF INFUSION DEVICE INTO LEFT INTERNAL JUGULAR VEIN, PERCUTANEOUS APPROACH: ICD-10-PCS

## 2023-02-07 PROCEDURE — 93010 ELECTROCARDIOGRAM REPORT: CPT | Performed by: INTERNAL MEDICINE

## 2023-02-07 PROCEDURE — 89050 BODY FLUID CELL COUNT: CPT

## 2023-02-07 PROCEDURE — 90935 HEMODIALYSIS ONE EVALUATION: CPT

## 2023-02-07 PROCEDURE — P9047 ALBUMIN (HUMAN), 25%, 50ML: HCPCS | Performed by: INTERNAL MEDICINE

## 2023-02-07 PROCEDURE — 82248 BILIRUBIN DIRECT: CPT

## 2023-02-07 PROCEDURE — 5A1D70Z PERFORMANCE OF URINARY FILTRATION, INTERMITTENT, LESS THAN 6 HOURS PER DAY: ICD-10-PCS | Performed by: INTERNAL MEDICINE

## 2023-02-07 PROCEDURE — 82948 REAGENT STRIP/BLOOD GLUCOSE: CPT

## 2023-02-07 PROCEDURE — 2000000000 HC ICU R&B

## 2023-02-07 PROCEDURE — 87581 M.PNEUMON DNA AMP PROBE: CPT

## 2023-02-07 PROCEDURE — 82330 ASSAY OF CALCIUM: CPT

## 2023-02-07 PROCEDURE — 80053 COMPREHEN METABOLIC PANEL: CPT

## 2023-02-07 PROCEDURE — 6360000002 HC RX W HCPCS

## 2023-02-07 PROCEDURE — 6370000000 HC RX 637 (ALT 250 FOR IP)

## 2023-02-07 PROCEDURE — 82150 ASSAY OF AMYLASE: CPT

## 2023-02-07 PROCEDURE — 0W9G3ZZ DRAINAGE OF PERITONEAL CAVITY, PERCUTANEOUS APPROACH: ICD-10-PCS | Performed by: INTERNAL MEDICINE

## 2023-02-07 PROCEDURE — 2580000003 HC RX 258

## 2023-02-07 PROCEDURE — 87205 SMEAR GRAM STAIN: CPT

## 2023-02-07 PROCEDURE — 82042 OTHER SOURCE ALBUMIN QUAN EA: CPT

## 2023-02-07 PROCEDURE — 85610 PROTHROMBIN TIME: CPT

## 2023-02-07 PROCEDURE — 99291 CRITICAL CARE FIRST HOUR: CPT | Performed by: INTERNAL MEDICINE

## 2023-02-07 PROCEDURE — 84157 ASSAY OF PROTEIN OTHER: CPT

## 2023-02-07 PROCEDURE — 85025 COMPLETE CBC W/AUTO DIFF WBC: CPT

## 2023-02-07 PROCEDURE — 99222 1ST HOSP IP/OBS MODERATE 55: CPT | Performed by: INTERNAL MEDICINE

## 2023-02-07 PROCEDURE — 36415 COLL VENOUS BLD VENIPUNCTURE: CPT

## 2023-02-07 RX ORDER — ALBUMIN (HUMAN) 12.5 G/50ML
25 SOLUTION INTRAVENOUS ONCE
Status: COMPLETED | OUTPATIENT
Start: 2023-02-07 | End: 2023-02-07

## 2023-02-07 RX ORDER — ONDANSETRON 2 MG/ML
4 INJECTION INTRAMUSCULAR; INTRAVENOUS EVERY 4 HOURS PRN
Status: DISCONTINUED | OUTPATIENT
Start: 2023-02-07 | End: 2023-02-08

## 2023-02-07 RX ORDER — SODIUM CHLORIDE, SODIUM LACTATE, POTASSIUM CHLORIDE, AND CALCIUM CHLORIDE .6; .31; .03; .02 G/100ML; G/100ML; G/100ML; G/100ML
500 INJECTION, SOLUTION INTRAVENOUS ONCE
Status: DISCONTINUED | OUTPATIENT
Start: 2023-02-07 | End: 2023-02-09

## 2023-02-07 RX ORDER — SODIUM CHLORIDE, SODIUM LACTATE, POTASSIUM CHLORIDE, CALCIUM CHLORIDE 600; 310; 30; 20 MG/100ML; MG/100ML; MG/100ML; MG/100ML
INJECTION, SOLUTION INTRAVENOUS CONTINUOUS
Status: DISCONTINUED | OUTPATIENT
Start: 2023-02-07 | End: 2023-02-07

## 2023-02-07 RX ORDER — DEXTROSE MONOHYDRATE 100 MG/ML
INJECTION, SOLUTION INTRAVENOUS CONTINUOUS PRN
Status: DISCONTINUED | OUTPATIENT
Start: 2023-02-07 | End: 2023-02-19 | Stop reason: HOSPADM

## 2023-02-07 RX ADMIN — AMIODARONE HYDROCHLORIDE 0.5 MG/MIN: 50 INJECTION, SOLUTION INTRAVENOUS at 02:03

## 2023-02-07 RX ADMIN — CEFTRIAXONE 2000 MG: 2 INJECTION, POWDER, FOR SOLUTION INTRAMUSCULAR; INTRAVENOUS at 11:28

## 2023-02-07 RX ADMIN — ONDANSETRON 4 MG: 2 INJECTION INTRAMUSCULAR; INTRAVENOUS at 04:42

## 2023-02-07 RX ADMIN — AMIODARONE HYDROCHLORIDE 0.5 MG/MIN: 50 INJECTION, SOLUTION INTRAVENOUS at 18:10

## 2023-02-07 RX ADMIN — ALBUMIN (HUMAN) 25 G: 0.25 INJECTION, SOLUTION INTRAVENOUS at 11:56

## 2023-02-07 RX ADMIN — ALBUMIN (HUMAN) 25 G: 0.25 INJECTION, SOLUTION INTRAVENOUS at 02:53

## 2023-02-07 RX ADMIN — CALCIUM GLUCONATE 4000 MG: 98 INJECTION, SOLUTION INTRAVENOUS at 02:54

## 2023-02-07 RX ADMIN — MIDODRINE HYDROCHLORIDE 15 MG: 10 TABLET ORAL at 17:29

## 2023-02-07 RX ADMIN — ALBUMIN (HUMAN) 25 G: 0.25 INJECTION, SOLUTION INTRAVENOUS at 13:12

## 2023-02-07 RX ADMIN — SODIUM BICARBONATE 650 MG: 650 TABLET ORAL at 20:32

## 2023-02-07 RX ADMIN — MIDODRINE HYDROCHLORIDE 15 MG: 10 TABLET ORAL at 08:39

## 2023-02-07 RX ADMIN — SODIUM CHLORIDE, PRESERVATIVE FREE 10 ML: 5 INJECTION INTRAVENOUS at 20:32

## 2023-02-07 RX ADMIN — MIDODRINE HYDROCHLORIDE 15 MG: 10 TABLET ORAL at 11:29

## 2023-02-07 ASSESSMENT — PAIN SCALES - GENERAL
PAINLEVEL_OUTOF10: 0

## 2023-02-07 NOTE — PLAN OF CARE
Problem: Discharge Planning  Goal: Discharge to home or other facility with appropriate resources  2/7/2023 0739 by Anival Reynolds RN  Flowsheets (Taken 2/7/2023 2182)  Discharge to home or other facility with appropriate resources:   Identify barriers to discharge with patient and caregiver   Arrange for needed discharge resources and transportation as appropriate   Identify discharge learning needs (meds, wound care, etc)   Refer to discharge planning if patient needs post-hospital services based on physician order or complex needs related to functional status, cognitive ability or social support system     Problem: Pain  Goal: Verbalizes/displays adequate comfort level or baseline comfort level  2/7/2023 0739 by Anival Reynolds RN  Flowsheets (Taken 2/7/2023 8999)  Verbalizes/displays adequate comfort level or baseline comfort level:   Encourage patient to monitor pain and request assistance   Assess pain using appropriate pain scale   Administer analgesics based on type and severity of pain and evaluate response   Implement non-pharmacological measures as appropriate and evaluate response   Consider cultural and social influences on pain and pain management   Notify Licensed Independent Practitioner if interventions unsuccessful or patient reports new pain   Care plan reviewed with patient. Patient verbalize understanding of the plan of care and contribute to goal setting.

## 2023-02-07 NOTE — PROGRESS NOTES
CRITICAL CARE PROGRESS NOTE      Patient:  Lalita Inman    Unit/Bed:4D-14/014-A  YOB: 1960  MRN: [id number]   PCP: Sharon Vila DO  Date of Admission: 2/6/2023  Chief Complaint:- Hypotension    Assessment and Plan:    Septic shock: Patient was transferred from outside facility, arrival hypotensive and ventricular tach arrhythmias in the 140s. Recent diagnosed with systemic amyloidosis, liver cirrhosis with ascites. Patient is on hemodialysis for ESRD. Initially suspicious for SBP. Patient was started on antibiotics of ceftriaxone 2 g. Patient underwent paracentesis 2/7/23 for further evaluation and relieve pressure of the abdomen. SBP not suspected at this time, will follow body fluid cultures. We will continue to monitor this time. Patient is on Levophed for BP support at this time. Patient is on Rocephin 2 g every 24 hours  Hypotension: Multifactorial.  Patient is on home midodrine, per patient 15, 3 times daily. Sepsis was suspected. Patient is on ESRD. He has undifferentiated liver disease, suspected due to amyloidosis. Patient is on Levophed for blood pressure support at this time. We will wean Levophed as patient tolerates  Ventricular tachycardia, asymptomatic: Patient had reported runs of V. tach. Given IV magnesium at outside facility per report. Patient is a given amiodarone bolus 300 followed with amiodarone drip at 0.5. On telemetry, pulse ox. Patient outside of room. Cardiology did see the patient. Continuing strict inputs and outputs, daily weights. We will continue to monitor electrolytes, keep mag above 2 potassium greater than 4  HAGMA: Lactic acidosis last uremia due to ESRD on HD. Monitoring lactic acid 2 hours and BMP CBC daily. Home bicarb started p.o. We will continue to monitor  ESRD: On hemodialysis.   Patient is noncompliant with medications including p.o. bicarb, fluid intake and other medical visits in the past.  Patient gets hemodialysis outpatient 3 times weekly. Patient is consulted for neurology to start inpatient home dialysis. Patient underwent hemodialysis 2/7/23  HFpEF: Not in exacerbation. We will continue to monitor. Continue telemetry, pulse ox, daily weights, strict MARIA C's. Suspected liver cirrhosis: This is likely secondary to systemic adenosis. Waiting on liver biopsy for the past 2 months for this. Elevated transaminase, alk phos, hypoalbuminemia. Patient is scheduled to have regular paracentesis. He has received 2 scheduled so far. Systemic amyloidosis: Initially diagnosed September 2022 patient is awaiting for liver biopsy this time. Patient reports compliance to medication treatments and visits. Chronic normocytic anemia, mild: Patient has multiple chronic diseases including ESRD on HD, liver disease, systemic amyloidosis, continue current daily CBCs. Chronic thrombocytopenia, mild: Possibly due to suspected liver cirrhosis secondary to systemic amyloidosis. Continue to monitor with daily CBCs. Malnutrition:   Due to patient's multiple comorbidities  Conditioning, debility: Multiple comorbidities including systemic amyloidosis, ESRD on HD, malnutrition, liver disease, ventricular tachycardia    INITIAL H AND P AND ICU COURSE:  Per HPI, \"patient is 63years old male, brought from outside facility due to hypotension. Patient has past medical history of recently diagnosed with systemic amyloidosis, ESRD on hemodialysis, undifferentiated liver disease, newly diagnosis CHF preserved EF, nonsustained episodes of ventricular tachycardia. Patient states he felt sick and nauseous and went to the local hospital and transferred to [facility] for further evaluation and management. Currently patient alert and oriented X3, complains of nausea, episodes of vomiting, feeling sick abdominal distention due to ascites.   However he denies chest pain, heart palpitations, productive cough, dizziness, fevers, chills lightheadedness, dysuria/urinary frequency\"    Past 24 Hr Progress: Patient underwent paracentesis this morning. 5.5 L were removed. Patient underwent hemodialysis following paracentesis tolerated well. He denies fever, chills, headache, dizziness, shortness of breath, chest pain, abdominal pain nausea, vomiting. He denies any recent palpitations. Patient is on Levophed for BP support    Past Medical History: Systemic amyloidosis, CHF, ESRD, liver disease. Family History: Hypertension. Social History: Former smoker, 34+ years since history of smoking. History of quitting December 2022. No previous illicit drug use or alcohol use. .    ROS   Per HPI    Scheduled Meds:   calcium replacement protocol   Other RX Placeholder    [Held by provider] lactated ringers bolus  500 mL IntraVENous Once    midodrine  15 mg Oral TID WC    sodium chloride flush  5-40 mL IntraVENous 2 times per day    sodium bicarbonate  650 mg Oral BID    [START ON 2/10/2023] vitamin D  50,000 Units Oral Weekly     Continuous Infusions:   dextrose      amiodarone 0.5 mg/min (02/07/23 1810)    sodium chloride      norepinephrine 2 mcg/min (02/07/23 1158)     PHYSICAL EXAMINATION:  T: 97.4. P: 70. RR: 12. B/P: 102/67. Room air O2 Sat: 94.  I/O: -500 cc. 5.5 L drained from paracentesis  Body mass index is 17.68 kg/m². GCS:   15  General: Chronically ill-appearing male. Appears older than stated age. Malnourished. Cachectic pale appearance. HEENT:  normocephalic and atraumatic. No scleral icterus. Mild scleral icterus. PERR. Mucous membranes dry. Neck: supple. No thyromegaly  Lungs: clear to auscultation throughout all lung fields. No wheezes or rhonchi's no retractions. Right-sided chest wall there is a hemodialysis catheter in place  Cardiac: RRR. No murmurs rubs gallops no JVD. Abdomen: soft. Nontender. Nondistended. Extremities: Multiple bruises, pale  Vasculature: capillary refill < 3 seconds.  Palpable dorsalis pedis pulses. Skin:  warm and dry. Psych:  Alert and oriented x3. Affect appropriate  Lymph:  No supraclavicular adenopathy. Neurologic:  No focal deficit. No seizures. Data: (All radiographs, tracings, PFTs, and imaging are personally viewed and interpreted unless otherwise noted). Sodium 145. Potassium 4.5. Chloride 99. Creatinine 6.5. WBC 10.3. Hemoglobin 10.4. Platelets 79. Telemetry shows normal sinus rhythm  CXR, small left lateral effusion and small basilar atelectasis. Most recent echo, January 2023 showed EF 50%, LVH seen, there is a small circumferential pericardial effusion with no t tamponade on physiology      Seen with multidisciplinary ICU team.  Meets Continued ICU Level Care Criteria:    [x] Yes   [] No - Transfer Planned to listed location:  [] HOSPITALIST CONTACTED-      Case and plan discussed with Dr. Kerry Rose MD      Electronically signed by Randy Fairbanks DO, PGY-1  CRITICAL CARE SPECIALIST  Patient seen by me including key components of medical care. Case discussed with resident physician. Remains critical on pressors. See significant event. Italicized font, if present,  represents changes to the note made by me. CC time 35 minutes. Time was discontiguous. Time does not include procedure. Time does include my direct assessment of the patient and coordination of care. Time represents more than 50% of the time involved with patient care by the [address] [place] [address] team.  Electronically signed by Jazmyne Hernández.  Kerry Rose MD.

## 2023-02-07 NOTE — FLOWSHEET NOTE
Stable 3.5 hour TX. Removed no fluid as ordered. Gave albumin 25g at the start of dialysis as ordered. Bilateral cath ports flushed with normal saline, clamped, and secured with tegos. CVC drsg clean, dry, and intact. Report given to primary RN. TX record printed for scanning into EMR.    02/07/23 1225 02/07/23 5548   Vital Signs   /60 (!) 90/50   Temp 97 °F (36.1 °C) 97.4 °F (36.3 °C)   Heart Rate 59 70   Resp 14 15   SpO2  --  95 %   Weight 123 lb 3.8 oz (55.9 kg) 123 lb 3.8 oz (55.9 kg)   Weight Method  --  Bed scale   Percent Weight Change 0 -4.77   Post-Hemodialysis Assessment   Post-Treatment Procedures  --  Blood returned;Catheter Capped, clamped with Saline x2 ports   Machine Disinfection Process  --  Acid/Vinegar Clean;Heat Disinfect; Exterior Machine Disinfection   Blood Volume Processed (Liters)  --  78.4 l/min   Dialyzer Clearance  --  Lightly streaked   Duration of Treatment (minutes)  --  210 minutes   Heparin Amount Administered During Treatment (mL)  --  0 mL   Hemodialysis Intake (ml)  --  500 ml   Hemodialysis Output (ml)  --  500 ml   NET Removed (ml)  --  0   Tolerated Treatment  --  Good

## 2023-02-07 NOTE — SIGNIFICANT EVENT
dextrose      amiodarone 0.5 mg/min (02/07/23 0945)    sodium chloride      norepinephrine 6 mcg/min (02/07/23 0945)      calcium replacement protocol   Other RX Placeholder    [Held by provider] lactated ringers bolus  500 mL IntraVENous Once    midodrine  15 mg Oral TID WC    cefTRIAXone (ROCEPHIN) IV  2,000 mg IntraVENous Once    sodium chloride flush  5-40 mL IntraVENous 2 times per day    sodium bicarbonate  650 mg Oral BID    [START ON 2/10/2023] vitamin D  50,000 Units Oral Weekly    Patient seen by me including key components of medical care. Case discussed with resident physician. On pressors. Abdominal pain. Paracentesis completed and specimen sent to lab. Still requiring pressors. For dialysis later today. Italicized font, if present,  represents changes to the note made by me. CC time 35 minutes. Time was discontiguous. Time does not include procedure. Time does include my direct assessment of the patient and coordination of care. Time represents more than 50% of the time involved with patient care by the [address] [place] [address] team.  Electronically signed by Blaine Freed.  Fransisco Isaacs MD.

## 2023-02-07 NOTE — PROGRESS NOTES
Pt was in bed as he was doing dialysis. He was hopeful but wanted prayer to cope and heal. He was blessed.     02/07/23 1507   Encounter Summary   Encounter Overview/Reason  Initial Encounter   Service Provided For: Friend   Referral/Consult From: [facility] Family members   Last Encounter  02/07/23   Complexity of Encounter Low   Begin Time 1220   End Time  1225   Total Time Calculated 5 min   Spiritual/Emotional needs   Type Spiritual Support   Assessment/Intervention/Outcome   Assessment Calm   Intervention Empowerment   Outcome Encouraged

## 2023-02-07 NOTE — PROCEDURES
Central Venous Catheterization Procedure Note    Indication:  [x] Lack of adequate peripheral iv access    [x] Infusion of medications with high risk of extravasation injury  [] Hemodynamic monitoring  [] Extracorporeal therapies  [] Other                     Time Out:  [x] Time out was completed immediately prior to the start of the procedure which included verification of the correct patient, correct site and agreement on the procedure to be done. [] Time out was not done because procedure was emergent      Consent:  [x] The patient/surrogate decision maker was informed of the procedure indications, risks, benefits and alternatives. Questions were answered and consent was obtained to proceed with the procedure. [] Consent was not obtained, because the procedure was emergent or patient was unable to consent and surrogate decision maker was not available. Type of Central Line Being Placed:   [x] Central venous    [] Hemodialysis  [] Pulmonary artery    Location:   [x] Internal Jugular Vein [] Right [x] Left  [] Subclavian Vein  [] Right [] Left  [] Femoral Vein   [] Right [] Left      Central Line Bundle:  [x] I washed/disinfected my hands prior to starting procedure  [x] Hat      [x] Mask      [x] Sterile gown      [x] Sterile gloves were worn throughout the procedure  [x] Everyone in the room during the procedure wore a mask  [x] Chlorhexidine was utilized to prep the skin and allowed to dry completely  [] Povidone-iodine was used to prep the skin and allowed to dry completely  [x] Full body drape was used to cover the patient    Ultrasound Guidance:   [x] Yes      [] No    Anesthetic Used:  [x] Lidocaine      [] Other    Sterile Technique Used Throughout Procedure?   [x] Yes      [] No    Description/Findings:   [x] Dark nonpulsatile blood return obtained from all ports  [] Appropriate wavefom(s) seen  [] Other    Complications:  [x] None apparent  [] Other:    Blood Cultures obtained during line insertion due to the following indication(s):  No indication(s) met for blood culture collection    Follow-up x-ray: Completed. Chest x-ray showed catheter actually went to left subclavian vein> axillary vein. Under sterile condition, with new central line kit, utilized ultrasound. Guidewire entered and removed with central line replaced with a new one. Confirmed by X2 chest x-ray section of the central line, and successfully repositioned the central line. Procedure Performed By: Dr Phoenix Alarcon PGY-2 , IM. If supervised: Critical care provider -Rylan Wade   was physically present and supervised, all key elements of this procedure.     Electronically signed by Marc Jones DO, PGY-2, internal medicine, on 2/6/2023 at 11:41 PM

## 2023-02-07 NOTE — CARE COORDINATION
Case Management Assessment  Initial Evaluation    Date/Time of Evaluation: 2/7/2023 11:53 AM  Assessment Completed by: Gabriella Levin RN    If patient is discharged prior to next notation, then this note serves as note for discharge by case management. Patient Name: Lora Ozuna                   YOB: 1960  Diagnosis: Hypotension [I95.9]  Hypotension, unspecified hypotension type [I95.9]                   Date / Time: 2/6/2023  4:58 PM  Location: [address] [place] [address]     Patient Admission Status: Inpatient   Readmission Risk Low 0-14, Mod 15-19), High > 20: Readmission Risk Score: 20.3    Current PCP: Natali Uribe, DO  PCP verified by CM? Yes    Chart Reviewed: Yes      History Provided by: Patient  Patient Orientation: Alert and Oriented    Patient Cognition: Alert    Hospitalization in the last 30 days (Readmission):  Yes    If yes, Readmission Assessment in CM Navigator will be completed. Advance Directives:      Code Status: Full Code   Patient's Primary Decision Maker is: Patient Declined (Legal Next of Kin Remains as Decision Maker)      Discharge Planning:    Patient lives with: Spouse/Significant Other Type of Home: House  Primary Care Giver: Self  Patient Support Systems include: Spouse/Significant Other, Children   Current Financial resources: Other (Comment) (Commercial BCBS)  Current community resources: Other (Comment) (HD at [address] [place] [address]; Chemo 1x/wk in [facility])  Current services prior to admission: Durable Medical Equipment, Other (Comment) (HD @ [address] [place] [address] chair time; Chemo in [place] [address])            Current DME: Cristian Perea, Other (Comment) ([employer])            Type of Home Care services:  None    ADLS  Prior functional level: Independent in ADLs/IADLs  Current functional level: Independent in ADLs/IADLs    Family can provide assistance at DC:  Yes  Would you like Case Management to discuss the discharge plan with any other family members/significant others, and if so, who? No  Plans to Return to Present Housing: Other (see comment) (Daughter wanting SNF for rehab at discharge)  Other Identified Issues/Barriers to RETURNING to current housing: Daughter and Neph recommending SNF   Potential Assistance needed at discharge:              Potential DME:    Patient expects to discharge to: Saúl Kaplan for transportation at discharge: Family    Financial    Payor: BCBS / Plan: [facility] PPO / Product Type: *No Product type* /     Does insurance require precert for SNF: Yes    Potential assistance Purchasing Medications: No  Meds-to-Beds request:        [id number] [address], [address] [facility] [phone] [place] [phone] [address] [address]  Phone: [phone] Fax: [phone]      Notes:    Factors facilitating achievement of predicted outcomes: Family support and Cooperative    Barriers to discharge: Medical complications and Medication managment    Additional Case Management Notes: Presented to [facility] with c/o feeling sick, n/v, and abdominal distention d/t ascites. Found to be hypotensive, started on levophed, and had run of [employer] given. Transferred to [facility] ICU. Hx of ESRD on HD, suspicion for SBP. Nephrology consulted. Cardiology consulted for Vtach episodes, systemic amyloidosis. CVC placed. Afebrile. NSR. On room air. Ox4. Follows commands. PT/OT. Telemetry, SCDs. Amio @ 0.5 mg/min, Levo @ 1 mcg/min, IV rocephin, midodrine 15 mg tid, prn zofran, sodium bicarb tabs, Electrolyte replacement protocols. Received IV rocephin x1 yesterday and IV albumin x1 today. CO2 20 - now 19, BUN 43 - now 46, Creat 5.7 - now 6.1, Ical 0.95 - now 1.06, LA 2.4 - up to 3.2 - now 2.5, phos 6.6, procal 4.68, alb 2.9 -now 2.7, alk phos 2432 - now 2255, alt 135 - now 125, ast 81 - now 72, bili 4.6- now 3.8, direct bili 3.7 - now 3.1, total pro 5.2 - now 4.9, hgb 12.5 - now 11.4, plt 89 - now 77. Blood and urine cultures sent. Procedure:   2/6 CXR: Small left pleural effusion with left basal atelectasis  2/6 CVC [facility]  2/6 Repeat CXR: Left basilar subsegmental atelectasis    The Plan for Transition of Care is related to the following treatment goals of Hypotension [I95.9]  Hypotension, unspecified hypotension type [I95.9]    Patient Goals/Plan/Treatment Preferences: From home with girlfriend. HD MWF at [address] [place] [address] with 3761 chair time. Chemo 1x/wk in [facility]. SW on case; plan for SNF for rehab. Will require precert. Transportation/Food Security/Housekeeping Addressed: No issues identified.      Jane Rivera RN  Case Management Department none

## 2023-02-07 NOTE — FLOWSHEET NOTE
1036- Lidocaine given per Dr. Mahin Spaulding. 1037- Procedure start. 1038- Drain placed per Dr. Mahin Spaulding. 1105- Paracentesis completed. 5500ml removed. Samples sent to labs. Pt tolerated procedure well.

## 2023-02-07 NOTE — CONSULTS
"[facility]   Physical Therapy Treatment Note     Name: Keren Huertamo  Clinic Number: [id number]    Therapy Diagnosis:   Encounter Diagnoses   Name Primary?    Limited joint range of motion (ROM) Yes    Shoulder weakness     Rotator cuff syndrome of right shoulder      Physician: Jose Angel Isaac,*    Visit Date: 3/24/2022    Physician Orders: PT Eval and Treat   Medical Diagnosis from Referral: Rotator cuff syndrome of right shoulder  Evaluation Date: 3/22/2022  Authorization Period Expiration: 12/31/2022  Plan of Care Expiration: 5/5/2022  Progress Note Due: 5/5/2022  Visit # / Visits authorized: 2/ 12   FOTO: Next survey due week of 4/4/2022    Precautions: Standard     PTA Visit #: 0/5     Time In: 1237  Time Out: 1328  Total Billable Time: 40 minutes    SUBJECTIVE     Pt reports: "Got rid of the sling".  She was compliant with home exercise program.  Response to previous treatment: A little sore  Functional change: None yet noted    Pain: 5/10  Location: right anterolateral shoulder      OBJECTIVE     Objective Measures updated at progress report unless specified.     Treatment     Keren received the treatments listed below:      therapeutic exercises to develop strength and ROM for 30 minutes including:   Pulley assisted ROM x 2' ea    Flexion    Abduction   Wall climbs x 10   Reviewed pendulum exercises   Shoulder shrugs x 10    Submax isometric exercise x 10 ea    Flexion    Extension    Abduction    Adduction    External rotation    Internal rotation   Supine wand exercises 2x10 ea    Flexion    Abduction    ER/IR    Serratus punch    manual therapy techniques: Joint mobilizations and PROM were applied to the: R shoulder for 10 minutes, including:   Grade II superior to inferior glide, anterior to posterior glide, lateral distraction to R GH joint   PROM to R shoulder    Cold pack to R shoulder for 10 minutes      Patient Education and Home Exercises     Home Exercises " [facility]    [address]  [facility]  2/7/2023 10:35 AM    Pt Name:    Howard Foreman  MRN:     [id number]   [id number]  YOB: 1960  Admit Date:    2/6/2023  4:58 PM  Primary Care Physician:  Roland Padron DO    [facility] Number:   [id number]    Reason for Consult:  ESRD on hemodialysis  Requesting provider:  ICU    History:   The patient is a 63 y.o. white male with history of amyloidosis, ESRD on hemodialysis, biopsy-proven renal amyloidosis. Patient subsequently underwent bone marrow biopsy. He is currently on chemotherapy per oncology. Has chronic hypotension. Blood pressure chronically stays in the systolic 17Q. He is on high-dose midodrine. Thought to have underlying autonomic dysfunction from amyloidosis. Patient presented to outside facility emergency room with complaints of abdominal distention, generalized weakness, fatigue as well as hypotension. He reports generalized weakness and extreme fatigue. Also reports some diarrhea. Reports significant abdominal distention. No alleviating or aggravating factors. He was given IV fluids. He was also started on Levophed at outside hospital.  Subsequently patient developed wide-complex tachycardia. He received potassium supplementation for hypokalemia. Patient was then transferred to [facility]. Nephrology consulted for management of ESRD. Patient seen and examined. Currently on IV amiodarone drip. Not requiring any pressors. His major complaint right now is abdominal distention and discomfort. He is awaiting paracentesis. Patient usually gets dialyzed on Mondays, Wednesdays and Fridays.   Patient did not have dialysis treatment yesterday    Past Medical History:  Past Medical History:   Diagnosis Date    Bilateral inguinal hernia     CKD (chronic kidney disease) stage 3, GFR 30-59 ml/min ([facility])     Hypoalbuminemia     Monoclonal gammopathy Past Surgical History:  Past Surgical History:   Procedure Laterality Date    APPENDECTOMY      COLONOSCOPY      CT BIOPSY RENAL  10/26/2022    CT BIOPSY RENAL [facility] CT SCAN    CT BIOPSY RENAL  10/26/2022    CT BIOPSY RENAL 10/26/2022 [facility] CT SCAN    CT BONE MARROW BIOPSY  11/16/2022    CT BONE MARROW BIOPSY 11/16/2022 [facility] CT SCAN    HERNIA REPAIR      double    KNEE SURGERY Left     TONSILLECTOMY         Family History:  Family History   Problem Relation Age of Onset    Hypertension Mother     Hypertension Father     Cancer Father         liver    Pancreatic Cancer Brother     Diabetes Paternal Grandmother        Social History:  Social History     Socioeconomic History    Marital status: Single     Spouse name: Not on file    Number of children: Not on file    Years of education: Not on file    Highest education level: Not on file   Occupational History    Not on file   Tobacco Use    Smoking status: Every Day     Packs/day: 0.50     Years: 40.00     Pack years: 20.00     Types: Cigarettes    Smokeless tobacco: Never    Tobacco comments:     Quit last month   Vaping Use    Vaping Use: Former   Substance and Sexual Activity    Alcohol use: Not Currently    Drug use: Never    Sexual activity: Not on file   Other Topics Concern    Not on file   Social History Narrative    Not on file     Social Determinants of Health     Financial Resource Strain: Not on file   Food Insecurity: Not on file   Transportation Needs: Not on file   Physical Activity: Not on file   Stress: Not on file   Social Connections: Not on file   Intimate Partner Violence: Not on file   Housing Stability: Not on file       Home Meds:  Prior to Admission medications    Medication Sig Start Date End Date Taking?  Authorizing Provider   loperamide (IMODIUM) 2 MG capsule Take 1 capsule by mouth as needed for Diarrhea 1/31/23 5/1/23  Rosetta Esparza MD   MULTIPLE VITAMIN PO Take 1 tablet by mouth    Historical Provider, MD   midodrine (PROAMATINE) 5 Provided and Patient Education Provided     Education provided:   - Advanced HEP, reviewed pendulum exercises    Written Home Exercises Provided: yes. Exercises were reviewed and Keren was able to demonstrate them prior to the end of the session.  Keren demonstrated good  understanding of the education provided. See EMR under Patient Instructions for exercises provided during therapy sessions    ASSESSMENT     Increased PROM of R shoulder achieved today with decreased complaints of discomfort.  Tolerated initiation of AAROM with minimal increase in pain.      Keren Is progressing well towards her goals.   Pt prognosis is Good.     Pt will continue to benefit from skilled outpatient physical therapy to address the deficits listed in the problem list box on initial evaluation, provide pt/family education and to maximize pt's level of independence in the home and community environment.     Pt's spiritual, cultural and educational needs considered and pt agreeable to plan of care and goals.     Anticipated barriers to physical therapy: None    Goals:   Short Term Goals: 3 weeks    1) Decrease max pain to < 5/10 Minimal progress   2) Facilitate improved upper quadrant flexibility Initiated   3) Decrease tenderness to minimal Minimal progress   4) Patient will consistently reach to top of head using R UE to allow for ease in hair dressing. Initiated    Long Term Goals: 6 weeks    1) Achieve > 75% of normal AROM of R shoulder to allow reaching to 2nd shelf in cabinet Initiated   2) Patient will sleep > 6 hours per night without disturbance by shoulder pain  Ongoing   3) Patient will resume light housework  Ongoing   4) Patient will consistently perform all self care activities without restriction Ongoing   5) Decrease functional deficit to < 40% as indicated by FOTO shoulder survey Ongoing   6) Patient will be independent in HEP for ROM and light strengthening   Initiated      PLAN     Progress ROM as tolerated.  Instruct  MG tablet Take 3 tablets by mouth every 8 (eight) hours 1/21/23   Russ Hale DO   Ergocalciferol (VITAMIN D) 76376 units CAPS Take 50,000 Units by mouth once a week 1/27/23 2/26/23  Russ Hale DO   sodium bicarbonate 650 MG tablet Take 1 tablet by mouth 2 times daily  Patient not taking: No sig reported 12/6/22   Bob Sanchez MD       Review of Systems:  Constitutional: Positive for generalized weakness, fatigue, abdominal distention  Head: Negative for headaches  Eyes: Negative for blurry vision or discharge  Ears: Negative for ear pain or hearing changes  Nose: Negative for runny nose or epistaxis  Respiratory: Negative for shortness of breath. Negative for cough or sputum production. Negative for hemoptysis  Cardiovascular: Negative for chest pain  GI: Positive for abdominal distention  Skin: Negative for rash  Musculoskeletal: Negative for joint pain, moves all ext      All other review of systems were reviewed and negative    Current Meds:  Infusion:    dextrose      amiodarone 0.5 mg/min (02/07/23 0945)    sodium chloride      norepinephrine 6 mcg/min (02/07/23 0945)     Meds:    calcium replacement protocol   Other RX Placeholder    [Held by provider] lactated ringers bolus  500 mL IntraVENous Once    midodrine  15 mg Oral TID WC    cefTRIAXone (ROCEPHIN) IV  2,000 mg IntraVENous Once    sodium chloride flush  5-40 mL IntraVENous 2 times per day    sodium bicarbonate  650 mg Oral BID    [START ON 2/10/2023] vitamin D  50,000 Units Oral Weekly     Meds prn: ondansetron, glucose, dextrose bolus **OR** dextrose bolus, glucagon (rDNA), dextrose, sodium chloride flush, sodium chloride, polyethylene glycol, acetaminophen **OR** acetaminophen, albuterol     Allergies/Intolerances: ALLERGIES: Patient has no known allergies.     24HR INTAKE/OUTPUT:    Intake/Output Summary (Last 24 hours) at 2/7/2023 1035  Last data filed at 2/7/2023 0945  Gross per 24 hour   Intake 858.46 ml   Output --   Net pt in AROM exercises as patient tolerates.  Moist heat/cold pack to facilitate ROM and pain reduction.      Laura Marcum, PT      858.46 ml     I/O last 3 completed shifts: In: 781 [I.V.:553.3; IV Piggyback:227.7]  Out: -   I/O this shift: In: 77.5 [I.V.:71.7; IV Piggyback:5.8]  Out: -   Admission weight: 129 lb 6.6 oz (58.7 kg)  Wt Readings from Last 3 Encounters:   02/06/23 129 lb 6.6 oz (58.7 kg)   01/31/23 133 lb 12.8 oz (60.7 kg)   01/24/23 132 lb 3.2 oz (60 kg)     Body mass index is 18.57 kg/m². Physical Examination:  VITALS:   Vitals:    02/07/23 0915 02/07/23 0930 02/07/23 0945 02/07/23 1000   BP: (!) 85/55 84/61 (!) 83/57 92/62   Pulse: 66 65 64 65   Resp: 17 (!) 9 16 16   Temp:       TempSrc:       SpO2: 95% 94% 94% 96%   Weight:       Height:         Weight:   Wt Readings from Last 3 Encounters:   02/06/23 129 lb 6.6 oz (58.7 kg)   01/31/23 133 lb 12.8 oz (60.7 kg)   01/24/23 132 lb 3.2 oz (60 kg)     Constitutional and General Appearance: Chronic cachectic appearance, ill-appearing, severe muscle wasting, temporal wasting  Eyes: no icteric sclera in left eye or right eye, better conjunctive of both eyes noted  Ears and Nose: normal external appearance of left and right ear. Both ear lobules are nontender to palpation. Normal external appearance of nose. No active drainage from nose.    Oral: moist oral mucus membranes  Neck: No jugular venous distention  Lungs: Air entry diminished, no rhonchi  Chest: No chest wall tenderness  Heart: S1, S2  Extremities: No significant LE edema, no tenderness  GI: + Distended, no guarding, + mild tenderness on palpation  Skin: warm to touch  Musculo: No obvious joint deformities noted  Neuro: no slurred speech, no facial drooping noted  Psychiatric: Normal mood and affect, Not agitated    Lab Data  CBC:   Recent Labs     02/06/23  1745 02/07/23  0104   WBC 10.8 9.4   HGB 12.5* 11.4*   HCT 34.6* 32.1*   PLT 89* 77*     BMP:  Recent Labs     02/06/23  1745 02/07/23  0104    140   K 4.5 4.5   CL 98 100   CO2 20* 19*   BUN 43* 46*   CREATININE 5.7* 6.1*   GLUCOSE 193* 185*   CALCIUM 8.0* 7.9*   MG 2.2  --      Hepatic:   Recent Labs     02/06/23  1745 02/07/23  0104   LABALBU 2.9* 2.7*   AST 81* 72*   * 125*   BILITOT 4.6* 3.8*   ALKPHOS 2,432* 2,255*     Diagnostics: Chest x-ray shows left basilar segmental atelectasis    Old tests reviewed.   Echo: EF 50%, pericardial effusion small to moderate without any tamponade (January 2023)    Impression and Plan:  ESRD on hemodialysis.  ESRD secondary to biopsy-proven AL amyloidosis.  Patient did not have dialysis treatment yesterday.  Serum creatinine is 6.1 in setting of poor muscle mass.  Needs dialysis treatment today.  Will use chronic dialysis orders from his outpatient dialysis unit.  Metabolic acidosis.  Will correct with dialysis  Biopsy-proven AL renal amyloidosis  Chronic autonomic dysfunction  Chronic hypotension on midodrine  Ascites.  Awaiting paracentesis.  Patient does have some mild abdominal tenderness.  Awaiting paracentesis.  Send fluid for studies.  On antibiotics  Elevated liver enzymes  Anemia in ESRD  Chronic thrombocytopenia    Thank you for the consult. Please feel free to call me if you have any questions.     Young Chacon MD  [facility]    Addendum  Patient seen during dialysis in ICU at bedside  Tolerating hemodialysis well  Also underwent paracentesis--about 6 L was removed  Blood pressure at this time is 102 systolic  Ordered IV albumin if blood pressure drops below 100  No ultrafiltration needed at this time  Discussed with HD RN      This report has been created using voice recognition software. It may contain minor errors which are inherent in voice recognition technology.

## 2023-02-07 NOTE — CARE COORDINATION
2/7/23, 2:11 PM EST    DISCHARGE PLANNING EVALUATION     Pc received from pts daughter Rosana Kendrick, states they prefer [address] and [address] [facility]. JESSICA spoke with Ronald Trevino at [facility], they have no beds available for 1 to 2 weeks. Jessica left message for Dawood Aquino in Admissions at [facility] to return call. JESSICA spoke with Marie  with [facility], she will have [place] [address] review and work out Chemo situation. Pt may need to get chemo through [address] [place] [address] while pt is in skilled care.

## 2023-02-07 NOTE — PROCEDURES
PARACENTESIS:    Risks and benefits to the procedure were discussed. Alternatives and their risks were discussed as well. INDICATION:  Symptomatic ascites     US Interpretation:  Ultrasound was utilized to identify pocket of fluid, and area was marked. COMPLICATIONS:  None    FLUID:   5.5L    PROCEDURE:  After informed consent was obtained, and area was marked for procedure, patient was prepped with chlorhexidine prep and draped with sterile or towels. Maintaining sterile technique with sterile gloves, patient received lidocaine locally for anesthesia. A small incision was made in the skin. Utilizing a number 5 [place] catheter was advanced into the peritoneal cavity as noted by fluid return. Catheter was threaded over introducer needle. Introducer needle was removed leaving the catheter in place. Fluid was removed until no more flow occurred. Catheter was removed. Electronically signed by Marilee Bailey MD.

## 2023-02-07 NOTE — PLAN OF CARE
Problem: Discharge Planning  Goal: Discharge to home or other facility with appropriate resources  2/7/2023 1016 by Kelsy Martinez RN  Outcome: Progressing  Flowsheets (Taken 2/7/2023 1010)  Discharge to home or other facility with appropriate resources: Identify barriers to discharge with patient and caregiver  Note: Discharge planning in process and discussed with patient/family. Social work consulted for any additional needs. Care manager aware of discharge needs. Problem: Pain  Goal: Verbalizes/displays adequate comfort level or baseline comfort level  2/7/2023 1016 by Kelsy Martinez RN  Outcome: Progressing  Verbalizes/displays adequate comfort level or baseline comfort level:   Encourage patient to monitor pain and request assistance   Assess pain using appropriate pain scale   Administer analgesics based on type and severity of pain and evaluate response   Implement non-pharmacological measures as appropriate and evaluate response   Consider cultural and social influences on pain and pain management   Notify Licensed Independent Practitioner if interventions unsuccessful or patient reports new pain  Note: Patient able to use 0-10 pain scale. Denies pain at this time. Agreeable to take PRN pain medications. Problem: Skin/Tissue Integrity  Goal: Absence of new skin breakdown  Description: 1. Monitor for areas of redness and/or skin breakdown  2. Assess vascular access sites hourly  3. Every 4-6 hours minimum:  Change oxygen saturation probe site  4. Every 4-6 hours:  If on nasal continuous positive airway pressure, respiratory therapy assess nares and determine need for appliance change or resting period. Outcome: Progressing  Note: No signs of new skin breakdown with each assessment. Skin remains warm, dry, intact. Mucous membranes pink & moist. Assisting patient with turning and repositioning.         Problem: Cardiovascular - Adult  Goal: Maintains optimal cardiac output and hemodynamic stability  Outcome: Progressing  Flowsheets (Taken 2/7/2023 1016)  Maintains optimal cardiac output and hemodynamic stability:   Monitor blood pressure and heart rate   Assess for signs of decreased cardiac output  Note:   Vitals:    02/07/23 0915 02/07/23 0930 02/07/23 0945 02/07/23 1000   BP: (!) 85/55 84/61 (!) 83/57 92/62   Pulse: 66 65 64 65   Resp: 17 (!) 9 16 16   Temp:       TempSrc:       SpO2: 95% 94% 94% 96%   Weight:       Height:              Problem: Genitourinary - Adult  Goal: Absence of urinary retention  Outcome: Progressing  Flowsheets (Taken 2/7/2023 1016)  Absence of urinary retention: Assess patients ability to void and empty bladder     Problem: Metabolic/Fluid and Electrolytes - Adult  Goal: Hemodynamic stability and optimal renal function maintained  Outcome: Progressing  Flowsheets (Taken 2/7/2023 1016)  Hemodynamic stability and optimal renal function maintained: Monitor labs and assess for signs and symptoms of volume excess or deficit  Note:   Vitals:    02/07/23 0915 02/07/23 0930 02/07/23 0945 02/07/23 1000   BP: (!) 85/55 84/61 (!) 83/57 92/62   Pulse: 66 65 64 65   Resp: 17 (!) 9 16 16   Temp:       TempSrc:       SpO2: 95% 94% 94% 96%   Weight:       Height:            Care plan reviewed with patient. Patient verbalizes understanding of the plan of care and contributed to goal setting.

## 2023-02-07 NOTE — CARE COORDINATION
DISCHARGE PLANNING EVALUATION  2/7/23, 11:40 AM EST    Reason for Referral: Placement  Mental Status: alert and oriented. Decision Making: makes own decisions. Family/Social/Home Environment: Assessment completed with pts neeta Bae over the phone. Daughter states pt lives with his girlfriend and has been caring for himself up until a week or two ago. Pt is able to use his walker in the home and walk to kitchen and bathroom on his own. Pt uses his electric scooter for going outside. Daughter states she has been helping with bathing, helping pt get dressed, etc due to his low blood pressure, dizziness and increased weakness. Daughter states pt is in agreement of going to a SNF for rehab and will decide where he prefers and get back with SW. SW did suggest daughter get on line Medicare. gov to see what is available in pts area and the ratings. Daughter agreed. Current Services including food security, transportation and housekeeping:  see note above. Current Equipment: electric scooter and a walker. Payment Source:BC, pt is currently on medical leave from work. Daughter states pt will received 18 months of [employer] after medical leave is depleted. Daughter also states she applied for Medicaid and still has a gas bill to turn in today. Concerns or Barriers to Discharge:  none reported at this time. Post-acute [facility]) provider list was provided to patient. Patient was informed of their freedom to choose [facility] provider. Discussed and offered to show the patient the relevant [facility] Providers quality and resource use measures on Medicare Compare web site via computer based on patient's goals of care and treatment preferences. Questions regarding selection process were answered. Teach Back Method used with daughter Tony Renu regarding care plan and discharge planning. Daughter verbalized understanding of the plan of care and contribute to goal setting.        Patient goals, treatment preferences and discharge plan:  pt planning SNF for rehab prior to pt returning home. Daughter to call sw when they have decided on their top 3 preferences. PRECERT required.      Electronically signed by Debbe Phalen, LSW on 2/7/2023 at 11:40 AM

## 2023-02-08 PROBLEM — R65.21 SEPTIC SHOCK (HCC): Status: ACTIVE | Noted: 2023-02-08

## 2023-02-08 PROBLEM — A41.9 SEPTIC SHOCK (HCC): Status: ACTIVE | Noted: 2023-01-01

## 2023-02-08 LAB
ANION GAP SERPL CALC-SCNC: 15 MEQ/L (ref 8–16)
BACTERIA SPEC AEROBE CULT: NORMAL
BUN SERPL-MCNC: 22 MG/DL (ref 7–22)
CA-I BLD ISE-SCNC: 1.02 MMOL/L (ref 1.12–1.32)
CALCIUM SERPL-MCNC: 8.1 MG/DL (ref 8.5–10.5)
CHLORIDE SERPL-SCNC: 96 MEQ/L (ref 98–111)
CO2 SERPL-SCNC: 25 MEQ/L (ref 23–33)
CREAT SERPL-MCNC: 3.3 MG/DL (ref 0.4–1.2)
DEPRECATED RDW RBC AUTO: 63.1 FL (ref 35–45)
EKG ATRIAL RATE: 70 BPM
EKG P AXIS: 45 DEGREES
EKG P-R INTERVAL: 172 MS
EKG Q-T INTERVAL: 510 MS
EKG QRS DURATION: 86 MS
EKG QTC CALCULATION (BAZETT): 550 MS
EKG R AXIS: -28 DEGREES
EKG T AXIS: 79 DEGREES
EKG VENTRICULAR RATE: 70 BPM
ERYTHROCYTE [DISTWIDTH] IN BLOOD BY AUTOMATED COUNT: 22.5 % (ref 11.5–14.5)
GFR SERPL CREATININE-BSD FRML MDRD: 21 ML/MIN/1.73M2
GLUCOSE SERPL-MCNC: 110 MG/DL (ref 70–108)
HCT VFR BLD AUTO: 25.3 % (ref 42–52)
HGB BLD-MCNC: 9.1 GM/DL (ref 14–18)
MCH RBC QN AUTO: 31.7 PG (ref 26–33)
MCHC RBC AUTO-ENTMCNC: 36 GM/DL (ref 32.2–35.5)
MCV RBC AUTO: 88.2 FL (ref 80–94)
PLATELET # BLD AUTO: 57 THOU/MM3 (ref 130–400)
PMV BLD AUTO: ABNORMAL FL (ref 9.4–12.4)
POTASSIUM SERPL-SCNC: 3.8 MEQ/L (ref 3.5–5.2)
PROCALCITONIN SERPL IA-MCNC: 4.66 NG/ML (ref 0.01–0.09)
RBC # BLD AUTO: 2.87 MILL/MM3 (ref 4.7–6.1)
SODIUM SERPL-SCNC: 136 MEQ/L (ref 135–145)
WBC # BLD AUTO: 6.4 THOU/MM3 (ref 4.8–10.8)

## 2023-02-08 PROCEDURE — 2000000000 HC ICU R&B

## 2023-02-08 PROCEDURE — 93005 ELECTROCARDIOGRAM TRACING: CPT

## 2023-02-08 PROCEDURE — 6370000000 HC RX 637 (ALT 250 FOR IP)

## 2023-02-08 PROCEDURE — 99291 CRITICAL CARE FIRST HOUR: CPT | Performed by: INTERNAL MEDICINE

## 2023-02-08 PROCEDURE — 6360000002 HC RX W HCPCS: Performed by: INTERNAL MEDICINE

## 2023-02-08 PROCEDURE — 36415 COLL VENOUS BLD VENIPUNCTURE: CPT

## 2023-02-08 PROCEDURE — 97166 OT EVAL MOD COMPLEX 45 MIN: CPT

## 2023-02-08 PROCEDURE — 6360000002 HC RX W HCPCS

## 2023-02-08 PROCEDURE — 2580000003 HC RX 258

## 2023-02-08 PROCEDURE — P9047 ALBUMIN (HUMAN), 25%, 50ML: HCPCS | Performed by: INTERNAL MEDICINE

## 2023-02-08 PROCEDURE — 6370000000 HC RX 637 (ALT 250 FOR IP): Performed by: NURSE PRACTITIONER

## 2023-02-08 PROCEDURE — 99232 SBSQ HOSP IP/OBS MODERATE 35: CPT | Performed by: INTERNAL MEDICINE

## 2023-02-08 PROCEDURE — 97110 THERAPEUTIC EXERCISES: CPT

## 2023-02-08 PROCEDURE — 82330 ASSAY OF CALCIUM: CPT

## 2023-02-08 PROCEDURE — 85027 COMPLETE CBC AUTOMATED: CPT

## 2023-02-08 PROCEDURE — 84145 PROCALCITONIN (PCT): CPT

## 2023-02-08 PROCEDURE — 93010 ELECTROCARDIOGRAM REPORT: CPT | Performed by: INTERNAL MEDICINE

## 2023-02-08 PROCEDURE — 80048 BASIC METABOLIC PNL TOTAL CA: CPT

## 2023-02-08 RX ORDER — MIDODRINE HYDROCHLORIDE 10 MG/1
20 TABLET ORAL
Status: DISCONTINUED | OUTPATIENT
Start: 2023-02-09 | End: 2023-02-09

## 2023-02-08 RX ORDER — CALCIUM GLUCONATE 20 MG/ML
2000 INJECTION, SOLUTION INTRAVENOUS ONCE
Status: COMPLETED | OUTPATIENT
Start: 2023-02-08 | End: 2023-02-08

## 2023-02-08 RX ORDER — DIPHENHYDRAMINE HCL 25 MG
25 TABLET ORAL NIGHTLY PRN
Status: DISCONTINUED | OUTPATIENT
Start: 2023-02-08 | End: 2023-02-19 | Stop reason: HOSPADM

## 2023-02-08 RX ORDER — ALBUMIN (HUMAN) 12.5 G/50ML
25 SOLUTION INTRAVENOUS ONCE
Status: COMPLETED | OUTPATIENT
Start: 2023-02-08 | End: 2023-02-08

## 2023-02-08 RX ORDER — POTASSIUM CHLORIDE 20 MEQ/1
40 TABLET, EXTENDED RELEASE ORAL PRN
Status: DISCONTINUED | OUTPATIENT
Start: 2023-02-08 | End: 2023-02-08

## 2023-02-08 RX ORDER — POTASSIUM CHLORIDE 7.45 MG/ML
10 INJECTION INTRAVENOUS PRN
Status: DISCONTINUED | OUTPATIENT
Start: 2023-02-08 | End: 2023-02-08

## 2023-02-08 RX ADMIN — SODIUM BICARBONATE 650 MG: 650 TABLET ORAL at 20:04

## 2023-02-08 RX ADMIN — MIDODRINE HYDROCHLORIDE 15 MG: 10 TABLET ORAL at 12:59

## 2023-02-08 RX ADMIN — SODIUM BICARBONATE 650 MG: 650 TABLET ORAL at 08:59

## 2023-02-08 RX ADMIN — DIPHENHYDRAMINE HYDROCHLORIDE 25 MG: 25 TABLET ORAL at 02:30

## 2023-02-08 RX ADMIN — MIDODRINE HYDROCHLORIDE 15 MG: 10 TABLET ORAL at 17:53

## 2023-02-08 RX ADMIN — CEFTRIAXONE 2000 MG: 2 INJECTION, POWDER, FOR SOLUTION INTRAMUSCULAR; INTRAVENOUS at 20:03

## 2023-02-08 RX ADMIN — CALCIUM GLUCONATE 2000 MG: 20 INJECTION, SOLUTION INTRAVENOUS at 13:01

## 2023-02-08 RX ADMIN — POTASSIUM BICARBONATE 40 MEQ: 782 TABLET, EFFERVESCENT ORAL at 07:11

## 2023-02-08 RX ADMIN — MIDODRINE HYDROCHLORIDE 15 MG: 10 TABLET ORAL at 08:59

## 2023-02-08 RX ADMIN — ALBUMIN (HUMAN) 25 G: 0.25 INJECTION, SOLUTION INTRAVENOUS at 09:08

## 2023-02-08 RX ADMIN — SODIUM CHLORIDE, PRESERVATIVE FREE 10 ML: 5 INJECTION INTRAVENOUS at 08:59

## 2023-02-08 RX ADMIN — ACETAMINOPHEN 650 MG: 325 TABLET ORAL at 07:11

## 2023-02-08 RX ADMIN — SODIUM CHLORIDE, PRESERVATIVE FREE 10 ML: 5 INJECTION INTRAVENOUS at 20:04

## 2023-02-08 ASSESSMENT — PAIN SCALES - GENERAL: PAINLEVEL_OUTOF10: 4

## 2023-02-08 NOTE — PLAN OF CARE
Problem: Discharge Planning  Goal: Discharge to home or other facility with appropriate resources  Outcome: Progressing  Flowsheets (Taken 2/8/2023 1841)  Discharge to home or other facility with appropriate resources:   Identify barriers to discharge with patient and caregiver   Arrange for needed discharge resources and transportation as appropriate   Identify discharge learning needs (meds, wound care, etc)     Problem: Pain  Goal: Verbalizes/displays adequate comfort level or baseline comfort level  Outcome: Progressing  Flowsheets (Taken 2/8/2023 1841)  Verbalizes/displays adequate comfort level or baseline comfort level:   Encourage patient to monitor pain and request assistance   Assess pain using appropriate pain scale   Administer analgesics based on type and severity of pain and evaluate response   Implement non-pharmacological measures as appropriate and evaluate response     Problem: Skin/Tissue Integrity  Goal: Absence of new skin breakdown  Description: 1. Monitor for areas of redness and/or skin breakdown  2. Assess vascular access sites hourly  3. Every 4-6 hours minimum:  Change oxygen saturation probe site  4. Every 4-6 hours:  If on nasal continuous positive airway pressure, respiratory therapy assess nares and determine need for appliance change or resting period.   Outcome: Progressing  Note: Frequent position changes     Problem: Cardiovascular - Adult  Goal: Maintains optimal cardiac output and hemodynamic stability  Outcome: Progressing  Flowsheets (Taken 2/8/2023 1841)  Maintains optimal cardiac output and hemodynamic stability:   Monitor blood pressure and heart rate   Monitor urine output and notify Licensed Independent Practitioner for values outside of normal range   Assess for signs of decreased cardiac output     Problem: Cardiovascular - Adult  Goal: Absence of cardiac dysrhythmias or at baseline  Outcome: Progressing  Flowsheets (Taken 2/8/2023 1841)  Absence of cardiac dysrhythmias or at baseline:   Monitor cardiac rate and rhythm   Assess for signs of decreased cardiac output     Problem: Genitourinary - Adult  Goal: Absence of urinary retention  Outcome: Progressing  Flowsheets (Taken 2/8/2023 1841)  Absence of urinary retention: Assess patients ability to void and empty bladder     Problem: Genitourinary - Adult  Goal: Urinary catheter remains patent  Outcome: Progressing     Problem: Metabolic/Fluid and Electrolytes - Adult  Goal: Electrolytes maintained within normal limits  Outcome: Progressing  Flowsheets (Taken 2/8/2023 1841)  Electrolytes maintained within normal limits:   Monitor labs and assess patient for signs and symptoms of electrolyte imbalances   Administer electrolyte replacement as ordered   Monitor response to electrolyte replacements, including repeat lab results as appropriate   Fluid restriction as ordered     Problem: Metabolic/Fluid and Electrolytes - Adult  Goal: Hemodynamic stability and optimal renal function maintained  Outcome: Progressing  Flowsheets (Taken 2/8/2023 1841)  Hemodynamic stability and optimal renal function maintained:   Monitor labs and assess for signs and symptoms of volume excess or deficit   Monitor intake, output and patient weight   Monitor urine specific gravity, serum osmolarity and serum sodium as indicated or ordered   Monitor response to interventions for patient's volume status, including labs, urine output, blood pressure (other measures as available)     Problem: Metabolic/Fluid and Electrolytes - Adult  Goal: Glucose maintained within prescribed range  Outcome: Progressing  Flowsheets (Taken 2/8/2023 1841)  Glucose maintained within prescribed range:   Monitor blood glucose as ordered   Assess for signs and symptoms of hyperglycemia and hypoglycemia   Administer ordered medications to maintain glucose within target range     Problem: ABCDS Injury Assessment  Goal: Absence of physical injury  Outcome: Progressing  Note: Up when able     Problem: Safety - Adult  Goal: Free from fall injury  Outcome: Progressing  Flowsheets (Taken 2/8/2023 1841)  Free From Fall Injury: Instruct family/caregiver on patient safety   Care plan reviewed with patient and family. Patient and family verbalize understanding of the plan of care and contribute to goal setting.

## 2023-02-08 NOTE — FLOWSHEET NOTE
0800 Mr Ale Mathur rests in the bed. he has complaints of knee pain during his dialysis. He denies CP or SOB. He has a 3 lumen CVC right IJ site clear with Levophed infusing to keep his BP above ordered parameters. Dr Mickey Mederos has come to see and we will give Albumin and continue to try to wean off the Levophed. He has a tunneled dialysis catheter right SC site clear. He tolerates PO well.

## 2023-02-08 NOTE — PROGRESS NOTES
[address] THERAPY  [facility] ICU 4D  EVALUATION    Time:    Time In: 1017  Time Out: 1113  Timed Code Treatment Minutes: 55 Minutes  Minutes: 56          Date: 2023  Patient Name: Jessica Inman,   Gender: male      MRN: [id number]  : 1960  (63 y.o.)  Referring Practitioner: Kaden Elias DO  Diagnosis: hypotension  Additional Pertinent Hx: Per ER note on :62 years old male, brought from outside facility due to hypotension. Patient has past medical history of recently diagnosed with systemic amyloidosis, ESRD on hemodialysis, undifferentiated liver disease, newly diagnosis CHF preserved EF, nonsustained episodes of ventricular tachycardia. Patient states he felt sick and nauseous and went to the local hospital and transferred to [facility] for further evaluation and management. Currently patient alert and oriented X3, complains of nausea, episodes of vomiting, feeling sick abdominal distention due to ascites.     Restrictions/Precautions:  Restrictions/Precautions: Fall Risk  Position Activity Restriction  Other position/activity restrictions: monitor BP    Subjective  Chart Reviewed: Yes, Progress Notes, Orders  Patient assessed for rehabilitation services?: Yes  Family / Caregiver Present: No    Subjective: cooperative, very talkative-asking therapist questions about various things    Pain: no number given/10: BLE pain    Vitals: Blood Pressure: 88/47 supine; EOB 76/47, HR 66, O2 95% on room air    Social/Functional History:  Lives With: Significant other  Type of Home: House  Home Layout: One level, Laundry in basement  Home Access: Stairs to enter with rails (3)  Home Equipment: [place], 4 wheeled, [place] [employer] Shower/Tub: Tub/Shower unit  [employer]: Standard  Bathroom Equipment: Grab bars in shower, Shower chair       ADL Assistance: [address]: Needs assistance  Ambulation Assistance: Independent  Transfer Assistance: Independent    Active : No  Patient's  Info: Girlfriend     Additional Comments: Pt reports using 4 wheeled walker for 3-4 weeks, indep with mobility around home with it & ADLs indep. SO completes all IADLs. VISION:WFL    HEARING:  WFL    COGNITION: WFL    RANGE OF MOTION:  Bilateral Upper Extremity:  WFL    STRENGTH:  Bilateral Upper Extremity:  4-/5 grossly    SENSATION:   WFL    ADL:   Footwear Management: Dependent. For donning slipper socks . **Pt reported that his LE feel extremely cold after dialysis for hours-days. Educated Pt on dry heat vs moist head for possible deeper penetration. BALANCE:  Sitting Balance:  Stand By Assistance. Sat EOB x 13 min; Pt reports feeling like he is on the edge of light headedness/passing out the longer he was sitting EOB    BED MOBILITY:  Rolling to Left: Minimal Assistance    Rolling to Right: Minimal Assistance    Supine to Sit: Moderate Assistance    Sit to Supine: Minimal Assistance      TRANSFERS:Not safe to attempt yet d/t low BP      FUNCTIONAL MOBILITY:  Not safe to attempt yet d/t low BP    Exercise:  Pt completed BUE AROM exercises x 10 reps x 3 set/s for shoulder flexion, horizontal add/abduction, elbow flexion/extension. .    Activity Tolerance:  Patient tolerance of  treatment: fair. Assessment:  Assessment: Pt demo decreased endurance & strength for ADLs & functional mobility at OF. Continued OT recommended to faciliate improved indep & safety for eventual returning to ADLs at home. Performance deficits / Impairments: Decreased strength, Decreased ADL status, Decreased functional mobility , Decreased safe awareness, Decreased balance, Decreased endurance  Prognosis: Fair  Decision Making: Medium Complexity    Treatment Initiated: Treatment and education initiated within context of evaluation.   Evaluation time included review of current medical information, gathering information related to past medical, social and functional history, completion of standardized testing, formal and informal observation of tasks, assessment of data and development of plan of care and goals. Treatment time included skilled education and facilitation of tasks to increase safety and independence with ADL's for improved functional independence and quality of life. Discharge Recommendations:  Continue to assess pending progress    Patient Education:     Patient Education  Education Given To: Patient  Education Provided: Role of Therapy, Plan of Care, ADL Adaptive Strategies, Home Exercise Program  Education Method: Demonstration, Verbal  Barriers to Learning: None  Education Outcome: Continued education needed    Equipment Recommendations: Other: Monitor pending progress    Plan:  Times Per Week: 3-5x  Current Treatment Recommendations: Balance training, Functional mobility training, Endurance training, Self-Care / ADL, Safety education & training, Strengthening. See long-term goal time frame for expected duration of plan of care. If no long-term goals established, a short length of stay is anticipated. Goals:  Patient goals : have less pain  Short Term Goals  Time Frame for Short Term Goals: until discharge  Short Term Goal 1: Pt will tolerate sitting EOB 15-20 min with S & 0-2 vcs for safety  Short Term Goal 2: Pt will complete BUE AROM-light resistance exercises to increase UB endurance & strength for increase UB strength for toilet t/fs  Short Term Goal 3: Pt will tolerate further assessment of functional t/fs by OTR when medically able  Long Term Goals  Time Frame for Long Term Goals : No LTG set d/t short ELOS         Following session, patient left in safe position with all fall risk precautions in place.

## 2023-02-08 NOTE — CARE COORDINATION
2/8/23, 10:29 AM EST    DISCHARGE PLANNING EVALUATION     Pc received from Sherry Carlson with [facility], informed valentín that [facility] is not in network with pts insurance. Valentín asked Sherry Carlson to try with [address] [place] [address] in [facility].

## 2023-02-08 NOTE — PROGRESS NOTES
Qtc noted to be 580-600 on telemetry monitor, Dr. Nico Cedeno notified and EKG ordered.  Amio drip stopped per Dr. Nico Cedeno

## 2023-02-08 NOTE — PROGRESS NOTES
CRITICAL CARE PROGRESS NOTE      Patient:  Jaspal Mendiola    Unit/Bed:4D-14/014-A  YOB: 1960  MRN: [id number]   PCP: Monica Quintero DO  Date of Admission: 2/6/2023  Chief Complaint:- Hypotension    Assessment and Plan:      Hypotensive: Currently asymptomatic. Initially thought to be related to septic shock. Patient was transferred to [facility] from outside of the facility and was hypotensive on arrival with ventricular arrhythmias in the 140s. 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 Has ESRD and is on hemodialysis. Initially suspicious for SBP and patient was given ceftriaxone 2 g. Culture and cell count of body fluid not suspicious for SBP at this time. We will not continue CTX currently. We will continue to monitor patient. Patient did receive paracentesis 2/7 with 5.5 L of ascites for drainage. Patient is on midodrine 15, 3 times daily at home for blood pressure support. Patient does have history of asymptomatic hypotension. Patient's hypotension is likely multifactorial.  Patient has history of running systolically in the 77C. -We will continue to monitor patient  -Currently on Levophed for blood pressure support  -We will try to wean Levophed down as patient tolerates  Ventricular tachycardia, asymptomatic: Reported runs of ventricular tachycardia. Patient was given IV magnesium at outside facility. Patient received amnio bolus 300, started on amnio drip at 0.5. Continuing amiodarone drip at this time. He is on telemetry, strict inputs and outputs, daily weights. We will continue to monitor patient's electrolytes. -Keep magnesium above 2.        -Keep potassium greater than 4        -Previously evaluated by EP on 1/20/2023. Arrhythmia thought to be secondary to underlying cardiac amyloidosis with risk of SCD. EP recommended AICD.   Not a candidate for class Ic or III antiarrhythmics.        -Previous admission discharged with a LifeVest was discussed. With follow-up with EP.  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 On hemodialysis - M/W/F follows with Dr. Jenny Del Angel.  Hx of noncompliance with medications including p.o. bicarb, fluid intake and other medical visits in the past. Patient is consulted for neurology to start inpatient home dialysis. Patient underwent hemodialysis 2/7/23. Nephrology does not recommend HD 2/8. Suspected liver cirrhosis: Likely 2/2 systemic amyloidosis waiting on liver biopsy for the past 2 months for this. Elevated transaminase, alk phos, hypoalbuminemia. Patient is scheduled to have regular paracentesis. Patient reports he has received 2-3 previous paracenteses. 5.5 L of ascites was drained with paracentesis, 2/7/2023. Patient tolerated the procedure well. He denies any tension or pain in his abdomen today. Systemic amyloidosis: Initially diagnosed September 2022, patient is awaiting for liver biopsy this time. Per patient he reports compliance to medication treatments and visits.         -Follows with Dr. Nancy Chavez outpatient. .         -as of now not started any chemotherapy regimen at this time        -Per heme-onc recommendations weekly paracentesis, limited to 2 L with with albumin infusions  HFpEF: Not in exacerbation. HFpEF 50%, 1/16. We will continue to monitor. Continue telemetry, pulse ox, daily weights, strict I&O's  Chronic normocytic anemia, mild: Patient has multiple chronic diseases including ESRD on HD, liver disease, systemic amyloidosis, continue current daily CBCs. Chronic thrombocytopenia, mild: Possibly due to suspected liver cirrhosis secondary to systemic amyloidosis. Continue to monitor with daily CBCs. Malnutrition: Likely due to patient's multiple comorbidities  Conditioning, debility: Multiple comorbidities including systemic amyloidosis, ESRD on HD, malnutrition, liver disease, ventricular tachycardia    HAGMA: Resolved. Anion gap, 15.0.  Lactic acidosis and uremia likely due to ESRD. Patient is on HD. Home bicarb started p.o. We will continue to monitor, daily CBCs and BMPs ordered. INITIAL H AND P AND ICU COURSE:  Per HPI, \"patient is 63years old male, brought from outside facility due to hypotension. Patient has past medical history of recently diagnosed with systemic amyloidosis, ESRD on hemodialysis, undifferentiated liver disease, newly diagnosis CHF preserved EF, nonsustained episodes of ventricular tachycardia. Patient states he felt sick and nauseous and went to the local hospital and transferred to [facility] for further evaluation and management. Currently patient alert and oriented X3, complains of nausea, episodes of vomiting, feeling sick abdominal distention due to ascites. However he denies chest pain, heart palpitations, productive cough, dizziness, fevers, chills lightheadedness, dysuria/urinary frequency\"    2/7: Paracentesis was performed, 5.5 L removed. Patient underwent hemodialysis following paracentesis with removal of 500 cc. Patient's been on Levophed for blood pressure support. 2/8: Patient denies fever, chills, headache, dizziness, shortness of breath, chest pain, palpitations, abdominal pain, abdominal tightness, nausea, vomiting. Patient is still currently on Levophed for blood pressure support will try to wean down as tolerated. Elevate HR gtt running at this time. Patient does complain of some bilateral knee and lower extremity pain. Past Medical History: Systemic amyloidosis, CHF, ESRD, liver disease. Family History: Hypertension. Social History: Former smoker, 34+ years since history of smoking. History of quitting December 2022. No previous illicit drug use or alcohol use. ROS: See HPI.     Scheduled Meds:   calcium gluconate  2,000 mg IntraVENous Once    calcium replacement protocol   Other RX Placeholder    [Held by provider] lactated ringers bolus  500 mL IntraVENous Once    midodrine  15 mg Oral TID WC    sodium chloride flush  5-40 mL IntraVENous 2 times per day    sodium bicarbonate  650 mg Oral BID    [START ON 2/10/2023] vitamin D  50,000 Units Oral Weekly     Continuous Infusions:   dextrose      amiodarone 0.5 mg/min (02/07/23 1938)    sodium chloride      norepinephrine 2 mcg/min (02/07/23 1929)       PHYSICAL EXAMINATION:  T:  97.7. P:  73. RR:  14. B/P:  99/56. Room Air O2 Sat: 97.  I/O:  +77 cc in the last 24 hrs  Body mass index is 17.68 kg/m². GCS:   15  General: Chronically ill-appearing male. Appears older than stated age. Malnourished. Cachectic pale appearance  HEENT: Normocephalic and atraumatic. Mild scleral icterus. PE PERR. Mucous membranes dry  Neck: Supple. No Thyromegaly. Lungs: Clear to auscultation throughout all lung fields. No wheezes or rhonchi's. No retractions. Right-sided chest wall there is a HD catheter in place  Cardiac: RRR. No murmurs, rubs, gallops. No JVD. Abdomen: Soft. Nontender. Non distended. No fluid wave. Extremities: Multiple bruises, pale. Vasculature: capillary refill < 3 seconds. Palpable dorsalis pedis pulses. Skin:  warm and dry. Psych:  Alert and oriented x3. Affect appropriate  Lymph:  No supraclavicular adenopathy. Neurologic:  No focal deficit. No seizures. Data: (All radiographs, tracings, PFTs, and imaging are personally viewed and interpreted unless otherwise noted). Sodium 136. Potassium 3.8. Creatinine 3.3. Calcium 1.02 Chloride 96. Anion gap 15  Telemetry shows NSR.  CXR, small left lateral effusion and small basilar atelectasis.   Most recent echo, January 2023 showed EF 50%, LVH seen, there is a small circumferential pericardial effusion with no t tamponade on physiology        Seen with multidisciplinary ICU team.  Meets Continued ICU Level Care Criteria:    Yes X  No - Transfer Planned to listed location:  HOSPITALIST CONTACTED-      Case and plan discussed with Dr. Nini Wallace MD.      Electronically signed by Kavita Stanton Radha Morales DO, PGY-1  CRITICAL CARE SPECIALIST  Patient seen by me including key components of medical care. Case discussed with resident physician. Severe protein calorie malnutrition. Still on pressors. Continue with antibiotics. Italicized font, if present,  represents changes to the note made by me. CC time 35 minutes. Time was discontiguous. Time does not include procedure. Time does include my direct assessment of the patient and coordination of care. Time represents more than 50% of the time involved with patient care by the [address] [place] [address] team.  Electronically signed by Buck Schmidt.  Carolyn Harkins MD.

## 2023-02-08 NOTE — PROGRESS NOTES
02/08/23 1010   Encounter Summary   Encounter Overview/Reason  Spiritual/Emotional Needs   Service Provided For: Patient   Referral/Consult From: [address] Family members   Last Encounter  02/08/23  (N/R)   Complexity of Encounter Low   Begin Time 1005   End Time  1010   Total Time Calculated 5 min   Encounter    Type Follow up   Spiritual/Emotional needs   Type Spiritual Support   Assessment/Intervention/Outcome   Assessment Unable to assess   Intervention Sustaining Presence/Ministry of presence;Prayer (assurance of)/[place]   In my encounter with the 63  yr old patient, I attempted to see the patient, but they were unresponsive at this time. No family was present in the room. I offered a prayer at the pt's side. A  will attempt to see the patient at a later time as a follow up. The pt was admitted due to hypotension.

## 2023-02-08 NOTE — CARE COORDINATION
2/8/23, 4:27 PM EST    DISCHARGE ON GOING EVALUATION    [facility] day: 2  Location: -14/014-A Reason for admit: Hypotension [I95.9]  Hypotension, unspecified hypotension type [I95.9]   Procedure:   2/6 CXR: Small left pleural effusion with left basal atelectasis  2/6 CVC LIJ  2/6 Repeat CXR: Left basilar subsegmental atelectasis  2/7 Paracentesis: 5.5L removed    Barriers to Discharge: Paracentesis yesterday with 5.5L removed. HD yesterday with net zero fluid removal.     Afebrile. NSR. On room air. Ox4. Follows commands. PT/OT. Telemetry, CVC, SCDs. Amio @ 0.5 mg/min, Levo @ 2 mcg/min, midodrine 15 mg tid, sodium bicarb tabs, Electrolyte replacement protocols. Received IV albumin x2 yesterday and x1 today. Creat 3.3, procal 4.66. PCP: Jenna Bales DO  Readmission Risk Score: 20%  Patient Goals/Plan/Treatment Preferences: From home with girlfriend. HD MWF at [facility] with 8495 chair time. Chemo 1x/wk in [facility]. SW on case; plan for SNF for rehab. Will require precert.

## 2023-02-08 NOTE — PROGRESS NOTES
[facility]   Nephrology progress note  2/8/2023, 8:50 AM      Pt Name:    Wen Galvan  MRN:     [id number]     YOB: 1960  Admit Date:    2/6/2023  4:58 PM    Chief Complaint: Nephrology following for ESRD and HD    Subjective:  Patient was seen and examined this morning  Seen earlier today during rounds in ICU  Patient is on Levophed 5 mcg  Blood pressure in systolic high 50B      Objective:  24HR INTAKE/OUTPUT:    Intake/Output Summary (Last 24 hours) at 2/8/2023 0850  Last data filed at 2/7/2023 1938  Gross per 24 hour   Intake 913.08 ml   Output 500 ml   Net 413.08 ml         I/O last 3 completed shifts: In: 3089 [I.V.:784.8; IV Piggyback:361.3]  Out: 500   No intake/output data recorded. Admission weight: 129 lb 6.6 oz (58.7 kg)  Wt Readings from Last 3 Encounters:   02/07/23 123 lb 3.8 oz (55.9 kg)   01/31/23 133 lb 12.8 oz (60.7 kg)   01/24/23 132 lb 3.2 oz (60 kg)        Vitals :   Vitals:    02/08/23 0630 02/08/23 0645 02/08/23 0700 02/08/23 0746   BP: (!) 94/58 (!) 92/56 (!) 88/50    Pulse: 75 76 77 75   Resp: 10 11 21    Temp:    97.7 °F (36.5 °C)   TempSrc:    Oral   SpO2: 92% 92% 93%    Weight:       Height:           Physical examination  General Appearance: Ill-appearing, no acute distress  Awake and alert.   Mouth/Throat: Oral mucosa moist  Neck: No JVD  Lungs:  no use of accessory muscles  GI: soft, non-tender, no guarding  Extremities: No pitting LE edema    Medications:  Infusion:    dextrose      amiodarone 0.5 mg/min (02/07/23 1938)    sodium chloride      norepinephrine 2 mcg/min (02/07/23 1929)     Meds:    albumin human  25 g IntraVENous Once    calcium replacement protocol   Other RX Placeholder    [Held by provider] lactated ringers bolus  500 mL IntraVENous Once    midodrine  15 mg Oral TID WC    sodium chloride flush  5-40 mL IntraVENous 2 times per day    sodium bicarbonate  650 mg Oral BID    [START ON 2/10/2023] vitamin D  50,000 Units Oral Weekly     Meds prn: diphenhydrAMINE, glucose, dextrose bolus **OR** dextrose bolus, glucagon (rDNA), dextrose, sodium chloride flush, sodium chloride, polyethylene glycol, acetaminophen **OR** acetaminophen, albuterol     Lab Data :  CBC:   Recent Labs     02/06/23 1745 02/07/23 0104 02/07/23  1220   WBC 10.8 9.4 10.3   HGB 12.5* 11.4* 10.4*   HCT 34.6* 32.1* 28.8*   PLT 89* 77* 79*     CMP:  Recent Labs     02/06/23 1745 02/07/23 0104 02/07/23  1220 02/08/23  0400    140 140 136   K 4.5 4.5 4.5 3.8   CL 98 100 99 96*   CO2 20* 19* 20* 25   BUN 43* 46* 50* 22   CREATININE 5.7* 6.1* 6.5* 3.3*   GLUCOSE 193* 185* 124* 110*   CALCIUM 8.0* 7.9* 8.6 8.1*   MG 2.2  --   --   --    PHOS 6.6*  --   --   --      Hepatic:   Recent Labs     02/06/23 1745 02/07/23 0104 02/07/23  1220   LABALBU 2.9* 2.7* 2.9*   AST 81* 72* 54*   * 125* 107*   BILITOT 4.6* 3.8* 3.5*   ALKPHOS 2,432* 2,255* 1,960*         Assessment and Plan:  ESRD on hemodialysis. 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 Patient had dialysis treatment yesterday. Currently on Levophed 5 mcg. Blood pressure in systolic 30U with map of 55. Asymptomatic. No indication for hemodialysis treatment today. No plans for dialysis today. Discussed with ICU  Reevaluate in a.m. Metabolic acidosis. Corrected  Chronic hypotension. Continue with midodrine. Add one-time dose of IV albumin  Probable autonomic dysfunction  Ascites status post paracentesis  Elevated liver enzymes  Anemia in ESRD  Chronic thrombocytopenia      D/W patient and ICU LUDY Aldana MD  [facility]    This report has been created using voice recognition software.  It may contain minor errors which are inherent in voice recognition technology

## 2023-02-09 LAB
ANION GAP SERPL CALC-SCNC: 15 MEQ/L (ref 8–16)
BACTERIA: ABNORMAL
BILIRUB UR QL STRIP: ABNORMAL
BUN SERPL-MCNC: 32 MG/DL (ref 7–22)
CA-I BLD ISE-SCNC: 1.05 MMOL/L (ref 1.12–1.32)
CALCIUM SERPL-MCNC: 8.2 MG/DL (ref 8.5–10.5)
CASTS #/AREA URNS LPF: ABNORMAL /LPF
CASTS #/AREA URNS LPF: ABNORMAL /LPF
CHARACTER UR: ABNORMAL
CHARCOAL URNS QL MICRO: ABNORMAL
CHLORIDE SERPL-SCNC: 98 MEQ/L (ref 98–111)
CO2 SERPL-SCNC: 24 MEQ/L (ref 23–33)
COLOR UR: ABNORMAL
CORTIS SERPL-MCNC: 31.11 UG/DL
CREAT SERPL-MCNC: 4.3 MG/DL (ref 0.4–1.2)
CRYSTALS URNS QL MICRO: ABNORMAL
DEPRECATED RDW RBC AUTO: 67.2 FL (ref 35–45)
EKG ATRIAL RATE: 75 BPM
EKG P AXIS: 62 DEGREES
EKG P-R INTERVAL: 182 MS
EKG Q-T INTERVAL: 456 MS
EKG QRS DURATION: 76 MS
EKG QTC CALCULATION (BAZETT): 509 MS
EKG R AXIS: -48 DEGREES
EKG T AXIS: 86 DEGREES
EKG VENTRICULAR RATE: 75 BPM
EPITHELIAL CELLS, UA: ABNORMAL /HPF
ERYTHROCYTE [DISTWIDTH] IN BLOOD BY AUTOMATED COUNT: 22.6 % (ref 11.5–14.5)
GFR SERPL CREATININE-BSD FRML MDRD: 15 ML/MIN/1.73M2
GLUCOSE BLD STRIP.AUTO-MCNC: 128 MG/DL (ref 70–108)
GLUCOSE SERPL-MCNC: 71 MG/DL (ref 70–108)
GLUCOSE UR QL STRIP.AUTO: 100 MG/DL
HCT VFR BLD AUTO: 28.6 % (ref 42–52)
HGB BLD-MCNC: 10.1 GM/DL (ref 14–18)
HGB UR QL STRIP.AUTO: NEGATIVE
ICTOTEST: POSITIVE
KETONES UR QL STRIP.AUTO: NEGATIVE
LEUKOCYTE ESTERASE UR QL STRIP.AUTO: ABNORMAL
MAGNESIUM SERPL-MCNC: 1.9 MG/DL (ref 1.6–2.4)
MCH RBC QN AUTO: 31.6 PG (ref 26–33)
MCHC RBC AUTO-ENTMCNC: 35.3 GM/DL (ref 32.2–35.5)
MCV RBC AUTO: 89.4 FL (ref 80–94)
NITRITE UR QL STRIP.AUTO: NEGATIVE
PH UR STRIP.AUTO: 7 [PH] (ref 5–9)
PLATELET # BLD AUTO: 73 THOU/MM3 (ref 130–400)
PMV BLD AUTO: ABNORMAL FL (ref 9.4–12.4)
POTASSIUM SERPL-SCNC: 4.3 MEQ/L (ref 3.5–5.2)
PROT UR STRIP.AUTO-MCNC: 300 MG/DL
RBC # BLD AUTO: 3.2 MILL/MM3 (ref 4.7–6.1)
RBC #/AREA URNS HPF: ABNORMAL /HPF
RENAL EPI CELLS #/AREA URNS HPF: ABNORMAL /[HPF]
SODIUM SERPL-SCNC: 137 MEQ/L (ref 135–145)
SPECIFIC GRAVITY UA: > 1.03 (ref 1–1.03)
UROBILINOGEN, URINE: 0.2 EU/DL (ref 0–1)
WBC # BLD AUTO: 6.1 THOU/MM3 (ref 4.8–10.8)
WBC #/AREA URNS HPF: ABNORMAL /HPF
YEAST LIKE FUNGI URNS QL MICRO: ABNORMAL

## 2023-02-09 PROCEDURE — 99291 CRITICAL CARE FIRST HOUR: CPT | Performed by: INTERNAL MEDICINE

## 2023-02-09 PROCEDURE — 82533 TOTAL CORTISOL: CPT

## 2023-02-09 PROCEDURE — 99232 SBSQ HOSP IP/OBS MODERATE 35: CPT | Performed by: INTERNAL MEDICINE

## 2023-02-09 PROCEDURE — 93010 ELECTROCARDIOGRAM REPORT: CPT | Performed by: INTERNAL MEDICINE

## 2023-02-09 PROCEDURE — 6360000002 HC RX W HCPCS

## 2023-02-09 PROCEDURE — 93005 ELECTROCARDIOGRAM TRACING: CPT

## 2023-02-09 PROCEDURE — 83735 ASSAY OF MAGNESIUM: CPT

## 2023-02-09 PROCEDURE — 97530 THERAPEUTIC ACTIVITIES: CPT

## 2023-02-09 PROCEDURE — 82948 REAGENT STRIP/BLOOD GLUCOSE: CPT

## 2023-02-09 PROCEDURE — 87899 AGENT NOS ASSAY W/OPTIC: CPT

## 2023-02-09 PROCEDURE — 6370000000 HC RX 637 (ALT 250 FOR IP)

## 2023-02-09 PROCEDURE — 87086 URINE CULTURE/COLONY COUNT: CPT

## 2023-02-09 PROCEDURE — 82330 ASSAY OF CALCIUM: CPT

## 2023-02-09 PROCEDURE — 2580000003 HC RX 258

## 2023-02-09 PROCEDURE — 80048 BASIC METABOLIC PNL TOTAL CA: CPT

## 2023-02-09 PROCEDURE — 81001 URINALYSIS AUTO W/SCOPE: CPT

## 2023-02-09 PROCEDURE — 97162 PT EVAL MOD COMPLEX 30 MIN: CPT

## 2023-02-09 PROCEDURE — 97110 THERAPEUTIC EXERCISES: CPT

## 2023-02-09 PROCEDURE — 85027 COMPLETE CBC AUTOMATED: CPT

## 2023-02-09 PROCEDURE — 2000000000 HC ICU R&B

## 2023-02-09 PROCEDURE — 36415 COLL VENOUS BLD VENIPUNCTURE: CPT

## 2023-02-09 RX ORDER — MIDODRINE HYDROCHLORIDE 10 MG/1
20 TABLET ORAL 4 TIMES DAILY
Status: DISCONTINUED | OUTPATIENT
Start: 2023-02-09 | End: 2023-02-10

## 2023-02-09 RX ORDER — DEXAMETHASONE SODIUM PHOSPHATE 4 MG/ML
10 INJECTION, SOLUTION INTRA-ARTICULAR; INTRALESIONAL; INTRAMUSCULAR; INTRAVENOUS; SOFT TISSUE ONCE
Status: COMPLETED | OUTPATIENT
Start: 2023-02-09 | End: 2023-02-09

## 2023-02-09 RX ORDER — CALCIUM GLUCONATE 20 MG/ML
2000 INJECTION, SOLUTION INTRAVENOUS ONCE
Status: COMPLETED | OUTPATIENT
Start: 2023-02-09 | End: 2023-02-09

## 2023-02-09 RX ORDER — MAGNESIUM SULFATE 1 G/100ML
1000 INJECTION INTRAVENOUS ONCE
Status: COMPLETED | OUTPATIENT
Start: 2023-02-09 | End: 2023-02-09

## 2023-02-09 RX ADMIN — MIDODRINE HYDROCHLORIDE 20 MG: 10 TABLET ORAL at 21:05

## 2023-02-09 RX ADMIN — MIDODRINE HYDROCHLORIDE 20 MG: 10 TABLET ORAL at 19:14

## 2023-02-09 RX ADMIN — ACETAMINOPHEN 650 MG: 325 TABLET ORAL at 04:08

## 2023-02-09 RX ADMIN — MAGNESIUM SULFATE HEPTAHYDRATE 1000 MG: 1 INJECTION, SOLUTION INTRAVENOUS at 09:16

## 2023-02-09 RX ADMIN — ACETAMINOPHEN 650 MG: 325 TABLET ORAL at 16:39

## 2023-02-09 RX ADMIN — DEXAMETHASONE SODIUM PHOSPHATE 10 MG: 4 INJECTION, SOLUTION INTRA-ARTICULAR; INTRALESIONAL; INTRAMUSCULAR; INTRAVENOUS; SOFT TISSUE at 13:12

## 2023-02-09 RX ADMIN — CALCIUM GLUCONATE 2000 MG: 20 INJECTION, SOLUTION INTRAVENOUS at 05:41

## 2023-02-09 RX ADMIN — SODIUM BICARBONATE 650 MG: 650 TABLET ORAL at 21:04

## 2023-02-09 RX ADMIN — CEFTRIAXONE 2000 MG: 2 INJECTION, POWDER, FOR SOLUTION INTRAMUSCULAR; INTRAVENOUS at 20:11

## 2023-02-09 RX ADMIN — MIDODRINE HYDROCHLORIDE 20 MG: 10 TABLET ORAL at 09:04

## 2023-02-09 RX ADMIN — SODIUM CHLORIDE, PRESERVATIVE FREE 10 ML: 5 INJECTION INTRAVENOUS at 09:05

## 2023-02-09 RX ADMIN — MIDODRINE HYDROCHLORIDE 20 MG: 10 TABLET ORAL at 11:13

## 2023-02-09 RX ADMIN — SODIUM BICARBONATE 650 MG: 650 TABLET ORAL at 09:04

## 2023-02-09 ASSESSMENT — PAIN SCALES - GENERAL: PAINLEVEL_OUTOF10: 0

## 2023-02-09 NOTE — PROGRESS NOTES
CRITICAL CARE PROGRESS NOTE      Patient:  Macario Garcia    Unit/Bed:4D-14/014-A  YOB: 1960  MRN: [id number]   PCP: Shirley Peralta DO  Date of Admission: 2/6/2023  Chief Complaint:- Hypotension    Assessment and Plan:    Hypotensive: Currently asymptomatic. Initially thought to be related to septic shock. Patient was transferred to [facility] from outside of the facility and was hypotensive on arrival with ventricular arrhythmias in the 140s. 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 Has ESRD and is on hemodialysis. Initially suspicious for SBP and patient was given ceftriaxone 2 g. Culture and cell count of body fluid not suspicious for SBP at this time. We will continue to monitor patient. Patient did receive paracentesis 2/7 with 5.5 L of ascites for drainage. Patient is on midodrine 15, 3 times daily at home for blood pressure support. Midodirne was increased to 20 mg TID, 2/8. Patient does have history of asymptomatic hypotension. Patient's hypotension is likely multifactorial. Patient has history of running systolically in the 95O. -We will continue to monitor patient  -Currently on Levophed for blood pressure support  -We will try to wean Levophed down as patient tolerates  -Continuing ceftriaxone 2g Q24 hrs  -Midodrine 20 mg increased to 4 times daily.  -Decadron 10 mg once ordered  Ventricular tachycardia, asymptomatic: Reported runs of ventricular tachycardia. Patient was given IV magnesium at outside facility. Patient received amnio bolus 300, started on amnio drip at 0.5. Continuing amiodarone drip at this time. He is on telemetry, strict inputs and outputs, daily weights. We will continue to monitor patient's electrolytes. -Keep magnesium above 2.        -Keep potassium greater than 4        -Previously evaluated by EP on 1/20/2023.   Arrhythmia thought to be secondary to underlying cardiac amyloidosis with risk of SCD.  EP recommended AICD. Not a candidate for class Ic or III antiarrhythmics.        -Previous admission discharged with a LifeVest was discussed. With follow-up with EP.  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 On hemodialysis - M/W/F follows with Dr. Eugenio Chapin.  Hx of noncompliance with medications including p.o. bicarb, fluid intake and other medical visits in the past. Patient is consulted for neurology to start inpatient home dialysis. Patient underwent hemodialysis 2/7/23. Nephrology does not recommend HD on 2/8. -Nephrology is following, Plan for HD 2/10  Suspected liver cirrhosis: Likely 2/2 systemic amyloidosis waiting on liver biopsy for the past 2 months for this. Elevated transaminase, alk phos, hypoalbuminemia. Patient is scheduled to have regular paracentesis. Patient reports he has received 2-3 previous paracenteses. 5.5 L of ascites was drained with paracentesis, 2/7/2023. Patient tolerated the procedure well. Systemic amyloidosis: Initially diagnosed September 2022, patient is awaiting for liver biopsy this time. Per patient he reports compliance to medication treatments and visits.         -Follows with Dr. Jana Diaz outpatient. .         -as of now not started any chemotherapy regimen at this time        -Per heme-onc recommendations weekly paracentesis, limited to 2 L with with albumin infusions  HFpEF: Not in exacerbation. HFpEF 50%, 1/16. We will continue to monitor. Continue telemetry, pulse ox, daily weights, strict I&O's  Malnutrition, severe protein calorie malnutrition: Likely due to patient's multiple comorbidities  Chronic normocytic anemia, mild: Patient has multiple chronic diseases including ESRD on HD, liver disease, systemic amyloidosis, continue current daily CBCs. Chronic thrombocytopenia, mild: Possibly due to suspected liver cirrhosis secondary to systemic amyloidosis. Continue to monitor with daily CBCs.   Conditioning, debility: Multiple comorbidities including systemic amyloidosis, ESRD on HD, malnutrition, liver disease, ventricular tachycardia    HAGMA: Resolved. Anion gap, 15.0. Lactic acidosis and uremia likely due to ESRD. Patient is on HD. Home bicarb started p.o. We will continue to monitor, daily CBCs and BMPs ordered. INITIAL H AND P AND ICU COURSE:  Per HPI, \"patient is 63years old male, brought from outside facility due to hypotension. Patient has past medical history of recently diagnosed with systemic amyloidosis, ESRD on hemodialysis, undifferentiated liver disease, newly diagnosis CHF preserved EF, nonsustained episodes of ventricular tachycardia. Patient states he felt sick and nauseous and went to the local hospital and transferred to [facility] for further evaluation and management. Currently patient alert and oriented X3, complains of nausea, episodes of vomiting, feeling sick abdominal distention due to ascites. However he denies chest pain, heart palpitations, productive cough, dizziness, fevers, chills lightheadedness, dysuria/urinary frequency\"     2/7: Paracentesis was performed, 5.5 L removed. Patient underwent hemodialysis following paracentesis with removal of 500 cc. Patient's been on Levophed for blood pressure support. 2/8: On Levophed for blood pressure support will try to wean down as tolerated. Amio gtt running at this time. Midodrine was increased to 20 3 times daily. Patient did not require hemodialysis. 2/9: Patient reports he feels like his abdomen is starting to fill up with fluid again. He has noticed some tightness and distention of it as well. Patient reports he did have 1 episode of nausea overnight after drinking some fluids no reported emesis. Patient still on Levophed at 2 mcg. We will continue to wean patient down as tolerated. Patient denies fever, chills, headache, dizziness, shortness of breath, chest pain, palpitations no abdominal pain.     Past Medical History: Systemic amyloidosis, CHF, ESRD, liver disease. Family History: Hypertension. Social History: Former smoker, 34+ years since history of smoking. History of quitting December 2022. No previous illicit drug use or alcohol use. ROS: See HPI. Scheduled Meds:   calcium gluconate  2,000 mg IntraVENous Once    midodrine  20 mg Oral TID WC    cefTRIAXone (ROCEPHIN) IV  2,000 mg IntraVENous Q24H    calcium replacement protocol   Other RX Placeholder    sodium chloride flush  5-40 mL IntraVENous 2 times per day    sodium bicarbonate  650 mg Oral BID    [START ON 2/10/2023] vitamin D  50,000 Units Oral Weekly     Continuous Infusions:   dextrose      amiodarone Stopped (02/08/23 0357)    sodium chloride      norepinephrine 2 mcg/min (02/08/23 1839)     PHYSICAL EXAMINATION:  T:  97.7. P: 77. RR: 12. B/P:  99/63. Room air O2 Sat:  96. I/O:  +373 cc  Body mass index is 17.68 kg/m². GCS: 15  General: Chronically ill appearing male. Appears older than stated age. Malnourished. Cachectic. Pale appearance  HEENT: Normocephalic and atraumatic. Mild scleral icterus. PERR. Mucous membranes dry. Neck: Supple. No Thyromegaly. Lungs: Clear to auscultation throughout all lung fields. No wheezes or rhonchi. No retractions  Cardiac: RRR. No murmurs, rubs, or gallops. No JVD. Abdomen: Soft. Mild distention of abdomen. Has some discomfort with palpitation. Extremities: No clubbing, cyanosis, or edema x 4. Pale, multiple bruises. Vasculature: Capillary refill < 3 seconds. Palpable dorsalis pedis pulses. Skin: Warm and dry. Psych: Alert and oriented x3. Affect appropriate  Lymph:  No supraclavicular adenopathy. Neurologic:  No focal deficit. No seizures. Data: (All radiographs, tracings, PFTs, and imaging are personally viewed and interpreted unless otherwise noted). Sodium 137. Potassium 4.3. Chloride 98. BUN 32. Creatinine 4.3. I bucky 1.05. GFR 15. Mag 1.9. WBC 6.1. Hemoglobin 10.1. Platelets 73.   Telemetry shows NSR  CXR, small left lateral effusion and small basilar atelectasis. Most recent echo, January 2023, showed EF 50% w/ LVH seen, a small circumferential pericardial effusion with no tamponade physiology seen    Seen with multidisciplinary ICU team.  Meets Continued ICU Level Care Criteria:    [x] Yes   [] No - Transfer Planned to listed location:  [] HOSPITALIST CONTACTED-      Case and plan discussed with Dr. Lorenza Finch MD.      Electronically signed by Apurva Hughes DO, PGY-1  CRITICAL CARE SPECIALIST   Patient seen by me including key components of medical care. Case discussed with resident physician. Trying to wean pressors. Adding steroids to determine if this may improve blood pressures with relative adrenal insufficiency. Italicized font, if present,  represents changes to the note made by me. CC time 35 minutes. Time was discontiguous. Time does not include procedure. Time does include my direct assessment of the patient and coordination of care. Time represents more than 50% of the time involved with patient care by the [address] [place] [address] team.  Electronically signed by Ysabel Cespedes.  Lorenza Finch MD.

## 2023-02-09 NOTE — PROGRESS NOTES
[facility]   Nephrology progress note  2/9/2023, 11:14 AM      Pt Name:    Blake Ramirez  MRN:     [id number]     YOB: 1960  Admit Date:    2/6/2023  4:58 PM    Chief Complaint: Nephrology following for ESRD and HD    Subjective:  Patient was seen and examined this morning  Seen earlier today during rounds in ICU  Patient is on Levophed 2 mcg  Blood pressure in systolic high 80      Objective:  24HR INTAKE/OUTPUT:    Intake/Output Summary (Last 24 hours) at 2/9/2023 1114  Last data filed at 2/8/2023 1839  Gross per 24 hour   Intake 373.94 ml   Output --   Net 373.94 ml           I/O last 3 completed shifts: In: 709.5 [I.V.:390.7; IV Piggyback:318.9]  Out: -   No intake/output data recorded. Admission weight: 129 lb 6.6 oz (58.7 kg)  Wt Readings from Last 3 Encounters:   02/07/23 123 lb 3.8 oz (55.9 kg)   01/31/23 133 lb 12.8 oz (60.7 kg)   01/24/23 132 lb 3.2 oz (60 kg)        Vitals :   Vitals:    02/09/23 0915 02/09/23 0930 02/09/23 0945 02/09/23 1000   BP: (!) 71/47 (!) 76/44 (!) 70/45 (!) 84/52   Pulse: 82 75 74 73   Resp: 15 12 10 10   Temp:       TempSrc:       SpO2: 94% 93% 95% 94%   Weight:       Height:           Physical examination  General Appearance: Ill-appearing, no acute distress  Awake and alert.   Mouth/Throat: Oral mucosa moist  Neck: No JVD  Lungs:  no use of accessory muscles  GI: soft, non-tender, no guarding  Extremities: No pitting LE edema    Medications:  Infusion:    dextrose      amiodarone Stopped (02/08/23 0357)    sodium chloride      norepinephrine 2 mcg/min (02/08/23 1839)     Meds:    midodrine  20 mg Oral TID WC    cefTRIAXone (ROCEPHIN) IV  2,000 mg IntraVENous Q24H    calcium replacement protocol   Other RX Placeholder    sodium chloride flush  5-40 mL IntraVENous 2 times per day    sodium bicarbonate  650 mg Oral BID    [START ON 2/10/2023] vitamin D  50,000 Units Oral Weekly     Meds prn: diphenhydrAMINE, glucose, dextrose bolus **OR** dextrose bolus, glucagon (rDNA), dextrose, sodium chloride flush, sodium chloride, polyethylene glycol, acetaminophen **OR** acetaminophen, albuterol     Lab Data :  CBC:   Recent Labs     02/07/23 1220 02/08/23  1755 02/09/23  0400   WBC 10.3 6.4 6.1   HGB 10.4* 9.1* 10.1*   HCT 28.8* 25.3* 28.6*   PLT 79* 57* 73*       CMP:  Recent Labs     02/06/23  1745 02/07/23  0104 02/07/23  1220 02/08/23  0400 02/09/23  0400      < > 140 136 137   K 4.5   < > 4.5 3.8 4.3   CL 98   < > 99 96* 98   CO2 20*   < > 20* 25 24   BUN 43*   < > 50* 22 32*   CREATININE 5.7*   < > 6.5* 3.3* 4.3*   GLUCOSE 193*   < > 124* 110* 71   CALCIUM 8.0*   < > 8.6 8.1* 8.2*   MG 2.2  --   --   --  1.9   PHOS 6.6*  --   --   --   --     < > = values in this interval not displayed. Hepatic:   Recent Labs     02/06/23 1745 02/07/23 0104 02/07/23  1220   LABALBU 2.9* 2.7* 2.9*   AST 81* 72* 54*   * 125* 107*   BILITOT 4.6* 3.8* 3.5*   ALKPHOS 2,432* 2,255* 1,960*           Assessment and Plan:  ESRD on hemodialysis. 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 Currently on Levophed 2 mcg  Plan hemodialysis treatment tomorrow  Patient with chronic hypotension  Reevaluate in a.m. Metabolic acidosis. Corrected  Chronic hypotension. Continue with midodrine-dose increased already. Probable autonomic dysfunction  Ascites status post paracentesis  Elevated liver enzymes  Anemia in ESRD  Chronic thrombocytopenia      Uday Burris MD  [facility]    This report has been created using voice recognition software.  It may contain minor errors which are inherent in voice recognition technology

## 2023-02-09 NOTE — PROGRESS NOTES
Physician Progress Note      PATIENT:               Roseann Lockwood  CSN #:                  [id number]  :                       1960  ADMIT DATE:       2023 4:58 PM  DISCH DATE:  RESPONDING  PROVIDER #: Trent Ba          QUERY TEXT:    Patient admitted with hypotension. Documentation reflects septic shock in   progress notes until . If possible, please document in the progress notes   and discharge summary if sepsis was: The medical record reflects the following:  Risk Factors: hypotension  Clinical Indicators: on arrival WBC 10.8, Lactic 2.4, PCT 4.68, temp 97.3, HR   86, RR 14  Treatment: Labs, imaging, ICU monitoring, IVF, Levophed gtt, Rocephin  Options provided:  -- Sepsis confirmed after study  -- Sepsis treated and resolved  -- Sepsis ruled out after study  -- Other - I will add my own diagnosis  -- Disagree - Not applicable / Not valid  -- Disagree - Clinically unable to determine / Unknown  -- Refer to Clinical Documentation Reviewer    PROVIDER RESPONSE TEXT:    Sepsis ruled out after study.     Query created by: Rika Lofton on 2023 11:47 AM      Electronically signed by:  Papi Burroughs 2023 5:05 PM

## 2023-02-09 NOTE — PROGRESS NOTES
Date: 2023  Patient Name: Gurpreet Cruz        MRN: [id number]   Account: [de-identified]   : 1960  (63 y.o.)  Gender: male   Referring Practitioner: Apurva Hughes DO  Diagnosis: hypotension  Additional Pertinent Hx: Per ER note on :62 years old male, brought from outside facility due to hypotension. Patient has past medical history of recently diagnosed with systemic amyloidosis, ESRD on hemodialysis, undifferentiated liver disease, newly diagnosis CHF preserved EF, nonsustained episodes of ventricular tachycardia. Patient states he felt sick and nauseous and went to the local hospital and transferred to [facility] for further evaluation and management. Currently patient alert and oriented X3, complains of nausea, episodes of vomiting, feeling sick abdominal distention due to ascites. Gurpreet Cruz requires a Standard Bedside Commode to complete bathing, toileting, dressing and grooming tasks. Patient requires a Bedside Commode due to Lower extremity weakness, Limited ambulation abilities, and Inability to walk to bathroom at home. Without the Bedside Commode, Gurpreet Cruz is at increased risk for falls and would require increased assistance for ADL's and mobility.

## 2023-02-09 NOTE — PROGRESS NOTES
[address] PHYSICAL THERAPY  EVALUATION  [facility] ICU 4D - 4D-14/014-A    Time In: 1055  Time Out: 1115  Timed Code Treatment Minutes: 12 Minutes  Minutes: 20          Date: 2023  Patient Name: Rakel Fernandez,  Gender:  male        MRN: [id number]  : 1960  (63 y.o.)      Referring Practitioner: Jessica Morrell DO  Diagnosis: Hypotension  Additional Pertinent Hx: 63years old male, brought from outside facility due to hypotension. Patient has past medical history of recently diagnosed with systemic amyloidosis, ESRD on hemodialysis, undifferentiated liver disease, newly diagnosis CHF preserved EF, nonsustained episodes of ventricular tachycardia. Patient states he felt sick and nauseous and went to the local hospital and transferred to [facility] for further evaluation and management. Currently patient alert and oriented X3, complains of nausea, episodes of vomiting, feeling sick abdominal distention due to ascites. However he denies chest pain, heart palpitations, productive cough, dizziness, fevers, chills lightheadedness, dysuria/urinary frequency     Restrictions/Precautions:  Restrictions/Precautions: Fall Risk, General Precautions  Position Activity Restriction  Other position/activity restrictions: monitor BP    Subjective:  Chart Reviewed: Yes  Patient assessed for rehabilitation services?: Yes  Subjective: RN approved session.  Pt pleasant and agreeable to therapy    General:  Overall Orientation Status: Within Functional Limits  Vision: Within Functional Limits  Hearing: Within functional limits       Pain: 4-5/10: stomach     Vitals: Blood Pressure: prior to session: 76/50(57), sitting EOB after standin/22(32),at end of session: 76/44(64)  Oxygen: 96% on room air   Heart Rate: 68  RN present and aware of BP    Social/Functional History:    Lives With: Significant other  Type of Home: House  Home Layout: One level, Laundry in basement  Home Access: Stairs to enter with rails  Entrance Stairs - Number of Steps: 3  Entrance Stairs - Rails: Both  Home Equipment: Federico Dennys, 4 wheeled, [employer] Shower/Tub: Tub/Shower unit  [employer]: Standard  Bathroom Equipment: Grab bars in shower, Shower chair       ADL Assistance: [address]: Needs assistance  Ambulation Assistance: Independent  Transfer Assistance: Independent    Active : No     Additional Comments: Pt reports using 4 wheeled walker for 3-4 weeks, indep with mobility around home with it & ADLs indep. SO completes all IADLs. OBJECTIVE:  Range of Motion:  Bilateral Lower Extremity: WFL    Strength:  Bilateral Lower Extremity: Impaired - grossly deconditioned     Balance:  Static Sitting Balance:  Contact Guard Assistance  Static Standing Balance: Contact Guard Assistance    Bed Mobility:  Supine to Sit: Contact Guard Assistance, X 1, with head of bed raised  Sit to Supine: Minimal Assistance, with head of bed flat     Transfers:  Sit to Stand: Contact Guard Assistance  Stand to Sit:Contact Guard Assistance    Ambulation:  Not Tested    Functional Outcome Measures: Completed  -PAC Inpatient Mobility without Stair Climbing Raw Score : 14  -PAC Inpatient without Stair Climbing T-Scale Score : 40.85    ASSESSMENT:  Activity Tolerance:  Patient tolerance of  treatment: fair. Limited by low BP and lightheaded/dizziness       Treatment Initiated: Treatment and education initiated within context of evaluation. Evaluation time included review of current medical information, gathering information related to past medical, social and functional history, completion of standardized testing, formal and informal observation of tasks, assessment of data and development of plan of care and goals.   Treatment time included skilled education and facilitation of tasks to increase safety and independence with functional mobility for improved independence and quality of life.    Assessment: Body Structures, Functions, Activity Limitations Requiring Skilled Therapeutic Intervention: Decreased functional mobility , Decreased strength, Decreased endurance, Decreased balance, Decreased posture  Assessment: Lora Ozuna is a 63 y.o. male that presents with hypotension. he is ind prior to admission now requiring assist for basic mobility. Pt demonstrates a decrease in baseline by way of bed mobility, transfers and ambulation secondary to decreased activity tolerance, strength, fatigue, and balance deficits. Pt will benefit from skilled PT services throughout admission and beyond hospital discharge for improvements in functional mobility and in order to decrease fall risk and return pt to OF. Therapy Prognosis: Good    Requires PT Follow-Up: Yes    Discharge Recommendations:  Discharge Recommendations: Continue to assess pending progress    Patient Education:      .     Patient Education  Education Given To: Patient  Education Provided: Role of Therapy, Plan of Care  Education Method: Verbal  Barriers to Learning: None  Education Outcome: Verbalized understanding       Equipment Recommendations:  Equipment Needed: No    Plan:  Current Treatment Recommendations: Balance training, Endurance training, Strengthening, Functional mobility training, Transfer training, Therapeutic activities, Safety education & training  General Plan:  (5x GM)    Goals:  Patient Goals : get up and walk around  Short Term Goals  Time Frame for Short Term Goals: by discharge  Short Term Goal 1: bed mobility with HOB flat, no rails, mod I for increased functional ind  Short Term Goal 2: sit<>stand from various surfaces with LRD mod I for safe transfers  Short Term Goal 3: Pt to tolerate standing 10 min with SBA for increased endurance  Short Term Goal 4: PT to assess gait  Long Term Goals  Time Frame for Long Term Goals : NA d/t short ELOS    Following session, patient left in safe position with all fall risk precautions in place.     Sarah Hernandez ([place]) PT, DPT

## 2023-02-09 NOTE — FLOWSHEET NOTE
0800 Mr John Curry rests in the bed. He denies CP or SOB. He has a 3 lumen CVC right IJ site clear with Levophed infusing to keep his BP above ordered parameters. Dr Pedro Luis Pandey has come to see and we will plan for dialysis tomorrow. He has a tunneled dialysis catheter right SC site clear. He tolerates PO well but has a poor appetite.

## 2023-02-09 NOTE — PROGRESS NOTES
Comprehensive Nutrition Assessment    Type and Reason for Visit:  Initial, RD Nutrition Re-Screen/LOS (verbal consult)    Nutrition Recommendations/Plan:   Continue current diet  ONS; Ensure Enlive (strawberry) TID d/t pt preferences and poor po intake ~will monitor potassium levels and adjust ONS as appropriate      Malnutrition Assessment:  Malnutrition Status:  Severe malnutrition (02/09/23 1539)    Context:  Chronic Illness     Findings of the 6 clinical characteristics of malnutrition:  Energy Intake:  Unable to assess  Weight Loss:   (patient with ascites, receives paracentesis and dialysis; 10.9% weight loss noted in past month)     Body Fat Loss:  Severe body fat loss Orbital, Buccal region   Muscle Mass Loss:  Severe muscle mass loss Temples (temporalis)  Fluid Accumulation:  No significant fluid accumulation     Strength:  Not Performed    Nutrition Assessment:      Pt. severely malnourished AEB criteria listed above. At risk for further nutritional compromise r/t admit with acute on chronic hypotension, vtach, recurrent ascites-s/p paracentesis 2/7/23-5.5 liters, suspected liver cirrhosis, increased nutrient needs to support known losses with dialysis, skin integrity issues, low BMI, systemic amyloidosis- on chemotherapy, and underlying medical condition (Hx: systemic amyloidosis, ESRD-hemodialysis started 12/2022, recurrent ascites-s/p paracentesis 12/21/22-6 liters, 12/26/22-5 liters, double hernia repair, smoking, CHF). Nutrition Related Findings:    Pt. Report/Treatments/Miscellaneous: Pt seen sleeping this afternoon, pt continues to have very poor po intake r/t fullness caused by his ascitics; Good acceptance of Ensure Enlive [place] ~will monitor potassium as pt has ESRD on HD; Per past RD noted 1/21/23 pt is followed closely by OP closely by dialysis.   GI Status: BM 2/8  Pertinent Labs: Na 137, K 4.3, BUN 32, Cr 4.3, Mg 1.9, Glucose 71, Hgb 10.1  Pertinent Meds: Abx, Vitamin D, Levophed Wound Type: Pressure Injury, Stage I (coccyx)       Current Nutrition Intake & Therapies:    Average Meal Intake:  (poor po intake)  Average Supplements Intake:  (likes Ensure Strawberry)  ADULT DIET; Regular; Low Potassium (Less than 3000 mg/day); Low Phosphorus (Less than 1000 mg)  ADULT ORAL NUTRITION SUPPLEMENT; Breakfast, Lunch, Dinner; Standard High Calorie/High Protein Oral Supplement    Anthropometric Measures:  Height: 5' 10\" (177.8 cm)  Ideal Body Weight (IBW): 166 lbs (75 kg)    Admission Body Weight: 129 lb 7 oz (58.7 kg) (2/6; No Edema)  Current Body Weight: 123 lb 3.8 oz (55.9 kg) (2/7; No Edema), 74.2 % IBW. Weight Source: Bed Scale  Current BMI (kg/m2): 17.7  Usual Body Weight:  (~ 155# per pt.   Per EMr: 9/6/22: 135#, 11/8/22: 130#3oz, 1/21/23: 129#12.8oz)     Weight Adjustment For: No Adjustment                 BMI Categories: Underweight (BMI less than 18.5)    Estimated Daily Nutrient Needs:  Energy Requirements Based On: Kcal/kg  Weight Used for Energy Requirements: Current (55.9 kg)  Energy (kcal/day): 4098-2506 (30-35 g/kg)  Weight Used for Protein Requirements: Current (55.9 kg)  Protein (g/day): 84 grams or more         Nutrition Diagnosis:   Severe malnutrition related to inadequate protein-energy intake as evidenced by Criteria as identified in malnutrition assessment    Nutrition Interventions:   Food and/or Nutrient Delivery: Continue Current Diet, Continue Oral Nutrition Supplement  Nutrition Education/Counseling: Education not indicated  Coordination of Nutrition Care: Continue to monitor while inpatient       Goals:     Goals: PO intake 75% or greater, by next RD assessment       Nutrition Monitoring and Evaluation:   Behavioral-Environmental Outcomes: None Identified  Food/Nutrient Intake Outcomes: Food and Nutrient Intake, Supplement Intake  Physical Signs/Symptoms Outcomes: Biochemical Data, GI Status, Fluid Status or Edema, Weight, Skin, Nutrition Focused Physical Findings    Discharge Planning:     Too soon to determine     Jennifer Townsend  Contact: [phone]

## 2023-02-09 NOTE — PLAN OF CARE
Problem: Discharge Planning  Goal: Discharge to home or other facility with appropriate resources  2/9/2023 0729 by Beverly Gentile RN  Outcome: Progressing  Flowsheets (Taken 2/9/2023 9954)  Discharge to home or other facility with appropriate resources: Identify barriers to discharge with patient and caregiver     Problem: Pain  Goal: Verbalizes/displays adequate comfort level or baseline comfort level  2/9/2023 0729 by Beverly Gentile RN  Outcome: Progressing  Flowsheets (Taken 2/9/2023 0729)  Verbalizes/displays adequate comfort level or baseline comfort level:   Encourage patient to monitor pain and request assistance   Assess pain using appropriate pain scale   Administer analgesics based on type and severity of pain and evaluate response   Implement non-pharmacological measures as appropriate and evaluate response     Problem: Cardiovascular - Adult  Goal: Maintains optimal cardiac output and hemodynamic stability  2/9/2023 0729 by Beverly Gentile RN  Outcome: Progressing  Flowsheets (Taken 2/9/2023 0729)  Maintains optimal cardiac output and hemodynamic stability:   Monitor blood pressure and heart rate   Monitor urine output and notify Licensed Independent Practitioner for values outside of normal range     Problem: Cardiovascular - Adult  Goal: Absence of cardiac dysrhythmias or at baseline  2/9/2023 0729 by Beverly Gentile RN  Outcome: Progressing  Flowsheets (Taken 2/9/2023 0729)  Absence of cardiac dysrhythmias or at baseline:   Monitor cardiac rate and rhythm   Assess for signs of decreased cardiac output     Problem: Genitourinary - Adult  Goal: Absence of urinary retention  2/9/2023 0729 by Beverly Gentile RN  Outcome: Progressing  Flowsheets (Taken 2/9/2023 0729)  Absence of urinary retention: Assess patients ability to void and empty bladder     Problem: Metabolic/Fluid and Electrolytes - Adult  Goal: Electrolytes maintained within normal limits  2/9/2023 0729 by Beverly Gentile RN  Outcome: Progressing  Flowsheets (Taken 2/9/2023 0729)  Electrolytes maintained within normal limits:   Monitor labs and assess patient for signs and symptoms of electrolyte imbalances   Administer electrolyte replacement as ordered     Problem: Metabolic/Fluid and Electrolytes - Adult  Goal: Hemodynamic stability and optimal renal function maintained  2/9/2023 0729 by Soila Salmon RN  Outcome: Progressing  Flowsheets (Taken 2/9/2023 0729)  Hemodynamic stability and optimal renal function maintained:   Monitor labs and assess for signs and symptoms of volume excess or deficit   Monitor intake, output and patient weight     Problem: Metabolic/Fluid and Electrolytes - Adult  Goal: Glucose maintained within prescribed range  2/9/2023 0729 by Soila Salmon RN  Outcome: Progressing  Flowsheets (Taken 2/9/2023 0729)  Glucose maintained within prescribed range:   Monitor blood glucose as ordered   Assess for signs and symptoms of hyperglycemia and hypoglycemia     Problem: ABCDS Injury Assessment  Goal: Absence of physical injury  2/9/2023 0729 by Soila Salmon RN  Outcome: Progressing  Flowsheets (Taken 2/9/2023 0729)  Absence of Physical Injury: Implement safety measures based on patient assessment     Problem: Safety - Adult  Goal: Free from fall injury  2/9/2023 0729 by Soila Salmon RN  Outcome: Progressing  Flowsheets (Taken 2/9/2023 0729)  Free From Fall Injury: Instruct family/caregiver on patient safety

## 2023-02-09 NOTE — PROGRESS NOTES
Pt was in bed and alone at the time of the visit. He said that he wanted to sleep but could not. He was dealing with hypotension. He was encouraged and blessed. 02/09/23 1456   Encounter Summary   Service Provided For: Patient   Referral/Consult From: [facility]   Support System Significant other   Last Encounter  02/09/23   Complexity of Encounter Low   Begin Time 1020   End Time  1030   Total Time Calculated 10 min   Spiritual/Emotional needs   Type Spiritual Support   Assessment/Intervention/Outcome   Assessment Calm   Intervention Empowerment; Active listening   Outcome Encouraged

## 2023-02-09 NOTE — CARE COORDINATION
2/9/23, 3:30 PM EST    DISCHARGE ON GOING EVALUATION    [facility] day: 3  Location: -14/014-A Reason for admit: Hypotension [I95.9]  Hypotension, unspecified hypotension type [I95.9]   Procedure:   2/6 CXR: Small left pleural effusion with left basal atelectasis  2/6 CVC LIJ  2/6 Repeat CXR: Left basilar subsegmental atelectasis  2/7 Paracentesis: 5.5L removed    Barriers to Discharge: Continues on levophed drip. Midodrine increased to 4x/day and received 10 mg IV decadron x1. Plan for HD tomorrow. Afebrile. NSR. On room air. Ox4. Follows commands. PT/OT. Telemetry, CVC, SCDs. Levo @ 2 mcg/min, IV rocephin, midodrine 20 mg qid, sodium bicarb tabs, Electrolyte replacement protocols. PCP: Sharon Vila DO  Readmission Risk Score: 20.4%  Patient Goals/Plan/Treatment Preferences: From home with girlfriend. HD MWF at [facility] with 2833 chair time. Chemo 1x/wk in [facility]. SW on case; plan for SNF for rehab. Will require precert.

## 2023-02-09 NOTE — PROGRESS NOTES
[facility] ICU 4D  Occupational Therapy  Daily Note  Time:    Time In: 6889  Time Out: 1443  Timed Code Treatment Minutes: 23 Minutes  Minutes: 23          Date: 2023  Patient Name: Nallely Gill,   Gender: male      Room: 4D-14/014-A  MRN: [id number]  : 1960  (63 y.o.)  Referring Practitioner: Randy Fairbanks DO  Diagnosis: hypotension  Additional Pertinent Hx: Per ER note on :62 years old male, brought from outside facility due to hypotension. Patient has past medical history of recently diagnosed with systemic amyloidosis, ESRD on hemodialysis, undifferentiated liver disease, newly diagnosis CHF preserved EF, nonsustained episodes of ventricular tachycardia. Patient states he felt sick and nauseous and went to the local hospital and transferred to [facility] for further evaluation and management. Currently patient alert and oriented X3, complains of nausea, episodes of vomiting, feeling sick abdominal distention due to ascites. Restrictions/Precautions:  Restrictions/Precautions: Fall Risk, General Precautions  Position Activity Restriction  Other position/activity restrictions: monitor BP      SUBJECTIVE: Nurse  okayed session as long as BP were monitored    PAIN: 0/10: no c/o pain during session    Vitals: Orthostatic Blood Pressure: Supine: 93/67, Sittin/41, Standing: NA  Heart Rate: 69  O2 sat 96% on RA    COGNITION: WFL    ADL:   No ADL's completed this session. .  Educated Pt on BSC vs ETS pros/cons    BALANCE:  Sitting Balance:  Stand By Assistance. X 4 min --returned to supine after symptoms of light headedness increased    BED MOBILITY:  Supine to Sit: Minimal Assistance    Sit to Supine: Contact Guard Assistance      TRANSFERS:NA    FUNCTIONAL MOBILITY:NA      ADDITIONAL ACTIVITIES:  Completed BUE AROM exercises with bottle (1#) for resistance x 10 reps for shoulder flexion, horizontal add/abduction, elbow flexion.  Pt tolerated well in supine    ASSESSMENT:     Activity Tolerance:  Patient tolerance of  treatment: fair. Discharge Recommendations: Continue to assess pending progress  Equipment Recommendations: Other: Would benefit from Slipager 71: Times Per Week: 3-5x  Current Treatment Recommendations: Balance training, Functional mobility training, Endurance training, Self-Care / ADL, Safety education & training, Strengthening    Patient Education  Patient Education:  see above    Goals  Short Term Goals  Time Frame for Short Term Goals: until discharge  Short Term Goal 1: Pt will tolerate sitting EOB 15-20 min with S & 0-2 vcs for safety  Short Term Goal 2: Pt will complete BUE AROM-light resistance exercises to increase UB endurance & strength for increase UB strength for toilet t/fs  Short Term Goal 3: Pt will tolerate further assessment of functional t/fs by OTR when medically able  Long Term Goals  Time Frame for Long Term Goals : No LTG set d/t short ELOS    Following session, patient left in safe position with all fall risk precautions in place.

## 2023-02-10 ENCOUNTER — APPOINTMENT (OUTPATIENT)
Dept: GENERAL RADIOLOGY | Age: 63
End: 2023-02-10
Attending: INTERNAL MEDICINE
Payer: COMMERCIAL

## 2023-02-10 LAB
ANION GAP SERPL CALC-SCNC: 15 MEQ/L (ref 8–16)
ANION GAP SERPL CALC-SCNC: 17 MEQ/L (ref 8–16)
BUN SERPL-MCNC: 28 MG/DL (ref 7–22)
BUN SERPL-MCNC: 47 MG/DL (ref 7–22)
CALCIUM SERPL-MCNC: 8.3 MG/DL (ref 8.5–10.5)
CALCIUM SERPL-MCNC: 8.5 MG/DL (ref 8.5–10.5)
CHLORIDE SERPL-SCNC: 96 MEQ/L (ref 98–111)
CHLORIDE SERPL-SCNC: 97 MEQ/L (ref 98–111)
CO2 SERPL-SCNC: 23 MEQ/L (ref 23–33)
CO2 SERPL-SCNC: 26 MEQ/L (ref 23–33)
CREAT SERPL-MCNC: 3.4 MG/DL (ref 0.4–1.2)
CREAT SERPL-MCNC: 5.4 MG/DL (ref 0.4–1.2)
DEPRECATED RDW RBC AUTO: 67.2 FL (ref 35–45)
ERYTHROCYTE [DISTWIDTH] IN BLOOD BY AUTOMATED COUNT: 23.1 % (ref 11.5–14.5)
GFR SERPL CREATININE-BSD FRML MDRD: 11 ML/MIN/1.73M2
GFR SERPL CREATININE-BSD FRML MDRD: 20 ML/MIN/1.73M2
GLUCOSE SERPL-MCNC: 105 MG/DL (ref 70–108)
GLUCOSE SERPL-MCNC: 124 MG/DL (ref 70–108)
HCT VFR BLD AUTO: 27.7 % (ref 42–52)
HCT VFR BLD AUTO: 27.8 % (ref 42–52)
HGB BLD-MCNC: 10.3 GM/DL (ref 14–18)
HGB BLD-MCNC: 9.9 GM/DL (ref 14–18)
MAGNESIUM SERPL-MCNC: 2.1 MG/DL (ref 1.6–2.4)
MCH RBC QN AUTO: 32.4 PG (ref 26–33)
MCHC RBC AUTO-ENTMCNC: 37.2 GM/DL (ref 32.2–35.5)
MCV RBC AUTO: 87.1 FL (ref 80–94)
PLATELET # BLD AUTO: 76 THOU/MM3 (ref 130–400)
PMV BLD AUTO: ABNORMAL FL (ref 9.4–12.4)
POTASSIUM SERPL-SCNC: 3.8 MEQ/L (ref 3.5–5.2)
POTASSIUM SERPL-SCNC: 5 MEQ/L (ref 3.5–5.2)
RBC # BLD AUTO: 3.18 MILL/MM3 (ref 4.7–6.1)
SODIUM SERPL-SCNC: 136 MEQ/L (ref 135–145)
SODIUM SERPL-SCNC: 138 MEQ/L (ref 135–145)
STREP PNEUMO AG, UR: NEGATIVE
WBC # BLD AUTO: 5.5 THOU/MM3 (ref 4.8–10.8)

## 2023-02-10 PROCEDURE — 6360000002 HC RX W HCPCS: Performed by: INTERNAL MEDICINE

## 2023-02-10 PROCEDURE — 6360000002 HC RX W HCPCS

## 2023-02-10 PROCEDURE — 80048 BASIC METABOLIC PNL TOTAL CA: CPT

## 2023-02-10 PROCEDURE — 99291 CRITICAL CARE FIRST HOUR: CPT | Performed by: INTERNAL MEDICINE

## 2023-02-10 PROCEDURE — 2580000003 HC RX 258

## 2023-02-10 PROCEDURE — 6370000000 HC RX 637 (ALT 250 FOR IP)

## 2023-02-10 PROCEDURE — 83735 ASSAY OF MAGNESIUM: CPT

## 2023-02-10 PROCEDURE — 85018 HEMOGLOBIN: CPT

## 2023-02-10 PROCEDURE — P9047 ALBUMIN (HUMAN), 25%, 50ML: HCPCS | Performed by: INTERNAL MEDICINE

## 2023-02-10 PROCEDURE — 99232 SBSQ HOSP IP/OBS MODERATE 35: CPT | Performed by: INTERNAL MEDICINE

## 2023-02-10 PROCEDURE — 85027 COMPLETE CBC AUTOMATED: CPT

## 2023-02-10 PROCEDURE — 2000000000 HC ICU R&B

## 2023-02-10 PROCEDURE — 90935 HEMODIALYSIS ONE EVALUATION: CPT

## 2023-02-10 PROCEDURE — 74018 RADEX ABDOMEN 1 VIEW: CPT

## 2023-02-10 PROCEDURE — 36415 COLL VENOUS BLD VENIPUNCTURE: CPT

## 2023-02-10 PROCEDURE — 85014 HEMATOCRIT: CPT

## 2023-02-10 RX ORDER — PROMETHAZINE HYDROCHLORIDE 25 MG/1
12.5 TABLET ORAL EVERY 6 HOURS PRN
Status: DISCONTINUED | OUTPATIENT
Start: 2023-02-10 | End: 2023-02-10

## 2023-02-10 RX ORDER — ALBUMIN (HUMAN) 12.5 G/50ML
25 SOLUTION INTRAVENOUS ONCE
Status: COMPLETED | OUTPATIENT
Start: 2023-02-10 | End: 2023-02-10

## 2023-02-10 RX ORDER — PROMETHAZINE HYDROCHLORIDE 25 MG/ML
6.25 INJECTION, SOLUTION INTRAMUSCULAR; INTRAVENOUS ONCE
Status: DISCONTINUED | OUTPATIENT
Start: 2023-02-10 | End: 2023-02-15

## 2023-02-10 RX ORDER — PROMETHAZINE HYDROCHLORIDE 25 MG/ML
6.25 INJECTION, SOLUTION INTRAMUSCULAR; INTRAVENOUS EVERY 6 HOURS PRN
Status: DISCONTINUED | OUTPATIENT
Start: 2023-02-10 | End: 2023-02-19 | Stop reason: HOSPADM

## 2023-02-10 RX ADMIN — SODIUM CHLORIDE, PRESERVATIVE FREE 20 ML: 5 INJECTION INTRAVENOUS at 11:08

## 2023-02-10 RX ADMIN — ACETAMINOPHEN 650 MG: 325 TABLET ORAL at 02:31

## 2023-02-10 RX ADMIN — SODIUM CHLORIDE, PRESERVATIVE FREE 10 ML: 5 INJECTION INTRAVENOUS at 21:12

## 2023-02-10 RX ADMIN — ERGOCALCIFEROL 50000 UNITS: 1.25 CAPSULE ORAL at 10:04

## 2023-02-10 RX ADMIN — MIDODRINE HYDROCHLORIDE 20 MG: 10 TABLET ORAL at 07:00

## 2023-02-10 RX ADMIN — MIDODRINE HYDROCHLORIDE 25 MG: 10 TABLET ORAL at 21:13

## 2023-02-10 RX ADMIN — MIDODRINE HYDROCHLORIDE 25 MG: 10 TABLET ORAL at 16:00

## 2023-02-10 RX ADMIN — SODIUM BICARBONATE 650 MG: 650 TABLET ORAL at 10:04

## 2023-02-10 RX ADMIN — ALBUMIN (HUMAN) 25 G: 0.25 INJECTION, SOLUTION INTRAVENOUS at 08:16

## 2023-02-10 RX ADMIN — PROMETHAZINE HYDROCHLORIDE 6.25 MG: 25 INJECTION INTRAMUSCULAR; INTRAVENOUS at 17:49

## 2023-02-10 RX ADMIN — CEFTRIAXONE 2000 MG: 2 INJECTION, POWDER, FOR SOLUTION INTRAMUSCULAR; INTRAVENOUS at 21:10

## 2023-02-10 RX ADMIN — ACETAMINOPHEN 650 MG: 325 TABLET ORAL at 10:03

## 2023-02-10 ASSESSMENT — PAIN - FUNCTIONAL ASSESSMENT
PAIN_FUNCTIONAL_ASSESSMENT: ACTIVITIES ARE NOT PREVENTED
PAIN_FUNCTIONAL_ASSESSMENT: ACTIVITIES ARE NOT PREVENTED

## 2023-02-10 ASSESSMENT — PAIN SCALES - WONG BAKER

## 2023-02-10 ASSESSMENT — PAIN DESCRIPTION - PAIN TYPE
TYPE: ACUTE PAIN
TYPE: ACUTE PAIN

## 2023-02-10 ASSESSMENT — PAIN SCALES - GENERAL
PAINLEVEL_OUTOF10: 7
PAINLEVEL_OUTOF10: 4
PAINLEVEL_OUTOF10: 3
PAINLEVEL_OUTOF10: 4
PAINLEVEL_OUTOF10: 0
PAINLEVEL_OUTOF10: 4
PAINLEVEL_OUTOF10: 5

## 2023-02-10 ASSESSMENT — PAIN DESCRIPTION - LOCATION
LOCATION: ABDOMEN

## 2023-02-10 ASSESSMENT — PAIN DESCRIPTION - DESCRIPTORS
DESCRIPTORS: PRESSURE
DESCRIPTORS: PRESSURE
DESCRIPTORS: DISCOMFORT;HEAVINESS;PRESSURE

## 2023-02-10 ASSESSMENT — PAIN DESCRIPTION - FREQUENCY
FREQUENCY: CONTINUOUS
FREQUENCY: CONTINUOUS

## 2023-02-10 ASSESSMENT — PAIN DESCRIPTION - ORIENTATION
ORIENTATION: MID
ORIENTATION: INNER;MID
ORIENTATION: MID

## 2023-02-10 ASSESSMENT — PAIN DESCRIPTION - ONSET: ONSET: ON-GOING

## 2023-02-10 NOTE — PROGRESS NOTES
CRITICAL CARE PROGRESS NOTE      Patient:  Macario Bruins    Unit/Bed:4D-14/014-A  YOB: 1960  MRN: [id number]   PCP: Musa Moreno DO  Date of Admission: 2/6/2023  Chief Complaint:- Hypotension    Assessment and Plan:    Hypotensive: Currently asymptomatic. Initially thought to be related to septic shock. Patient was transferred to [facility] from outside of the facility and was hypotensive on arrival with ventricular arrhythmias in the 140s. 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 Has ESRD and is on hemodialysis. Initially suspicious for SBP and patient was given ceftriaxone 2 g. Culture and cell count of body fluid not suspicious for SBP at this time. We will continue to monitor patient. Patient did receive paracentesis 2/7 with 5.5 L of ascites for drainage. Patient is on midodrine 15, 3 times daily at home for blood pressure support. Midodirne was increased to 20 mg TID, 2/8. Patient does have history of asymptomatic hypotension. Patient's hypotension is likely multifactorial. Patient has history of running systolically in the 46S. -We will continue to monitor patient  -Currently on Levophed for blood pressure support. On 1 mcg, 2/10  -We will try to wean Levophed down as patient tolerates  -Continuing ceftriaxone 2g Q24 hrs  -Midodrine 20 mg, 4 times daily was increased to 25 mg four times daily. We have been unsuccessful at weaning patient off of levophed while he is on midodrine 20 mg four times daily.  -Decadron 10 mg, given 2/9/2023  Ventricular tachycardia, asymptomatic: Reported runs of ventricular tachycardia. Patient was given IV magnesium at outside facility. Patient received amnio bolus 300, started on amnio drip at 0.5. Continuing amiodarone drip at this time. He is on telemetry, strict inputs and outputs, daily weights. We will continue to monitor patient's electrolytes.           -Keep magnesium above 2.        -Keep potassium greater than 4        -Previously evaluated by EP on 1/20/2023. Arrhythmia thought to be secondary to underlying cardiac amyloidosis with risk of SCD. EP recommended AICD. Not a candidate for class Ic or III antiarrhythmics.        -Previous admission discharged with a LifeVest was discussed. With follow-up with EP.  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 On hemodialysis - M/W/F follows with Dr. Huey Leblanc.  Hx of noncompliance with medications including p.o. bicarb, fluid intake and other medical visits in the past. Patient is consulted for neurology to start inpatient home dialysis. Patient underwent hemodialysis 2/7/23. Nephrology does not recommend HD. -Nephrology is following, patient underwent HD, 2/10  Suspected liver cirrhosis: Likely 2/2 systemic amyloidosis waiting on liver biopsy for the past 2 months for this. Elevated transaminase, alk phos, hypoalbuminemia. Patient is scheduled to have regular paracentesis. Patient reports he has received 2-3 previous paracenteses. 5.5 L of ascites was drained with paracentesis, 2/7/2023. Patient tolerated the procedure well. Patient started to have increasing swelling in his abdomen, tightness, decreased appetite and started to have some SOB. 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 Per patient he reports compliance to medication treatments and visits.         -Follows with Dr. Claudia Woods outpatient. .        -As of now not started any chemotherapy regimen at this time       -Per heme-onc recommendations weekly paracentesis, limited to 2 L with with albumin infusions  HFpEF: Not in exacerbation. HFpEF 50%, 1/16. We will continue to monitor.  Continue telemetry, pulse ox, daily weights, strict I&O's  Malnutrition, severe protein calorie malnutrition: Likely due to patient's multiple comorbidities  Chronic normocytic anemia, mild: Patient has multiple chronic diseases including ESRD on HD, liver disease, systemic amyloidosis, continue current daily CBCs. Chronic thrombocytopenia, mild: Possibly due to suspected liver cirrhosis secondary to systemic amyloidosis. Continue to monitor with daily CBCs. Conditioning, debility: Multiple comorbidities including systemic amyloidosis, ESRD on HD, malnutrition, liver disease, ventricular tachycardia    HAGMA: Resolved. Anion gap, 15.0. Lactic acidosis and uremia likely due to ESRD. Patient is on HD. Home bicarb started p.o. We will continue to monitor, daily CBCs and BMPs ordered. INITIAL H AND P AND ICU COURSE:  Per HPI, \"patient is 63years old male, brought from outside facility due to hypotension. Patient has past medical history of recently diagnosed with systemic amyloidosis, ESRD on hemodialysis, undifferentiated liver disease, newly diagnosis CHF preserved EF, nonsustained episodes of ventricular tachycardia. Patient states he felt sick and nauseous and went to the local hospital and transferred to [facility] for further evaluation and management. Currently patient alert and oriented X3, complains of nausea, episodes of vomiting, feeling sick abdominal distention due to ascites. However he denies chest pain, heart palpitations, productive cough, dizziness, fevers, chills lightheadedness, dysuria/urinary frequency\"     2/7: Paracentesis was performed, 5.5 L removed. Patient underwent hemodialysis following paracentesis with removal of 500 cc. Patient's been on Levophed for blood pressure support. 2/8: On Levophed for blood pressure support will try to wean down as tolerated. Amio gtt running at this time. Midodrine was increased to 20 3 times daily. Patient did not require hemodialysis. 2/9: Patient reports he feels like his abdomen is starting to fill up with fluid again. He has noticed some tightness and distention of it as well.  Patient reports he did have 1 episode of nausea overnight after drinking some fluids no reported emesis. Patient still on Levophed at 2 mcg. We will continue to wean patient down as tolerated. Patient denies fever, chills, headache, dizziness, shortness of breath, chest pain, palpitations no abdominal pain. 2/10: Patient is having some tightness and pain with palpation of his abdomen. He denies any tenderness with no palpation or movement of his abdomen. Patient has noted some increasing shortness of breath the last 12-24 hrs. Additionally his appetite has decreased. Patient had 1 episode of nausea and vomiting overnight. Patient is undergoing dialysis at this time. He is still on 1 mcg of Levophed. We will still try to wean him off of Levophed. Patient has he is not having any fever, chills, weakness, dizziness, vision changes, chest pain, chest heaviness, palpitations, cough. Some increasing shortness of breath. He denies abdominal pain at rest or nausea or vomiting at this time. Past Medical History: Systemic amyloidosis, CHF, ESRD, liver disease. Family History: Hypertension. Social History: Former smoker, 34+ years since history of smoking. History of quitting December 2022. No previous illicit drug use or alcohol use. ROS: See HPI. Scheduled Meds:   midodrine  20 mg Oral 4x Daily    cefTRIAXone (ROCEPHIN) IV  2,000 mg IntraVENous Q24H    calcium replacement protocol   Other RX Placeholder    sodium chloride flush  5-40 mL IntraVENous 2 times per day    sodium bicarbonate  650 mg Oral BID    vitamin D  50,000 Units Oral Weekly     Continuous Infusions:   dextrose      amiodarone Stopped (02/08/23 0357)    sodium chloride      norepinephrine 2 mcg/min (02/10/23 0604)     PHYSICAL EXAMINATION  T:  97.6. P:  89. RR:  16. B/P:  122/98. Room air. O2 Sat:  95.  I/O:  + 294.6 cc   Body mass index is 17.97 kg/m². GCS:  15  General:  Chronically ill-appearing male. Appears older than stated age. Malnourished. Cachectic. Pale appearance. HEENT: Normocephalic atraumatic. No scleral icterus. PERR  Neck: Supple. No Thyromegaly. Lungs: Clear to auscultation throughout all lung fields. No wheezes or rhonchi. No retractions  Cardiac: Regular rate rhythm. No murmurs, rubs, gallops. No JVD. Abdomen: Soft. Diffuse tenderness of abdomen. Patient is having mild to moderate distention of abdomen. Abdomen is Not tight. Extremities: No clubbing, cyanosis, or edema x 4. Pale. Multiple bruises. Vasculature: Capillary refill < 3 seconds. Palpable dorsalis pedis pulses. Skin: Warm and dry. Psych: Alert and oriented x3. Affect appropriate  Lymph: No supraclavicular adenopathy. Neurologic: No focal deficit. No seizures. Data: (All radiographs, tracings, PFTs, and imaging are personally viewed and interpreted unless otherwise noted). Sodium 136. Potassium 5.0. Chloride 96. BUN 47. Creatinine 5.4. Calcium 8.4  WBC 5.5. Hemoglobin 10.3. Platelets 76. Telemetry shows normal sinus rhythm  CXR, 2/6/23: Small left lateral effusion and small basilar atelectasis. Most recent echo, January 2023, showed EF 50% w/ LVH seen, a small circumferential pericardial effusion with no tamponade physiology seen      Seen with multidisciplinary ICU team.  Meets Continued ICU Level Care Criteria:    [x] Yes   [] No - Transfer Planned to listed location:  [] HOSPITALIST CONTACTED-      Case and plan discussed with Dr. Omega Rangel MD.      Electronically signed by Karen Isbell DO, PGY-1  CRITICAL CARE SPECIALIST   Patient seen by me including key components of medical care. Case discussed with resident physician. Increase midodrine. Wean levophed. B/P did not respond to steroids. High levels of midodrine will have same activity as the already present levophed. May have issues with absorption of oral medication. Italicized font, if present,  represents changes to the note made by me. CC time 35 minutes.   Time was discontiguous. Time does not include procedure. Time does include my direct assessment of the patient and coordination of care. Time represents more than 50% of the time involved with patient care by the [address] [place] [address] team.  Electronically signed by Emanuel Arredondo.  Tristan Clayton MD.

## 2023-02-10 NOTE — PROGRESS NOTES
[facility]   Nephrology progress note  2/10/2023, 11:42 AM      Pt Name:    Vickie Bazzi  MRN:     [id number]     YOB: 1960  Admit Date:    2/6/2023  4:58 PM    Chief Complaint: Nephrology following for ESRD and HD    Subjective:  Patient was seen and examined this morning  Seen earlier today during rounds in ICU  Patient is on very low-dose Levophed at 1 mcg    Objective:  24HR INTAKE/OUTPUT:    Intake/Output Summary (Last 24 hours) at 2/10/2023 1142  Last data filed at 2/10/2023 0604  Gross per 24 hour   Intake 344.58 ml   Output 50 ml   Net 294.58 ml           I/O last 3 completed shifts: In: 344.6 [I.V.:165.8; IV Piggyback:178.8]  Out: 50 [Emesis/NG output:50]  No intake/output data recorded. Admission weight: 129 lb 6.6 oz (58.7 kg)  Wt Readings from Last 3 Encounters:   02/10/23 125 lb 3.5 oz (56.8 kg)   01/31/23 133 lb 12.8 oz (60.7 kg)   01/24/23 132 lb 3.2 oz (60 kg)        Vitals :   Vitals:    02/10/23 1030 02/10/23 1045 02/10/23 1100 02/10/23 1115   BP: (!) 88/58 92/72 99/68 90/67   Pulse: 91 (!) 109 (!) 110 (!) 102   Resp: 13 11 17 21   Temp:       TempSrc:       SpO2: 97% 96% 95% 95%   Weight:       Height:           Physical examination  General Appearance: Ill-appearing, no acute distress  Awake and alert.   Mouth/Throat: Oral mucosa moist  Neck: No JVD  Lungs:  no use of accessory muscles  GI: soft, non-tender, no guarding  Extremities: No pitting LE edema    Medications:  Infusion:    dextrose      amiodarone Stopped (02/08/23 0357)    sodium chloride      norepinephrine 2 mcg/min (02/10/23 0604)     Meds:    midodrine  20 mg Oral 4x Daily    cefTRIAXone (ROCEPHIN) IV  2,000 mg IntraVENous Q24H    calcium replacement protocol   Other RX Placeholder    sodium chloride flush  5-40 mL IntraVENous 2 times per day    sodium bicarbonate  650 mg Oral BID    vitamin D  50,000 Units Oral Weekly     Meds prn: diphenhydrAMINE, glucose, dextrose bolus **OR** dextrose bolus, glucagon (rDNA), dextrose, sodium chloride flush, sodium chloride, polyethylene glycol, acetaminophen **OR** acetaminophen, albuterol     Lab Data :  CBC:   Recent Labs     02/08/23  1755 02/09/23  0400 02/10/23  0515   WBC 6.4 6.1 5.5   HGB 9.1* 10.1* 10.3*   HCT 25.3* 28.6* 27.7*   PLT 57* 73* 76*       CMP:  Recent Labs     02/08/23  0400 02/09/23  0400 02/10/23  0515    137 136   K 3.8 4.3 5.0   CL 96* 98 96*   CO2 25 24 23   BUN 22 32* 47*   CREATININE 3.3* 4.3* 5.4*   GLUCOSE 110* 71 124*   CALCIUM 8.1* 8.2* 8.5   MG  --  1.9  --        Hepatic:   Recent Labs     02/07/23  1220   LABALBU 2.9*   AST 54*   *   BILITOT 3.5*   ALKPHOS 1,960*           Assessment and Plan:  ESRD on hemodialysis. 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 Currently on Levophed 1 mcg  Plan hemodialysis treatment today  Metabolic acidosis. Corrected  Chronic hypotension. Continue with midodrine-dose increased already. Wean Levophed  Probable autonomic dysfunction  Ascites status post paracentesis  Elevated liver enzymes  Anemia in ESRD  Chronic thrombocytopenia  Discussed with patient and nursing staff    Vince Montana MD  [facility]    This report has been created using voice recognition software.  It may contain minor errors which are inherent in voice recognition technology

## 2023-02-10 NOTE — PLAN OF CARE
Problem: Discharge Planning  Goal: Discharge to home or other facility with appropriate resources  2/9/2023 1905 by Cony Adams RN  Outcome: Progressing  Flowsheets (Taken 2/9/2023 0729 by Stanley Enamorado RN)  Discharge to home or other facility with appropriate resources: Identify barriers to discharge with patient and caregiver     Problem: Pain  Goal: Verbalizes/displays adequate comfort level or baseline comfort level  2/9/2023 1905 by Cony Adams RN  Outcome: Progressing  Flowsheets (Taken 2/9/2023 0729 by Stanley Enamorado RN)  Verbalizes/displays adequate comfort level or baseline comfort level:   Encourage patient to monitor pain and request assistance   Assess pain using appropriate pain scale   Administer analgesics based on type and severity of pain and evaluate response   Implement non-pharmacological measures as appropriate and evaluate response     Problem: Skin/Tissue Integrity  Goal: Absence of new skin breakdown  Description: 1. Monitor for areas of redness and/or skin breakdown  2. Assess vascular access sites hourly  3. Every 4-6 hours minimum:  Change oxygen saturation probe site  4. Every 4-6 hours:  If on nasal continuous positive airway pressure, respiratory therapy assess nares and determine need for appliance change or resting period.   Outcome: Progressing  Note: Frequent position change     Problem: Cardiovascular - Adult  Goal: Maintains optimal cardiac output and hemodynamic stability  2/9/2023 1905 by Cony Adams RN  Outcome: Progressing  Flowsheets (Taken 2/9/2023 0729 by Stanley Enamorado RN)  Maintains optimal cardiac output and hemodynamic stability:   Monitor blood pressure and heart rate   Monitor urine output and notify Licensed Independent Practitioner for values outside of normal range  2/9/2023 0729 by Stanley Enamorado RN  Outcome: Progressing  Flowsheets (Taken 2/9/2023 0729)  Maintains optimal cardiac output and hemodynamic stability:   Monitor blood pressure and heart rate   Monitor urine output and notify Licensed Independent Practitioner for values outside of normal range     Problem: Cardiovascular - Adult  Goal: Absence of cardiac dysrhythmias or at baseline  2/9/2023 1905 by Michaelle Sheppard RN  Outcome: Progressing  Flowsheets (Taken 2/9/2023 0729 by Pamela Garcia RN)  Absence of cardiac dysrhythmias or at baseline:   Monitor cardiac rate and rhythm   Assess for signs of decreased cardiac output     Problem: Metabolic/Fluid and Electrolytes - Adult  Goal: Electrolytes maintained within normal limits  2/9/2023 1905 by Michaelle Sheppard RN  Outcome: Progressing  Flowsheets (Taken 2/9/2023 0729 by Pamela Garcia RN)  Electrolytes maintained within normal limits:   Monitor labs and assess patient for signs and symptoms of electrolyte imbalances   Administer electrolyte replacement as ordered     Problem: Metabolic/Fluid and Electrolytes - Adult  Goal: Hemodynamic stability and optimal renal function maintained  2/9/2023 1905 by Michaelle Sheppard RN  Outcome: Progressing  Flowsheets (Taken 2/9/2023 0729 by Pamela Garcia RN)  Hemodynamic stability and optimal renal function maintained:   Monitor labs and assess for signs and symptoms of volume excess or deficit   Monitor intake, output and patient weight     Problem: Metabolic/Fluid and Electrolytes - Adult  Goal: Glucose maintained within prescribed range  2/9/2023 1905 by Michaelle Sheppard RN  Outcome: Progressing  Flowsheets (Taken 2/9/2023 0729 by Pamela Garcia RN)  Glucose maintained within prescribed range:   Monitor blood glucose as ordered   Assess for signs and symptoms of hyperglycemia and hypoglycemia     Problem: ABCDS Injury Assessment  Goal: Absence of physical injury  2/9/2023 1905 by Michaelle Sheppard RN  Outcome: Progressing  Flowsheets (Taken 2/9/2023 0729 by Pamela Garcia RN)  Absence of Physical Injury: Implement safety measures based on patient assessment     Problem: Safety - Adult  Goal: Free from fall injury  2/9/2023 1905 by Jose Gorman RN  Outcome: Progressing  Flowsheets (Taken 2/9/2023 4929 by Jasmeet Fabian, RN)  Free From Fall Injury: Instruct family/caregiver on patient safety     Problem: Nutrition Deficit:  Goal: Optimize nutritional status  Outcome: Progressing  Flowsheets (Taken 2/9/2023 1905)  Nutrient intake appropriate for improving, restoring, or maintaining nutritional needs: Assess nutritional status and recommend course of action     Problem: Genitourinary - Adult  Goal: Absence of urinary retention  2/9/2023 1905 by Jose Gorman RN  Outcome: Completed  Flowsheets (Taken 2/9/2023 0729 by Jasmeet Fabian RN)  Absence of urinary retention: Assess patients ability to void and empty bladder   Care plan reviewed with patient and family. Patient and family verbalize understanding of the plan of care and contribute to goal setting.

## 2023-02-10 NOTE — CARE COORDINATION
2/10/23, 3:26 PM EST    DISCHARGE ON GOING EVALUATION    [facility] day: 4  Location: [facility]14/014-A Reason for admit: Hypotension [I95.9]  Hypotension, unspecified hypotension type [I95.9]   Procedure:   2/6 CXR: Small left pleural effusion with left basal atelectasis  2/6 CVC [facility]  2/6 Repeat CXR: Left basilar subsegmental atelectasis  2/7 Paracentesis: 5.5L removed    Barriers to Discharge: Some increasing SOB. One episode of n/v overnight. HD today with no fluid removal. Still on levophed; increased midodrine dose to 25 mg 4x/day. Afebrile. NSR. On room air. Ox4. Follows commands. PT/OT. Telemetry, CVC, SCDs. Levo @ 2 mcg/min, IV rocephin, midodrine 25 mg qid, sodium bicarb tabs, vit D, Electrolyte replacement protocols. Received IV albumin x1 today. PCP: Tommie Wells DO  Readmission Risk Score: 20.4%  Patient Goals/Plan/Treatment Preferences: From home with girlfriend. HD MWF at [address] [place] [address] with 0234 chair time. Chemo 1x/wk in [facility]. SW on case; plan for SNF for rehab, see SW note regarding referrals made. Will require precert.

## 2023-02-10 NOTE — FLOWSHEET NOTE
0800 Mr Rosalina Corbett rests in the bed. He denies CP or SOB. He has a 3 lumen CVC right IJ site clear with Levophed infusing to keep his BP above ordered parameters. Dr Ayala Price has come to see and we will plan for dialysis today. He has a tunneled dialysis catheter right SC site clear. He tolerates PO well but has a poor appetite. He has complaints of his abd feeling full. He has moderate abdominal distension on palpation.

## 2023-02-10 NOTE — PROGRESS NOTES
[facility] 62  PHYSICAL THERAPY MISSED TREATMENT NOTE  [facility] ICU 4D    Date: 2/10/2023  Patient Name: Zelda Poole        MRN: [id number]   : 1960  (63 y.o.)  Gender: male   Referring Practitioner: Tiana Arias DO  Diagnosis: Hypotension         REASON FOR MISSED TREATMENT:  Patient unable to participate. First attempt this AM pt receiving dialysis, second attempt this afternoon, pt BP extremely low and not appropriate to participate with therapy at this time.

## 2023-02-10 NOTE — PROGRESS NOTES
Physician Progress Note      PATIENT:               Tony Thompson  CSN #:                  [id number]  :                       1960  ADMIT DATE:       2023 4:58 PM  DISCH DATE:  RESPONDING  PROVIDER #: Carol Ann Ba          QUERY TEXT:    Pt admitted with hypotension. If possible, please document in the progress   notes and discharge summary if you are evaluating and/or treating any of the   following: The medical record reflects the following:  Risk Factors: hypotension  Clinical Indicators: chronic hypotension, running in 80's regularly, but   currently requiring Levophed support to maintain BP  Treatment: Labs, imaging, IVF, Levophed gtt, monitoring in ICU  Options provided:  -- Hypovolemic Shock  -- Hypovolemia without Shock  -- Hypotension without Shock  -- Other - I will add my own diagnosis  -- Disagree - Not applicable / Not valid  -- Disagree - Clinically unable to determine / Unknown  -- Refer to Clinical Documentation Reviewer    PROVIDER RESPONSE TEXT:    This patient has hypotension without shock.     Query created by: Oscar Bernabe on 2/10/2023 9:06 AM      Electronically signed by:  Hua Pascal 2/10/2023 6:46 PM

## 2023-02-10 NOTE — PLAN OF CARE
Problem: Discharge Planning  Goal: Discharge to home or other facility with appropriate resources  Outcome: Progressing  Flowsheets (Taken 2/9/2023 2000 by Dayle Najjar, RN)  Discharge to home or other facility with appropriate resources:   Identify barriers to discharge with patient and caregiver   Arrange for needed discharge resources and transportation as appropriate   Identify discharge learning needs (meds, wound care, etc)   Refer to discharge planning if patient needs post-hospital services based on physician order or complex needs related to functional status, cognitive ability or social support system     Problem: Pain  Goal: Verbalizes/displays adequate comfort level or baseline comfort level  Outcome: Progressing  Flowsheets (Taken 2/9/2023 2000 by Dayle Najjar, RN)  Verbalizes/displays adequate comfort level or baseline comfort level:   Encourage patient to monitor pain and request assistance   Assess pain using appropriate pain scale   Administer analgesics based on type and severity of pain and evaluate response   Implement non-pharmacological measures as appropriate and evaluate response   Notify Licensed Independent Practitioner if interventions unsuccessful or patient reports new pain     Problem: Skin/Tissue Integrity  Goal: Absence of new skin breakdown  Description: 1. Monitor for areas of redness and/or skin breakdown  2. Assess vascular access sites hourly  3. Every 4-6 hours minimum:  Change oxygen saturation probe site  4. Every 4-6 hours:  If on nasal continuous positive airway pressure, respiratory therapy assess nares and determine need for appliance change or resting period.   Outcome: Progressing  Note: Frequent position changes     Problem: Cardiovascular - Adult  Goal: Maintains optimal cardiac output and hemodynamic stability  Outcome: Progressing  Flowsheets (Taken 2/9/2023 2000 by Dayle Najjar, RN)  Maintains optimal cardiac output and hemodynamic stability:   Monitor blood pressure and heart rate   Monitor urine output and notify Licensed Independent Practitioner for values outside of normal range   Assess for signs of decreased cardiac output   Administer fluid and/or volume expanders as ordered     Problem: Cardiovascular - Adult  Goal: Absence of cardiac dysrhythmias or at baseline  Outcome: Progressing  Flowsheets (Taken 2/9/2023 2000 by Darcy Porter RN)  Absence of cardiac dysrhythmias or at baseline:   Monitor cardiac rate and rhythm   Assess for signs of decreased cardiac output   Administer antiarrhythmia medication and electrolyte replacement as ordered     Problem: Genitourinary - Adult  Goal: Urinary catheter remains patent  Outcome: Progressing  Flowsheets (Taken 2/9/2023 2000 by Darcy Porter RN)  Urinary catheter remains patent:   Assess patency of urinary catheter   Irrigate catheter per Licensed Independent Practitioner order if indicated and notify Licensed Independent Practitioner if unable to irrigate   Assess need for a larger catheter size or a 3-way catheter for continuous bladder irrigation     Problem: Metabolic/Fluid and Electrolytes - Adult  Goal: Electrolytes maintained within normal limits  Outcome: Progressing  Flowsheets (Taken 2/9/2023 2000 by Darcy Porter RN)  Electrolytes maintained within normal limits:   Monitor labs and assess patient for signs and symptoms of electrolyte imbalances   Administer electrolyte replacement as ordered   Monitor response to electrolyte replacements, including repeat lab results as appropriate   Fluid restriction as ordered   Instruct patient on fluid and nutrition restrictions as appropriate     Problem: Metabolic/Fluid and Electrolytes - Adult  Goal: Hemodynamic stability and optimal renal function maintained  Outcome: Progressing  Flowsheets (Taken 2/9/2023 2000 by Darcy Porter RN)  Hemodynamic stability and optimal renal function maintained:   Monitor labs and assess for signs and symptoms of volume excess or deficit Monitor intake, output and patient weight   Monitor urine specific gravity, serum osmolarity and serum sodium as indicated or ordered   Monitor response to interventions for patient's volume status, including labs, urine output, blood pressure (other measures as available)   Encourage oral intake as appropriate   Instruct patient on fluid and nutrition restrictions as appropriate     Problem: Metabolic/Fluid and Electrolytes - Adult  Goal: Glucose maintained within prescribed range  Outcome: Progressing  Flowsheets (Taken 2/9/2023 2000 by Mikki Mccoy RN)  Glucose maintained within prescribed range:   Monitor blood glucose as ordered   Assess for signs and symptoms of hyperglycemia and hypoglycemia   Administer ordered medications to maintain glucose within target range   Assess barriers to adequate nutritional intake and initiate nutrition consult as needed   Instruct patient on self management of diabetes and initiate consult as needed     Problem: ABCDS Injury Assessment  Goal: Absence of physical injury  Outcome: Progressing  Flowsheets (Taken 2/9/2023 0729 by Michaelle Hughes RN)  Absence of Physical Injury: Implement safety measures based on patient assessment     Problem: Safety - Adult  Goal: Free from fall injury  Outcome: Progressing  Flowsheets (Taken 2/9/2023 0729 by Michaelle Hughes RN)  Free From Fall Injury: Instruct family/caregiver on patient safety     Problem: Nutrition Deficit:  Goal: Optimize nutritional status  Outcome: Progressing  Flowsheets (Taken 2/9/2023 1905)  Nutrient intake appropriate for improving, restoring, or maintaining nutritional needs: Assess nutritional status and recommend course of action   Care plan reviewed with patient and family. Patient and family verbalize understanding of the plan of care and contribute to goal setting.

## 2023-02-10 NOTE — FLOWSHEET NOTE
0800 Mr Cayden Cardoso rests in the bed. He denies CP or SOB. He has a 3 lumen CVC right IJ site clear with Levophed infusing to keep his BP above ordered parameters. Dr Sky Cho has come to see and we will plan for dialysis tomorrow. He has a tunneled dialysis catheter right SC site clear. He tolerates PO well but has a poor appetite. Plan to increase Midodrine today and wean off Levo if he tolerates. 1200 He was unable to tolerate much PT today as he became very hypotensive after attempting to stand.

## 2023-02-10 NOTE — FLOWSHEET NOTE
02/10/23 1110   Vital Signs   BP 99/68   Temp 97.3 °F (36.3 °C)   Heart Rate (!) 102   Weight 125 lb 3.5 oz (56.8 kg)   Weight Method Bed scale   Percent Weight Change 0   Pain Assessment   Pain Assessment None - Denies Pain   Post-Hemodialysis Assessment   Post-Treatment Procedures Blood returned;Catheter Capped, clamped with Saline x2 ports   Machine Disinfection Process Exterior Machine Disinfection   Rinseback Volume (ml) 400 ml   Blood Volume Processed (Liters) 72.8 l/min   Dialyzer Clearance Lightly streaked   Duration of Treatment (minutes) 210 minutes   Hemodialysis Output (ml) 400 ml   Tolerated Treatment Fair   Interventions Taken Medication   Bilateral Breath Sounds Clear   Edema None   Time Off 1050   Patient Disposition Remain in ICU/ED   completed 3.5 hr HD TX and removed No fluid. pt tolerated HD TX fair with hypotension. Levophed 3mcg infusing during [address]. pt given 20mg Midodrine pre HD TX, pt received Albumin 25g for hypotension. CVC dressing is clean, dry and intact. record completed and printed to be scanned into PT EMR. Report given to primary nurse, Michaelle Zavala RN.

## 2023-02-11 ENCOUNTER — APPOINTMENT (OUTPATIENT)
Dept: GENERAL RADIOLOGY | Age: 63
End: 2023-02-11
Attending: INTERNAL MEDICINE
Payer: COMMERCIAL

## 2023-02-11 LAB
ANION GAP SERPL CALC-SCNC: 15 MEQ/L (ref 8–16)
BACTERIA UR CULT: NORMAL
BUN SERPL-MCNC: 31 MG/DL (ref 7–22)
CALCIUM SERPL-MCNC: 8.5 MG/DL (ref 8.5–10.5)
CHLORIDE SERPL-SCNC: 98 MEQ/L (ref 98–111)
CO2 SERPL-SCNC: 28 MEQ/L (ref 23–33)
CREAT SERPL-MCNC: 3.4 MG/DL (ref 0.4–1.2)
DEPRECATED RDW RBC AUTO: 73.3 FL (ref 35–45)
ERYTHROCYTE [DISTWIDTH] IN BLOOD BY AUTOMATED COUNT: 24.1 % (ref 11.5–14.5)
GFR SERPL CREATININE-BSD FRML MDRD: 20 ML/MIN/1.73M2
GLUCOSE SERPL-MCNC: 94 MG/DL (ref 70–108)
HCT VFR BLD AUTO: 26.8 % (ref 42–52)
HGB BLD-MCNC: 9.6 GM/DL (ref 14–18)
MCH RBC QN AUTO: 31.7 PG (ref 26–33)
MCHC RBC AUTO-ENTMCNC: 35.8 GM/DL (ref 32.2–35.5)
MCV RBC AUTO: 88.4 FL (ref 80–94)
PLATELET # BLD AUTO: 77 THOU/MM3 (ref 130–400)
PMV BLD AUTO: ABNORMAL FL (ref 9.4–12.4)
POTASSIUM SERPL-SCNC: 3.9 MEQ/L (ref 3.5–5.2)
RBC # BLD AUTO: 3.03 MILL/MM3 (ref 4.7–6.1)
SODIUM SERPL-SCNC: 141 MEQ/L (ref 135–145)
WBC # BLD AUTO: 6.8 THOU/MM3 (ref 4.8–10.8)

## 2023-02-11 PROCEDURE — 6370000000 HC RX 637 (ALT 250 FOR IP): Performed by: STUDENT IN AN ORGANIZED HEALTH CARE EDUCATION/TRAINING PROGRAM

## 2023-02-11 PROCEDURE — 85027 COMPLETE CBC AUTOMATED: CPT

## 2023-02-11 PROCEDURE — 6370000000 HC RX 637 (ALT 250 FOR IP)

## 2023-02-11 PROCEDURE — 74018 RADEX ABDOMEN 1 VIEW: CPT

## 2023-02-11 PROCEDURE — 6360000002 HC RX W HCPCS

## 2023-02-11 PROCEDURE — 2580000003 HC RX 258

## 2023-02-11 PROCEDURE — 99232 SBSQ HOSP IP/OBS MODERATE 35: CPT | Performed by: INTERNAL MEDICINE

## 2023-02-11 PROCEDURE — 80048 BASIC METABOLIC PNL TOTAL CA: CPT

## 2023-02-11 PROCEDURE — 99233 SBSQ HOSP IP/OBS HIGH 50: CPT | Performed by: INTERNAL MEDICINE

## 2023-02-11 PROCEDURE — 2000000000 HC ICU R&B

## 2023-02-11 PROCEDURE — 36415 COLL VENOUS BLD VENIPUNCTURE: CPT

## 2023-02-11 RX ORDER — ZOLPIDEM TARTRATE 5 MG/1
5 TABLET ORAL ONCE
Status: COMPLETED | OUTPATIENT
Start: 2023-02-11 | End: 2023-02-11

## 2023-02-11 RX ADMIN — SODIUM CHLORIDE, PRESERVATIVE FREE 10 ML: 5 INJECTION INTRAVENOUS at 08:45

## 2023-02-11 RX ADMIN — MIDODRINE HYDROCHLORIDE 25 MG: 10 TABLET ORAL at 08:44

## 2023-02-11 RX ADMIN — SODIUM BICARBONATE 650 MG: 650 TABLET ORAL at 20:29

## 2023-02-11 RX ADMIN — SODIUM CHLORIDE, PRESERVATIVE FREE 10 ML: 5 INJECTION INTRAVENOUS at 20:29

## 2023-02-11 RX ADMIN — MIDODRINE HYDROCHLORIDE 25 MG: 10 TABLET ORAL at 20:29

## 2023-02-11 RX ADMIN — CEFTRIAXONE 2000 MG: 2 INJECTION, POWDER, FOR SOLUTION INTRAMUSCULAR; INTRAVENOUS at 20:26

## 2023-02-11 RX ADMIN — MIDODRINE HYDROCHLORIDE 25 MG: 10 TABLET ORAL at 17:20

## 2023-02-11 RX ADMIN — ZOLPIDEM TARTRATE 5 MG: 5 TABLET ORAL at 02:08

## 2023-02-11 RX ADMIN — MIDODRINE HYDROCHLORIDE 25 MG: 10 TABLET ORAL at 13:47

## 2023-02-11 RX ADMIN — SODIUM BICARBONATE 650 MG: 650 TABLET ORAL at 08:45

## 2023-02-11 ASSESSMENT — PAIN SCALES - GENERAL
PAINLEVEL_OUTOF10: 0

## 2023-02-11 ASSESSMENT — PAIN SCALES - WONG BAKER

## 2023-02-11 NOTE — PROGRESS NOTES
[facility] THERAPY MISSED TREATMENT NOTE  [facility] ICU 4D  4D-14/014-A      Date: 2/10/2023  Patient Name: Kimmie Billy        CSN: [id number]   : 1960  (63 y.o.)  Gender: male   Referring Practitioner: Michael Keyes DO  Diagnosis: hypotension         REASON FOR MISSED TREATMENT: Hold Treatment per Nursing  After having hemodialysis this morning, pt's BP extremely low and not appropriate to participate with therapy this afternoon.

## 2023-02-11 NOTE — PLAN OF CARE
Problem: Discharge Planning  Goal: Discharge to home or other facility with appropriate resources  Recent Flowsheet Documentation  Taken 2/11/2023 0830 by Amanda Hua RN  Discharge to home or other facility with appropriate resources: Identify barriers to discharge with patient and caregiver     Problem: Pain  Goal: Verbalizes/displays adequate comfort level or baseline comfort level  Recent Flowsheet Documentation  Taken 2/11/2023 0830 by Amanda Hua RN  Verbalizes/displays adequate comfort level or baseline comfort level: Encourage patient to monitor pain and request assistance     Problem: Cardiovascular - Adult  Goal: Maintains optimal cardiac output and hemodynamic stability  Recent Flowsheet Documentation  Taken 2/11/2023 0830 by Amanda Hua RN  Maintains optimal cardiac output and hemodynamic stability: Monitor blood pressure and heart rate  Goal: Absence of cardiac dysrhythmias or at baseline  Recent Flowsheet Documentation  Taken 2/11/2023 0830 by Amanda Hua RN  Absence of cardiac dysrhythmias or at baseline: Monitor cardiac rate and rhythm     Problem: Genitourinary - Adult  Goal: Absence of urinary retention  Recent Flowsheet Documentation  Taken 2/11/2023 0830 by Amanda Hua RN  Absence of urinary retention: Assess patients ability to void and empty bladder  Goal: Urinary catheter remains patent  Recent Flowsheet Documentation  Taken 2/11/2023 0830 by Amanda Hua RN  Urinary catheter remains patent: Assess patency of urinary catheter     Problem: Metabolic/Fluid and Electrolytes - Adult  Goal: Electrolytes maintained within normal limits  Recent Flowsheet Documentation  Taken 2/11/2023 0830 by Amanda Hua RN  Electrolytes maintained within normal limits: Monitor labs and assess patient for signs and symptoms of electrolyte imbalances  Goal: Hemodynamic stability and optimal renal function maintained  Recent Flowsheet Documentation  Taken 2/11/2023 0830 by Amanda Hua RN  Hemodynamic stability and optimal renal function maintained: Monitor labs and assess for signs and symptoms of volume excess or deficit  Goal: Glucose maintained within prescribed range  Recent Flowsheet Documentation  Taken 2/11/2023 0830 by Baljinder Barrios RN  Glucose maintained within prescribed range: Monitor blood glucose as ordered     Care plan reviewed with patient. Patient verbalized understanding of the plan of care and contribute to goal setting.

## 2023-02-11 NOTE — PROGRESS NOTES
CRITICAL CARE PROGRESS NOTE      Patient:  Zach Barkley    Unit/Bed:4D-14/014-A  YOB: 1960  MRN: [id number]   PCP: Prabhu Rivas DO  Date of Admission: 2/6/2023  Chief Complaint:- Hypotension    Assessment and Plan:    Ongoing Hypotension: Initially thought to be related to septic shock. Patient was transferred to [facility] from outside of the facility and was hypotensive on arrival with ventricular arrhythmias in the 140s. 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 Has ESRD and is on hemodialysis. Initially suspicious for SBP and patient was given ceftriaxone 2 g. Culture and cell count of body fluid not suspicious for SBP at this time. Patient did receive paracentesis 2/7 with 5.5 L of ascites for drainage. Patient is on midodrine 15, 3 times daily at home for blood pressure support. Midodirne was increased to 20 mg TID, 2/8. Patient's hypotension is likely multifactorial. Midodrine was further increased to 25 mg TID. Decadron was attempted without success. Patient remains on Levophed, difficult wean. Ventricular tachycardia, asymptomatic: Reported runs of ventricular tachycardia. Patient was given IV magnesium at outside facility. Patient received amnio bolus 300, started on amnio drip at 0.5. Continuing amiodarone drip at this time. He is on telemetry, strict inputs and outputs, daily weights. We will continue to monitor patient's electrolytes. -Keep magnesium above 2.        -Keep potassium greater than 4        -Previously evaluated by EP on 1/20/2023. Arrhythmia thought to be secondary to underlying cardiac amyloidosis with risk of SCD. EP recommended AICD. Not a candidate for class Ic or III antiarrhythmics.        -Previous admission discharged with a LifeVest was discussed. With follow-up with EP.  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  On hemodialysis - M/W/F follows with Dr. Ronal Zheng.  Hx of noncompliance with medications including p.o. bicarb, fluid intake and other medical visits in the past. Patient is consulted for neurology to start inpatient home dialysis. Patient underwent hemodialysis 2/7/23. Nephrology does not recommend HD. -Nephrology is following, patient underwent HD, 2/10  Suspected liver cirrhosis: Likely 2/2 systemic amyloidosis waiting on liver biopsy for the past 2 months for this. Elevated transaminase, alk phos, hypoalbuminemia. Patient is scheduled to have regular paracentesis. Patient reports he has received 2-3 previous paracenteses. 5.5 L of ascites was drained with paracentesis, 2/7/2023. Patient tolerated the procedure well. Patient started to have increasing swelling in his abdomen, tightness, decreased appetite and started to have some SOB. 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 Per patient he reports compliance to medication treatments and visits.         -Follows with Dr. Carrillo Davila outpatient. .        -As of now not started any chemotherapy regimen at this time       -Per heme-onc recommendations weekly paracentesis, limited to 2 L with with albumin infusions  HFpEF: Not in exacerbation. HFpEF 50%, 1/16. We will continue to monitor. Continue telemetry, pulse ox, daily weights, strict I&O's  Malnutrition, severe protein calorie malnutrition: Likely due to patient's multiple comorbidities  Chronic normocytic anemia, mild: Patient has multiple chronic diseases including ESRD on HD, liver disease, systemic amyloidosis, continue current daily CBCs. Chronic thrombocytopenia, mild: Possibly due to suspected liver cirrhosis secondary to systemic amyloidosis. Continue to monitor with daily CBCs. Conditioning, debility: Multiple comorbidities including systemic amyloidosis, ESRD on HD, malnutrition, liver disease, ventricular tachycardia    HAGMA: Resolved. Anion gap, 15.0. Lactic acidosis and uremia likely due to ESRD. Patient is on HD. Home bicarb started p.o. We will continue to monitor, daily CBCs and BMPs ordered. INITIAL H AND P AND ICU COURSE:  Per HPI, \"patient is 63years old male, brought from outside facility due to hypotension. Patient has past medical history of recently diagnosed with systemic amyloidosis, ESRD on hemodialysis, undifferentiated liver disease, newly diagnosis CHF preserved EF, nonsustained episodes of ventricular tachycardia. Patient states he felt sick and nauseous and went to the local hospital and transferred to [facility] for further evaluation and management. Currently patient alert and oriented X3, complains of nausea, episodes of vomiting, feeling sick abdominal distention due to ascites. However he denies chest pain, heart palpitations, productive cough, dizziness, fevers, chills lightheadedness, dysuria/urinary frequency\"     2/7: Paracentesis was performed, 5.5 L removed. Patient underwent hemodialysis following paracentesis with removal of 500 cc. Patient's been on Levophed for blood pressure support. 2/8: On Levophed for blood pressure support will try to wean down as tolerated. Amio gtt running at this time. Midodrine was increased to 20 3 times daily. Patient did not require hemodialysis. 2/9: Patient reports he feels like his abdomen is starting to fill up with fluid again. He has noticed some tightness and distention of it as well. Patient reports he did have 1 episode of nausea overnight after drinking some fluids no reported emesis. Patient still on Levophed at 2 mcg. We will continue to wean patient down as tolerated. Patient denies fever, chills, headache, dizziness, shortness of breath, chest pain, palpitations no abdominal pain. 2/10: Patient is having some tightness and pain with palpation of his abdomen.  He denies any tenderness with no palpation or movement of his abdomen. Patient has noted some increasing shortness of breath the last 12-24 hrs. Additionally his appetite has decreased. Patient had 1 episode of nausea and vomiting overnight. Patient is undergoing dialysis at this time. He is still on 1 mcg of Levophed. We will still try to wean him off of Levophed. Patient has he is not having any fever, chills, weakness, dizziness, vision changes, chest pain, chest heaviness, palpitations, cough. Some increasing shortness of breath. He denies abdominal pain at rest or nausea or vomiting at this time. 2/11: Patient is feeling better this morning. Levophed requirement up to 3 mcg. On max dose of Midodrine. Attempting to wean off Levophed. Past Medical History: Systemic amyloidosis, CHF, ESRD, liver disease. Family History: Hypertension. Social History: Former smoker, 34+ years since history of smoking. History of quitting December 2022. No previous illicit drug use or alcohol use. ROS: See HPI. Scheduled Meds:   midodrine  25 mg Oral 4x Daily    promethazine  6.25 mg IntraMUSCular Once    cefTRIAXone (ROCEPHIN) IV  2,000 mg IntraVENous Q24H    calcium replacement protocol   Other RX Placeholder    sodium chloride flush  5-40 mL IntraVENous 2 times per day    sodium bicarbonate  650 mg Oral BID    vitamin D  50,000 Units Oral Weekly     Continuous Infusions:   dextrose      amiodarone Stopped (02/08/23 0357)    sodium chloride      norepinephrine 2 mcg/min (02/10/23 0604)     PHYSICAL EXAMINATION  T:  97.6. P:  88. RR:  16. B/P:  81/52. Room air. O2 Sat:  95.  I/O:  + 562.6 cc   Body mass index is 17.97 kg/m². GCS:  15  General:  Chronically ill-appearing male. Appears older than stated age. Malnourished. Cachectic. Pale appearance. HEENT: Normocephalic atraumatic. No scleral icterus. PERR  Neck: Supple. No Thyromegaly. Lungs: Clear to auscultation throughout all lung fields. No wheezes or rhonchi. No retractions  Cardiac: Regular rate rhythm.   No murmurs, rubs, gallops. No JVD. Abdomen: Soft. Diffuse tenderness of abdomen. Patient is having mild to moderate distention of abdomen. Abdomen is Not tight. Extremities: No clubbing, cyanosis, or edema x 4. Pale. Multiple bruises. Vasculature: Capillary refill < 3 seconds. Palpable dorsalis pedis pulses. Skin: Warm and dry. Psych: Alert and oriented x3. Affect appropriate  Lymph: No supraclavicular adenopathy. Neurologic: No focal deficit. No seizures. Data: (All radiographs, tracings, PFTs, and imaging are personally viewed and interpreted unless otherwise noted). Sodium 141. Potassium 3.9. Chloride 98. BUN 31. Creatinine 3.4. WBC 6.8. Hemoglobin 9.6. Platelets 77. Telemetry shows normal sinus rhythm  Abdominal XRAY 2/11/2023 NG tube tip in distal stomach   CXR, 2/6/23: Small left lateral effusion and small basilar atelectasis. Most recent echo, January 2023, showed EF 50% w/ LVH seen, a small circumferential pericardial effusion with no tamponade physiology seen      Seen with multidisciplinary ICU team.  Meets Continued ICU Level Care Criteria:    [x] Yes   [] No - Transfer Planned to listed location:  [] HOSPITALIST CONTACTED-      Case and plan discussed with Dr. Radu Lima MD.  Electronically signed by Shelia Mike MD, PGY-3    Addendum by Dr. Radu Lima MD:  Patient seen by me independently including key components of medical care. Face to face evaluation and examination was performed. Case discussed with Dickson Dubin, MD-resident physician. Italicized font, if present,  represents changes to the note made by me. More than 50% of the encounter time involved with patient care by the Pulmonary & Critical care service team spent by me. Please see my modifications mentioned below:  Patient is chronically ill looking. Not in any distress. On Midodrine 25 mg p.o 4 times daily-it was ordered by Dr. Lynn Nair MD intensivist service. .    No results found for: PH, PCO2, PO2, HCO3, O2SAT  No results found for: IFIO2, MODE, SETTIDVOL, SETPEEP    CBC:   Recent Labs     02/09/23  0400 02/10/23  0515 02/10/23  2115 02/11/23  0210   WBC 6.1 5.5  --  6.8   HGB 10.1* 10.3* 9.9* 9.6*   HCT 28.6* 27.7* 27.8* 26.8*   PLT 73* 76*  --  77*     BMP:  Recent Labs     02/09/23  0400 02/10/23  0515 02/10/23  2115 02/11/23  0210    136 138 141   K 4.3 5.0 3.8 3.9   CL 98 96* 97* 98   CO2 24 23 26 28   BUN 32* 47* 28* 31*   CREATININE 4.3* 5.4* 3.4* 3.4*   GLUCOSE 71 124* 105 94   MG 1.9  --  2.1  --    CALCIUM 8.2* 8.5 8.3* 8.5     Hepatic: No results for input(s): AST, ALT, ALB, BILITOT, ALKPHOS, LIPASE in the last 72 hours. Invalid input(s): AMYLASE  Cardiac Enzymes: No results for input(s): CKTOTAL, CKMB, TROPONINI in the last 72 hours. BNP: No results for input(s): BNP in the last 72 hours. INR: No results for input(s): INR, PROTIME in the last 72 hours. POC   Recent Labs     02/09/23 2115   POCGLU 128*     No results for input(s): LACTA in the last 72 hours. dextrose      amiodarone Stopped (02/08/23 0357)    sodium chloride      norepinephrine 4 mcg/min (02/11/23 0945)        midodrine  25 mg Oral 4x Daily    promethazine  6.25 mg IntraMUSCular Once    cefTRIAXone (ROCEPHIN) IV  2,000 mg IntraVENous Q24H    calcium replacement protocol   Other RX Placeholder    sodium chloride flush  5-40 mL IntraVENous 2 times per day    sodium bicarbonate  650 mg Oral BID    vitamin D  50,000 Units Oral Weekly       24HR INTAKE/OUTPUT:    Intake/Output Summary (Last 24 hours) at 2/11/2023 1245  Last data filed at 2/11/2023 0845  Gross per 24 hour   Intake 57.39 ml   Output 900 ml   Net -842.61 ml       X-ray of abdomen performed on 11 February 2023  Impression:   NG tube tip distal stomach     PUD and DVT prophylaxis reviewed.     Chest x-ray portable view performed on 6 February 2023:  Impression:   Line placements as above   Left basilar subsegmental atelectasis         SCDs to both lower extremities.     Electronically signed by   Alina Sampson MD on 2/11/2023 at 12:44 PM

## 2023-02-11 NOTE — PROGRESS NOTES
[facility]   Nephrology progress note  2/11/2023, 11:25 AM      Pt Name:    Sandra Sampson  MRN:     [id number]     YOB: 1960  Admit Date:    2/6/2023  4:58 PM    Chief Complaint: Nephrology following for ESRD and HD    Subjective:  Patient was seen and examined. Feels well. No cp or SOB  Continues to be on pressors    Objective:  24HR INTAKE/OUTPUT:    Intake/Output Summary (Last 24 hours) at 2/11/2023 1125  Last data filed at 2/11/2023 0845  Gross per 24 hour   Intake 57.39 ml   Output 900 ml   Net -842.61 ml        Admission weight: 129 lb 6.6 oz (58.7 kg)  Wt Readings from Last 3 Encounters:   02/10/23 125 lb 3.5 oz (56.8 kg)   01/31/23 133 lb 12.8 oz (60.7 kg)   01/24/23 132 lb 3.2 oz (60 kg)        Vitals :   Vitals:    02/11/23 0930 02/11/23 0945 02/11/23 1000 02/11/23 1015   BP: 99/76 107/80 99/73 102/71   Pulse: 82 85 87 85   Resp: 13 13 10 14   Temp:       TempSrc:       SpO2:       Weight:       Height:           Physical examination  General Appearance: ill-appearing, no acute distress  Awake and alert.   Mouth/Throat: Oral mucosa moist  Neck: No JVD  Lungs:  no use of accessory muscles  GI: soft, non-tender, no guarding  Extremities: No pitting LE edema    Medications:  Infusion:    dextrose      amiodarone Stopped (02/08/23 0357)    sodium chloride      norepinephrine 4 mcg/min (02/11/23 0945)     Meds:    midodrine  25 mg Oral 4x Daily    promethazine  6.25 mg IntraMUSCular Once    cefTRIAXone (ROCEPHIN) IV  2,000 mg IntraVENous Q24H    calcium replacement protocol   Other RX Placeholder    sodium chloride flush  5-40 mL IntraVENous 2 times per day    sodium bicarbonate  650 mg Oral BID    vitamin D  50,000 Units Oral Weekly       Lab Data :  CBC:   Recent Labs     02/09/23  0400 02/10/23  0515 02/10/23  2115 02/11/23  0210   WBC 6.1 5.5  --  6.8   HGB 10.1* 10.3* 9.9* 9.6*   HCT 28.6* 27.7* 27.8* 26.8*   PLT 73* 76*  --  77*       CMP:  Recent Labs 02/09/23  0400 02/10/23  0515 02/10/23  2115 02/11/23  0210    136 138 141   K 4.3 5.0 3.8 3.9   CL 98 96* 97* 98   CO2 24 23 26 28   BUN 32* 47* 28* 31*   CREATININE 4.3* 5.4* 3.4* 3.4*   GLUCOSE 71 124* 105 94   CALCIUM 8.2* 8.5 8.3* 8.5   MG 1.9  --  2.1  --        Hepatic:   No results for input(s): LABALBU, AST, ALT, ALB, BILITOT, ALKPHOS in the last 72 hours. Assessment and Plan:  ESRD on hemodialysis. 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 Volume status stable electrolytes stable as well  No acute need for dialysis today  Closely follow electrolytes and renal function and reassess for dialysis needs tomorrow  Metabolic acidosis. Resolved  Chronic hypotension. Continue with midodrine-dose. On extremely high doses wean Levophed  Probable autonomic dysfunction  Ascites status post paracentesis  Elevated liver enzymes  Anemia in ESRD  Chronic thrombocytopenia  Discussed with patient and nursing staff    Jennifer Desai MD  [facility]    This report has been created using voice recognition software.  It may contain minor errors which are inherent in voice recognition technology

## 2023-02-12 ENCOUNTER — APPOINTMENT (OUTPATIENT)
Dept: GENERAL RADIOLOGY | Age: 63
End: 2023-02-12
Attending: INTERNAL MEDICINE
Payer: COMMERCIAL

## 2023-02-12 PROBLEM — I95.89 OTHER HYPOTENSION: Status: ACTIVE | Noted: 2023-02-12

## 2023-02-12 LAB
ANION GAP SERPL CALC-SCNC: 15 MEQ/L (ref 8–16)
ANION GAP SERPL CALC-SCNC: 18 MEQ/L (ref 8–16)
BACTERIA BLD AEROBE CULT: NORMAL
BACTERIA SPEC ANAEROBE CULT: NORMAL
BACTERIA SPEC BFLD CULT: NORMAL
BACTERIA SPEC RESP CULT: ABNORMAL
BACTERIA SPEC RESP CULT: ABNORMAL
BUN SERPL-MCNC: 47 MG/DL (ref 7–22)
BUN SERPL-MCNC: 51 MG/DL (ref 7–22)
CALCIUM SERPL-MCNC: 8.2 MG/DL (ref 8.5–10.5)
CALCIUM SERPL-MCNC: 8.5 MG/DL (ref 8.5–10.5)
CHLORIDE SERPL-SCNC: 92 MEQ/L (ref 98–111)
CHLORIDE SERPL-SCNC: 96 MEQ/L (ref 98–111)
CO2 SERPL-SCNC: 26 MEQ/L (ref 23–33)
CO2 SERPL-SCNC: 28 MEQ/L (ref 23–33)
CREAT SERPL-MCNC: 4.5 MG/DL (ref 0.4–1.2)
CREAT SERPL-MCNC: 4.8 MG/DL (ref 0.4–1.2)
DEPRECATED RDW RBC AUTO: 76.5 FL (ref 35–45)
ERYTHROCYTE [DISTWIDTH] IN BLOOD BY AUTOMATED COUNT: 25.4 % (ref 11.5–14.5)
GFR SERPL CREATININE-BSD FRML MDRD: 13 ML/MIN/1.73M2
GFR SERPL CREATININE-BSD FRML MDRD: 14 ML/MIN/1.73M2
GLUCOSE BLD STRIP.AUTO-MCNC: 149 MG/DL (ref 70–108)
GLUCOSE SERPL-MCNC: 119 MG/DL (ref 70–108)
GLUCOSE SERPL-MCNC: 150 MG/DL (ref 70–108)
GRAM STN SPEC: ABNORMAL
GRAM STN SPEC: NORMAL
HCT VFR BLD AUTO: 29.3 % (ref 42–52)
HCT VFR BLD AUTO: 30.4 % (ref 42–52)
HGB BLD-MCNC: 10.6 GM/DL (ref 14–18)
HGB BLD-MCNC: 11 GM/DL (ref 14–18)
LACTATE SERPL-SCNC: 2.9 MMOL/L (ref 0.5–2)
LIPASE SERPL-CCNC: 27.8 U/L (ref 5.6–51.3)
MAGNESIUM SERPL-MCNC: 2.3 MG/DL (ref 1.6–2.4)
MCH RBC QN AUTO: 32.2 PG (ref 26–33)
MCHC RBC AUTO-ENTMCNC: 36.2 GM/DL (ref 32.2–35.5)
MCV RBC AUTO: 89.1 FL (ref 80–94)
ORGANISM: ABNORMAL
PLATELET # BLD AUTO: 84 THOU/MM3 (ref 130–400)
PMV BLD AUTO: ABNORMAL FL (ref 9.4–12.4)
POTASSIUM SERPL-SCNC: 3.9 MEQ/L (ref 3.5–5.2)
POTASSIUM SERPL-SCNC: 3.9 MEQ/L (ref 3.5–5.2)
RBC # BLD AUTO: 3.29 MILL/MM3 (ref 4.7–6.1)
SODIUM SERPL-SCNC: 136 MEQ/L (ref 135–145)
SODIUM SERPL-SCNC: 139 MEQ/L (ref 135–145)
WBC # BLD AUTO: 7.2 THOU/MM3 (ref 4.8–10.8)

## 2023-02-12 PROCEDURE — 2580000003 HC RX 258: Performed by: INTERNAL MEDICINE

## 2023-02-12 PROCEDURE — 6370000000 HC RX 637 (ALT 250 FOR IP): Performed by: NURSE PRACTITIONER

## 2023-02-12 PROCEDURE — 71045 X-RAY EXAM CHEST 1 VIEW: CPT

## 2023-02-12 PROCEDURE — 6360000002 HC RX W HCPCS

## 2023-02-12 PROCEDURE — 99232 SBSQ HOSP IP/OBS MODERATE 35: CPT | Performed by: INTERNAL MEDICINE

## 2023-02-12 PROCEDURE — A4216 STERILE WATER/SALINE, 10 ML: HCPCS | Performed by: INTERNAL MEDICINE

## 2023-02-12 PROCEDURE — 6370000000 HC RX 637 (ALT 250 FOR IP): Performed by: STUDENT IN AN ORGANIZED HEALTH CARE EDUCATION/TRAINING PROGRAM

## 2023-02-12 PROCEDURE — 6370000000 HC RX 637 (ALT 250 FOR IP)

## 2023-02-12 PROCEDURE — 83605 ASSAY OF LACTIC ACID: CPT

## 2023-02-12 PROCEDURE — 2580000003 HC RX 258

## 2023-02-12 PROCEDURE — 36415 COLL VENOUS BLD VENIPUNCTURE: CPT

## 2023-02-12 PROCEDURE — 74018 RADEX ABDOMEN 1 VIEW: CPT

## 2023-02-12 PROCEDURE — 83735 ASSAY OF MAGNESIUM: CPT

## 2023-02-12 PROCEDURE — 82948 REAGENT STRIP/BLOOD GLUCOSE: CPT

## 2023-02-12 PROCEDURE — 6360000002 HC RX W HCPCS: Performed by: INTERNAL MEDICINE

## 2023-02-12 PROCEDURE — 85018 HEMOGLOBIN: CPT

## 2023-02-12 PROCEDURE — 2500000003 HC RX 250 WO HCPCS: Performed by: INTERNAL MEDICINE

## 2023-02-12 PROCEDURE — 85027 COMPLETE CBC AUTOMATED: CPT

## 2023-02-12 PROCEDURE — 80048 BASIC METABOLIC PNL TOTAL CA: CPT

## 2023-02-12 PROCEDURE — 6370000000 HC RX 637 (ALT 250 FOR IP): Performed by: INTERNAL MEDICINE

## 2023-02-12 PROCEDURE — 83690 ASSAY OF LIPASE: CPT

## 2023-02-12 PROCEDURE — 85014 HEMATOCRIT: CPT

## 2023-02-12 PROCEDURE — 99233 SBSQ HOSP IP/OBS HIGH 50: CPT | Performed by: INTERNAL MEDICINE

## 2023-02-12 PROCEDURE — 2100000000 HC CCU R&B

## 2023-02-12 RX ORDER — ONDANSETRON 2 MG/ML
4 INJECTION INTRAMUSCULAR; INTRAVENOUS EVERY 8 HOURS PRN
Status: DISCONTINUED | OUTPATIENT
Start: 2023-02-12 | End: 2023-02-19 | Stop reason: HOSPADM

## 2023-02-12 RX ORDER — ONDANSETRON 2 MG/ML
4 INJECTION INTRAMUSCULAR; INTRAVENOUS EVERY 6 HOURS PRN
Status: DISCONTINUED | OUTPATIENT
Start: 2023-02-12 | End: 2023-02-12

## 2023-02-12 RX ORDER — MAGNESIUM HYDROXIDE/ALUMINUM HYDROXICE/SIMETHICONE 120; 1200; 1200 MG/30ML; MG/30ML; MG/30ML
15 SUSPENSION ORAL EVERY 6 HOURS PRN
Status: DISCONTINUED | OUTPATIENT
Start: 2023-02-12 | End: 2023-02-19 | Stop reason: HOSPADM

## 2023-02-12 RX ORDER — ZOLPIDEM TARTRATE 5 MG/1
5 TABLET ORAL NIGHTLY
Status: DISCONTINUED | OUTPATIENT
Start: 2023-02-12 | End: 2023-02-19 | Stop reason: HOSPADM

## 2023-02-12 RX ADMIN — MIDODRINE HYDROCHLORIDE 25 MG: 10 TABLET ORAL at 21:08

## 2023-02-12 RX ADMIN — MIDODRINE HYDROCHLORIDE 25 MG: 10 TABLET ORAL at 10:50

## 2023-02-12 RX ADMIN — SODIUM CHLORIDE, PRESERVATIVE FREE 10 ML: 5 INJECTION INTRAVENOUS at 10:50

## 2023-02-12 RX ADMIN — PROMETHAZINE HYDROCHLORIDE 6.25 MG: 25 INJECTION INTRAMUSCULAR; INTRAVENOUS at 00:36

## 2023-02-12 RX ADMIN — ZOLPIDEM TARTRATE 5 MG: 5 TABLET ORAL at 21:40

## 2023-02-12 RX ADMIN — FAMOTIDINE 20 MG: 10 INJECTION, SOLUTION INTRAVENOUS at 14:58

## 2023-02-12 RX ADMIN — SODIUM BICARBONATE 650 MG: 650 TABLET ORAL at 10:50

## 2023-02-12 RX ADMIN — ACETAMINOPHEN 650 MG: 325 TABLET ORAL at 21:39

## 2023-02-12 RX ADMIN — SODIUM CHLORIDE, PRESERVATIVE FREE 10 ML: 5 INJECTION INTRAVENOUS at 21:08

## 2023-02-12 RX ADMIN — SODIUM BICARBONATE 650 MG: 650 TABLET ORAL at 21:08

## 2023-02-12 RX ADMIN — ONDANSETRON 4 MG: 2 INJECTION INTRAMUSCULAR; INTRAVENOUS at 12:55

## 2023-02-12 RX ADMIN — CEFTRIAXONE 2000 MG: 2 INJECTION, POWDER, FOR SOLUTION INTRAMUSCULAR; INTRAVENOUS at 19:43

## 2023-02-12 RX ADMIN — ALUMINUM HYDROXIDE, MAGNESIUM HYDROXIDE, AND SIMETHICONE 15 ML: 200; 200; 20 SUSPENSION ORAL at 14:58

## 2023-02-12 RX ADMIN — DIPHENHYDRAMINE HYDROCHLORIDE 25 MG: 25 TABLET ORAL at 17:33

## 2023-02-12 RX ADMIN — MIDODRINE HYDROCHLORIDE 25 MG: 10 TABLET ORAL at 15:03

## 2023-02-12 RX ADMIN — PROMETHAZINE HYDROCHLORIDE 6.25 MG: 25 INJECTION INTRAMUSCULAR; INTRAVENOUS at 11:58

## 2023-02-12 ASSESSMENT — PAIN SCALES - WONG BAKER

## 2023-02-12 ASSESSMENT — PAIN SCALES - GENERAL
PAINLEVEL_OUTOF10: 2
PAINLEVEL_OUTOF10: 5
PAINLEVEL_OUTOF10: 0
PAINLEVEL_OUTOF10: 5
PAINLEVEL_OUTOF10: 5
PAINLEVEL_OUTOF10: 7
PAINLEVEL_OUTOF10: 0
PAINLEVEL_OUTOF10: 4
PAINLEVEL_OUTOF10: 5
PAINLEVEL_OUTOF10: 6
PAINLEVEL_OUTOF10: 5
PAINLEVEL_OUTOF10: 0
PAINLEVEL_OUTOF10: 0
PAINLEVEL_OUTOF10: 7
PAINLEVEL_OUTOF10: 0
PAINLEVEL_OUTOF10: 8
PAINLEVEL_OUTOF10: 0

## 2023-02-12 ASSESSMENT — PAIN DESCRIPTION - FREQUENCY
FREQUENCY: CONTINUOUS

## 2023-02-12 ASSESSMENT — PAIN DESCRIPTION - LOCATION
LOCATION: ABDOMEN
LOCATION: LEG
LOCATION: ABDOMEN
LOCATION: LEG

## 2023-02-12 ASSESSMENT — PAIN DESCRIPTION - ONSET
ONSET: ON-GOING
ONSET: PROGRESSIVE
ONSET: ON-GOING
ONSET: PROGRESSIVE
ONSET: ON-GOING
ONSET: ON-GOING
ONSET: PROGRESSIVE

## 2023-02-12 ASSESSMENT — PAIN - FUNCTIONAL ASSESSMENT
PAIN_FUNCTIONAL_ASSESSMENT: PREVENTS OR INTERFERES WITH MANY ACTIVE NOT PASSIVE ACTIVITIES
PAIN_FUNCTIONAL_ASSESSMENT: PREVENTS OR INTERFERES WITH MANY ACTIVE NOT PASSIVE ACTIVITIES
PAIN_FUNCTIONAL_ASSESSMENT: PREVENTS OR INTERFERES WITH ALL ACTIVE AND SOME PASSIVE ACTIVITIES
PAIN_FUNCTIONAL_ASSESSMENT: PREVENTS OR INTERFERES SOME ACTIVE ACTIVITIES AND ADLS
PAIN_FUNCTIONAL_ASSESSMENT: PREVENTS OR INTERFERES WITH ALL ACTIVE AND SOME PASSIVE ACTIVITIES
PAIN_FUNCTIONAL_ASSESSMENT: PREVENTS OR INTERFERES WITH MANY ACTIVE NOT PASSIVE ACTIVITIES
PAIN_FUNCTIONAL_ASSESSMENT: PREVENTS OR INTERFERES WITH ALL ACTIVE AND SOME PASSIVE ACTIVITIES
PAIN_FUNCTIONAL_ASSESSMENT: PREVENTS OR INTERFERES SOME ACTIVE ACTIVITIES AND ADLS
PAIN_FUNCTIONAL_ASSESSMENT: PREVENTS OR INTERFERES WITH ALL ACTIVE AND SOME PASSIVE ACTIVITIES

## 2023-02-12 ASSESSMENT — PAIN DESCRIPTION - ORIENTATION
ORIENTATION: UPPER
ORIENTATION: LOWER
ORIENTATION: UPPER
ORIENTATION: LOWER
ORIENTATION: UPPER
ORIENTATION: UPPER

## 2023-02-12 ASSESSMENT — PAIN DESCRIPTION - DESCRIPTORS
DESCRIPTORS: ACHING;SORE
DESCRIPTORS: SHARP;PRESSURE
DESCRIPTORS: SHARP;PRESSURE
DESCRIPTORS: PRESSURE
DESCRIPTORS: PRESSURE
DESCRIPTORS: SHARP;PRESSURE
DESCRIPTORS: STABBING;SHARP
DESCRIPTORS: PRESSURE;NAGGING
DESCRIPTORS: ACHING;SORE

## 2023-02-12 ASSESSMENT — PAIN DESCRIPTION - PAIN TYPE
TYPE: CHRONIC PAIN

## 2023-02-12 NOTE — PROGRESS NOTES
[facility]   Nephrology progress note  2/12/2023, 4:30 PM      Pt Name:    Lora Ozuna  MRN:     [id number]     YOB: 1960  Admit Date:    2/6/2023  4:58 PM    Chief Complaint: Nephrology following for ESRD and HD    Subjective:  Patient was seen and examined. Feels well. No cp or SOB  Continues to be on pressors    Objective:  24HR INTAKE/OUTPUT:  No intake or output data in the 24 hours ending 02/12/23 1630     Admission weight: 129 lb 6.6 oz (58.7 kg)  Wt Readings from Last 3 Encounters:   02/10/23 125 lb 3.5 oz (56.8 kg)   01/31/23 133 lb 12.8 oz (60.7 kg)   01/24/23 132 lb 3.2 oz (60 kg)        Vitals :   Vitals:    02/12/23 1545 02/12/23 1600 02/12/23 1601 02/12/23 1615   BP: 108/74 117/82 117/82 114/75   Pulse: 91 87 88 88   Resp: 17 10 11 11   Temp:  97 °F (36.1 °C)     TempSrc:  Axillary     SpO2:       Weight:       Height:           Physical examination  General Appearance: ill-appearing, no acute distress  Awake and alert.   Mouth/Throat: Oral mucosa moist  Neck: No JVD  Lungs:  no use of accessory muscles  GI: soft, non-tender, no guarding  Extremities: No pitting LE edema    Medications:  Infusion:    dextrose      amiodarone Stopped (02/08/23 0357)    sodium chloride      norepinephrine 12 mcg/min (02/12/23 1544)     Meds:    zolpidem  5 mg Oral Nightly    famotidine (PEPCID) injection  20 mg IntraVENous Daily    midodrine  25 mg Oral 4x Daily    promethazine  6.25 mg IntraMUSCular Once    cefTRIAXone (ROCEPHIN) IV  2,000 mg IntraVENous Q24H    calcium replacement protocol   Other RX Placeholder    sodium chloride flush  5-40 mL IntraVENous 2 times per day    sodium bicarbonate  650 mg Oral BID    vitamin D  50,000 Units Oral Weekly       Lab Data :  CBC:   Recent Labs     02/10/23  0515 02/10/23  2115 02/11/23  0210 02/12/23  0350 02/12/23  1515   WBC 5.5  --  6.8 7.2  --    HGB 10.3*   < > 9.6* 10.6* 11.0*   HCT 27.7*   < > 26.8* 29.3* 30.4*   PLT 76*  --  77* 84*  --     < > = values in this interval not displayed. CMP:  Recent Labs     02/10/23  2115 02/11/23  0210 02/12/23  0350 02/12/23  1515    141 139 136   K 3.8 3.9 3.9 3.9   CL 97* 98 96* 92*   CO2 26 28 28 26   BUN 28* 31* 47* 51*   CREATININE 3.4* 3.4* 4.5* 4.8*   GLUCOSE 105 94 119* 150*   CALCIUM 8.3* 8.5 8.2* 8.5   MG 2.1  --   --  2.3       Hepatic:   No results for input(s): LABALBU, AST, ALT, ALB, BILITOT, ALKPHOS in the last 72 hours. Assessment and Plan:  ESRD on hemodialysis. 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 Volume status stable electrolytes stable as well  No acute need for dialysis today  Closely follow electrolytes and renal function. Mostly for HD in AM  Metabolic acidosis. Resolved  Chronic hypotension. Continue with midodrine-dose. On extremely high doses . wean Levophed  Probable autonomic dysfunction  Ascites status post paracentesis  Elevated liver enzymes  Anemia in ESRD  Chronic thrombocytopenia  Discussed with patient     Gemma Sorenson MD  [facility]    This report has been created using voice recognition software.  It may contain minor errors which are inherent in voice recognition technology

## 2023-02-12 NOTE — PROGRESS NOTES
CRITICAL CARE PROGRESS NOTE      Patient:  Conception Fabry    Unit/Bed:4D-14/014-A  YOB: 1960  MRN: [id number]   PCP: Amauri Durbin DO  Date of Admission: 2/6/2023  Chief Complaint:- Hypotension    Assessment and Plan:    Hypotensive: Currently asymptomatic. Initially thought to be related to septic shock. Transferred to [facility] from outside of the facility and was hypotensive on arrival with ventricular arrhythmias in the 140s. 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 Has ESRD and is on hemodialysis. Initially suspicious for SBP and patient was given ceftriaxone 2g. Culture and cell count of body fluid not suspicious for SBP at this time. We will continue to monitor patient. Patient did receive paracentesis 2/7 with 5.5 L of ascites for drainage. Patient is on midodrine 15, 3 times daily at home for blood pressure support. Midodirne was increased to 20 mg TID, 2/8. Patient does have history of asymptomatic hypotension. Patient's hypotension is likely multifactorial. Patient has history of running systolically in the 22T. -We will continue to monitor patient  -Currently on Levophed for blood pressure support.   -We will try to wean Levophed down as patient tolerates  -Continuing ceftriaxone 2g Q24 hrs  -Midodrine 25 mg four times daily. We have been unsuccessful at weaning patient off of levophed while he is on midodrine 20 mg four times daily.  -Decadron 10 mg, given 2/9/2023  Ventricular tachycardia, asymptomatic: Reported runs of ventricular tachycardia. Patient was given IV magnesium at outside facility. Patient received amnio bolus 300, started on amnio drip at 0.5. Continuing amiodarone drip at this time. He is on telemetry, strict inputs and outputs, daily weights. We will continue to monitor patient's electrolytes. -Keep magnesium above 2.    -Keep potassium greater than 4    -Previously evaluated by EP on 1/20/2023. Arrhythmia thought to be secondary to underlying cardiac amyloidosis with risk of SCD. EP recommended AICD. Not a candidate for class Ic or III antiarrhythmics.        -Previous admission discharged with a LifeVest was discussed. With follow-up with EP.  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 On hemodialysis - M/W/F follows with Dr. Delle Phoenix.  Hx of noncompliance with medications including p.o. bicarb, fluid intake and other medical visits in the past. Patient is consulted for neurology to start inpatient home dialysis. Patient underwent hemodialysis 2/7/23, 2/10/23  Suspected liver cirrhosis: Likely 2/2 systemic amyloidosis waiting on liver biopsy for the past 2 months for this. Elevated transaminase, alk phos, hypoalbuminemia. Patient is scheduled to have regular paracentesis. Patient reports he has received 2-3 previous paracenteses. 5.5 L of ascites was drained with paracentesis, 2/7/2023. Patient tolerated the procedure well. Patient started to have increasing swelling in his abdomen, tightness, decreased appetite and started to have some SOB. 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 Per patient he reports compliance to medication treatments and visits.         -Follows with Dr. Sánchez Honeycutt outpatient. .        -As of now not started any chemotherapy regimen at this time       -Per heme-onc recommendations weekly paracentesis, limited to 2 L with with albumin infusions  HFpEF: Not in exacerbation. HFpEF 50%, 1/16. We will continue to monitor. Continue telemetry, pulse ox, daily weights, strict I&O's  Malnutrition, severe protein calorie malnutrition: Likely due to patient's multiple comorbidities  Chronic normocytic anemia, mild: Patient has multiple chronic diseases including ESRD on HD, liver disease, systemic amyloidosis, continue current daily CBCs.   Chronic thrombocytopenia, mild: Possibly due to suspected liver cirrhosis secondary to systemic amyloidosis. Continue to monitor with daily CBCs. Conditioning, debility: Multiple comorbidities including systemic amyloidosis, ESRD on HD, malnutrition, liver disease, ventricular tachycardia    HAGMA: Resolved. Anion gap, 15.0. Lactic acidosis and uremia likely due to ESRD. Patient is on HD. Home bicarb started p.o. We will continue to monitor, daily CBCs and BMPs ordered. INITIAL H AND P AND ICU COURSE:  Per HPI, \"patient is 63years old male, brought from outside facility due to hypotension. Patient has past medical history of recently diagnosed with systemic amyloidosis, ESRD on hemodialysis, undifferentiated liver disease, newly diagnosis CHF preserved EF, nonsustained episodes of ventricular tachycardia. Patient states he felt sick and nauseous and went to the local hospital and transferred to [facility] for further evaluation and management. Currently patient alert and oriented X3, complains of nausea, episodes of vomiting, feeling sick abdominal distention due to ascites. However he denies chest pain, heart palpitations, productive cough, dizziness, fevers, chills lightheadedness, dysuria/urinary frequency\" paracentesis was performed on 2/7/2023 with 5.5 L of ascites drained. Patient was also undergoing hemodialysis on 2/7, and 2/10. Patient has been continue to have to have his midodrine dose increased while attempting to wean down Levophed. Past 24 hours: Patient evaluated at bedside this morning. Vital signs stable. Patient's blood pressure currently is 96/64 with a MAP of 70. Patient still on Levophed, currently on 5 mcgs. We will continue to try to wean patient as possible. Nephrology saw patient this morning, per patient possible dialysis tomorrow. Patient's nausea has improved no emesis overnight.   Patient denies fever, chills, dizziness, headache, vision changes, chest pain, shortness of breath, palpitations, abdominal pain. Patient denies any abdominal pain, has noticed slight increase in fullness of his abdomen. He does have some pain/coolness of his knees bilaterally. Past Medical History: Systemic amyloidosis, CHF, ESRD, liver disease. Family History: Hypertension. Social History: Former smoker, 34+ years since history of smoking. History of quitting December 2022. No previous illicit drug use or alcohol use. ROS: See HPI. Scheduled Meds:   zolpidem  5 mg Oral Nightly    midodrine  25 mg Oral 4x Daily    promethazine  6.25 mg IntraMUSCular Once    cefTRIAXone (ROCEPHIN) IV  2,000 mg IntraVENous Q24H    calcium replacement protocol   Other RX Placeholder    sodium chloride flush  5-40 mL IntraVENous 2 times per day    sodium bicarbonate  650 mg Oral BID    vitamin D  50,000 Units Oral Weekly     Continuous Infusions:   dextrose      amiodarone Stopped (02/08/23 0357)    sodium chloride      norepinephrine 6 mcg/min (02/11/23 4119)     PHYSICAL EXAMINATION:  T: 97.7. P: 76. RR: 11. B/P: 96/64. Room air  O2 Sat: 92 I/O: +365 cc last 24 hours  Body mass index is 17.97 kg/m². GCS:   15  General:   Chronically ill-appearing male. Appears older than stated age. Malnourished. Cachectic. Pale appearance  HEENT:  normocephalic and atraumatic. Mild scleral icterus. PERR  Neck: Supple. No Thyromegaly. Lungs: Clear to auscultation throughout all lung fields. No wheezes or rhonchi. No retractions. Cardiac: RRR. No murmurs rubs gallops. No JVD. Abdomen: Soft. No tenderness to palpation. Mild-moderate distention of abdomen. Abdomen is not tight  Extremities: Pale. Multiple bruises. No clubbing, cyanosis, or edema x 4. Vasculature: Capillary refill < 3 seconds. Palpable dorsalis pedis pulses. Skin:  Warm and dry. Psych:  Alert and oriented x3. Affect appropriate  Lymph:  No supraclavicular adenopathy. Neurologic:  No focal deficit. No seizures.     Data: (All radiographs, tracings, PFTs, and imaging are personally viewed and interpreted unless otherwise noted). Sodium 139. Potassium 3.9. Chloride 96. CO2 28. BUN 47. Creatinine 4.5. Anion gap 15. Calcium 8.2  WBC 7.2. Hemoglobin 10.6. Platelets 84. Telemetry shows normal sinus rhythm  Most recent echo, January 2023 showed EF 50% with LVH, small circumferential pericardial effusion with no tamponade physiology  CXR, 2/6/2023 shows small left lateral effusion and small basilar atelectasis      Seen with multidisciplinary ICU team.  Meets Continued ICU Level Care Criteria:    [x] Yes   [] No - Transfer Planned to listed location:  [] HOSPITALIST CONTACTED-      Case and plan discussed with Dr. uHa Ruelas MD    Electronically signed by Randy Fairbanks DO, PGY-1  CRITICAL CARE SPECIALIST     Addendum by Dr. Lias Tyson MD:  Patient seen by me independently including key components of medical care. Face to face evaluation and examination was performed. Case discussed with Dr. Randy Fairbanks DO -resident physician. Italicized font, if present,  represents changes to the note made by me. More than 50% of the encounter time involved with patient care by the Pulmonary & Critical care service team spent by me.     Please see my modifications mentioned below:  Patient is chronically ill looking  Not in any distress    No results found for: PH, PCO2, PO2, HCO3, O2SAT  No results found for: IFIO2, MODE, SETTIDVOL, SETPEEP    CBC:   Recent Labs     02/10/23  0515 02/10/23  2115 02/11/23  0210 02/12/23  0350   WBC 5.5  --  6.8 7.2   HGB 10.3* 9.9* 9.6* 10.6*   HCT 27.7* 27.8* 26.8* 29.3*   PLT 76*  --  77* 84*     BMP:  Recent Labs     02/10/23  2115 02/11/23  0210 02/12/23  0350    141 139   K 3.8 3.9 3.9   CL 97* 98 96*   CO2 26 28 28   BUN 28* 31* 47*   CREATININE 3.4* 3.4* 4.5*   GLUCOSE 105 94 119*   MG 2.1  --   --    CALCIUM 8.3* 8.5 8.2*     Hepatic: No results for input(s): AST, ALT, ALB, BILITOT, ALKPHOS, LIPASE in the last 72 hours. Invalid input(s): AMYLASE  Cardiac Enzymes: No results for input(s): CKTOTAL, CKMB, TROPONINI in the last 72 hours. BNP: No results for input(s): BNP in the last 72 hours. INR: No results for input(s): INR, PROTIME in the last 72 hours. POC   Recent Labs     02/09/23 2115   POCGLU 128*     No results for input(s): LACTA in the last 72 hours. dextrose      amiodarone Stopped (02/08/23 0357)    sodium chloride      norepinephrine 6 mcg/min (02/11/23 3179)        zolpidem  5 mg Oral Nightly    midodrine  25 mg Oral 4x Daily    promethazine  6.25 mg IntraMUSCular Once    cefTRIAXone (ROCEPHIN) IV  2,000 mg IntraVENous Q24H    calcium replacement protocol   Other RX Placeholder    sodium chloride flush  5-40 mL IntraVENous 2 times per day    sodium bicarbonate  650 mg Oral BID    vitamin D  50,000 Units Oral Weekly       24HR INTAKE/OUTPUT:    Intake/Output Summary (Last 24 hours) at 2/12/2023 1045  Last data filed at 2/11/2023 1400  Gross per 24 hour   Intake 308.12 ml   Output --   Net 308.12 ml     X-ray of abdomen performed on 11 February 2023  Impression:   NG tube tip distal stomach     PUD and DVT prophylaxis reviewed. Chest x-ray portable view performed on 6 February 2023:  Impression:   Line placements as above   Left basilar subsegmental atelectasis        SCDs to both lower extremities. He will be considered for transfer to medical floor once he is off Levophed drip.     Electronically signed by   Manohar Brito MD on 2/12/2023 at 10:45 AM

## 2023-02-13 ENCOUNTER — APPOINTMENT (OUTPATIENT)
Dept: CT IMAGING | Age: 63
End: 2023-02-13
Attending: INTERNAL MEDICINE
Payer: COMMERCIAL

## 2023-02-13 LAB
ANION GAP SERPL CALC-SCNC: 14 MEQ/L (ref 8–16)
ANION GAP SERPL CALC-SCNC: 17 MEQ/L (ref 8–16)
BASE EXCESS BLDA CALC-SCNC: 3.1 MMOL/L (ref -2.5–2.5)
BASE EXCESS BLDA CALC-SCNC: 4.8 MMOL/L (ref -2–3)
BUN SERPL-MCNC: 27 MG/DL (ref 7–22)
BUN SERPL-MCNC: 56 MG/DL (ref 7–22)
CALCIUM SERPL-MCNC: 8.1 MG/DL (ref 8.5–10.5)
CALCIUM SERPL-MCNC: 8.3 MG/DL (ref 8.5–10.5)
CHLORIDE SERPL-SCNC: 93 MEQ/L (ref 98–111)
CHLORIDE SERPL-SCNC: 96 MEQ/L (ref 98–111)
CO2 SERPL-SCNC: 26 MEQ/L (ref 23–33)
CO2 SERPL-SCNC: 26 MEQ/L (ref 23–33)
COLLECTED BY:: ABNORMAL
COLLECTED BY:: ABNORMAL
CREAT SERPL-MCNC: 2.5 MG/DL (ref 0.4–1.2)
CREAT SERPL-MCNC: 5.2 MG/DL (ref 0.4–1.2)
DEPRECATED RDW RBC AUTO: 80.6 FL (ref 35–45)
ERYTHROCYTE [DISTWIDTH] IN BLOOD BY AUTOMATED COUNT: 25.9 % (ref 11.5–14.5)
GFR SERPL CREATININE-BSD FRML MDRD: 12 ML/MIN/1.73M2
GFR SERPL CREATININE-BSD FRML MDRD: 29 ML/MIN/1.73M2
GLUCOSE BLD STRIP.AUTO-MCNC: 98 MG/DL (ref 70–108)
GLUCOSE SERPL-MCNC: 111 MG/DL (ref 70–108)
GLUCOSE SERPL-MCNC: 91 MG/DL (ref 70–108)
HCO3 BLDA-SCNC: 26 MMOL/L (ref 23–28)
HCO3 BLDA-SCNC: 29 MMOL/L (ref 23–28)
HCT VFR BLD AUTO: 26.1 % (ref 42–52)
HCT VFR BLD AUTO: 29.6 % (ref 42–52)
HGB BLD-MCNC: 10.4 GM/DL (ref 14–18)
HGB BLD-MCNC: 9.4 GM/DL (ref 14–18)
MAGNESIUM SERPL-MCNC: 2 MG/DL (ref 1.6–2.4)
MCH RBC QN AUTO: 32.1 PG (ref 26–33)
MCHC RBC AUTO-ENTMCNC: 35.1 GM/DL (ref 32.2–35.5)
MCV RBC AUTO: 91.4 FL (ref 80–94)
PCO2 BLDA: 35 MMHG (ref 35–45)
PCO2 TEMP ADJ BLDMV: 42 MMHG (ref 41–51)
PH BLDA: 7.49 [PH] (ref 7.35–7.45)
PH BLDMV: 7.45 [PH] (ref 7.31–7.41)
PLATELET # BLD AUTO: 84 THOU/MM3 (ref 130–400)
PMV BLD AUTO: ABNORMAL FL (ref 9.4–12.4)
PO2 BLDA: 58 MMHG (ref 71–104)
PO2 BLDMV: 32 MMHG (ref 25–40)
POTASSIUM SERPL-SCNC: 3.6 MEQ/L (ref 3.5–5.2)
POTASSIUM SERPL-SCNC: 3.8 MEQ/L (ref 3.5–5.2)
POTASSIUM SERPL-SCNC: 4.2 MEQ/L (ref 3.5–5.2)
RBC # BLD AUTO: 3.24 MILL/MM3 (ref 4.7–6.1)
SAO2 % BLDA: 92 %
SAO2 % BLDMV: 64 %
SODIUM SERPL-SCNC: 136 MEQ/L (ref 135–145)
SODIUM SERPL-SCNC: 136 MEQ/L (ref 135–145)
WBC # BLD AUTO: 8.4 THOU/MM3 (ref 4.8–10.8)

## 2023-02-13 PROCEDURE — 74178 CT ABD&PLV WO CNTR FLWD CNTR: CPT

## 2023-02-13 PROCEDURE — 2500000003 HC RX 250 WO HCPCS: Performed by: STUDENT IN AN ORGANIZED HEALTH CARE EDUCATION/TRAINING PROGRAM

## 2023-02-13 PROCEDURE — 36600 WITHDRAWAL OF ARTERIAL BLOOD: CPT

## 2023-02-13 PROCEDURE — 2580000003 HC RX 258: Performed by: INTERNAL MEDICINE

## 2023-02-13 PROCEDURE — 6370000000 HC RX 637 (ALT 250 FOR IP): Performed by: INTERNAL MEDICINE

## 2023-02-13 PROCEDURE — 82803 BLOOD GASES ANY COMBINATION: CPT

## 2023-02-13 PROCEDURE — 93010 ELECTROCARDIOGRAM REPORT: CPT | Performed by: INTERNAL MEDICINE

## 2023-02-13 PROCEDURE — 82948 REAGENT STRIP/BLOOD GLUCOSE: CPT

## 2023-02-13 PROCEDURE — 83735 ASSAY OF MAGNESIUM: CPT

## 2023-02-13 PROCEDURE — 2500000003 HC RX 250 WO HCPCS

## 2023-02-13 PROCEDURE — 90935 HEMODIALYSIS ONE EVALUATION: CPT

## 2023-02-13 PROCEDURE — 6360000004 HC RX CONTRAST MEDICATION: Performed by: INTERNAL MEDICINE

## 2023-02-13 PROCEDURE — 85027 COMPLETE CBC AUTOMATED: CPT

## 2023-02-13 PROCEDURE — 93005 ELECTROCARDIOGRAM TRACING: CPT | Performed by: NURSE PRACTITIONER

## 2023-02-13 PROCEDURE — 2100000000 HC CCU R&B

## 2023-02-13 PROCEDURE — 84132 ASSAY OF SERUM POTASSIUM: CPT

## 2023-02-13 PROCEDURE — 82533 TOTAL CORTISOL: CPT

## 2023-02-13 PROCEDURE — 36415 COLL VENOUS BLD VENIPUNCTURE: CPT

## 2023-02-13 PROCEDURE — 6360000002 HC RX W HCPCS

## 2023-02-13 PROCEDURE — 80048 BASIC METABOLIC PNL TOTAL CA: CPT

## 2023-02-13 PROCEDURE — A4216 STERILE WATER/SALINE, 10 ML: HCPCS | Performed by: INTERNAL MEDICINE

## 2023-02-13 PROCEDURE — 6370000000 HC RX 637 (ALT 250 FOR IP)

## 2023-02-13 PROCEDURE — 99291 CRITICAL CARE FIRST HOUR: CPT | Performed by: INTERNAL MEDICINE

## 2023-02-13 PROCEDURE — 2500000003 HC RX 250 WO HCPCS: Performed by: INTERNAL MEDICINE

## 2023-02-13 PROCEDURE — 94761 N-INVAS EAR/PLS OXIMETRY MLT: CPT

## 2023-02-13 PROCEDURE — 2580000003 HC RX 258: Performed by: STUDENT IN AN ORGANIZED HEALTH CARE EDUCATION/TRAINING PROGRAM

## 2023-02-13 PROCEDURE — 85018 HEMOGLOBIN: CPT

## 2023-02-13 PROCEDURE — P9047 ALBUMIN (HUMAN), 25%, 50ML: HCPCS

## 2023-02-13 PROCEDURE — 85014 HEMATOCRIT: CPT

## 2023-02-13 PROCEDURE — 2580000003 HC RX 258

## 2023-02-13 PROCEDURE — 93005 ELECTROCARDIOGRAM TRACING: CPT | Performed by: STUDENT IN AN ORGANIZED HEALTH CARE EDUCATION/TRAINING PROGRAM

## 2023-02-13 PROCEDURE — 99232 SBSQ HOSP IP/OBS MODERATE 35: CPT | Performed by: INTERNAL MEDICINE

## 2023-02-13 PROCEDURE — 97530 THERAPEUTIC ACTIVITIES: CPT

## 2023-02-13 RX ORDER — ALBUMIN (HUMAN) 12.5 G/50ML
25 SOLUTION INTRAVENOUS EVERY 6 HOURS
Status: COMPLETED | OUTPATIENT
Start: 2023-02-13 | End: 2023-02-15

## 2023-02-13 RX ORDER — MIDODRINE HYDROCHLORIDE 10 MG/1
20 TABLET ORAL EVERY 6 HOURS
Status: DISCONTINUED | OUTPATIENT
Start: 2023-02-14 | End: 2023-02-19 | Stop reason: HOSPADM

## 2023-02-13 RX ORDER — FLUDROCORTISONE ACETATE 0.1 MG/1
0.2 TABLET ORAL DAILY
Status: DISCONTINUED | OUTPATIENT
Start: 2023-02-13 | End: 2023-02-19 | Stop reason: HOSPADM

## 2023-02-13 RX ADMIN — VASOPRESSIN 0.02 UNITS/MIN: 20 INJECTION INTRAVENOUS at 19:43

## 2023-02-13 RX ADMIN — SODIUM CHLORIDE, PRESERVATIVE FREE 10 ML: 5 INJECTION INTRAVENOUS at 08:26

## 2023-02-13 RX ADMIN — IOPAMIDOL 80 ML: 755 INJECTION, SOLUTION INTRAVENOUS at 07:53

## 2023-02-13 RX ADMIN — SODIUM CHLORIDE, PRESERVATIVE FREE 10 ML: 5 INJECTION INTRAVENOUS at 19:59

## 2023-02-13 RX ADMIN — ALBUMIN (HUMAN) 25 G: 0.25 INJECTION, SOLUTION INTRAVENOUS at 18:45

## 2023-02-13 RX ADMIN — MIDODRINE HYDROCHLORIDE 25 MG: 10 TABLET ORAL at 19:59

## 2023-02-13 RX ADMIN — Medication 8 MCG/MIN: at 00:11

## 2023-02-13 RX ADMIN — MIDODRINE HYDROCHLORIDE 25 MG: 10 TABLET ORAL at 11:54

## 2023-02-13 RX ADMIN — ALBUMIN (HUMAN) 25 G: 0.25 INJECTION, SOLUTION INTRAVENOUS at 11:53

## 2023-02-13 RX ADMIN — FLUDROCORTISONE ACETATE 0.2 MG: 0.1 TABLET ORAL at 11:47

## 2023-02-13 RX ADMIN — MIDODRINE HYDROCHLORIDE 25 MG: 10 TABLET ORAL at 08:23

## 2023-02-13 RX ADMIN — SODIUM BICARBONATE 650 MG: 650 TABLET ORAL at 19:59

## 2023-02-13 RX ADMIN — MIDODRINE HYDROCHLORIDE 25 MG: 10 TABLET ORAL at 18:46

## 2023-02-13 RX ADMIN — SODIUM BICARBONATE 650 MG: 650 TABLET ORAL at 08:23

## 2023-02-13 RX ADMIN — FAMOTIDINE 20 MG: 10 INJECTION, SOLUTION INTRAVENOUS at 08:23

## 2023-02-13 ASSESSMENT — PAIN DESCRIPTION - ORIENTATION
ORIENTATION: UPPER
ORIENTATION: UPPER
ORIENTATION: LOWER
ORIENTATION: LOWER
ORIENTATION: UPPER

## 2023-02-13 ASSESSMENT — PAIN DESCRIPTION - ONSET
ONSET: ON-GOING
ONSET: ON-GOING

## 2023-02-13 ASSESSMENT — PAIN SCALES - GENERAL
PAINLEVEL_OUTOF10: 3
PAINLEVEL_OUTOF10: 2
PAINLEVEL_OUTOF10: 2
PAINLEVEL_OUTOF10: 3
PAINLEVEL_OUTOF10: 0
PAINLEVEL_OUTOF10: 3

## 2023-02-13 ASSESSMENT — PAIN DESCRIPTION - FREQUENCY
FREQUENCY: CONTINUOUS

## 2023-02-13 ASSESSMENT — PAIN DESCRIPTION - LOCATION
LOCATION: LEG
LOCATION: ABDOMEN
LOCATION: ABDOMEN
LOCATION: LEG
LOCATION: ABDOMEN

## 2023-02-13 ASSESSMENT — PAIN DESCRIPTION - DESCRIPTORS
DESCRIPTORS: ACHING;SORE
DESCRIPTORS: ACHING;SORE
DESCRIPTORS: PRESSURE

## 2023-02-13 ASSESSMENT — PAIN DESCRIPTION - PAIN TYPE
TYPE: CHRONIC PAIN

## 2023-02-13 ASSESSMENT — PAIN - FUNCTIONAL ASSESSMENT
PAIN_FUNCTIONAL_ASSESSMENT: PREVENTS OR INTERFERES SOME ACTIVE ACTIVITIES AND ADLS
PAIN_FUNCTIONAL_ASSESSMENT: PREVENTS OR INTERFERES SOME ACTIVE ACTIVITIES AND ADLS

## 2023-02-13 NOTE — CARE COORDINATION
2/13/23, 8:39 AM EST    DISCHARGE BARRIERS        Patient transferred to 3A-04. Report given to unit Faraz Mckinnon, regarding discharge plan for this patient.

## 2023-02-13 NOTE — PROGRESS NOTES
Pt started with a-fib RVR around 1800. Labs were drawn 40 minutes after dialysis. After using the left IJ is when the rhythm change began. MD aware. EKG obtained and orders placed.

## 2023-02-13 NOTE — PROGRESS NOTES
[address] [id number]  INPATIENT PHYSICAL THERAPY  DAILY NOTE  [facility] CCU 3A - 3A-04/004-A    Time In: 9620  Time Out: 0930  Timed Code Treatment Minutes: 14 Minutes  Minutes: 14          Date: 2023  Patient Name: Macario Suárez,  Gender:  male        MRN: [id number]  : 1960  (63 y.o.)     Referring Practitioner: Papi Burroughs DO  Diagnosis: Hypotension  Additional Pertinent Hx: 63years old male, brought from outside facility due to hypotension. Patient has past medical history of recently diagnosed with systemic amyloidosis, ESRD on hemodialysis, undifferentiated liver disease, newly diagnosis CHF preserved EF, nonsustained episodes of ventricular tachycardia. Patient states he felt sick and nauseous and went to the local hospital and transferred to [facility] for further evaluation and management. Currently patient alert and oriented X3, complains of nausea, episodes of vomiting, feeling sick abdominal distention due to ascites. However he denies chest pain, heart palpitations, productive cough, dizziness, fevers, chills lightheadedness, dysuria/urinary frequency     Prior Level of Function:  Lives With: Significant other  Type of Home: House  Home Layout: One level, Laundry in basement  Home Access: Stairs to enter with rails  Entrance Stairs - Number of Steps: 3  Entrance Stairs - Rails: Both  Home Equipment: Jorge Janus, 4 wheeled, [place] Shower/Tub: Tub/Shower unit  [employer]: Standard  Bathroom Equipment: Grab bars in shower, Shower chair    ADL Assistance: Independent  Homemaking Assistance: Needs assistance  Ambulation Assistance: Independent  Transfer Assistance: Independent  Active : No  Additional Comments: Pt reports using 4 wheeled walker for 3-4 weeks, indep with mobility around home with it & ADLs indep. SO completes all IADLs.     Restrictions/Precautions:  Restrictions/Precautions: Fall Risk, General Precautions  Position Activity Restriction  Other position/activity restrictions: monitor BP     SUBJECTIVE: RN approved session. Reports pt BP has still be low but per nephro \"it is his baseline and will be low\". Pt pleasant and agreeable to therapy. Upon sitting up EOB, pt does c/o lightheaded and dizziness, BP significantly low, pt laid back down and session ended. RN aware. PAIN: does not c/o much pain; does have some stomach     Vitals: Orthostatic Blood Pressure: Supine: 66/38(46), Sittin/22(24), Supine at end of session: 65/43(49)    OBJECTIVE:  Bed Mobility:  Supine to Sit: Moderate Assistance, X 1, with head of bed raised, with increased time for completion  Sit to Supine: Minimal Assistance, X 1, with head of bed raised     Transfers:  Not Tested    Ambulation:  Not Tested    Balance:  Static Sitting Balance:  Stand By Assistance  Pt sitting ~3 min     Functional Outcome Measures: Completed  AM-PAC Inpatient Mobility without Stair Climbing Raw Score : 14  AM-PAC Inpatient without Stair Climbing T-Scale Score : 40.85    ASSESSMENT:  Assessment: Patient progressing toward established goals. Activity Tolerance:  Patient tolerance of  treatment: fair. Pt limited by BP at this time.       Equipment Recommendations:Equipment Needed: No  Discharge Recommendations: Continue to assess pending progress  Plan: Current Treatment Recommendations: Balance training, Endurance training, Strengthening, Functional mobility training, Transfer training, Therapeutic activities, Safety education & training  General Plan:  (5x GM)    Patient Education  Patient Education: Plan of Care, Bed Mobility    Goals:  Patient Goals : get up and walk around  Short Term Goals  Time Frame for Short Term Goals: by discharge  Short Term Goal 1: bed mobility with HOB flat, no rails, mod I for increased functional ind  Short Term Goal 2: sit<>stand from various surfaces with LRD mod I for safe transfers  Short Term Goal 3: Pt to tolerate standing 10 min with SBA for increased endurance  Short Term Goal 4: PT to assess gait  Long Term Goals  Time Frame for Long Term Goals : NA d/t short ELOS    Following session, patient left in safe position with all fall risk precautions in place.     Mp Manzo ([place]) PT, DPT

## 2023-02-13 NOTE — PROGRESS NOTES
Comprehensive Nutrition Assessment    Type and Reason for Visit:  Reassess    Nutrition Recommendations/Plan:   Currently NPO  Will re-order ONS when diet is advanced to full liquids or greater     Malnutrition Assessment:  Malnutrition Status:  Severe malnutrition (02/09/23 1539)    Context:  Chronic Illness     Findings of the 6 clinical characteristics of malnutrition:  Energy Intake:  75% or less estimated energy requirements for 1 month or longer  Weight Loss:   (patient with ascites, receives paracentesis and dialysis; 10.9% weight loss noted in past month)     Body Fat Loss:  Severe body fat loss Orbital, Buccal region   Muscle Mass Loss:  Severe muscle mass loss Temples (temporalis)  Fluid Accumulation:  No significant fluid accumulation     Strength:  Not Performed    Nutrition Assessment:     Pt. declining from a nutritional standpoint AEB NPO; Poor po intake during LOS. 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 Nutrition Related Findings:    Pt. Report/Treatments/Miscellaneous: Pt seen resting in bed, NG tube in place to intermittent suction, KUB 2/12 showed mild dilated small bowel loops with distal bowel gas suspicious for paralytic ileus; Currently NPO; N/V improving; planning HD today; If PO diet is unable to be started soon may consider parenteral nutrition as pt is already malnourished.    GI Status: BM 2/12  Pertinent Labs: BUN 56, Cr 5.2, Glucose 111, Hgb 10.4  Pertinent Meds: Pepcid, Vitamin D, Levophed     Wound Type: Pressure Injury, Stage I (coccyx)       Current Nutrition Intake & Therapies:    Average Meal Intake: 1-25%, NPO  Average Supplements Intake: NPO  Diet NPO    Anthropometric Measures:  Height: 5' 10\" (177.8 cm)  Ideal Body Weight (IBW): 166 lbs (75 kg)    Admission Body Weight: 129 lb 7 oz (58.7 kg) (2/6; No Edema)  Current Body Weight: 128 lb 1.4 oz (58.1 kg) (2/13; No Edema), 74.2 % IBW. Weight Source: Bed Scale  Current BMI (kg/m2): 18.4  Usual Body Weight:  (~ 155# per pt. Per EMr: 9/6/22: 135#, 11/8/22: 130#3oz, 1/21/23: 129#12.8oz)     Weight Adjustment For: No Adjustment                 BMI Categories: Underweight (BMI less than 18.5)    Estimated Daily Nutrient Needs:  Energy Requirements Based On: Kcal/kg  Weight Used for Energy Requirements: Current (55.9 kg)  Energy (kcal/day): 4767-8402 (30-35 g/kg)  Weight Used for Protein Requirements: Current (55.9 kg)  Protein (g/day): 84 grams or more         Nutrition Diagnosis:   Severe malnutrition related to inadequate protein-energy intake as evidenced by Criteria as identified in malnutrition assessment    Nutrition Interventions:   Food and/or Nutrient Delivery: Continue NPO (if diet is unable to be initiated may consider parentral nutrition)  Nutrition Education/Counseling: Education not indicated  Coordination of Nutrition Care: Continue to monitor while inpatient       Goals:  Previous Goal Met: Progress towards Goal(s) Declining  Goals: PO intake 75% or greater, by next RD assessment       Nutrition Monitoring and Evaluation:   Behavioral-Environmental Outcomes: None Identified  Food/Nutrient Intake Outcomes: Diet Advancement/Tolerance  Physical Signs/Symptoms Outcomes: Biochemical Data, GI Status, Fluid Status or Edema, Weight, Skin, Nutrition Focused Physical Findings    Discharge Planning:     Too soon to determine     Margarette Mccrray [address]  Contact: [phone]

## 2023-02-13 NOTE — FLOWSHEET NOTE
3.5 hour TX completed. Removed no fluid with dialysis with primary RN titrating levophed. Bilateral cath ports flushed with normal saline, clamped, and secured with tegos. CVC drsg clean, dry, and intact. Report given to primary RN. TX record printed for scanning into EMR.   02/13/23 1300 02/13/23 1710   Vital Signs   BP 90/68 108/75   Temp  --  97.6 °F (36.4 °C)   Heart Rate 74 97   Resp 17 16   SpO2 92 % 94 %   Weight 128 lb 1.4 oz (58.1 kg) 128 lb 1.4 oz (58.1 kg)   Weight Method  --  Bed scale   Percent Weight Change  --  0   Post-Hemodialysis Assessment   Post-Treatment Procedures  --  Blood returned;Catheter Capped, clamped with Saline x2 ports   Machine Disinfection Process  --  Acid/Vinegar Clean;Heat Disinfect; Exterior Machine Disinfection   Blood Volume Processed (Liters)  --  78.6 l/min   Dialyzer Clearance  --  Lightly streaked   Duration of Treatment (minutes)  --  210 minutes   Heparin Amount Administered During Treatment (mL)  --  0 mL   Hemodialysis Intake (ml)  --  400 ml   Hemodialysis Output (ml)  --  400 ml   NET Removed (ml)  --  0   Tolerated Treatment  --  Good O-T Plasty Text: The defect edges were debeveled with a #15 scalpel blade.  Given the location of the defect, shape of the defect and the proximity to free margins an O-T plasty was deemed most appropriate.  Using a sterile surgical marker, an appropriate O-T plasty was drawn incorporating the defect and placing the expected incisions within the relaxed skin tension lines where possible.    The area thus outlined was incised deep to adipose tissue with a #15 scalpel blade.  The skin margins were undermined to an appropriate distance in all directions utilizing iris scissors.

## 2023-02-13 NOTE — PROGRESS NOTES
[facility]   Nephrology progress note  2/13/2023, 9:53 AM      Pt Name:    Dayami Zavala  MRN:     [id number]     YOB: 1960  Admit Date:    2/6/2023  4:58 PM    Chief Complaint: Nephrology following for ESRD and HD    Subjective:  Patient was seen and examined this morning  Seen earlier today during rounds in ICU  On Levophed  NG tube present    Objective:  24HR INTAKE/OUTPUT:    Intake/Output Summary (Last 24 hours) at 2/13/2023 0953  Last data filed at 2/13/2023 0826  Gross per 24 hour   Intake 432.74 ml   Output --   Net 432.74 ml           I/O last 3 completed shifts: In: 422.7 [P.O.:25; I.V.:332.7; IV Piggyback:65]  Out: -   I/O this shift:  In: 10 [I.V.:10]  Out: -    Admission weight: 129 lb 6.6 oz (58.7 kg)  Wt Readings from Last 3 Encounters:   02/13/23 130 lb 11.7 oz (59.3 kg)   01/31/23 133 lb 12.8 oz (60.7 kg)   01/24/23 132 lb 3.2 oz (60 kg)        Vitals :   Vitals:    02/13/23 0715 02/13/23 0730 02/13/23 0745 02/13/23 0800   BP: (!) 149/65 98/67 98/80 109/76   Pulse: 83 80 79 76   Resp: 14 13 13 12   Temp:    97.7 °F (36.5 °C)   TempSrc:    Oral   SpO2:    (!) 89%   Weight:       Height:           Physical examination  General Appearance: Ill-appearing, no acute distress  Awake and alert.   Mouth/Throat: Oral mucosa moist  Neck: No JVD  Lungs:  no use of accessory muscles  GI: soft, non-tender, no guarding  Extremities: No pitting LE edema    Medications:  Infusion:    dextrose      amiodarone Stopped (02/08/23 0357)    sodium chloride      norepinephrine 4 mcg/min (02/13/23 0876)     Meds:    zolpidem  5 mg Oral Nightly    famotidine (PEPCID) injection  20 mg IntraVENous Daily    midodrine  25 mg Oral 4x Daily    promethazine  6.25 mg IntraMUSCular Once    calcium replacement protocol   Other RX Placeholder    sodium chloride flush  5-40 mL IntraVENous 2 times per day    sodium bicarbonate  650 mg Oral BID    vitamin D  50,000 Units Oral Weekly     Meds prn: ondansetron, aluminum & magnesium hydroxide-simethicone, promethazine, diphenhydrAMINE, glucose, dextrose bolus **OR** dextrose bolus, glucagon (rDNA), dextrose, sodium chloride flush, sodium chloride, polyethylene glycol, acetaminophen **OR** acetaminophen, albuterol     Lab Data :  CBC:   Recent Labs     02/11/23  0210 02/12/23  0350 02/12/23 1515 02/13/23  0417   WBC 6.8 7.2  --  8.4   HGB 9.6* 10.6* 11.0* 10.4*   HCT 26.8* 29.3* 30.4* 29.6*   PLT 77* 84*  --  84*       CMP:  Recent Labs     02/10/23  2115 02/11/23  0210 02/12/23  0350 02/12/23  1515 02/13/23  0417      < > 139 136 136   K 3.8   < > 3.9 3.9 3.8   CL 97*   < > 96* 92* 93*   CO2 26   < > 28 26 26   BUN 28*   < > 47* 51* 56*   CREATININE 3.4*   < > 4.5* 4.8* 5.2*   GLUCOSE 105   < > 119* 150* 111*   CALCIUM 8.3*   < > 8.2* 8.5 8.3*   MG 2.1  --   --  2.3  --     < > = values in this interval not displayed. Hepatic:   No results for input(s): LABALBU, AST, ALT, ALB, BILITOT, ALKPHOS in the last 72 hours. Assessment and Plan:  ESRD on hemodialysis. 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 On Levophed. Still requiring somewhat decreased dose this morning  Patient is chronically hypotensive and asymptomatic  We will continue to attempt to wean Levophed  Plan hemodialysis treatment today  Metabolic acidosis. Resolved  Chronic hypotension. Patient is on very high doses of midodrine. Chronically he runs in 80s as outpatient. Okay to wean Levophed. We will add Florinef  Probable autonomic dysfunction from amyloidosis  Ascites status post paracentesis  Elevated liver enzymes. Recheck liver enzyme in a.m. Anemia in ESRD  Chronic thrombocytopenia        Iraida Henson MD  [facility]    This report has been created using voice recognition software.  It may contain minor errors which are inherent in voice recognition technology

## 2023-02-13 NOTE — PROGRESS NOTES
CRITICAL CARE PROGRESS NOTE      Patient:  Treva Motta    Unit/Bed:3A-04/004-A  YOB: 1960  MRN: [id number]   PCP: Brenda Warner DO  Date of Admission: 2/6/2023  Chief Complaint:-Hypotension    Assessment and Plan:    Hypotensive: Currently asymptomatic. Initially thought to be related to septic shock. Transferred to [facility] from outside of the facility and was hypotensive on arrival with ventricular arrhythmias in the 140s. 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 Has ESRD and is on hemodialysis. Initially suspicious for SBP and patient was given ceftriaxone 2g. Culture and cell count of body fluid not suspicious for SBP at this time. We will continue to monitor patient. Patient did receive paracentesis 2/7 with 5.5 L of ascites for drainage. Patient is on midodrine 15, 3 times daily at home for blood pressure support. Midodirne was increased to 20 mg TID, 2/8. Patient does have history of asymptomatic hypotension. Patient's hypotension is likely multifactorial. Patient has history of running systolically in the 79S. -We will continue to monitor patient  -Currently on Levophed for blood pressure support.   -We will try to wean Levophed down as patient tolerates  -Continuing ceftriaxone 2g Q24 hrs  -Midodrine 25 mg four times daily. We have been unsuccessful at weaning patient off of levophed while he is on midodrine 20 mg four times daily.  -Decadron 10 mg, given 2/9/2023  -Nephrology is adding Florinef  -Albumin 25 mg IV every 6 hours for total of 8 doses   Ventricular tachycardia, asymptomatic: Reported runs of ventricular tachycardia. Patient was given IV magnesium at outside facility. Patient received amnio bolus 300, started on amnio drip at 0.5. Continuing amiodarone drip at this time. He is on telemetry, strict inputs and outputs, daily weights. We will continue to monitor patient's electrolytes.            -Keep magnesium above 2.         -Keep potassium greater than 4         -Previously evaluated by EP on 1/20/2023. Arrhythmia thought to be secondary to underlying cardiac amyloidosis with risk of SCD. EP recommended AICD. Not a candidate for class Ic or III antiarrhythmics.          -Previous admission discharged with a LifeVest was discussed. With follow-up with EP.  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 On hemodialysis - M/W/F follows with Dr. Lan Dalton.  Hx of noncompliance with medications including p.o. bicarb, fluid intake and other medical visits in the past. Patient is consulted for neurology to start inpatient home dialysis. Patient underwent hemodialysis 2/7/23, 2/10/23, 2/13/23  Suspected liver cirrhosis: Likely 2/2 systemic amyloidosis waiting on liver biopsy for the past 2 months for this. Elevated transaminase, alk phos, hypoalbuminemia. Patient is scheduled to have regular paracentesis. Patient reports he has received 2-3 previous paracenteses. 5.5 L of ascites was drained with paracentesis, 2/7/2023. Patient tolerated the procedure well. Patient started to have increasing swelling in his abdomen, tightness, decreased appetite and started to have some SOB. 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 Per patient he reports compliance to medication treatments and visits.         -Follows with Dr. Chanel Garcia outpatient. .        -As of now not started any chemotherapy regimen at this time       -Per heme-onc recommendations weekly paracentesis, limited to 2 L with with albumin infusions  HFpEF: Not in exacerbation. HFpEF 50%, 1/16. We will continue to monitor.  Continue telemetry, pulse ox, daily weights, strict I&O's  Malnutrition, severe protein calorie malnutrition: Likely due to patient's multiple comorbidities  Chronic normocytic anemia, mild: Patient has multiple chronic diseases including ESRD on HD, liver disease, systemic amyloidosis, continue current daily CBCs. Chronic thrombocytopenia, mild: Possibly due to suspected liver cirrhosis secondary to systemic amyloidosis. Continue to monitor with daily CBCs. Conditioning, debility: Multiple comorbidities including systemic amyloidosis, ESRD on HD, malnutrition, liver disease, ventricular tachycardia    HAGMA: Resolved. Anion gap, 15.0. Lactic acidosis and uremia likely due to ESRD. Patient is on HD. Home bicarb started p.o. We will continue to monitor, daily CBCs and BMPs ordered. INITIAL H AND P AND ICU COURSE:  Per HPI, \"patient is 63years old male, brought from outside facility due to hypotension. Patient has past medical history of recently diagnosed with systemic amyloidosis, ESRD on hemodialysis, undifferentiated liver disease, newly diagnosis CHF preserved EF, nonsustained episodes of ventricular tachycardia. Patient states he felt sick and nauseous and went to the local hospital and transferred to [facility] for further evaluation and management. Currently patient alert and oriented X3, complains of nausea, episodes of vomiting, feeling sick abdominal distention due to ascites. However he denies chest pain, heart palpitations, productive cough, dizziness, fevers, chills lightheadedness, dysuria/urinary frequency\" paracentesis was performed on 2/7/2023 with 5.5 L of ascites drained. Patient was also undergoing hemodialysis on 2/7, and 2/10. Patient has been continue to have to have his midodrine dose increased while attempting to wean down Levophed. Past 24 hours:   Patient evaluated at bedside this morning. Levophed was increased to 16 mcg evening of 2/12/2023 that this is been decreased to 11 mcg this morning. Current blood pressure was 104/72. Patient is no longer on 2 L nasal cannula and is not complaining of shortness of breath. Patient developed abdominal pain and nausea and vomiting yesterday.   KUB was ordered that showed mild dilated small bowel loops with distal bowel gas suspicious for paralytic ileus, NG tube was placed with intermittent suction for treatment. Patient reports that he tolerated NG tube well and this improved his symptoms of nausea and vomiting. This morning patient denies fever, chills, dizziness, headache, vision changes, chest pain, shortness of breath, palpitations or abdominal pain. He has noticed some increased fullness of his abdomen this morning. Past Medical History: Systemic amyloidosis, CHF, ESRD, liver disease. Family History: Hypertension. Social History: Former smoker, 34+ years since history of smoking. History of quitting December 2022. No previous illicit drug use or alcohol use. ROS: See HPI. Scheduled Meds:   zolpidem  5 mg Oral Nightly    famotidine (PEPCID) injection  20 mg IntraVENous Daily    midodrine  25 mg Oral 4x Daily    promethazine  6.25 mg IntraMUSCular Once    calcium replacement protocol   Other RX Placeholder    sodium chloride flush  5-40 mL IntraVENous 2 times per day    sodium bicarbonate  650 mg Oral BID    vitamin D  50,000 Units Oral Weekly     Continuous Infusions:   dextrose      amiodarone Stopped (02/08/23 0357)    sodium chloride      norepinephrine 8 mcg/min (02/13/23 0603)     PHYSICAL EXAMINATION:  T:  97.7.  P:  81. RR:  18. B/P:  104/72. Room air O2 Sat: 93%. I/O: + 430 cc  Body mass index is 18.76 kg/m². GCS:   15  General: Chronically ill-appearing male. Appears older than stated age. Cachectic. Pale appearance. NG tube in place and hooked up to intermittent suction  HEENT: Normocephalic and atraumatic. Mild scleral icterus. PERR  Neck: Supple. Lungs: Clear to auscultation throughout all lung fields. No wheezes or rhonchi's. No retractions. Cardiac: RRR. No murmurs, rubs, gallops. No JVD. Abdomen: Mild tenderness to abdomen with palpation. Some increased firmness of abdomen. Mild to moderately distended.   Extremities:  No clubbing, cyanosis, or edema x 4. Pale. Multiple bruises. Vasculature: Capillary refill < 3 seconds. Palpable dorsalis pedis pulses. Skin:  Warm and dry. Psych:  Alert and oriented x3. Affect appropriate  Lymph:  No supraclavicular adenopathy. Neurologic: No focal deficit. No seizures. Data: (All radiographs, tracings, PFTs, and imaging are personally viewed and interpreted unless otherwise noted). Sodium 136. Potassium 3.8. Chloride 93. CO2 26 BUN 56. Creatinine 5.2. Anion gap 17. Serum calcium 8.3. WBC 8.4. Hemoglobin 10.4. Platelets count 84. Telemetry shows normal sinus rhythm. KUB 2/12/2023 shows mild dilated small bowel loops with distal bowel gas suspicious for paralytic ileus. Chest x-ray shows NG tube tip overlies the left upper abdomen likely in the stomach. CT abdomen pelvis with mild contrast 2/13/2023 shows subtle omental caking/enhancement mesh visualized within the upper abdomen along the anterior abdominal wall with consideration of possible peritoneal carcinomatosis. Large volume intra-abdominal ascites. No bowel obstruction, perforation or discrete fluid collection observed. Seen with multidisciplinary ICU team.  Meets Continued ICU Level Care Criteria:    [x] Yes   [] No - Transfer Planned to listed location:  [] HOSPITALIST CONTACTED-      Case and plan discussed with Dr. Alan Reynoso MD    Electronically signed by Tiana Arias DO, PGY-1  CRITICAL CARE SPECIALIST  Patient seen by me including key components of medical care. Case discussed with resident physician. Persistent hypotension. Increasing ascitis. Evidence of carcinomatosis. Suspect patient is terminal.  Certainly hypotension is refractory to medications. Italicized font, if present,  represents changes to the note made by me. CC time 35 minutes. Time was discontiguous. Time does not include procedure. Time does include my direct assessment of the patient and coordination of care.   Time represents more than 50% of the time involved with patient care by the [address] [place] [address] team.  Electronically signed by Janae Hickman.  Prateek Camp MD.

## 2023-02-13 NOTE — PROGRESS NOTES
[address] THERAPY MISSED TREATMENT NOTE  [facility] CCU 3A  3A-04/004-A      Date: 2023  Patient Name: Marleen Severino        CSN: [id number]   : 1960  (63 y.o.)  Gender: male   Referring Practitioner: Meredith Lozano DO  Diagnosis: hypotension         REASON FOR MISSED TREATMENT:  Nurse recommends holding today.  Pt did not tolerate PT session well earlier d/t low BP

## 2023-02-13 NOTE — CARE COORDINATION
2/13/23, 10:17 AM EST    DISCHARGE ON GOING EVALUATION    [facility] day: 7  Location: 3A-04/004-A Reason for admit: Hypotension [I95.9]  Hypotension, unspecified hypotension type [I95.9]   Procedure:   2/6 CXR: Small left pleural effusion with left basal atelectasis  2/6 CVC LIJ  2/6 Repeat CXR: Left basilar subsegmental atelectasis  2/7 Paracentesis: 5.5L removed. 2/12 KUB: Mildly dilated small bowel loops with distal bowel gas suspicious for paralytic ileus. 2/13 CT abd/pelvis: Subtle omental caking/enhancement best visualized within the upper abdomen along the anterior abdominal wall with differential considerations including peritoneal carcinomatosis. Large volume intra-abdominal ascites. No bowel obstruction, perforation, or discrete fluid collection observed. Small bilateral pleural effusions and bilateral lower lobe consolidation, left greater than right. Question mild sclerosis involving the skeletal structures diffusely. No discrete bony lesion observed. Barriers to Discharge: Transferred to , remains in ICU. Intensivist and Nephrology following. Resp culture positive for Christelle dubliniensis. Room air, Afebrile. BP low of 81/53. Remains on Levophed gtt. NG tube. CVC. Tessio. Pepcid iv daily. Florinef po daily (starting). Midodrine qid. Sodium bicarb tabs bid. Completed Rocephin. BUN 56, creatinine 5.2. Planning dialysis today. PT/OT. PCP: Brenda Warner DO  Readmission Risk Score: 20.3%  Patient Goals/Plan/Treatment Preferences: From home with girlfriend. HD MWF at [address] [place] [address] with 0176 chair time. Chemo 1x/wk in [facility]. SW following for possible SNF placement.

## 2023-02-13 NOTE — PLAN OF CARE
Problem: Discharge Planning  Goal: Discharge to home or other facility with appropriate resources  Recent Flowsheet Documentation  Taken 2/13/2023 0800 by Robb Schaumann, RN  Discharge to home or other facility with appropriate resources: Identify barriers to discharge with patient and caregiver     Problem: Pain  Goal: Verbalizes/displays adequate comfort level or baseline comfort level  Recent Flowsheet Documentation  Taken 2/13/2023 0800 by Robb Schaumann, RN  Verbalizes/displays adequate comfort level or baseline comfort level: Encourage patient to monitor pain and request assistance     Problem: Cardiovascular - Adult  Goal: Maintains optimal cardiac output and hemodynamic stability  Recent Flowsheet Documentation  Taken 2/13/2023 0800 by Robb Schaumann, RN  Maintains optimal cardiac output and hemodynamic stability: Monitor blood pressure and heart rate  Goal: Absence of cardiac dysrhythmias or at baseline  Recent Flowsheet Documentation  Taken 2/13/2023 0800 by Robb Schaumann, RN  Absence of cardiac dysrhythmias or at baseline: Monitor cardiac rate and rhythm     Problem: Genitourinary - Adult  Goal: Absence of urinary retention  Recent Flowsheet Documentation  Taken 2/13/2023 0800 by Robb Schaumann, RN  Absence of urinary retention: Assess patients ability to void and empty bladder  Goal: Urinary catheter remains patent  Recent Flowsheet Documentation  Taken 2/13/2023 0800 by Robb Schaumann, RN  Urinary catheter remains patent: Assess patency of urinary catheter     Problem: Metabolic/Fluid and Electrolytes - Adult  Goal: Electrolytes maintained within normal limits  Recent Flowsheet Documentation  Taken 2/13/2023 0800 by Robb Schaumann, RN  Electrolytes maintained within normal limits: Monitor labs and assess patient for signs and symptoms of electrolyte imbalances  Goal: Hemodynamic stability and optimal renal function maintained  Recent Flowsheet Documentation  Taken 2/13/2023 0800 by Robb Schaumann, RN  Hemodynamic stability and optimal renal function maintained: Monitor labs and assess for signs and symptoms of volume excess or deficit  Goal: Glucose maintained within prescribed range  Recent Flowsheet Documentation  Taken 2/13/2023 0800 by Guerita Mott RN  Glucose maintained within prescribed range: Monitor blood glucose as ordered    Care plan reviewed with patient. Patient verbalized understanding of the plan of care and contribute to goal setting.

## 2023-02-14 ENCOUNTER — APPOINTMENT (OUTPATIENT)
Dept: GENERAL RADIOLOGY | Age: 63
End: 2023-02-14
Attending: INTERNAL MEDICINE
Payer: COMMERCIAL

## 2023-02-14 LAB
ALBUMIN SERPL BCG-MCNC: 3.4 G/DL (ref 3.5–5.1)
ALP SERPL-CCNC: 1700 U/L (ref 38–126)
ALT SERPL W/O P-5'-P-CCNC: 72 U/L (ref 11–66)
ANION GAP SERPL CALC-SCNC: 15 MEQ/L (ref 8–16)
ANISOCYTOSIS BLD QL SMEAR: PRESENT
AST SERPL-CCNC: 55 U/L (ref 5–40)
BASOPHILS ABSOLUTE: 0 THOU/MM3 (ref 0–0.1)
BASOPHILS NFR BLD AUTO: 0.1 %
BILIRUB SERPL-MCNC: 8.9 MG/DL (ref 0.3–1.2)
BUN SERPL-MCNC: 30 MG/DL (ref 7–22)
CALCIUM SERPL-MCNC: 8.4 MG/DL (ref 8.5–10.5)
CHLORIDE SERPL-SCNC: 98 MEQ/L (ref 98–111)
CO2 SERPL-SCNC: 26 MEQ/L (ref 23–33)
CORTIS SERPL-MCNC: 33.6 UG/DL
CORTISOL COLLECTION INFO: NORMAL
CREAT SERPL-MCNC: 3.1 MG/DL (ref 0.4–1.2)
CRP SERPL-MCNC: 5.4 MG/DL (ref 0–1)
DEPRECATED RDW RBC AUTO: 81.3 FL (ref 35–45)
EOSINOPHIL NFR BLD AUTO: 0 %
EOSINOPHILS ABSOLUTE: 0 THOU/MM3 (ref 0–0.4)
ERYTHROCYTE [DISTWIDTH] IN BLOOD BY AUTOMATED COUNT: 26.7 % (ref 11.5–14.5)
FERRITIN SERPL IA-MCNC: 2195 NG/ML (ref 22–322)
GFR SERPL CREATININE-BSD FRML MDRD: 22 ML/MIN/1.73M2
GLUCOSE SERPL-MCNC: 92 MG/DL (ref 70–108)
HCT VFR BLD AUTO: 24.1 % (ref 42–52)
HCT VFR BLD AUTO: 25.4 % (ref 42–52)
HGB BLD-MCNC: 8.6 GM/DL (ref 14–18)
HGB BLD-MCNC: 8.8 GM/DL (ref 14–18)
IMM GRANULOCYTES # BLD AUTO: 0.03 THOU/MM3 (ref 0–0.07)
IMM GRANULOCYTES NFR BLD AUTO: 0.4 %
LV EF: 55 %
LVEF MODALITY: NORMAL
LYMPHOCYTES ABSOLUTE: 0.8 THOU/MM3 (ref 1–4.8)
LYMPHOCYTES NFR BLD AUTO: 11.7 %
MCH RBC QN AUTO: 32.5 PG (ref 26–33)
MCHC RBC AUTO-ENTMCNC: 35.7 GM/DL (ref 32.2–35.5)
MCV RBC AUTO: 90.9 FL (ref 80–94)
MONOCYTES ABSOLUTE: 0.3 THOU/MM3 (ref 0.4–1.3)
MONOCYTES NFR BLD AUTO: 3.6 %
NEUTROPHILS NFR BLD AUTO: 84.2 %
NRBC BLD AUTO-RTO: 3 /100 WBC
PLATELET # BLD AUTO: 70 THOU/MM3 (ref 130–400)
PLATELET BLD QL SMEAR: ABNORMAL
PMV BLD AUTO: ABNORMAL FL (ref 9.4–12.4)
POIKILOCYTES: ABNORMAL
POLYCHROMASIA BLD QL SMEAR: ABNORMAL
POTASSIUM SERPL-SCNC: 3.8 MEQ/L (ref 3.5–5.2)
PROT SERPL-MCNC: 4.6 G/DL (ref 6.1–8)
RBC # BLD AUTO: 2.65 MILL/MM3 (ref 4.7–6.1)
SCAN OF BLOOD SMEAR: NORMAL
SEGMENTED NEUTROPHILS ABSOLUTE COUNT: 5.9 THOU/MM3 (ref 1.8–7.7)
SODIUM SERPL-SCNC: 139 MEQ/L (ref 135–145)
TARGETS BLD QL SMEAR: ABNORMAL
WBC # BLD AUTO: 7 THOU/MM3 (ref 4.8–10.8)

## 2023-02-14 PROCEDURE — 85018 HEMOGLOBIN: CPT

## 2023-02-14 PROCEDURE — 6370000000 HC RX 637 (ALT 250 FOR IP): Performed by: NURSE PRACTITIONER

## 2023-02-14 PROCEDURE — 93307 TTE W/O DOPPLER COMPLETE: CPT

## 2023-02-14 PROCEDURE — 99291 CRITICAL CARE FIRST HOUR: CPT | Performed by: INTERNAL MEDICINE

## 2023-02-14 PROCEDURE — 74018 RADEX ABDOMEN 1 VIEW: CPT

## 2023-02-14 PROCEDURE — 93010 ELECTROCARDIOGRAM REPORT: CPT | Performed by: INTERNAL MEDICINE

## 2023-02-14 PROCEDURE — 2500000003 HC RX 250 WO HCPCS

## 2023-02-14 PROCEDURE — 85014 HEMATOCRIT: CPT

## 2023-02-14 PROCEDURE — 6370000000 HC RX 637 (ALT 250 FOR IP)

## 2023-02-14 PROCEDURE — 2580000003 HC RX 258

## 2023-02-14 PROCEDURE — 82728 ASSAY OF FERRITIN: CPT

## 2023-02-14 PROCEDURE — 97530 THERAPEUTIC ACTIVITIES: CPT

## 2023-02-14 PROCEDURE — 86140 C-REACTIVE PROTEIN: CPT

## 2023-02-14 PROCEDURE — 71045 X-RAY EXAM CHEST 1 VIEW: CPT

## 2023-02-14 PROCEDURE — 2100000000 HC CCU R&B

## 2023-02-14 PROCEDURE — 6360000002 HC RX W HCPCS

## 2023-02-14 PROCEDURE — 2500000003 HC RX 250 WO HCPCS: Performed by: INTERNAL MEDICINE

## 2023-02-14 PROCEDURE — 2580000003 HC RX 258: Performed by: INTERNAL MEDICINE

## 2023-02-14 PROCEDURE — 6370000000 HC RX 637 (ALT 250 FOR IP): Performed by: STUDENT IN AN ORGANIZED HEALTH CARE EDUCATION/TRAINING PROGRAM

## 2023-02-14 PROCEDURE — A4216 STERILE WATER/SALINE, 10 ML: HCPCS | Performed by: INTERNAL MEDICINE

## 2023-02-14 PROCEDURE — 36415 COLL VENOUS BLD VENIPUNCTURE: CPT

## 2023-02-14 PROCEDURE — 85025 COMPLETE CBC W/AUTO DIFF WBC: CPT

## 2023-02-14 PROCEDURE — 97110 THERAPEUTIC EXERCISES: CPT

## 2023-02-14 PROCEDURE — 80053 COMPREHEN METABOLIC PANEL: CPT

## 2023-02-14 PROCEDURE — P9047 ALBUMIN (HUMAN), 25%, 50ML: HCPCS

## 2023-02-14 PROCEDURE — 6370000000 HC RX 637 (ALT 250 FOR IP): Performed by: INTERNAL MEDICINE

## 2023-02-14 PROCEDURE — 99232 SBSQ HOSP IP/OBS MODERATE 35: CPT | Performed by: INTERNAL MEDICINE

## 2023-02-14 RX ORDER — NOREPINEPHRINE BIT/0.9 % NACL 16MG/250ML
1-100 INFUSION BOTTLE (ML) INTRAVENOUS CONTINUOUS
Status: DISCONTINUED | OUTPATIENT
Start: 2023-02-14 | End: 2023-02-19 | Stop reason: HOSPADM

## 2023-02-14 RX ORDER — POTASSIUM CHLORIDE 29.8 MG/ML
20 INJECTION INTRAVENOUS ONCE
Status: COMPLETED | OUTPATIENT
Start: 2023-02-14 | End: 2023-02-14

## 2023-02-14 RX ADMIN — ALBUMIN (HUMAN) 25 G: 0.25 INJECTION, SOLUTION INTRAVENOUS at 18:53

## 2023-02-14 RX ADMIN — ZOLPIDEM TARTRATE 5 MG: 5 TABLET ORAL at 20:22

## 2023-02-14 RX ADMIN — SODIUM CHLORIDE, PRESERVATIVE FREE 10 ML: 5 INJECTION INTRAVENOUS at 20:22

## 2023-02-14 RX ADMIN — ALBUMIN (HUMAN) 25 G: 0.25 INJECTION, SOLUTION INTRAVENOUS at 13:18

## 2023-02-14 RX ADMIN — FAMOTIDINE 20 MG: 10 INJECTION, SOLUTION INTRAVENOUS at 09:27

## 2023-02-14 RX ADMIN — SODIUM CHLORIDE, PRESERVATIVE FREE 10 ML: 5 INJECTION INTRAVENOUS at 09:28

## 2023-02-14 RX ADMIN — MIDODRINE HYDROCHLORIDE 20 MG: 10 TABLET ORAL at 20:22

## 2023-02-14 RX ADMIN — ACETAMINOPHEN 650 MG: 325 TABLET ORAL at 20:22

## 2023-02-14 RX ADMIN — Medication 6 MCG/MIN: at 03:34

## 2023-02-14 RX ADMIN — MIDODRINE HYDROCHLORIDE 20 MG: 10 TABLET ORAL at 13:14

## 2023-02-14 RX ADMIN — HYDROXOCOBALAMIN 5 G: 5 INJECTION, POWDER, LYOPHILIZED, FOR SOLUTION INTRAVENOUS at 12:50

## 2023-02-14 RX ADMIN — SODIUM BICARBONATE 650 MG: 650 TABLET ORAL at 09:42

## 2023-02-14 RX ADMIN — FLUDROCORTISONE ACETATE 0.2 MG: 0.1 TABLET ORAL at 09:41

## 2023-02-14 RX ADMIN — SODIUM BICARBONATE 650 MG: 650 TABLET ORAL at 20:22

## 2023-02-14 RX ADMIN — Medication 4 MCG/MIN: at 12:59

## 2023-02-14 RX ADMIN — MIDODRINE HYDROCHLORIDE 20 MG: 10 TABLET ORAL at 09:42

## 2023-02-14 RX ADMIN — ALBUMIN (HUMAN) 25 G: 0.25 INJECTION, SOLUTION INTRAVENOUS at 00:14

## 2023-02-14 RX ADMIN — MIDODRINE HYDROCHLORIDE 20 MG: 10 TABLET ORAL at 03:36

## 2023-02-14 RX ADMIN — ALBUMIN (HUMAN) 25 G: 0.25 INJECTION, SOLUTION INTRAVENOUS at 06:18

## 2023-02-14 RX ADMIN — ALBUMIN (HUMAN) 25 G: 0.25 INJECTION, SOLUTION INTRAVENOUS at 23:29

## 2023-02-14 RX ADMIN — POTASSIUM CHLORIDE 20 MEQ: 29.8 INJECTION, SOLUTION INTRAVENOUS at 13:21

## 2023-02-14 ASSESSMENT — PAIN DESCRIPTION - DESCRIPTORS
DESCRIPTORS: PRESSURE
DESCRIPTORS: ACHING

## 2023-02-14 ASSESSMENT — PAIN DESCRIPTION - LOCATION
LOCATION: ABDOMEN
LOCATION: KNEE

## 2023-02-14 ASSESSMENT — PAIN SCALES - GENERAL
PAINLEVEL_OUTOF10: 5
PAINLEVEL_OUTOF10: 7
PAINLEVEL_OUTOF10: 4
PAINLEVEL_OUTOF10: 4

## 2023-02-14 ASSESSMENT — PAIN DESCRIPTION - PAIN TYPE
TYPE: ACUTE PAIN
TYPE: CHRONIC PAIN

## 2023-02-14 ASSESSMENT — PAIN DESCRIPTION - ORIENTATION: ORIENTATION: RIGHT;LEFT

## 2023-02-14 ASSESSMENT — PAIN DESCRIPTION - ONSET
ONSET: ON-GOING
ONSET: GRADUAL

## 2023-02-14 ASSESSMENT — PAIN DESCRIPTION - FREQUENCY
FREQUENCY: CONTINUOUS
FREQUENCY: INTERMITTENT

## 2023-02-14 ASSESSMENT — PAIN - FUNCTIONAL ASSESSMENT: PAIN_FUNCTIONAL_ASSESSMENT: PREVENTS OR INTERFERES SOME ACTIVE ACTIVITIES AND ADLS

## 2023-02-14 NOTE — PROGRESS NOTES
[address] CCU 3A  Occupational Therapy  Daily Note  Time:   Time In: 0848  Time Out: 4845  Timed Code Treatment Minutes: 34 Minutes  Minutes: 34          Date: 2023  Patient Name: Any Dickey,   Gender: male      Room: [facility]-A  MRN: [id number]  : 1960  (63 y.o.)  Referring Practitioner: Arun Daniel DO  Diagnosis: hypotension  Additional Pertinent Hx: Per ER note on :62 years old male, brought from outside facility due to hypotension. Patient has past medical history of recently diagnosed with systemic amyloidosis, ESRD on hemodialysis, undifferentiated liver disease, newly diagnosis CHF preserved EF, nonsustained episodes of ventricular tachycardia. Patient states he felt sick and nauseous and went to the local hospital and transferred to [facility] for further evaluation and management. Currently patient alert and oriented X3, complains of nausea, episodes of vomiting, feeling sick abdominal distention due to ascites. Restrictions/Precautions:  Restrictions/Precautions: Fall Risk, General Precautions  Position Activity Restriction  Other position/activity restrictions: monitor BP     SUBJECTIVE: Pleasant and cooperative  RN approved session. Pt had just finished having an echocardiogram    PAIN: Denies having pain. Vitals: Blood Pressure: Pt's resting blood pressure: 89/55; sittin/42; 80/37 after having been in sitting for several minutes; 71/55 in supine. Pt C/O dizziness when first sitting up. COGNITION: Slow Processing, Decreased Problem Solving, and Decreased Safety Awareness    ADL:   No ADL's completed this session. .  Pt indicated that his nurse had helped him get washed earlier in the morning. Pt also indicated that his girlfriend would bring him his electric razor so he can shave. BALANCE:  Sitting Balance:  Stand By Assistance.   While doing UE exercises in sitting at the edge of bed and having B feet touching the floor. BED MOBILITY:  Rolling to Left: Stand By Assistance, with rail    Rolling to Right: Stand By Assistance, with head of bed flat, with rail    Supine to Sit: Minimal Assistance help provided for bringing his trunk upright - cues for pushing off of the bed with his elbow  Sit to Supine: Minimal Assistance, with head of bed flat, with rail    Scooting: Stand By Assistance, with rail      TRANSFERS:  Not able to demonstrate. ADDITIONAL ACTIVITIES:  Pt completed 3 sets of UE exercises while using a 2 lb dumbbell: elbow flexion, extension and scapular protraction/retraction. 8 reps each with the triceps and biceps except 6 only with his L UE for the biceps. ASSESSMENT:     Activity Tolerance:  Patient tolerance of  treatment: fair. Pt had sat at the edge of bed for almost 20 minutes total. He C/O feeling tired at the end of the session. Pt had removed his nasal cannula and was on room for the time he was sitting. Discharge Recommendations: Subacute/skilled nursing facility  Equipment Recommendations: Other: Would benefit from Slipager 71: Times Per Week: 3-5x  Current Treatment Recommendations: Balance training, Functional mobility training, Endurance training, Self-Care / ADL, Safety education & training, Strengthening    Patient Education  Patient Education: Plan of Care, Importance of Increasing Activity, and limiting activity when blood pressures are low and experiencing dizziness.      Goals  Short Term Goals  Time Frame for Short Term Goals: until discharge  Short Term Goal 1: Pt will tolerate sitting EOB 15-20 min with S & 0-2 vcs for safety  Short Term Goal 2: Pt will complete BUE AROM-light resistance exercises to increase UB endurance & strength for increase UB strength for toilet t/fs  Short Term Goal 3: Pt will tolerate further assessment of functional t/fs by OTR when medically able  Long Term Goals  Time Frame for Long Term Goals : No LTG set d/t short ELOS    Following session, patient left in safe position with all fall risk precautions in place.

## 2023-02-14 NOTE — PROGRESS NOTES
CRITICAL CARE PROGRESS NOTE      Patient:  Jennifer Curry    Unit/Bed:3A-04/004-A  YOB: 1960  MRN: [id number]   PCP: Tommie Wells,   Date of Admission: 2/6/2023  Chief Complaint:- Hypotension    Assessment and Plan:    Hypotensive, refractory to medications: Asymptomatic - Initially thought to be related to septic shock. Transferred to [facility] from outside of the facility and was hypotensive on arrival with ventricular arrhythmias in the 140s. 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 ESRD on hemodialysis, M,W,F schedule. Initially suspicious for SBP and was given ceftriaxone 2g. Culture and cell count of body fluid not suspicious for SBP at this time. We will continue to monitor patient. Patient is on midodrine 15 mg TID at home for blood pressure support. Midodirne was increased to 20 mg TID, 2/8 and 25mg four times daily. History of asymptomatic hypotension. Patient's hypotension is likely multifactorial. Patient has history of running systolically in the 06E. -Paracentesis, 2/7 with 5.5 L of ascites for drainage. Will evaluate for repeat paracentesis. Ceftriaxone 2g Q24 hrs, stopped 2/12  -Will continue to monitor patient  -Currently on Levophed for blood pressure support, MAP >55    -Will try to wean Levophed down as patient tolerates  -Midodrine 25 mg four times daily. We have been unsuccessful at weaning patient off of levophed while he is on  midodrine 20 mg four times daily.  -Decadron 10 mg, given 2/9/2023  -Nephrology added Florinef  -Albumin 25 mg IV every 6 hours for total of 8 doses   -Methylene blue 50 mg IV was ordered for vasoplegia  Ventricular tachycardia, asymptomatic: Reported runs of ventricular tachycardia. Patient was given IV magnesium at outside facility. Patient received amiodarone bolus 300, started on amnio drip at 0.5. Continuing amiodarone drip at this time.  He is on telemetry, strict inputs and outputs, daily weights. We will continue to monitor patient's electrolytes. -Keep magnesium above 2.         -Keep potassium greater than 4         -Previously evaluated by EP on 1/20/2023. Arrhythmia thought to be secondary to underlying cardiac amyloidosis with risk of SCD. EP recommended AICD. Not a candidate for class Ic or III antiarrhythmics.        -Previous admission discharged with a LifeVest was discussed. With follow-up with EP.  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 On hemodialysis - M/W/F follows with Dr. Delle Phoenix.  Hx of noncompliance with medications including p.o. bicarb, fluid intake and other medical visits in the past. Patient is consulted for neurology to start inpatient home dialysis. Patient underwent hemodialysis 2/7/, 2/10, 2/13  Suspected liver cirrhosis: Likely 2/2 systemic amyloidosis waiting on liver biopsy for the past 2 months for this. Elevated transaminase, alk phos, hypoalbuminemia. Patient is scheduled to have regular paracentesis. Patient reports he has received 2-3 previous paracenteses. 5.5 L of ascites was drained with paracentesis, 2/7. Patient tolerated the procedure well. Patient started to have increasing swelling in his abdomen, tightness, decreased appetite and started to have some SOB. 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 Per patient he reports compliance to medication treatments and visits.           -Follows with Dr. Sánchez Honeycutt outpatient. .           -Patient was started on chemotherapy regimen in 12/2022          -Per heme-onc recommendations weekly paracentesis, limited to 2 L with with albumin infusions  HFpEF: Not in exacerbation.  HFpEF 50%, 1/16.           -We will continue to monitor.           -Continue telemetry, pulse ox, daily weights, strict I&O's  Malnutrition, severe protein calorie malnutrition: Likely due to patient's multiple comorbidities  Chronic normocytic anemia, mild: Patient has multiple chronic diseases including ESRD on HD, liver disease, systemic amyloidosis, continue current daily CBCs. Chronic thrombocytopenia, mild: Possibly due to suspected liver cirrhosis secondary to systemic amyloidosis. Continue to monitor with daily CBCs. Conditioning, debility: Multiple comorbidities including systemic amyloidosis, ESRD on HD, malnutrition, liver disease, ventricular tachycardia    HAGMA: Resolved. Anion gap, 15.0. Lactic acidosis and uremia likely due to ESRD. Patient is on HD. Home bicarb started p.o. We will continue to monitor, daily CBCs and BMPs ordered.:      INITIAL H AND P AND ICU COURSE:  Per HPI, \"patient is 63years old male, brought from outside facility due to hypotension. Patient has past medical history of recently diagnosed with systemic amyloidosis, ESRD on hemodialysis, undifferentiated liver disease, newly diagnosis CHF preserved EF, nonsustained episodes of ventricular tachycardia. Patient states he felt sick and nauseous and went to the local hospital and transferred to [facility] for further evaluation and management. Currently patient alert and oriented X3, complains of nausea, episodes of vomiting, feeling sick abdominal distention due to ascites. However he denies chest pain, heart palpitations, productive cough, dizziness, fevers, chills lightheadedness, dysuria/urinary frequency\" paracentesis was performed on 2/7/2023 with 5.5 L of ascites drained. Patient was also undergoing hemodialysis on 2/7, and 2/10. Patient has been continue to have to have his midodrine dose increased while attempting to wean down Levophed. Past 24 hrs: Patient was evaluated at bedside this morning. Patient had a reported run of nonsustained ventricular tachycardia last night. Amiodarone GTT was discontinued from overnight provider.   EKG was ordered which showed sinus rhythm with PVCs, left axis deviation and low voltage QRS. Patient additionally pulled out his NG tube overnight he reports he is not sleep. Patient denies any palpitations, nausea, weakness. Does admit to some fatigue this morning. No abdominal pain    Past Medical History: Systemic amyloidosis, CHF, ESRD, liver disease. Family History: Hypertension. Social History: Former smoker, 34+ years since history of smoking. History of quitting December 2022. No previous illicit drug use or alcohol use. ROS: Denies fever, chills, shortness of breath, headache, chest pain, palpitations. He denies nausea or vomiting. No changes in appetite. Patient does state he has some increased firmness/tightness in his abdomen    Scheduled Meds:   fludrocortisone  0.2 mg Oral Daily    albumin human  25 g IntraVENous Q6H    midodrine  20 mg Oral Q6H    zolpidem  5 mg Oral Nightly    famotidine (PEPCID) injection  20 mg IntraVENous Daily    promethazine  6.25 mg IntraMUSCular Once    calcium replacement protocol   Other RX Placeholder    sodium chloride flush  5-40 mL IntraVENous 2 times per day    sodium bicarbonate  650 mg Oral BID    vitamin D  50,000 Units Oral Weekly     Continuous Infusions:   dextrose      sodium chloride      norepinephrine 6 mcg/min (02/14/23 0334)     PHYSICAL EXAMINATION:  T:  98.0.  P:  74. RR:  22. B/P:  87/72. 2 L nasal cannula. O2 Sat: 94%. I/O:  +570 last 24 hrs  Body mass index is 17.71 kg/m². GCS:   15  General: Chronically ill-appearing male. Appears older than stated age. Cachectic. Pale appearance. HEENT: Normocephalic and atraumatic. No scleral icterus. PERR  Neck: Supple  Lungs: Clear to auscultation. Minimal wheezes in the right upper lobes bilaterally. No retractions  Cardiac: RRR. No murmurs, gallops, rubs. No JVD. Abdomen: Firmness. Mild tenderness to abdomen with palpation. Increasingly distended. Normoactive bowel sounds  Extremities: No clubbing, cyanosis, or edema x 4.     Vasculature: Capillary refill < 3 seconds. Palpable dorsalis pedis pulses. Skin: Warm and dry. Pale. Minimal jaundice. Multiple bruises. Psych: Alert and oriented x3. Affect appropriate  Lymph: No supraclavicular adenopathy. Neurologic: No focal deficit. No seizures. Data: (All radiographs, tracings, PFTs, and imaging are personally viewed and interpreted unless otherwise noted). Sodium 139. Potassium 3.8. Chloride 98. CO2 26. BUN 30. Creatinine 3.1. Anion gap 15.0. Serum calcium 84. Albumin 3.4 alk phos 1700, ALT 72. AST 55. Bilirubin 8.9. WBC 7.0. Hemoglobin 8.6. Platelet count 70. Telemetry shows normal sinus rhythm. KUB 2/14 showed nonspecific gas pattern. CXR, 2/14 shows slight changes from previous studies  CT abdomen pelvis with mild contrast 2/13/2023 shows subtle omental caking/enhancement mesh visualized within the upper abdomen along the anterior abdominal wall with consideration of possible peritoneal carcinomatosis. Large volume intra-abdominal ascites. No bowel obstruction, perforation or discrete fluid collection observed. Seen with multidisciplinary ICU team.  Meets Continued ICU Level Care Criteria:    [x] Yes     Case and plan discussed with Dr. Alonzo Yu. Electronically signed by Manoj Arechiga DO, PGY-1  CRITICAL CARE SPECIALIST   Patient seen by me including key components of medical care. Case discussed with resident physician. See significant event. Italicized font, if present,  represents changes to the note made by me. CC time 35 minutes. Time was discontiguous. Time does not include procedure. Time does include my direct assessment of the patient and coordination of care. Time represents more than 50% of the time involved with patient care by the [address] [place] [address] team.  Electronically signed by Deanne Hammans.  Alonzo Yu MD.

## 2023-02-14 NOTE — PLAN OF CARE
Problem: Discharge Planning  Goal: Discharge to home or other facility with appropriate resources  Outcome: Progressing  Flowsheets (Taken 2/14/2023 0256)  Discharge to home or other facility with appropriate resources:   Identify barriers to discharge with patient and caregiver   Arrange for needed discharge resources and transportation as appropriate   Identify discharge learning needs (meds, wound care, etc)   Refer to discharge planning if patient needs post-hospital services based on physician order or complex needs related to functional status, cognitive ability or social support system     Problem: Pain  Goal: Verbalizes/displays adequate comfort level or baseline comfort level  Outcome: Progressing  Flowsheets (Taken 2/14/2023 0256)  Verbalizes/displays adequate comfort level or baseline comfort level:   Encourage patient to monitor pain and request assistance   Assess pain using appropriate pain scale   Implement non-pharmacological measures as appropriate and evaluate response   Administer analgesics based on type and severity of pain and evaluate response     Problem: Skin/Tissue Integrity  Goal: Absence of new skin breakdown  Description: 1. Monitor for areas of redness and/or skin breakdown  2. Assess vascular access sites hourly  3. Every 4-6 hours minimum:  Change oxygen saturation probe site  4. Every 4-6 hours:  If on nasal continuous positive airway pressure, respiratory therapy assess nares and determine need for appliance change or resting period. Outcome: Progressing  Note: Q2 turn and incontinence care, zinc oxide applied, skin assessment in flowsheets.         Problem: Cardiovascular - Adult  Goal: Maintains optimal cardiac output and hemodynamic stability  Outcome: Progressing  Flowsheets (Taken 2/14/2023 0256)  Maintains optimal cardiac output and hemodynamic stability:   Monitor blood pressure and heart rate   Administer fluid and/or volume expanders as ordered   Assess for signs of decreased cardiac output     Problem: Cardiovascular - Adult  Goal: Absence of cardiac dysrhythmias or at baseline  Outcome: Progressing  Flowsheets (Taken 2/14/2023 0256)  Absence of cardiac dysrhythmias or at baseline:   Monitor cardiac rate and rhythm   Assess for signs of decreased cardiac output     Problem: Genitourinary - Adult  Goal: Urinary catheter remains patent  Outcome: Progressing  Flowsheets (Taken 2/14/2023 0256)  Urinary catheter remains patent: Assess patency of urinary catheter     Problem: Metabolic/Fluid and Electrolytes - Adult  Goal: Hemodynamic stability and optimal renal function maintained  Outcome: Progressing  Flowsheets (Taken 2/14/2023 0256)  Hemodynamic stability and optimal renal function maintained:   Monitor labs and assess for signs and symptoms of volume excess or deficit   Monitor intake, output and patient weight     Problem: Metabolic/Fluid and Electrolytes - Adult  Goal: Glucose maintained within prescribed range  Outcome: Progressing  Flowsheets (Taken 2/14/2023 0256)  Glucose maintained within prescribed range:   Monitor blood glucose as ordered   Assess for signs and symptoms of hyperglycemia and hypoglycemia   Administer ordered medications to maintain glucose within target range     Problem: ABCDS Injury Assessment  Goal: Absence of physical injury  Outcome: Progressing  Flowsheets (Taken 2/14/2023 0256)  Absence of Physical Injury: Implement safety measures based on patient assessment     Problem: Safety - Adult  Goal: Free from fall injury  Outcome: Progressing  Flowsheets (Taken 2/14/2023 0256)  Free From Fall Injury: Instruct family/caregiver on patient safety     Problem: Nutrition Deficit:  Goal: Optimize nutritional status  Outcome: Progressing  Flowsheets (Taken 2/14/2023 0256)  Nutrient intake appropriate for improving, restoring, or maintaining nutritional needs: Assess nutritional status and recommend course of action   Care plan reviewed with patient and family. Patient and family verbalize understanding of the plan of care and contribute to goal setting.

## 2023-02-14 NOTE — PROGRESS NOTES
Advanced Directives Consult: Pt was anxious and does not want to talk about it. He wanted to sleep. His daughter will come in sometime this week, he said. 's number was written on the board in his room, so his daughter can call the  or his nurse will the . He was encouraged and prayer appreciated.     02/14/23 5385   Encounter Summary   Service Provided For: Patient   Referral/Consult From: Nurse   Support System Children   Last Encounter  02/14/23   Complexity of Encounter Low   Begin Time 0730   End Time  0737   Total Time Calculated 7 min   Spiritual/Emotional needs   Type Spiritual Support   Advance Care Planning   Type ACP conversation   Assessment/Intervention/Outcome   Assessment Anxious   Intervention Empowerment

## 2023-02-14 NOTE — SIGNIFICANT EVENT
dextrose      sodium chloride      norepinephrine 6 mcg/min (02/14/23 0334)      fludrocortisone  0.2 mg Oral Daily    albumin human  25 g IntraVENous Q6H    midodrine  20 mg Oral Q6H    zolpidem  5 mg Oral Nightly    famotidine (PEPCID) injection  20 mg IntraVENous Daily    promethazine  6.25 mg IntraMUSCular Once    calcium replacement protocol   Other RX Placeholder    sodium chloride flush  5-40 mL IntraVENous 2 times per day    sodium bicarbonate  650 mg Oral BID    vitamin D  50,000 Units Oral Weekly    Patient seen by me including key components of medical care. Case discussed with resident physician. Will give trial of methylene blue for vaso-plegia. Appreciate nephrology assistance. Agree with Sosa. Will target MAP 55. Cachetic. No chest pain. Positive abdominal distention. Slight increase WOB with increasing abdominal girth. Italicized font, if present,  represents changes to the note made by me. CC time 35 minutes. Time was discontiguous. Time does not include procedure. Time does include my direct assessment of the patient and coordination of care. Time represents more than 50% of the time involved with patient care by the [address] [place] [address] team.  Electronically signed by Fredricka Aschoff.  Gm Peralta MD.

## 2023-02-14 NOTE — CARE COORDINATION
2/14/23, 9:19 AM EST    DISCHARGE ON GOING EVALUATION    [facility] day: 8  Location: 3A-04/004-A Reason for admit: Hypotension [I95.9]  Hypotension, unspecified hypotension type [I95.9]   Procedure:   2/6 CXR: Small left pleural effusion with left basal atelectasis  2/6 CVC LIJ  2/6 Repeat CXR: Left basilar subsegmental atelectasis  2/7 Paracentesis: 5.5L removed. 2/12 KUB: Mildly dilated small bowel loops with distal bowel gas suspicious for paralytic ileus. 2/13 CT abd/pelvis: Subtle omental caking/enhancement best visualized within the upper abdomen along the anterior abdominal wall with differential considerations including peritoneal carcinomatosis. Large volume intra-abdominal ascites. No bowel obstruction, perforation, or discrete fluid collection observed. Small bilateral pleural effusions and bilateral lower lobe consolidation, left greater than right. Question mild sclerosis involving the skeletal structures diffusely. No discrete bony lesion observed. 2/14 KUB: Ascites with underlying nonspecific bowel gas pattern. 2/14 CXR: Interval removal of nasogastric tube. Stable positioning of left IJ single lumen catheter and dual-lumen catheter via right IJ approach. Bilateral hypoventilatory changes. Normal heart size and central bony vasculature. Minimally increased left basilar atelectasis. 2/14 Limited ECHO: ordered. Barriers to Discharge: Intensivist and Nephrology following. Resp culture positive for Christelle dubliniensis. Afebrile. BP low of 71/55 this am. Levophed gtt. Albumin iv q6hr. Pepcid iv daily. Florinef daily. Midodrine po q6hr. Vasopressin iv gtt was started, but has been stopped. BUN 27 and 30, creatinine 2.5 and 3.1. CRP 5.40. Hgb 8.6. Platelets 70.       Latest Reference Range & Units 2/14/23 04:00   Albumin 3.5 - 5.1 g/dL 3.4 (L)   Alk Phos 38 - 126 U/L 1,700 (H)   ALT 11 - 66 U/L 72 (H)   AST 5 - 40 U/L 55 (H)   BILIRUBIN TOTAL 0.3 - 1.2 mg/dL 8.9 (H)   Total Protein 6.1 - 8.0 g/dL 4.6 (L)   (L): Data is abnormally low  (H): Data is abnormally high    PCP: Louis Gill DO  Readmission Risk Score: 20.2%  Patient Goals/Plan/Treatment Preferences: From home with girlfriend. HD MWF at [address] [place] [address] with 5142 chair time. Chemo 1x/wk in [facility]. SW following for possible SNF placement.

## 2023-02-14 NOTE — PROGRESS NOTES
[facility]   Nephrology progress note  2/14/2023, 11:37 AM      Pt Name:    Chester Jimenez  MRN:     [id number]     YOB: 1960  Admit Date:    2/6/2023  4:58 PM    Chief Complaint: Nephrology following for ESRD and HD    Subjective:  Patient was seen and examined this morning  Seen earlier today during rounds in ICU  On Levophed      Objective:  24HR INTAKE/OUTPUT:    Intake/Output Summary (Last 24 hours) at 2/14/2023 1137  Last data filed at 2/14/2023 1109  Gross per 24 hour   Intake 1114.11 ml   Output 400 ml   Net 714.11 ml           I/O last 3 completed shifts: In: 1237.8 [I.V.:501.9; IV Piggyback:335.9]  Out: 400   I/O this shift:  In: 153.3 [I.V.:56.7; IV Piggyback:96.6]  Out: -    Admission weight: 129 lb 6.6 oz (58.7 kg)  Wt Readings from Last 3 Encounters:   02/14/23 123 lb 7.3 oz (56 kg)   01/31/23 133 lb 12.8 oz (60.7 kg)   01/24/23 132 lb 3.2 oz (60 kg)        Vitals :   Vitals:    02/14/23 0727 02/14/23 0730 02/14/23 0800 02/14/23 0900   BP: 92/61 88/61  (!) 80/37   Pulse: 77 78 79 82   Resp: 12 13 14 19   Temp:    97.6 °F (36.4 °C)   TempSrc:    Oral   SpO2:  94% 97% 94%   Weight:       Height:           Physical examination  General Appearance: Ill-appearing, no acute distress  Awake and alert.   Mouth/Throat: Oral mucosa moist  Neck: No JVD  Lungs:  no use of accessory muscles  GI: soft, non-tender, no guarding  Extremities: No pitting LE edema    Medications:  Infusion:    norepinephrine      dextrose      sodium chloride       Meds:    hydroxocobalamin  5 g IntraVENous Once    fludrocortisone  0.2 mg Oral Daily    albumin human  25 g IntraVENous Q6H    midodrine  20 mg Oral Q6H    zolpidem  5 mg Oral Nightly    famotidine (PEPCID) injection  20 mg IntraVENous Daily    promethazine  6.25 mg IntraMUSCular Once    calcium replacement protocol   Other RX Placeholder    sodium chloride flush  5-40 mL IntraVENous 2 times per day    sodium bicarbonate  650 mg Oral BID vitamin D  50,000 Units Oral Weekly     Meds prn: ondansetron, aluminum & magnesium hydroxide-simethicone, promethazine, diphenhydrAMINE, glucose, dextrose bolus **OR** dextrose bolus, glucagon (rDNA), dextrose, sodium chloride flush, sodium chloride, polyethylene glycol, acetaminophen **OR** acetaminophen, albuterol     Lab Data :  CBC:   Recent Labs     02/12/23  0350 02/12/23  1515 02/13/23  0417 02/13/23  2222 02/14/23  0400 02/14/23  1105   WBC 7.2  --  8.4  --  7.0  --    HGB 10.6*   < > 10.4* 9.4* 8.6* 8.8*   HCT 29.3*   < > 29.6* 26.1* 24.1* 25.4*   PLT 84*  --  84*  --  70*  --     < > = values in this interval not displayed. CMP:  Recent Labs     02/12/23  1515 02/13/23  0417 02/13/23  1808 02/13/23 2222 02/14/23  0400    136  --  136 139   K 3.9 3.8 3.6 4.2 3.8   CL 92* 93*  --  96* 98   CO2 26 26  --  26 26   BUN 51* 56*  --  27* 30*   CREATININE 4.8* 5.2*  --  2.5* 3.1*   GLUCOSE 150* 111*  --  91 92   CALCIUM 8.5 8.3*  --  8.1* 8.4*   MG 2.3  --  2.0  --   --        Hepatic:   Recent Labs     02/14/23  0400   LABALBU 3.4*   AST 55*   ALT 72*   BILITOT 8.9*   ALKPHOS 1,700*           Assessment and Plan:  ESRD on hemodialysis. 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 On Levophed-being titrated/weaned, on lower dose this morning about 5 mcg  Patient is chronically hypotensive and asymptomatic  We will continue to attempt to wean Levophed  Plan hemodialysis treatment tomorrow  No need for RRT today  Metabolic acidosis. Resolved  Chronic hypotension. 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  It may contain minor errors which are inherent in voice recognition technology

## 2023-02-14 NOTE — PROGRESS NOTES
Scheduled albumin given and IJ lines flushed. Pt is now sinus rhythm with occasional PVC's. MD aware.

## 2023-02-14 NOTE — PLAN OF CARE
Problem: Discharge Planning  Goal: Discharge to home or other facility with appropriate resources  2/14/2023 1404 by Mario Bonilla RN  Outcome: Progressing  Flowsheets (Taken 2/14/2023 0256 by Eldon Helm RN)  Discharge to home or other facility with appropriate resources:   Identify barriers to discharge with patient and caregiver   Arrange for needed discharge resources and transportation as appropriate   Identify discharge learning needs (meds, wound care, etc)   Refer to discharge planning if patient needs post-hospital services based on physician order or complex needs related to functional status, cognitive ability or social support system  2/14/2023 0256 by Eldon Helm RN  Outcome: Progressing  Flowsheets (Taken 2/14/2023 0256)  Discharge to home or other facility with appropriate resources:   Identify barriers to discharge with patient and caregiver   Arrange for needed discharge resources and transportation as appropriate   Identify discharge learning needs (meds, wound care, etc)   Refer to discharge planning if patient needs post-hospital services based on physician order or complex needs related to functional status, cognitive ability or social support system     Problem: Pain  Goal: Verbalizes/displays adequate comfort level or baseline comfort level  2/14/2023 1404 by Mario Bonilla RN  Outcome: Progressing  Flowsheets (Taken 2/14/2023 0256 by Eldon Helm RN)  Verbalizes/displays adequate comfort level or baseline comfort level:   Encourage patient to monitor pain and request assistance   Assess pain using appropriate pain scale   Implement non-pharmacological measures as appropriate and evaluate response   Administer analgesics based on type and severity of pain and evaluate response  2/14/2023 0256 by Eldon Helm RN  Outcome: Progressing  Flowsheets (Taken 2/14/2023 0256)  Verbalizes/displays adequate comfort level or baseline comfort level:   Encourage patient to monitor pain and request assistance   Assess pain using appropriate pain scale   Implement non-pharmacological measures as appropriate and evaluate response   Administer analgesics based on type and severity of pain and evaluate response     Problem: Skin/Tissue Integrity  Goal: Absence of new skin breakdown  Description: 1. Monitor for areas of redness and/or skin breakdown  2. Assess vascular access sites hourly  3. Every 4-6 hours minimum:  Change oxygen saturation probe site  4. Every 4-6 hours:  If on nasal continuous positive airway pressure, respiratory therapy assess nares and determine need for appliance change or resting period. 2/14/2023 1404 by Sneha Neal RN  Outcome: Progressing  Note: Turn and reposition, low air loss air matress  2/14/2023 0256 by Annie Gonsales RN  Outcome: Progressing  Note: Q2 turn and incontinence care, zinc oxide applied, skin assessment in flowsheets.         Problem: Cardiovascular - Adult  Goal: Maintains optimal cardiac output and hemodynamic stability  2/14/2023 1404 by Sneha Neal RN  Outcome: Progressing  Flowsheets (Taken 2/14/2023 0256 by Annie Gonsales RN)  Maintains optimal cardiac output and hemodynamic stability:   Monitor blood pressure and heart rate   Administer fluid and/or volume expanders as ordered   Assess for signs of decreased cardiac output  2/14/2023 0256 by Annie Gonsales RN  Outcome: Progressing  Flowsheets (Taken 2/14/2023 0256)  Maintains optimal cardiac output and hemodynamic stability:   Monitor blood pressure and heart rate   Administer fluid and/or volume expanders as ordered   Assess for signs of decreased cardiac output  Goal: Absence of cardiac dysrhythmias or at baseline  2/14/2023 1404 by Sneha Neal RN  Outcome: Progressing  Flowsheets (Taken 2/14/2023 0256 by Annie Gonsales RN)  Absence of cardiac dysrhythmias or at baseline:   Monitor cardiac rate and rhythm   Assess for signs of decreased cardiac output  2/14/2023 0256 by [employer] LUDY Lanier  Outcome: Progressing  Flowsheets (Taken 2/14/2023 0256)  Absence of cardiac dysrhythmias or at baseline:   Monitor cardiac rate and rhythm   Assess for signs of decreased cardiac output     Problem: Genitourinary - Adult  Goal: Urinary catheter remains patent  2/14/2023 1404 by Atilio Lund RN  Outcome: Progressing  Flowsheets (Taken 2/14/2023 0256 by Jagdish Harrell RN)  Urinary catheter remains patent: Assess patency of urinary catheter  2/14/2023 0256 by Jagdish Harrell RN  Outcome: Progressing  Flowsheets (Taken 2/14/2023 0256)  Urinary catheter remains patent: Assess patency of urinary catheter     Problem: Metabolic/Fluid and Electrolytes - Adult  Goal: Electrolytes maintained within normal limits  2/14/2023 1404 by Atilio Lund RN  Outcome: Progressing  Flowsheets (Taken 2/14/2023 0256 by Jagdish Harrell RN)  Electrolytes maintained within normal limits:   Monitor labs and assess patient for signs and symptoms of electrolyte imbalances   Administer electrolyte replacement as ordered  2/14/2023 0256 by Jagdish Harrell RN  Outcome: Progressing  Flowsheets (Taken 2/14/2023 0256)  Electrolytes maintained within normal limits:   Monitor labs and assess patient for signs and symptoms of electrolyte imbalances   Administer electrolyte replacement as ordered  Goal: Hemodynamic stability and optimal renal function maintained  2/14/2023 1404 by Atilio Lund RN  Outcome: Progressing  Flowsheets (Taken 2/14/2023 0256 by Jagdish Harrell RN)  Hemodynamic stability and optimal renal function maintained:   Monitor labs and assess for signs and symptoms of volume excess or deficit   Monitor intake, output and patient weight  2/14/2023 0256 by Jagdish Harrell RN  Outcome: Progressing  Flowsheets (Taken 2/14/2023 0256)  Hemodynamic stability and optimal renal function maintained:   Monitor labs and assess for signs and symptoms of volume excess or deficit   Monitor intake, output and patient weight  Goal: Glucose maintained within prescribed range  2/14/2023 1404 by Carlos Moore RN  Outcome: Progressing  Flowsheets (Taken 2/14/2023 1404)  Glucose maintained within prescribed range: Assess barriers to adequate nutritional intake and initiate nutrition consult as needed  2/14/2023 0256 by Rama Phillips RN  Outcome: Progressing  Flowsheets (Taken 2/14/2023 0256)  Glucose maintained within prescribed range:   Monitor blood glucose as ordered   Assess for signs and symptoms of hyperglycemia and hypoglycemia   Administer ordered medications to maintain glucose within target range     Problem: ABCDS Injury Assessment  Goal: Absence of physical injury  2/14/2023 1404 by Carlos Moore RN  Outcome: Progressing  Flowsheets (Taken 2/14/2023 0256 by Rama Phillips RN)  Absence of Physical Injury: Implement safety measures based on patient assessment  2/14/2023 0256 by Rama Phillips RN  Outcome: Progressing  Flowsheets (Taken 2/14/2023 0256)  Absence of Physical Injury: Implement safety measures based on patient assessment     Problem: Safety - Adult  Goal: Free from fall injury  2/14/2023 1404 by Carlos Moore RN  Outcome: Progressing  Flowsheets (Taken 2/14/2023 0256 by Rama Phillips RN)  Free From Fall Injury: Instruct family/caregiver on patient safety  2/14/2023 0256 by Rama Phillips RN  Outcome: Progressing  Flowsheets (Taken 2/14/2023 0256)  Free From Fall Injury: Instruct family/caregiver on patient safety     Problem: Nutrition Deficit:  Goal: Optimize nutritional status  2/14/2023 1404 by Carlos Moore RN  Outcome: Progressing  Flowsheets (Taken 2/14/2023 0256 by Rama Phillips RN)  Nutrient intake appropriate for improving, restoring, or maintaining nutritional needs: Assess nutritional status and recommend course of action  2/14/2023 0256 by Rama Phillips RN  Outcome: Progressing  Flowsheets (Taken 2/14/2023 0256)  Nutrient intake appropriate for improving, restoring, or maintaining nutritional needs: Assess nutritional status and recommend course of action     Problem: Hematologic - Adult  Goal: Maintains hematologic stability  Outcome: Progressing  Flowsheets (Taken 2/14/2023 1404)  Maintains hematologic stability: Assess for signs and symptoms of bleeding or hemorrhage   Care plan reviewed with patient and girlfriend. Patient and girlfriend verbalize understanding of the plan of care and contribute to goal setting.

## 2023-02-15 ENCOUNTER — APPOINTMENT (OUTPATIENT)
Dept: ULTRASOUND IMAGING | Age: 63
End: 2023-02-15
Attending: INTERNAL MEDICINE
Payer: COMMERCIAL

## 2023-02-15 LAB
ALBUMIN SERPL BCG-MCNC: 3.3 G/DL (ref 3.5–5.1)
ALP SERPL-CCNC: 1192 U/L (ref 38–126)
ALT SERPL W/O P-5'-P-CCNC: 49 U/L (ref 11–66)
ANION GAP SERPL CALC-SCNC: 18 MEQ/L (ref 8–16)
ANISOCYTOSIS BLD QL SMEAR: PRESENT
AST SERPL-CCNC: 44 U/L (ref 5–40)
BASOPHILS ABSOLUTE: 0 THOU/MM3 (ref 0–0.1)
BASOPHILS NFR BLD AUTO: 0.2 %
BILIRUB SERPL-MCNC: 7.2 MG/DL (ref 0.3–1.2)
BUN SERPL-MCNC: 38 MG/DL (ref 7–22)
CALCIUM SERPL-MCNC: 8.3 MG/DL (ref 8.5–10.5)
CHLORIDE SERPL-SCNC: 96 MEQ/L (ref 98–111)
CO2 SERPL-SCNC: 23 MEQ/L (ref 23–33)
CREAT SERPL-MCNC: 3.8 MG/DL (ref 0.4–1.2)
DEPRECATED RDW RBC AUTO: 90 FL (ref 35–45)
EOSINOPHIL NFR BLD AUTO: 0.2 %
EOSINOPHILS ABSOLUTE: 0 THOU/MM3 (ref 0–0.4)
ERYTHROCYTE [DISTWIDTH] IN BLOOD BY AUTOMATED COUNT: 27.7 % (ref 11.5–14.5)
GFR SERPL CREATININE-BSD FRML MDRD: 17 ML/MIN/1.73M2
GGT SERPL-CCNC: 177 U/L (ref 8–69)
GLUCOSE BLD STRIP.AUTO-MCNC: 72 MG/DL (ref 70–108)
GLUCOSE BLD STRIP.AUTO-MCNC: 78 MG/DL (ref 70–108)
GLUCOSE BLD STRIP.AUTO-MCNC: 85 MG/DL (ref 70–108)
GLUCOSE SERPL-MCNC: 64 MG/DL (ref 70–108)
HCT VFR BLD AUTO: 26.5 % (ref 42–52)
HGB BLD-MCNC: 9.2 GM/DL (ref 14–18)
IMM GRANULOCYTES # BLD AUTO: 0.01 THOU/MM3 (ref 0–0.07)
IMM GRANULOCYTES NFR BLD AUTO: 0.2 %
LYMPHOCYTES ABSOLUTE: 0.7 THOU/MM3 (ref 1–4.8)
LYMPHOCYTES NFR BLD AUTO: 15 %
MAGNESIUM SERPL-MCNC: 2.2 MG/DL (ref 1.6–2.4)
MCH RBC QN AUTO: 33 PG (ref 26–33)
MCHC RBC AUTO-ENTMCNC: 34.7 GM/DL (ref 32.2–35.5)
MCV RBC AUTO: 95 FL (ref 80–94)
MONOCYTES ABSOLUTE: 0.1 THOU/MM3 (ref 0.4–1.3)
MONOCYTES NFR BLD AUTO: 2 %
NEUTROPHILS NFR BLD AUTO: 82.4 %
NRBC BLD AUTO-RTO: 7 /100 WBC
PLATELET # BLD AUTO: 71 THOU/MM3 (ref 130–400)
PMV BLD AUTO: ABNORMAL FL (ref 9.4–12.4)
POTASSIUM SERPL-SCNC: 3.3 MEQ/L (ref 3.5–5.2)
PROT SERPL-MCNC: 4.4 G/DL (ref 6.1–8)
RBC # BLD AUTO: 2.79 MILL/MM3 (ref 4.7–6.1)
SEGMENTED NEUTROPHILS ABSOLUTE COUNT: 4 THOU/MM3 (ref 1.8–7.7)
SODIUM SERPL-SCNC: 137 MEQ/L (ref 135–145)
WBC # BLD AUTO: 4.9 THOU/MM3 (ref 4.8–10.8)

## 2023-02-15 PROCEDURE — 6370000000 HC RX 637 (ALT 250 FOR IP): Performed by: NURSE PRACTITIONER

## 2023-02-15 PROCEDURE — 99291 CRITICAL CARE FIRST HOUR: CPT | Performed by: INTERNAL MEDICINE

## 2023-02-15 PROCEDURE — 6370000000 HC RX 637 (ALT 250 FOR IP): Performed by: STUDENT IN AN ORGANIZED HEALTH CARE EDUCATION/TRAINING PROGRAM

## 2023-02-15 PROCEDURE — 80053 COMPREHEN METABOLIC PANEL: CPT

## 2023-02-15 PROCEDURE — 85025 COMPLETE CBC W/AUTO DIFF WBC: CPT

## 2023-02-15 PROCEDURE — 6360000002 HC RX W HCPCS

## 2023-02-15 PROCEDURE — 6370000000 HC RX 637 (ALT 250 FOR IP): Performed by: INTERNAL MEDICINE

## 2023-02-15 PROCEDURE — 6360000002 HC RX W HCPCS: Performed by: INTERNAL MEDICINE

## 2023-02-15 PROCEDURE — 76705 ECHO EXAM OF ABDOMEN: CPT

## 2023-02-15 PROCEDURE — 82948 REAGENT STRIP/BLOOD GLUCOSE: CPT

## 2023-02-15 PROCEDURE — 2580000003 HC RX 258

## 2023-02-15 PROCEDURE — A4216 STERILE WATER/SALINE, 10 ML: HCPCS | Performed by: INTERNAL MEDICINE

## 2023-02-15 PROCEDURE — 83735 ASSAY OF MAGNESIUM: CPT

## 2023-02-15 PROCEDURE — 2100000000 HC CCU R&B

## 2023-02-15 PROCEDURE — 93975 VASCULAR STUDY: CPT

## 2023-02-15 PROCEDURE — 6370000000 HC RX 637 (ALT 250 FOR IP)

## 2023-02-15 PROCEDURE — 82977 ASSAY OF GGT: CPT

## 2023-02-15 PROCEDURE — 99232 SBSQ HOSP IP/OBS MODERATE 35: CPT | Performed by: INTERNAL MEDICINE

## 2023-02-15 PROCEDURE — 2580000003 HC RX 258: Performed by: INTERNAL MEDICINE

## 2023-02-15 PROCEDURE — P9047 ALBUMIN (HUMAN), 25%, 50ML: HCPCS

## 2023-02-15 PROCEDURE — 36415 COLL VENOUS BLD VENIPUNCTURE: CPT

## 2023-02-15 PROCEDURE — 90935 HEMODIALYSIS ONE EVALUATION: CPT

## 2023-02-15 PROCEDURE — 2500000003 HC RX 250 WO HCPCS: Performed by: INTERNAL MEDICINE

## 2023-02-15 RX ORDER — MORPHINE SULFATE 2 MG/ML
1 INJECTION, SOLUTION INTRAMUSCULAR; INTRAVENOUS ONCE
Status: COMPLETED | OUTPATIENT
Start: 2023-02-15 | End: 2023-02-15

## 2023-02-15 RX ORDER — MORPHINE SULFATE 2 MG/ML
1 INJECTION, SOLUTION INTRAMUSCULAR; INTRAVENOUS EVERY 6 HOURS PRN
Status: DISCONTINUED | OUTPATIENT
Start: 2023-02-15 | End: 2023-02-19 | Stop reason: HOSPADM

## 2023-02-15 RX ADMIN — MIDODRINE HYDROCHLORIDE 20 MG: 10 TABLET ORAL at 13:53

## 2023-02-15 RX ADMIN — FLUDROCORTISONE ACETATE 0.2 MG: 0.1 TABLET ORAL at 08:23

## 2023-02-15 RX ADMIN — FAMOTIDINE 20 MG: 10 INJECTION, SOLUTION INTRAVENOUS at 08:24

## 2023-02-15 RX ADMIN — EPOETIN ALFA-EPBX 6000 UNITS: 3000 INJECTION, SOLUTION INTRAVENOUS; SUBCUTANEOUS at 10:53

## 2023-02-15 RX ADMIN — MIDODRINE HYDROCHLORIDE 20 MG: 10 TABLET ORAL at 20:18

## 2023-02-15 RX ADMIN — SODIUM CHLORIDE, PRESERVATIVE FREE 10 ML: 5 INJECTION INTRAVENOUS at 08:24

## 2023-02-15 RX ADMIN — MIDODRINE HYDROCHLORIDE 20 MG: 10 TABLET ORAL at 08:23

## 2023-02-15 RX ADMIN — ALBUMIN (HUMAN) 25 G: 0.25 INJECTION, SOLUTION INTRAVENOUS at 05:15

## 2023-02-15 RX ADMIN — ZOLPIDEM TARTRATE 5 MG: 5 TABLET ORAL at 20:18

## 2023-02-15 RX ADMIN — MIDODRINE HYDROCHLORIDE 20 MG: 10 TABLET ORAL at 02:30

## 2023-02-15 RX ADMIN — SODIUM BICARBONATE 650 MG: 650 TABLET ORAL at 20:18

## 2023-02-15 RX ADMIN — MORPHINE SULFATE 1 MG: 2 INJECTION, SOLUTION INTRAMUSCULAR; INTRAVENOUS at 11:42

## 2023-02-15 RX ADMIN — ACETAMINOPHEN 650 MG: 325 TABLET ORAL at 11:25

## 2023-02-15 ASSESSMENT — PAIN DESCRIPTION - ONSET
ONSET: ON-GOING
ONSET: GRADUAL
ONSET: ON-GOING

## 2023-02-15 ASSESSMENT — PAIN DESCRIPTION - LOCATION
LOCATION: ABDOMEN

## 2023-02-15 ASSESSMENT — PAIN DESCRIPTION - ORIENTATION
ORIENTATION: RIGHT;LEFT

## 2023-02-15 ASSESSMENT — PAIN SCALES - GENERAL
PAINLEVEL_OUTOF10: 10
PAINLEVEL_OUTOF10: 0
PAINLEVEL_OUTOF10: 0
PAINLEVEL_OUTOF10: 4
PAINLEVEL_OUTOF10: 4
PAINLEVEL_OUTOF10: 10
PAINLEVEL_OUTOF10: 4

## 2023-02-15 ASSESSMENT — PAIN DESCRIPTION - FREQUENCY
FREQUENCY: CONTINUOUS
FREQUENCY: CONTINUOUS
FREQUENCY: INTERMITTENT

## 2023-02-15 ASSESSMENT — PAIN DESCRIPTION - PAIN TYPE
TYPE: ACUTE PAIN
TYPE: CHRONIC PAIN
TYPE: CHRONIC PAIN

## 2023-02-15 ASSESSMENT — PAIN DESCRIPTION - DESCRIPTORS
DESCRIPTORS: ACHING
DESCRIPTORS: OTHER (COMMENT)
DESCRIPTORS: PRESSURE

## 2023-02-15 ASSESSMENT — PAIN - FUNCTIONAL ASSESSMENT
PAIN_FUNCTIONAL_ASSESSMENT: PREVENTS OR INTERFERES SOME ACTIVE ACTIVITIES AND ADLS

## 2023-02-15 NOTE — PROGRESS NOTES
[facility]   Nephrology progress note  2/15/2023, 8:18 AM      Pt Name:    Jaspal Mendiola  MRN:     [id number]     YOB: 1960  Admit Date:    2/6/2023  4:58 PM    Chief Complaint: Nephrology following for ESRD and HD    Subjective:  Patient was seen and examined this morning  Seen earlier today during rounds in ICU  On Levophed at very minimal dose  No chest pain      Objective:  24HR INTAKE/OUTPUT:    Intake/Output Summary (Last 24 hours) at 2/15/2023 0818  Last data filed at 2/15/2023 0656  Gross per 24 hour   Intake 705 ml   Output --   Net 705 ml           I/O last 3 completed shifts: In: 1068.1 [P.O.:120; I.V.:286.6; IV Piggyback:661.6]  Out: -   No intake/output data recorded. Admission weight: 129 lb 6.6 oz (58.7 kg)  Wt Readings from Last 3 Encounters:   02/15/23 124 lb 1.9 oz (56.3 kg)   01/31/23 133 lb 12.8 oz (60.7 kg)   01/24/23 132 lb 3.2 oz (60 kg)        Vitals :   Vitals:    02/15/23 0615 02/15/23 0630 02/15/23 0700 02/15/23 0730   BP: (!) 81/50 86/62 89/67    Pulse: 61 66 71    Resp: 21 11 14    Temp:    97.4 °F (36.3 °C)   TempSrc:       SpO2: 93% 95% 94%    Weight:    124 lb 1.9 oz (56.3 kg)   Height:           Physical examination  General Appearance: Ill-appearing, no acute distress  Awake and alert.   Mouth/Throat: Oral mucosa moist  Neck: No JVD  Lungs:  no use of accessory muscles  GI: soft, non-tender, no guarding  Extremities: No pitting LE edema    Medications:  Infusion:    norepinephrine 2 mcg/min (02/15/23 0656)    dextrose      sodium chloride       Meds:    fludrocortisone  0.2 mg Oral Daily    midodrine  20 mg Oral Q6H    zolpidem  5 mg Oral Nightly    famotidine (PEPCID) injection  20 mg IntraVENous Daily    calcium replacement protocol   Other RX Placeholder    sodium chloride flush  5-40 mL IntraVENous 2 times per day    sodium bicarbonate  650 mg Oral BID    vitamin D  50,000 Units Oral Weekly     Meds prn: ondansetron, aluminum & magnesium hydroxide-simethicone, promethazine, diphenhydrAMINE, glucose, dextrose bolus **OR** dextrose bolus, glucagon (rDNA), dextrose, sodium chloride flush, sodium chloride, polyethylene glycol, acetaminophen **OR** acetaminophen, albuterol     Lab Data :  CBC:   Recent Labs     02/13/23  0417 02/13/23  2222 02/14/23  0400 02/14/23  1105 02/15/23  0500   WBC 8.4  --  7.0  --  4.9   HGB 10.4*   < > 8.6* 8.8* 9.2*   HCT 29.6*   < > 24.1* 25.4* 26.5*   PLT 84*  --  70*  --  71*    < > = values in this interval not displayed. CMP:  Recent Labs     02/12/23  1515 02/13/23  0417 02/13/23  1808 02/13/23  2222 02/14/23  0400 02/15/23  0500      < >  --  136 139 137   K 3.9   < > 3.6 4.2 3.8 3.3*   CL 92*   < >  --  96* 98 96*   CO2 26   < >  --  26 26 23   BUN 51*   < >  --  27* 30* 38*   CREATININE 4.8*   < >  --  2.5* 3.1* 3.8*   GLUCOSE 150*   < >  --  91 92 64*   CALCIUM 8.5   < >  --  8.1* 8.4* 8.3*   MG 2.3  --  2.0  --   --  2.2    < > = values in this interval not displayed. Hepatic:   Recent Labs     02/14/23  0400 02/15/23  0500   LABALBU 3.4* 3.3*   AST 55* 44*   ALT 72* 49   BILITOT 8.9* 7.2*   ALKPHOS 1,700* 1,192*           Assessment and Plan:  ESRD on hemodialysis. 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 On Levophed-being titrated/weaned--currently on very low-dose  Plan hemodialysis treatment today with 4K bath  Patient is chronically hypotensive and asymptomatic  Continue to wean Levophed  Metabolic acidosis. Resolved  Chronic hypotension. On midodrine, continue with Florinef. 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 Added Retacrit  Chronic thrombocytopenia        Andres Clark MD  [facility]    This report has been created using voice recognition software.  It may contain minor errors which are inherent in voice recognition technology

## 2023-02-15 NOTE — PLAN OF CARE
Problem: Discharge Planning  Goal: Discharge to home or other facility with appropriate resources  2/15/2023 1229 by Alma Ramírez  Outcome: Completed     Problem: Pain  Goal: Verbalizes/displays adequate comfort level or baseline comfort level  2/15/2023 1229 by Alma Ramírez  Outcome: Completed     Problem: Skin/Tissue Integrity  Goal: Absence of new skin breakdown  Description: 1. Monitor for areas of redness and/or skin breakdown  2. Assess vascular access sites hourly  3. Every 4-6 hours minimum:  Change oxygen saturation probe site  4. Every 4-6 hours:  If on nasal continuous positive airway pressure, respiratory therapy assess nares and determine need for appliance change or resting period.   2/15/2023 1229 by Alma Ramírez  Outcome: Completed     Problem: Cardiovascular - Adult  Goal: Maintains optimal cardiac output and hemodynamic stability  2/15/2023 1229 by Alma Ramírez  Outcome: Completed     Problem: Cardiovascular - Adult  Goal: Absence of cardiac dysrhythmias or at baseline  2/15/2023 1229 by Alma Ramírez  Outcome: Completed     Problem: Genitourinary - Adult  Goal: Urinary catheter remains patent  2/15/2023 1229 by Alma Ramírez  Outcome: Completed     Problem: Metabolic/Fluid and Electrolytes - Adult  Goal: Electrolytes maintained within normal limits  2/15/2023 1229 by Alma Ramírez  Outcome: Completed     Problem: Metabolic/Fluid and Electrolytes - Adult  Goal: Hemodynamic stability and optimal renal function maintained  2/15/2023 1229 by Alma Ramírez  Outcome: Completed     Problem: Metabolic/Fluid and Electrolytes - Adult  Goal: Glucose maintained within prescribed range  2/15/2023 1229 by Alma Ramírez  Outcome: Completed     Problem: Hematologic - Adult  Goal: Maintains hematologic stability  2/15/2023 1229 by Alma Ramírez  Outcome: Completed     Problem: ABCDS Injury Assessment  Goal: Absence of physical injury  2/15/2023 1229 by Alma Ramírez  Outcome: Completed     Problem: Safety - Adult  Goal: Free from fall injury  2/15/2023 1229 by Letty Leigh  Outcome: Completed     Problem: Nutrition Deficit:  Goal: Optimize nutritional status  2/15/2023 1229 by Letty Leigh  Outcome: Completed     Care plan reviewed with patient. Patient verbalizes understanding of the plan of care and contributes to goal setting.

## 2023-02-15 NOTE — CARE COORDINATION
2/15/23, 10:44 AM EST    DISCHARGE PLANNING EVALUATION    SW spoke with Colette Zheng with [facility], they currently do not have beds for pt. SW advised that pt is not ready for discharge and requested that they re-look at pt for possible admission. Colette Zheng stated that they would follow along with pt and keep SW updated on bed availability.

## 2023-02-15 NOTE — PROGRESS NOTES
New Epoetin jose-epbx (Retacrit)     Gurpreet Cruz is a 63 y.o. male. Pharmacy has reviewed the appropriateness of Epoetin jose-epbx (Retacrit) dosage per P&T protocol. Recent Labs     02/13/23  0417 02/13/23  2222 02/14/23  0400 02/14/23  1105 02/15/23  0500   HGB 10.4*   < > 8.6* 8.8* 9.2*   PLT 84*  --  70*  --  71*    < > = values in this interval not displayed. Epoetin jose-epbx (Retacrit) ordered dose: 6000 units MWF  Prior to arrival dose BRENT (if available): n/a; Appropriate conversion: n/a    Hold Epoetin jose-epbx (Retacrit) for:   Hemoglobin >/= 10 g/dL, exception for patients who cannot receive transfusions. Pharmacists will automatically hold one dose by discontinuing the order, reentering to start for next scheduled dose, and contacting the provider. If two doses are held in a row the pharmacist will contact the provider to discuss dose reduction/ discontinuation. Plan:   Will monitor

## 2023-02-15 NOTE — PROGRESS NOTES
CRITICAL CARE PROGRESS NOTE      Patient:  Madi Jimenez    Unit/Bed:3A-04/004-A  YOB: 1960  MRN: [id number]   PCP: Brenda Barreto DO  Date of Admission: 2/6/2023  Chief Complaint:- Hypotension    Assessment and Plan:    Hypotensive, refractory to medications: Asymptomatic - Initially thought to be related to septic shock. Transferred to [facility] from outside of the facility and was hypotensive on arrival with ventricular arrhythmias in the 140s. 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 ESRD on hemodialysis, M,W,F schedule. Initially suspicious for SBP and was given ceftriaxone 2g. Culture and cell count of body fluid not suspicious for SBP at this time. We will continue to monitor patient. Patient is on midodrine 15 mg TID at home for blood pressure support. Midodirne was increased to 20 mg TID, 2/8. History of asymptomatic hypotension. Patient's hypotension is likely multifactorial. Patient has history of running systolically in the 06N. Paracentesis, 2/7 with 5.5 L of ascites for drainage.                -Ceftriaxone 2g Q24 hrs, stopped 2/12  -Will continue to monitor patient  -Currently on Levophed for blood pressure support, MAP >55    -Will try to wean Levophed down as patient tolerates  -Midodrine 20 mg four times daily. Have been unsuccessful and difficult time weaning patient down off Levophed  -Decadron 10 mg, given 2/9/2023  -Continuing Florinef  -Albumin 25 mg IV every 6 hours for total of 8 doses   -Methylene blue 50 mg IV was ordered for vasoplegia  Ventricular tachycardia, asymptomatic: Reported runs of ventricular tachycardia. Patient was given IV magnesium at outside facility. Patient received amiodarone bolus 300, started on amnio drip at 0.5. Continuing amiodarone drip at this time. He is on telemetry, strict inputs and outputs, daily weights. We will continue to monitor patient's electrolytes. -Keep magnesium above 2. -Keep potassium greater than 4         -Previously evaluated by EP on 1/20/2023. Arrhythmia thought to be secondary to underlying cardiac amyloidosis with risk of SCD. EP recommended AICD. Not a candidate for class Ic or III antiarrhythmics.        -Previous admission discharged with a LifeVest was discussed. With follow-up with EP.  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 On hemodialysis - M/W/F follows with Dr. Jenny Del Angel.  Hx of noncompliance with medications including p.o. bicarb, fluid intake and other medical visits in the past. Patient is consulted for neurology to start inpatient home dialysis. Patient underwent hemodialysis 2/7/, 2/10, 2/13  Suspected liver cirrhosis: Likely 2/2 systemic amyloidosis waiting on liver biopsy for the past 2 months for this. Elevated transaminase, alk phos, hypoalbuminemia. Patient is scheduled to have regular paracentesis. Patient reports he has received 2-3 previous paracenteses. 5.5 L of ascites was drained with paracentesis, 2/7. Patient tolerated the procedure well. Patient started to have increasing swelling in his abdomen, tightness, decreased appetite and started to have some SOB. -Paracentesis vs pleurx catheter for symptomatic treatment of abdominal ascites  Systemic amyloidosis: Initially diagnosed September 2022, patient is awaiting for liver biopsy this time. Per patient he reports compliance to medication treatments and visits.           -Follows with Dr. Nancy Chavez outpatient. .           -Patient was started on chemotherapy regimen in 12/2022          -Per heme-onc recommendations weekly paracentesis, limited to 2 L with with albumin infusions  HFpEF: Not in exacerbation.  HFpEF 50%, 1/16.           -We will continue to monitor.           -Continue telemetry, pulse ox, daily weights, strict I&O's  Malnutrition, severe protein calorie malnutrition: Likely due to patient's multiple comorbidities  Chronic normocytic anemia, mild: Patient has multiple chronic diseases including ESRD on HD, liver disease, systemic amyloidosis, continue current daily CBCs. Chronic thrombocytopenia, mild: Possibly due to suspected liver cirrhosis secondary to systemic amyloidosis. Continue to monitor with daily CBCs. Conditioning, debility: Multiple comorbidities including systemic amyloidosis, ESRD on HD, malnutrition, liver disease, ventricular tachycardia    HAGMA: Resolved. Anion gap, 15.0. Lactic acidosis and uremia likely due to ESRD. Patient is on HD. Home bicarb started p.o. We will continue to monitor, daily CBCs and BMPs ordered.:      INITIAL H AND P AND ICU COURSE:  Per HPI, \"patient is 63years old male, brought from outside facility due to hypotension. Patient has past medical history of recently diagnosed with systemic amyloidosis, ESRD on hemodialysis, undifferentiated liver disease, newly diagnosis CHF preserved EF, nonsustained episodes of ventricular tachycardia. Patient states he felt sick and nauseous and went to the local hospital and transferred to [facility] for further evaluation and management. Currently patient alert and oriented X3, complains of nausea, episodes of vomiting, feeling sick abdominal distention due to ascites. However he denies chest pain, heart palpitations, productive cough, dizziness, fevers, chills lightheadedness, dysuria/urinary frequency\" paracentesis was performed on 2/7/2023 with 5.5 L of ascites drained. Patient was also undergoing hemodialysis on 2/7, and 2/10. Patient has been continue to have to have his midodrine dose increased while attempting to wean down Levophed. Past 24 hrs: Seen and evaluated at bedside this morning. Patient is currently getting hemodialysis. Tolerating well. Patient has no complaints or concerns today. He states he does have some increasing abdominal tightness and some discomfort. Patient does state he is fatigued.   Pleurx catheter versus paracentesis was discussed with the patient. Patient states he talked with his girlfriend Kathy Royal over the phone and they were thinking to go ahead with Pleurx catheter. He wishes to wait to see in person to further decide. Past Medical History: Systemic amyloidosis, CHF, ESRD, liver disease. Family History: Hypertension. Social History: Former smoker, 34+ years since history of smoking. History of quitting December 2022. No previous illicit drug use or alcohol use. ROS: Patient denies fever, chills, shortness of breath, headache, chest pain, palpitations. No nausea or vomiting. Patient has no change in his appetite, still minimal.  Patient has abdominal tightness and discomfort today    Scheduled Meds:   epoetin jose-epbx  6,000 Units SubCUTAneous Once per day on Mon Wed Fri    fludrocortisone  0.2 mg Oral Daily    midodrine  20 mg Oral Q6H    zolpidem  5 mg Oral Nightly    famotidine (PEPCID) injection  20 mg IntraVENous Daily    calcium replacement protocol   Other RX Placeholder    sodium chloride flush  5-40 mL IntraVENous 2 times per day    sodium bicarbonate  650 mg Oral BID    vitamin D  50,000 Units Oral Weekly     Continuous Infusions:   norepinephrine 4 mcg/min (02/15/23 1509)    dextrose      sodium chloride         PHYSICAL EXAMINATION:  T: 97.4. P: 75. RR: 12. B/P: 90/63. On room air O2 Sat: 95%. I/O: +600cc  Body mass index is 17.81 kg/m². GCS:   15  General:   Chronically ill appearing male. Appears older than stated age. Cachectic. Pale in appearance  HEENT:  normocephalic and atraumatic. Mild scleral icterus. PERR  Neck: Supple  Lungs: Clear to auscultation. Minimal wheezes in the right upper lobe. No rhonchi or rales no retractions  Cardiac: RRR. No murmurs, gallops, rubs. No JVD. Abdomen: Firm. Mild tenderness to palpation. Increasingly distended. Normoactive bowel sounds. Extremities:  No clubbing, cyanosis, or edema x 4. Vasculature: capillary refill < 3 seconds. Palpable dorsalis pedis pulses.   Skin: Warm and dry. Minimal jaundice. Multiple bruising  Psych:  Alert and oriented x3. Affect appropriate  Lymph:  No supraclavicular adenopathy. Neurologic:  No focal deficit. No seizures. Data: (All radiographs, tracings, PFTs, and imaging are personally viewed and interpreted unless otherwise noted). Sodium 137. Potassium 3.3. Chloride 96. CO2 23. BUN 38. Creatinine 3.8. Anion gap 18. Mag 2.2. Serum calcium 8.3  Albumin 3.3. Alk phos 1192. ALT 49. AST 44. Total bili 7.2 . WBC 4.9. Hemoglobin 9.2. Platelet count 91. Telemetry shows normal sinus rhythm. Echo, 2/14/2023 shows EF at 55% with overall LV function normal and mild concentric left ventricular hypertrophy  Abdominal ultrasound, 3/15/2023 shows large volume ascites, mild dilated common bile duct up to 0.7 cm, small gallstones, diffuse gallbladder wall thickening nonspecific. Patient has patent hepatic and portal vein venous vessels and recanalized periumbilical vein which can be seen with portal hypertension    Seen with multidisciplinary ICU team.  Meets Continued ICU Level Care Criteria:    [x] Yes     Case and plan discussed with Dr. Kena Henao MD    Electronically signed by Minerva Herrmann DO, PGY-1  CRITICAL CARE SPECIALIST  Patient seen by me including key components of medical care. Case discussed with resident physician. Awaiting patient decision for hospice vs continued aggressive care. If hospice, PleurX catheter. If aggressive intervention, then weekly paracentesis. Continue with pressors. Italicized font, if present,  represents changes to the note made by me. CC time 35 minutes. Time was discontiguous. Time does not include procedure. Time does include my direct assessment of the patient and coordination of care. Time represents more than 50% of the time involved with patient care by the [address] [place] [address] team.  Electronically signed by Sherren Marines.  Kena Henao MD.

## 2023-02-15 NOTE — FLOWSHEET NOTE
02/15/23 1124   Vital Signs   BP (!) 82/59   Temp 97.4 °F (36.3 °C)   Weight 124 lb 1.9 oz (56.3 kg)   Weight Method Bed scale   Percent Weight Change 0   Post-Hemodialysis Assessment   Post-Treatment Procedures Blood returned;Catheter Capped, clamped with Saline x2 ports   Machine Disinfection Process Exterior Machine Disinfection   Rinseback Volume (ml) 400 ml   Blood Volume Processed (Liters) 78.3 l/min   Dialyzer Clearance Lightly streaked   Duration of Treatment (minutes) 210 minutes   Heparin Amount Administered During Treatment (mL) 0 mL   Hemodialysis Intake (ml) 400 ml   Hemodialysis Output (ml) 400 ml   NET Removed (ml) 0   stable 3.5 hour treatment. on levo throughout, 0 net uf. cath lines flushed with 0.9 ns, clamped  and tegos in palce. report called to primary nurse.

## 2023-02-15 NOTE — CARE COORDINATION
2/15/23, 2:29 PM EST    DISCHARGE ON GOING EVALUATION    [facility] day: 9  Location: 3A-04/004-A Reason for admit: Hypotension [I95.9]  Hypotension, unspecified hypotension type [I95.9]   Procedure:   2/6 CXR: Small left pleural effusion with left basal atelectasis  2/6 CVC LIJ  2/6 Repeat CXR: Left basilar subsegmental atelectasis  2/7 Paracentesis: 5.5L removed. 2/12 KUB: Mildly dilated small bowel loops with distal bowel gas suspicious for paralytic ileus. 2/13 CT abd/pelvis: Subtle omental caking/enhancement best visualized within the upper abdomen along the anterior abdominal wall with differential considerations including peritoneal carcinomatosis. Large volume intra-abdominal ascites. No bowel obstruction, perforation, or discrete fluid collection observed. Small bilateral pleural effusions and bilateral lower lobe consolidation, left greater than right. Question mild sclerosis involving the skeletal structures diffusely. No discrete bony lesion observed. 2/14 KUB: Ascites with underlying nonspecific bowel gas pattern. 2/14 CXR: Interval removal of nasogastric tube. Stable positioning of left IJ single lumen catheter and dual-lumen catheter via right IJ approach. Bilateral hypoventilatory changes. Normal heart size and central bony vasculature. Minimally increased left basilar atelectasis. 2/14 ECHO: Ejection fraction is visually estimated at 55%. Overall left ventricular function is normal.  Mild concentric left ventricular hypertrophy. Left pleural effusion. Barriers to Discharge: Intensivist and Nephrology following. Resp culture positive for Christelle dubliniensis. Patient afebrile, low BP 83/67, on Levophed drip, HD Mon., Wed., and Fri., [de-identified] Mon., Wed., Fri., Florinef, Sodium Bicarbonate tabs, CBC and CMP in a.m., daily Magnesium levels, carb choice diet, aspiration and fall precautions, SCD's, PT/OT, SS, up with assistance.    PCP: Yanira Chapin, DO  Readmission Risk Score: 20.7%  Patient Goals/Plan/Treatment Preferences: From home with girlfriend. HD MWF at [facility] with 7533 chair time. Chemo 1x/wk in [facility]. SW following for possible SNF placement. Qulin monitoring.

## 2023-02-16 ENCOUNTER — APPOINTMENT (OUTPATIENT)
Dept: ULTRASOUND IMAGING | Age: 63
End: 2023-02-16
Attending: INTERNAL MEDICINE
Payer: COMMERCIAL

## 2023-02-16 LAB
ALBUMIN SERPL BCG-MCNC: 3.4 G/DL (ref 3.5–5.1)
ALP SERPL-CCNC: 1459 U/L (ref 38–126)
ALT SERPL W/O P-5'-P-CCNC: 52 U/L (ref 11–66)
ANION GAP SERPL CALC-SCNC: 15 MEQ/L (ref 8–16)
ANION GAP SERPL CALC-SCNC: 15 MEQ/L (ref 8–16)
ANISOCYTOSIS BLD QL SMEAR: PRESENT
AST SERPL-CCNC: 60 U/L (ref 5–40)
BASOPHILS ABSOLUTE: 0 THOU/MM3 (ref 0–0.1)
BASOPHILS NFR BLD AUTO: 0 %
BILIRUB SERPL-MCNC: 8.1 MG/DL (ref 0.3–1.2)
BUN SERPL-MCNC: 24 MG/DL (ref 7–22)
BUN SERPL-MCNC: 29 MG/DL (ref 7–22)
CA-I BLD ISE-SCNC: 1.03 MMOL/L (ref 1.12–1.32)
CALCIUM SERPL-MCNC: 8.1 MG/DL (ref 8.5–10.5)
CALCIUM SERPL-MCNC: 8.1 MG/DL (ref 8.5–10.5)
CHLORIDE SERPL-SCNC: 97 MEQ/L (ref 98–111)
CHLORIDE SERPL-SCNC: 98 MEQ/L (ref 98–111)
CO2 SERPL-SCNC: 25 MEQ/L (ref 23–33)
CO2 SERPL-SCNC: 25 MEQ/L (ref 23–33)
CREAT SERPL-MCNC: 2.3 MG/DL (ref 0.4–1.2)
CREAT SERPL-MCNC: 2.8 MG/DL (ref 0.4–1.2)
DEPRECATED RDW RBC AUTO: 95.3 FL (ref 35–45)
EOSINOPHIL NFR BLD AUTO: 0 %
EOSINOPHILS ABSOLUTE: 0 THOU/MM3 (ref 0–0.4)
ERYTHROCYTE [DISTWIDTH] IN BLOOD BY AUTOMATED COUNT: 28.7 % (ref 11.5–14.5)
GFR SERPL CREATININE-BSD FRML MDRD: 25 ML/MIN/1.73M2
GFR SERPL CREATININE-BSD FRML MDRD: 32 ML/MIN/1.73M2
GLUCOSE SERPL-MCNC: 116 MG/DL (ref 70–108)
GLUCOSE SERPL-MCNC: 71 MG/DL (ref 70–108)
HCT VFR BLD AUTO: 28.9 % (ref 42–52)
HGB BLD-MCNC: 9.7 GM/DL (ref 14–18)
HOWELL-JOLLY BOD BLD QL SMEAR: PRESENT
IMM GRANULOCYTES # BLD AUTO: 0.02 THOU/MM3 (ref 0–0.07)
IMM GRANULOCYTES NFR BLD AUTO: 0.4 %
LYMPHOCYTES ABSOLUTE: 0.7 THOU/MM3 (ref 1–4.8)
LYMPHOCYTES NFR BLD AUTO: 13.4 %
MAGNESIUM SERPL-MCNC: 2.3 MG/DL (ref 1.6–2.4)
MCH RBC QN AUTO: 32.2 PG (ref 26–33)
MCHC RBC AUTO-ENTMCNC: 33.6 GM/DL (ref 32.2–35.5)
MCV RBC AUTO: 96 FL (ref 80–94)
MONOCYTES ABSOLUTE: 0.1 THOU/MM3 (ref 0.4–1.3)
MONOCYTES NFR BLD AUTO: 2.5 %
NEUTROPHILS NFR BLD AUTO: 83.7 %
NRBC BLD AUTO-RTO: 5 /100 WBC
PHOSPHATE SERPL-MCNC: 4.1 MG/DL (ref 2.4–4.7)
PLATELET # BLD AUTO: 75 THOU/MM3 (ref 130–400)
PLATELET BLD QL SMEAR: ABNORMAL
PMV BLD AUTO: ABNORMAL FL (ref 9.4–12.4)
POIKILOCYTES: ABNORMAL
POTASSIUM SERPL-SCNC: 4.2 MEQ/L (ref 3.5–5.2)
POTASSIUM SERPL-SCNC: 4.3 MEQ/L (ref 3.5–5.2)
POTASSIUM SERPL-SCNC: 4.3 MEQ/L (ref 3.5–5.2)
PROT SERPL-MCNC: 4.7 G/DL (ref 6.1–8)
RBC # BLD AUTO: 3.01 MILL/MM3 (ref 4.7–6.1)
SCAN OF BLOOD SMEAR: NORMAL
SEGMENTED NEUTROPHILS ABSOLUTE COUNT: 4.4 THOU/MM3 (ref 1.8–7.7)
SODIUM SERPL-SCNC: 137 MEQ/L (ref 135–145)
SODIUM SERPL-SCNC: 138 MEQ/L (ref 135–145)
TARGETS BLD QL SMEAR: ABNORMAL
WBC # BLD AUTO: 5.2 THOU/MM3 (ref 4.8–10.8)

## 2023-02-16 PROCEDURE — 6370000000 HC RX 637 (ALT 250 FOR IP): Performed by: INTERNAL MEDICINE

## 2023-02-16 PROCEDURE — 2580000003 HC RX 258

## 2023-02-16 PROCEDURE — 6370000000 HC RX 637 (ALT 250 FOR IP): Performed by: SURGERY

## 2023-02-16 PROCEDURE — 83735 ASSAY OF MAGNESIUM: CPT

## 2023-02-16 PROCEDURE — 2100000000 HC CCU R&B

## 2023-02-16 PROCEDURE — 6370000000 HC RX 637 (ALT 250 FOR IP)

## 2023-02-16 PROCEDURE — 2500000003 HC RX 250 WO HCPCS: Performed by: INTERNAL MEDICINE

## 2023-02-16 PROCEDURE — 84100 ASSAY OF PHOSPHORUS: CPT

## 2023-02-16 PROCEDURE — 6370000000 HC RX 637 (ALT 250 FOR IP): Performed by: NURSE PRACTITIONER

## 2023-02-16 PROCEDURE — 6360000002 HC RX W HCPCS: Performed by: INTERNAL MEDICINE

## 2023-02-16 PROCEDURE — 99232 SBSQ HOSP IP/OBS MODERATE 35: CPT | Performed by: INTERNAL MEDICINE

## 2023-02-16 PROCEDURE — 84132 ASSAY OF SERUM POTASSIUM: CPT

## 2023-02-16 PROCEDURE — 2500000003 HC RX 250 WO HCPCS

## 2023-02-16 PROCEDURE — 97110 THERAPEUTIC EXERCISES: CPT

## 2023-02-16 PROCEDURE — 36415 COLL VENOUS BLD VENIPUNCTURE: CPT

## 2023-02-16 PROCEDURE — 6370000000 HC RX 637 (ALT 250 FOR IP): Performed by: STUDENT IN AN ORGANIZED HEALTH CARE EDUCATION/TRAINING PROGRAM

## 2023-02-16 PROCEDURE — 2580000003 HC RX 258: Performed by: INTERNAL MEDICINE

## 2023-02-16 PROCEDURE — 80053 COMPREHEN METABOLIC PANEL: CPT

## 2023-02-16 PROCEDURE — 85025 COMPLETE CBC W/AUTO DIFF WBC: CPT

## 2023-02-16 PROCEDURE — 99291 CRITICAL CARE FIRST HOUR: CPT | Performed by: SURGERY

## 2023-02-16 PROCEDURE — 82330 ASSAY OF CALCIUM: CPT

## 2023-02-16 PROCEDURE — 97530 THERAPEUTIC ACTIVITIES: CPT

## 2023-02-16 PROCEDURE — 49083 ABD PARACENTESIS W/IMAGING: CPT

## 2023-02-16 PROCEDURE — A4216 STERILE WATER/SALINE, 10 ML: HCPCS | Performed by: INTERNAL MEDICINE

## 2023-02-16 RX ORDER — OXYCODONE HYDROCHLORIDE 5 MG/1
5 TABLET ORAL EVERY 6 HOURS PRN
Status: DISCONTINUED | OUTPATIENT
Start: 2023-02-16 | End: 2023-02-19 | Stop reason: HOSPADM

## 2023-02-16 RX ORDER — OXYCODONE HYDROCHLORIDE 5 MG/1
2.5 TABLET ORAL EVERY 6 HOURS PRN
Status: DISCONTINUED | OUTPATIENT
Start: 2023-02-16 | End: 2023-02-19 | Stop reason: HOSPADM

## 2023-02-16 RX ADMIN — SODIUM BICARBONATE 650 MG: 650 TABLET ORAL at 08:15

## 2023-02-16 RX ADMIN — MIDODRINE HYDROCHLORIDE 20 MG: 10 TABLET ORAL at 08:15

## 2023-02-16 RX ADMIN — MIDODRINE HYDROCHLORIDE 20 MG: 10 TABLET ORAL at 20:33

## 2023-02-16 RX ADMIN — OXYCODONE 5 MG: 5 TABLET ORAL at 17:58

## 2023-02-16 RX ADMIN — SODIUM CHLORIDE, PRESERVATIVE FREE 10 ML: 5 INJECTION INTRAVENOUS at 08:15

## 2023-02-16 RX ADMIN — SODIUM BICARBONATE 650 MG: 650 TABLET ORAL at 20:33

## 2023-02-16 RX ADMIN — SODIUM CHLORIDE, PRESERVATIVE FREE 10 ML: 5 INJECTION INTRAVENOUS at 20:33

## 2023-02-16 RX ADMIN — FAMOTIDINE 20 MG: 10 INJECTION, SOLUTION INTRAVENOUS at 08:15

## 2023-02-16 RX ADMIN — Medication 8 MCG/MIN: at 20:40

## 2023-02-16 RX ADMIN — MIDODRINE HYDROCHLORIDE 20 MG: 10 TABLET ORAL at 13:26

## 2023-02-16 RX ADMIN — ONDANSETRON 4 MG: 2 INJECTION INTRAMUSCULAR; INTRAVENOUS at 19:01

## 2023-02-16 RX ADMIN — FLUDROCORTISONE ACETATE 0.2 MG: 0.1 TABLET ORAL at 08:15

## 2023-02-16 RX ADMIN — MIDODRINE HYDROCHLORIDE 20 MG: 10 TABLET ORAL at 03:05

## 2023-02-16 RX ADMIN — ZOLPIDEM TARTRATE 5 MG: 5 TABLET ORAL at 20:34

## 2023-02-16 ASSESSMENT — PAIN SCALES - WONG BAKER: WONGBAKER_NUMERICALRESPONSE: 2

## 2023-02-16 ASSESSMENT — PAIN DESCRIPTION - FREQUENCY: FREQUENCY: CONTINUOUS

## 2023-02-16 ASSESSMENT — PAIN SCALES - GENERAL
PAINLEVEL_OUTOF10: 0
PAINLEVEL_OUTOF10: 8
PAINLEVEL_OUTOF10: 2
PAINLEVEL_OUTOF10: 3

## 2023-02-16 ASSESSMENT — PAIN DESCRIPTION - ORIENTATION: ORIENTATION: OTHER (COMMENT)

## 2023-02-16 ASSESSMENT — PAIN DESCRIPTION - ONSET: ONSET: ON-GOING

## 2023-02-16 ASSESSMENT — PAIN DESCRIPTION - DESCRIPTORS: DESCRIPTORS: ACHING

## 2023-02-16 ASSESSMENT — PAIN DESCRIPTION - LOCATION: LOCATION: ABDOMEN

## 2023-02-16 ASSESSMENT — PAIN DESCRIPTION - PAIN TYPE: TYPE: CHRONIC PAIN

## 2023-02-16 ASSESSMENT — PAIN - FUNCTIONAL ASSESSMENT: PAIN_FUNCTIONAL_ASSESSMENT: PREVENTS OR INTERFERES SOME ACTIVE ACTIVITIES AND ADLS

## 2023-02-16 NOTE — PROGRESS NOTES
CRITICAL CARE PROGRESS NOTE      Patient:  Dayami Zavala    Unit/Bed:4B-03/003-A  YOB: 1960  MRN: [id number]   PCP: Miguel Conklin DO  Date of Admission: 2/6/2023  Chief Complaint:- Hypotension    Assessment and Plan:    Hypotensive, refractory to medications: Asymptomatic - Initially thought to be related to septic shock. Transferred to [facility] from outside of the facility and was hypotensive on arrival with ventricular arrhythmias in the 140s. 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 ESRD on hemodialysis, M,W,F schedule. Initially suspicious for SBP and was given ceftriaxone 2g. Culture and cell count of body fluid not suspicious for SBP at this time. We will continue to monitor patient. Patient is on midodrine 15 mg TID at home for blood pressure support. Midodirne was increased to 20 mg TID, 2/8. History of asymptomatic hypotension. Patient's hypotension is likely multifactorial. Patient has history of running systolically in the 55R. Paracentesis, 2/7 with 5.5 L of ascites for drainage. Ceftriaxone 2g Q24 hrs, stopped 2/12. Decadron 10 mg, given 2/9/2023. Albumin 25 mg IV total 8 doses completed. -Will continue to monitor patient  -Currently on Levophed for blood pressure support, MAP >55    -Will try to wean Levophed down as patient tolerates  -Midodrine 20 mg four times daily. Have been unsuccessful and difficult time weaning patient down off Levophed  -Continuing Florinef  -Methylene blue 50 mg IV was ordered for vasoplegia  Ventricular tachycardia, asymptomatic: Reported runs of ventricular tachycardia. Patient was given IV magnesium at outside facility. Patient received amiodarone bolus 300, started on amnio drip at 0.5. Continuing amiodarone drip at this time. He is on telemetry, strict inputs and outputs, daily weights. We will continue to monitor patient's electrolytes.            -Keep magnesium above 2.         -Keep potassium greater than 4         -Previously evaluated by EP on 1/20/2023. Arrhythmia thought to be secondary to underlying cardiac amyloidosis with risk of SCD. EP recommended AICD. Not a candidate for class Ic or III antiarrhythmics.        -Previous admission discharged with a LifeVest was discussed. With follow-up with EP.  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 On hemodialysis - M/W/F follows with Dr. Marie Schilling.  Hx of noncompliance with medications including p.o. bicarb, fluid intake and other medical visits in the past. Patient is consulted for neurology to start inpatient home dialysis. Patient underwent hemodialysis 2/7/, 2/10, 2/13  Suspected liver cirrhosis: Likely 2/2 systemic amyloidosis waiting on liver biopsy for the past 2 months for this. Elevated transaminase, alk phos, hypoalbuminemia. Patient is scheduled to have regular paracentesis. Patient reports he has received 2-3 previous paracenteses. 5.5 L of ascites was drained with paracentesis, 2/7. Patient tolerated the procedure well. We discussed paracentesis versus Pleurx catheter with patient. He wishes to proceed with paracentesis at this time. -IR was consulted, plan for paracentesis, 2/16. Limit of 3 liters  Systemic amyloidosis: Initially diagnosed September 2022, patient is awaiting for liver biopsy this time. Per patient he reports compliance to medication treatments and visits.           -Follows with Dr. Navin Carbajal outpatient. .          -Patient was started on chemotherapy regimen in 12/2022          -Per heme-onc recommendations weekly paracentesis, limited to 2 L with with albumin infusions  HFpEF: Not in exacerbation.  HFpEF 50%, 1/16.           -We will continue to monitor.           -Continue telemetry, pulse ox, daily weights, strict I&O's  Malnutrition, severe protein calorie malnutrition: Likely due to patient's multiple comorbidities  Chronic normocytic anemia, mild: Patient has multiple chronic diseases including ESRD on HD, liver disease, systemic amyloidosis, continue current daily CBCs. Chronic thrombocytopenia, mild: Possibly due to suspected liver cirrhosis secondary to systemic amyloidosis. Continue to monitor with daily CBCs. Conditioning, debility: Multiple comorbidities including systemic amyloidosis, ESRD on HD, malnutrition, liver disease, ventricular tachycardia    HAGMA: Resolved. Anion gap, 15.0. Lactic acidosis and uremia likely due to ESRD. Patient is on HD. Home bicarb started p.o. We will continue to monitor, daily CBCs and BMPs ordered    INITIAL H AND P AND ICU COURSE:  Per HPI, \"patient is 63years old male, brought from outside facility due to hypotension. Patient has past medical history of recently diagnosed with systemic amyloidosis, ESRD on hemodialysis, undifferentiated liver disease, newly diagnosis CHF preserved EF, nonsustained episodes of ventricular tachycardia. Patient states he felt sick and nauseous and went to the local hospital and transferred to [facility] for further evaluation and management. Currently patient alert and oriented X3, complains of nausea, episodes of vomiting, feeling sick abdominal distention due to ascites. However he denies chest pain, heart palpitations, productive cough, dizziness, fevers, chills lightheadedness, dysuria/urinary frequency\" paracentesis was performed on 2/7/2023 with 5.5 L of ascites drained. Patient was also undergoing hemodialysis on 2/7, and 2/10. Patient has been continue to have to have his midodrine dose increased while attempting to wean down Levophed. Past 24 hrs:   Patient was seen and evaluated at bedside this morning. Discussed with patient a interventional radiology has been contacted for possible paracentesis later today. Patient is currently on 2 mcg of Levophed at this time, patient denies fatigue weakness or dizziness.   Pleurx catheter versus paracentesis was discussed with the patient and his daughter yesterday. Patient wished to proceed with paracentesis yesterday. Past Medical History: Systemic amyloidosis, CHF, ESRD, liver disease. Family History: Hypertension. Social History: Former smoker, 34+ years since history of smoking. History of quitting December 2022. No previous illicit drug use or alcohol use. ROS: No fever, chills, shortness of breath, difficulty breathing, chest pain, palpitations. Patient has some abdominal discomfort this time. Improvement in appetite. Patient is having bowel movement and has urinated. No nausea or vomiting this morning. Scheduled Meds:   epoetin jose-epbx  6,000 Units SubCUTAneous Once per day on Mon Wed Fri    fludrocortisone  0.2 mg Oral Daily    midodrine  20 mg Oral Q6H    zolpidem  5 mg Oral Nightly    famotidine (PEPCID) injection  20 mg IntraVENous Daily    calcium replacement protocol   Other RX Placeholder    sodium chloride flush  5-40 mL IntraVENous 2 times per day    sodium bicarbonate  650 mg Oral BID    vitamin D  50,000 Units Oral Weekly     Continuous Infusions:   norepinephrine 5 mcg/min (02/16/23 0322)    dextrose      sodium chloride       PHYSICAL EXAMINATION:  T:  97.7. P:  73. RR:  13. B/P: 90/69. On room air. O2 Sat:  94.  I/O: +164 cc. Body mass index is 18.35 kg/m². GCS:   15  General:  Chronically ill-appearing male. Appears older than stated age. Cachectic. Pale in appearance. HEENT:  Normocephalic and atraumatic. Mild scleral icterus. PERR  Neck: Supple. Lungs: Clear to auscultation. No wheezes, rales or rhonchis. No retractions  Cardiac: RRR. No murmurs, gallops, rubs. No JVD. Abdomen: Firm. Mild tenderness palpation. Distended. Normoactive bowel sounds  Extremities:  No clubbing, cyanosis, or edema x 4. Vasculature: Capillary refill < 3 seconds. Palpable dorsalis pedis pulses. Skin:  Warm and dry. Minimal jaundice. Multiple bruising  Psych:  Alert and oriented x3.   Affect appropriate  Lymph:  No supraclavicular adenopathy. Neurologic: No focal deficit. No seizures. Data: (All radiographs, tracings, PFTs, and imaging are personally viewed and interpreted unless otherwise noted). Sodium 138. Potassium 4.2. Chloride 98. CO2 25. BUN 24. Creatinine 2.3. Mag 2.3. Calcium 8.1  WBC 5.2. Hemoglobin 9.7 platelets 75. Telemetry shows normal sinus rhythm with a rate of 68. Echo, 2/14 shows EF at 55% with overall LV function normal and mild concentric left ventricular hypertrophy  Abdominal ultrasound, 2/15 shows large volume ascites, mild dilated common bile duct up to 0.7 cm, small gallstones, diffuse gallbladder wall thickening nonspecific. Patient has patent hepatic and portal vein venous vessels and recanalized periumbilical vein which can be seen with portal hypertension    Seen with multidisciplinary ICU team.  Meets Continued ICU Level Care Criteria:    [x] Yes     Case and plan discussed with Dr. Jaramillo Adjutant. [address] [facility] [facility], .     Electronically signed by Saadia Calderon DO, PGY-1  CRITICAL CARE SPECIALIST

## 2023-02-16 NOTE — CARE COORDINATION
2/16/23, 1:53 PM EST    DISCHARGE PLANNING EVALUATION     JESSICA called pts daughter Christoph Cristobal, she is agreeable to [place] however, had questions about pt receiving Chemo in [place] if restarted. Daughter also asked for  Oncology consult to offer an opinion if pt is medically/physically  ready to re start chemo. Daughter states she will be here with her sister on Saturday and would like to meet with Spiritual Care to  have POA paperwork completed and Living Will witnessed. JESSICA spoke with Alexandra Rico, she will contact Zach Renee with [facility] and have her call daughter to help  answer questions. JESSICA called Spiritual Care and left detailed message, asked that someone return my call. JESSICA left note on pts chart for nurse and physician to address Oncology consult requested by daughter. JESSICA updated pt.

## 2023-02-16 NOTE — CARE COORDINATION
2/16/23, 12:23 PM EST    DISCHARGE ON GOING [facility] day: 10  Location: -03/003-A Reason for admit: Hypotension [I95.9]  Hypotension, unspecified hypotension type [I95.9]   Procedure:   2/6 CXR: Small left pleural effusion with left basal atelectasis  2/6 CVC LIJ  2/6 Repeat CXR: Left basilar subsegmental atelectasis  2/7 Paracentesis: 5.5L removed. 2/12 KUB: Mildly dilated small bowel loops with distal bowel gas suspicious for paralytic ileus. 2/13 CT abd/pelvis: Subtle omental caking/enhancement best visualized within the upper abdomen along the anterior abdominal wall with differential considerations including peritoneal carcinomatosis. Large volume intra-abdominal ascites. No bowel obstruction, perforation, or discrete fluid collection observed. Small bilateral pleural effusions and bilateral lower lobe consolidation, left greater than right. Question mild sclerosis involving the skeletal structures diffusely. No discrete bony lesion observed. 2/14 KUB: Ascites with underlying nonspecific bowel gas pattern. 2/14 CXR: Interval removal of nasogastric tube. Stable positioning of left IJ single lumen catheter and dual-lumen catheter via right IJ approach. Bilateral hypoventilatory changes. Normal heart size and central bony vasculature. Minimally increased left basilar atelectasis. 2/14 ECHO: Ejection fraction is visually estimated at 55%. Overall left ventricular function is normal.  Mild concentric left ventricular hypertrophy. Left pleural effusion. 2/15 US Abdomen limited 1. Large volume ascites. 2. Mild dilated common bile duct up to 0.7 cm.   3. Small gallstones. 4. Diffuse gallbladder wall thickening, nonspecific. This can be due to   non-biliary causes such as ascites, low protein state, or other numerous   etiologies. HIDA scan may be helpful. 5. Patent hepatic and portal venous vessels.  Recanalized paraumbilical   vein, which can be seen with portal hypertension  2/16 US Guided Paracentesis Successful ultrasound-guided paracentesis. Barriers to Discharge: Hgb 9.7, creatinine 2.4, Nephrology following for HD, PT/OT, Levophed drip, Proventil nebs, Retacrit, IV Pepcid, Florinef, Midodrine diabetes management. PCP: Roland Padron DO  Readmission Risk Score: 20.2%  Patient Goals/Plan/Treatment Preferences: From home with girlfriend. [facility] eval in place. SW following. Refer to SW note.

## 2023-02-16 NOTE — PROGRESS NOTES
[address] [id number]  INPATIENT PHYSICAL THERAPY  DAILY NOTE  [facility] 4B - 4B-03/003-A    Time In: 5441  Time Out: 1531  Timed Code Treatment Minutes: 23 Minutes  Minutes: 23          Date: 2023  Patient Name: Leelee Montes,  Gender:  male        MRN: [id number]  : 1960  (63 y.o.)     Referring Practitioner: Minerva Herrmann DO  Diagnosis: Hypotension  Additional Pertinent Hx: 63years old male, brought from outside facility due to hypotension. Patient has past medical history of recently diagnosed with systemic amyloidosis, ESRD on hemodialysis, undifferentiated liver disease, newly diagnosis CHF preserved EF, nonsustained episodes of ventricular tachycardia. Patient states he felt sick and nauseous and went to the local hospital and transferred to [facility] for further evaluation and management. Currently patient alert and oriented X3, complains of nausea, episodes of vomiting, feeling sick abdominal distention due to ascites. However he denies chest pain, heart palpitations, productive cough, dizziness, fevers, chills lightheadedness, dysuria/urinary frequency     Prior Level of Function:  Lives With: Significant other  Type of Home: House  Home Layout: One level, Laundry in basement  Home Access: Stairs to enter with rails  Entrance Stairs - Number of Steps: 3  Entrance Stairs - Rails: Both  Home Equipment: Boris Martinez, 4 wheeled, [employer] [place] Shower/Tub: Tub/Shower unit  [employer]: Standard  Bathroom Equipment: Grab bars in shower, Shower chair    ADL Assistance: Independent  Homemaking Assistance: Needs assistance  Ambulation Assistance: Independent  Transfer Assistance: Independent  Active : No  Additional Comments: Pt reports using 4 wheeled walker for 3-4 weeks, indep with mobility around home with it & ADLs indep. SO completes all IADLs.     Restrictions/Precautions:  Restrictions/Precautions: Fall Risk, General Precautions  Position Activity Restriction  Other position/activity restrictions: monitor BP     SUBJECTIVE: RN approved session. Pt pleasant and agreeable to therapy. PAIN: 0/10: denies pain     Vitals: Orthostatic Blood Pressure: Supine: 101/77(84), Sittin/48(56), Sitting in chair, immediately after standing and taking a couple steps: 73/48(57); Sitting at end of session: 75/53 (61)    OBJECTIVE:  Bed Mobility:  Supine to Sit: Minimal Assistance, X 1, with head of bed raised    Transfers:  Sit to Stand: Contact Guard Assistance  Stand to Sit:Contact Guard Assistance    Ambulation:  Contact Guard Assistance  Distance: 3'  Surface: Level Tile  Device:Hand-Held Assist  Gait Deviations: Forward Flexed Posture, Decreased Step Length Bilaterally, and Narrow Base of Support    Balance:  Static Sitting Balance:  Stand By Assistance  Pt sitting EOB ~ 10 min; monitoring symptoms and BP; exercises complete at EOB as well     Exercise:  Patient was guided in 1 set(s) 10 reps of exercise to both lower extremities. Seated marches, Seated hamstring curls, Seated heel/toe raises, and Long arc quads. Exercises were completed for increased independence with functional mobility. Functional Outcome Measures: Completed  AM-PAC Inpatient Mobility without Stair Climbing Raw Score : 14  -PAC Inpatient without Stair Climbing T-Scale Score : 40.85    ASSESSMENT:  Assessment: Patient progressing toward established goals. Activity Tolerance:  Patient tolerance of  treatment: fair.  BP better this date but still having some lightheaded/dizziness      Equipment Recommendations:Equipment Needed: No  Discharge Recommendations: Inpatient Therapy Stay  Plan: Current Treatment Recommendations: Balance training, Endurance training, Strengthening, Functional mobility training, Transfer training, Therapeutic activities, Safety education & training  General Plan:  (5x GM)    Patient Education  Patient Education: Plan of Care, Bed Mobility, Transfers, Gait, Verbal Exercise Instruction    Goals:  Patient Goals : get up and walk around  Short Term Goals  Time Frame for Short Term Goals: by discharge  Short Term Goal 1: bed mobility with HOB flat, no rails, mod I for increased functional ind  Short Term Goal 2: sit<>stand from various surfaces with LRD mod I for safe transfers  Short Term Goal 3: Pt to tolerate standing 10 min with SBA for increased endurance  Short Term Goal 4: Pt to ambulate 48' with LRD mod I for safe amb at d/c site  Long Term Goals  Time Frame for Long Term Goals : NA d/t short ELOS    Following session, patient left in safe position with all fall risk precautions in place.     Delvin Novi ([place]) PT, DPT

## 2023-02-16 NOTE — PROGRESS NOTES
[facility]   Nephrology progress note  2/16/2023, 9:15 AM      Pt Name:    Jennifer Curry  MRN:     [id number]     YOB: 1960  Admit Date:    2/6/2023  4:58 PM    Chief Complaint: Nephrology following for ESRD and HD    Subjective:  Patient was seen and examined this morning  Seen earlier today during rounds in ICU  On Levophed at very minimal dose which is being titrated  MAP is greater than 55  Patient is asymptomatic      Objective:  24HR INTAKE/OUTPUT:    Intake/Output Summary (Last 24 hours) at 2/16/2023 0915  Last data filed at 2/16/2023 0725  Gross per 24 hour   Intake 631.23 ml   Output 400 ml   Net 231.23 ml           I/O last 3 completed shifts: In: 936.3 [P.O.:120; I.V.:138.1; IV Piggyback:278.2]  Out: 400   I/O this shift: In: 0.7 [I.V.:0.7]  Out: -    Admission weight: 129 lb 6.6 oz (58.7 kg)  Wt Readings from Last 3 Encounters:   02/16/23 127 lb 13.9 oz (58 kg)   01/31/23 133 lb 12.8 oz (60.7 kg)   01/24/23 132 lb 3.2 oz (60 kg)        Vitals : Blood pressure in systolic 66-27E, MAP in 59G  Vitals:    02/16/23 0825 02/16/23 0830 02/16/23 0835 02/16/23 0840   BP:  96/69     Pulse: 71 74 71 71   Resp: 14 20 13 10   Temp:       TempSrc:       SpO2:       Weight:       Height:           Physical examination  General Appearance: Ill-appearing, no acute distress  Awake and alert.   Mouth/Throat: Oral mucosa moist  Neck: No JVD  Lungs:  no use of accessory muscles  GI: soft, non-tender, no guarding  Extremities: No pitting LE edema    Medications:  Infusion:    norepinephrine 2 mcg/min (02/16/23 0725)    dextrose      sodium chloride       Meds:    epoetin jose-epbx  6,000 Units SubCUTAneous Once per day on Mon Wed Fri    fludrocortisone  0.2 mg Oral Daily    midodrine  20 mg Oral Q6H    zolpidem  5 mg Oral Nightly    famotidine (PEPCID) injection  20 mg IntraVENous Daily    calcium replacement protocol   Other RX Placeholder    sodium chloride flush  5-40 mL IntraVENous 2 times per day    sodium bicarbonate  650 mg Oral BID    vitamin D  50,000 Units Oral Weekly     Meds prn: morphine, ondansetron, aluminum & magnesium hydroxide-simethicone, promethazine, diphenhydrAMINE, glucose, dextrose bolus **OR** dextrose bolus, glucagon (rDNA), dextrose, sodium chloride flush, sodium chloride, polyethylene glycol, acetaminophen **OR** acetaminophen, albuterol     Lab Data :  CBC:   Recent Labs     02/14/23  0400 02/14/23  1105 02/15/23  0500 02/16/23  0500   WBC 7.0  --  4.9 5.2   HGB 8.6* 8.8* 9.2* 9.7*   HCT 24.1* 25.4* 26.5* 28.9*   PLT 70*  --  71* 75*       CMP:  Recent Labs     02/13/23  1808 02/13/23  2222 02/14/23  0400 02/15/23  0500 02/16/23  0500   NA  --    < > 139 137 138   K 3.6   < > 3.8 3.3* 4.2   CL  --    < > 98 96* 98   CO2  --    < > 26 23 25   BUN  --    < > 30* 38* 24*   CREATININE  --    < > 3.1* 3.8* 2.3*   GLUCOSE  --    < > 92 64* 71   CALCIUM  --    < > 8.4* 8.3* 8.1*   MG 2.0  --   --  2.2 2.3    < > = values in this interval not displayed. Hepatic:   Recent Labs     02/14/23  0400 02/15/23  0500 02/16/23  0500   LABALBU 3.4* 3.3* 3.4*   AST 55* 44* 60*   ALT 72* 49 52   BILITOT 8.9* 7.2* 8.1*   ALKPHOS 1,700* 1,192* 1,459*           Assessment and Plan:  ESRD on hemodialysis. 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 On Levophed-being titrated/weaned--currently on very low-dose, MAP is greater than 55  No need for renal replacement therapy today. Patient is chronically hypotensive and asymptomatic  Continue to wean Levophed  Plan hemotreatment tomorrow  Metabolic acidosis. Resolved  Chronic hypotension. On midodrine, continue with Florinef. Levophed being titrated  Probable autonomic dysfunction from amyloidosis  Ascites status post paracentesis  Elevated liver enzymes  Anemia in ESRD. Continue with Retacrit  Chronic thrombocytopenia        Lonny Henao MD  [facility]    This report has been created using voice recognition software. It may contain minor errors which are inherent in voice recognition technology

## 2023-02-16 NOTE — PLAN OF CARE
Problem: Discharge Planning  Goal: Discharge to home or other facility with appropriate resources  Recent Flowsheet Documentation  Taken 2/15/2023 2000 by Odilon Young RN  Discharge to home or other facility with appropriate resources:   Identify barriers to discharge with patient and caregiver   Arrange for needed discharge resources and transportation as appropriate   Identify discharge learning needs (meds, wound care, etc)     Problem: Pain  Goal: Verbalizes/displays adequate comfort level or baseline comfort level  Recent Flowsheet Documentation  Taken 2/15/2023 2000 by Odilon Young RN  Verbalizes/displays adequate comfort level or baseline comfort level:   Encourage patient to monitor pain and request assistance   Assess pain using appropriate pain scale   Administer analgesics based on type and severity of pain and evaluate response   Implement non-pharmacological measures as appropriate and evaluate response   Consider cultural and social influences on pain and pain management   Notify Licensed Independent Practitioner if interventions unsuccessful or patient reports new pain     Problem: Cardiovascular - Adult  Goal: Maintains optimal cardiac output and hemodynamic stability  Recent Flowsheet Documentation  Taken 2/15/2023 2000 by Odilon Young RN  Maintains optimal cardiac output and hemodynamic stability:   Monitor blood pressure and heart rate   Monitor urine output and notify Licensed Independent Practitioner for values outside of normal range   Assess for signs of decreased cardiac output   Administer fluid and/or volume expanders as ordered   Administer vasoactive medications as ordered  Goal: Absence of cardiac dysrhythmias or at baseline  Recent Flowsheet Documentation  Taken 2/15/2023 2000 by Odilon Young RN  Absence of cardiac dysrhythmias or at baseline:   Monitor cardiac rate and rhythm   Assess for signs of decreased cardiac output   Administer antiarrhythmia medication and electrolyte replacement as ordered     Problem: Genitourinary - Adult  Goal: Absence of urinary retention  Recent Flowsheet Documentation  Taken 2/15/2023 2000 by Suzanne Hook RN  Absence of urinary retention:   Assess patients ability to void and empty bladder   Monitor intake/output and perform bladder scan as needed     Problem: Metabolic/Fluid and Electrolytes - Adult  Goal: Electrolytes maintained within normal limits  Recent Flowsheet Documentation  Taken 2/15/2023 2000 by Suzanne Hook RN  Electrolytes maintained within normal limits:   Monitor labs and assess patient for signs and symptoms of electrolyte imbalances   Administer electrolyte replacement as ordered   Monitor response to electrolyte replacements, including repeat lab results as appropriate  Goal: Hemodynamic stability and optimal renal function maintained  Recent Flowsheet Documentation  Taken 2/15/2023 2000 by Suzanne Hook RN  Hemodynamic stability and optimal renal function maintained:   Monitor labs and assess for signs and symptoms of volume excess or deficit   Monitor intake, output and patient weight  Goal: Glucose maintained within prescribed range  Recent Flowsheet Documentation  Taken 2/15/2023 2000 by Suzanne Hook RN  Glucose maintained within prescribed range:   Monitor blood glucose as ordered   Assess for signs and symptoms of hyperglycemia and hypoglycemia   Administer ordered medications to maintain glucose within target range   Assess barriers to adequate nutritional intake and initiate nutrition consult as needed   Instruct patient on self management of diabetes and initiate consult as needed     Problem: Hematologic - Adult  Goal: Maintains hematologic stability  Recent Flowsheet Documentation  Taken 2/15/2023 2000 by Suzanne Hook RN  Maintains hematologic stability:   Assess for signs and symptoms of bleeding or hemorrhage   Administer blood products/factors as ordered   Monitor labs for bleeding or clotting disorders     Problem: Safety - Adult  Goal: Free from fall injury  2/16/2023 0908 by Steven Hills RN  Outcome: Progressing  Flowsheets (Taken 2/15/2023 0038 by Minerva Canada RN)  Free From Fall Injury: Instruct family/caregiver on patient safety  2/16/2023 0326 by Jael Coppola RN  Outcome: Progressing     Problem: Chronic Conditions and Co-morbidities  Goal: Patient's chronic conditions and co-morbidity symptoms are monitored and maintained or improved  2/16/2023 0908 by Steven Hills RN  Outcome: Progressing  Flowsheets (Taken 2/15/2023 2000 by Jael Coppola RN)  Care Plan - Patient's Chronic Conditions and Co-Morbidity Symptoms are Monitored and Maintained or Improved:   Monitor and assess patient's chronic conditions and comorbid symptoms for stability, deterioration, or improvement   Collaborate with multidisciplinary team to address chronic and comorbid conditions and prevent exacerbation or deterioration   Update acute care plan with appropriate goals if chronic or comorbid symptoms are exacerbated and prevent overall improvement and discharge  2/16/2023 0326 by Jael Coppola RN  Outcome: Progressing  Flowsheets (Taken 2/15/2023 2000)  Care Plan - Patient's Chronic Conditions and Co-Morbidity Symptoms are Monitored and Maintained or Improved:   Monitor and assess patient's chronic conditions and comorbid symptoms for stability, deterioration, or improvement   Collaborate with multidisciplinary team to address chronic and comorbid conditions and prevent exacerbation or deterioration   Update acute care plan with appropriate goals if chronic or comorbid symptoms are exacerbated and prevent overall improvement and discharge

## 2023-02-16 NOTE — PLAN OF CARE
Problem: Safety - Adult  Goal: Free from fall injury  Outcome: Progressing     Problem: Chronic Conditions and Co-morbidities  Goal: Patient's chronic conditions and co-morbidity symptoms are monitored and maintained or improved  Outcome: Progressing  Flowsheets (Taken 2/15/2023 2000)  Care Plan - Patient's Chronic Conditions and Co-Morbidity Symptoms are Monitored and Maintained or Improved:   Monitor and assess patient's chronic conditions and comorbid symptoms for stability, deterioration, or improvement   Collaborate with multidisciplinary team to address chronic and comorbid conditions and prevent exacerbation or deterioration   Update acute care plan with appropriate goals if chronic or comorbid symptoms are exacerbated and prevent overall improvement and discharge

## 2023-02-17 LAB
ALBUMIN SERPL BCG-MCNC: 3.2 G/DL (ref 3.5–5.1)
ALP SERPL-CCNC: 1579 U/L (ref 38–126)
ALT SERPL W/O P-5'-P-CCNC: 48 U/L (ref 11–66)
ANION GAP SERPL CALC-SCNC: 15 MEQ/L (ref 8–16)
ANISOCYTOSIS BLD QL SMEAR: PRESENT
AST SERPL-CCNC: 41 U/L (ref 5–40)
BASOPHILS ABSOLUTE: 0 THOU/MM3 (ref 0–0.1)
BASOPHILS NFR BLD AUTO: 0.2 %
BILIRUB SERPL-MCNC: 8.3 MG/DL (ref 0.3–1.2)
BUN SERPL-MCNC: 35 MG/DL (ref 7–22)
CALCIUM SERPL-MCNC: 8.4 MG/DL (ref 8.5–10.5)
CHLORIDE SERPL-SCNC: 98 MEQ/L (ref 98–111)
CO2 SERPL-SCNC: 24 MEQ/L (ref 23–33)
CREAT SERPL-MCNC: 3.3 MG/DL (ref 0.4–1.2)
DEPRECATED RDW RBC AUTO: 96.2 FL (ref 35–45)
EKG ATRIAL RATE: 85 BPM
EKG P AXIS: 31 DEGREES
EKG P-R INTERVAL: 184 MS
EKG Q-T INTERVAL: 322 MS
EKG Q-T INTERVAL: 432 MS
EKG QRS DURATION: 80 MS
EKG QRS DURATION: 82 MS
EKG QTC CALCULATION (BAZETT): 467 MS
EKG QTC CALCULATION (BAZETT): 514 MS
EKG R AXIS: -52 DEGREES
EKG R AXIS: -59 DEGREES
EKG T AXIS: 83 DEGREES
EKG T AXIS: 88 DEGREES
EKG VENTRICULAR RATE: 127 BPM
EKG VENTRICULAR RATE: 85 BPM
EOSINOPHIL NFR BLD AUTO: 0 %
EOSINOPHILS ABSOLUTE: 0 THOU/MM3 (ref 0–0.4)
ERYTHROCYTE [DISTWIDTH] IN BLOOD BY AUTOMATED COUNT: 28.5 % (ref 11.5–14.5)
GFR SERPL CREATININE-BSD FRML MDRD: 21 ML/MIN/1.73M2
GLUCOSE SERPL-MCNC: 100 MG/DL (ref 70–108)
HCT VFR BLD AUTO: 29.3 % (ref 42–52)
HGB BLD-MCNC: 10.1 GM/DL (ref 14–18)
IMM GRANULOCYTES # BLD AUTO: 0.02 THOU/MM3 (ref 0–0.07)
IMM GRANULOCYTES NFR BLD AUTO: 0.3 %
LYMPHOCYTES ABSOLUTE: 0.8 THOU/MM3 (ref 1–4.8)
LYMPHOCYTES NFR BLD AUTO: 13.9 %
MAGNESIUM SERPL-MCNC: 2.1 MG/DL (ref 1.6–2.4)
MCH RBC QN AUTO: 32.8 PG (ref 26–33)
MCHC RBC AUTO-ENTMCNC: 34.5 GM/DL (ref 32.2–35.5)
MCV RBC AUTO: 95.1 FL (ref 80–94)
MONOCYTES ABSOLUTE: 0.2 THOU/MM3 (ref 0.4–1.3)
MONOCYTES NFR BLD AUTO: 3.2 %
NEUTROPHILS NFR BLD AUTO: 82.4 %
NRBC BLD AUTO-RTO: 3 /100 WBC
PLATELET # BLD AUTO: 71 THOU/MM3 (ref 130–400)
PLATELET BLD QL SMEAR: ABNORMAL
PMV BLD AUTO: ABNORMAL FL (ref 9.4–12.4)
POIKILOCYTES: ABNORMAL
POLYCHROMASIA BLD QL SMEAR: ABNORMAL
POTASSIUM SERPL-SCNC: 4.5 MEQ/L (ref 3.5–5.2)
PROT SERPL-MCNC: 4.5 G/DL (ref 6.1–8)
RBC # BLD AUTO: 3.08 MILL/MM3 (ref 4.7–6.1)
SCAN OF BLOOD SMEAR: NORMAL
SCHISTOCYTES BLD QL SMEAR: ABNORMAL
SEGMENTED NEUTROPHILS ABSOLUTE COUNT: 4.9 THOU/MM3 (ref 1.8–7.7)
SODIUM SERPL-SCNC: 137 MEQ/L (ref 135–145)
STOMATOCYTES: ABNORMAL
TARGETS BLD QL SMEAR: ABNORMAL
VARIANT LYMPHS BLD QL SMEAR: ABNORMAL %
WBC # BLD AUTO: 6 THOU/MM3 (ref 4.8–10.8)

## 2023-02-17 PROCEDURE — 85025 COMPLETE CBC W/AUTO DIFF WBC: CPT

## 2023-02-17 PROCEDURE — 80053 COMPREHEN METABOLIC PANEL: CPT

## 2023-02-17 PROCEDURE — 90935 HEMODIALYSIS ONE EVALUATION: CPT | Performed by: INTERNAL MEDICINE

## 2023-02-17 PROCEDURE — 6370000000 HC RX 637 (ALT 250 FOR IP): Performed by: SURGERY

## 2023-02-17 PROCEDURE — 2100000000 HC CCU R&B

## 2023-02-17 PROCEDURE — 90935 HEMODIALYSIS ONE EVALUATION: CPT

## 2023-02-17 PROCEDURE — 83735 ASSAY OF MAGNESIUM: CPT

## 2023-02-17 PROCEDURE — 6360000002 HC RX W HCPCS: Performed by: INTERNAL MEDICINE

## 2023-02-17 PROCEDURE — 6370000000 HC RX 637 (ALT 250 FOR IP): Performed by: STUDENT IN AN ORGANIZED HEALTH CARE EDUCATION/TRAINING PROGRAM

## 2023-02-17 PROCEDURE — 6370000000 HC RX 637 (ALT 250 FOR IP)

## 2023-02-17 PROCEDURE — 2580000003 HC RX 258

## 2023-02-17 PROCEDURE — 99291 CRITICAL CARE FIRST HOUR: CPT | Performed by: SURGERY

## 2023-02-17 PROCEDURE — 6370000000 HC RX 637 (ALT 250 FOR IP): Performed by: NURSE PRACTITIONER

## 2023-02-17 PROCEDURE — 36415 COLL VENOUS BLD VENIPUNCTURE: CPT

## 2023-02-17 RX ADMIN — SODIUM CHLORIDE, PRESERVATIVE FREE 10 ML: 5 INJECTION INTRAVENOUS at 19:51

## 2023-02-17 RX ADMIN — OXYCODONE 5 MG: 5 TABLET ORAL at 18:42

## 2023-02-17 RX ADMIN — SODIUM BICARBONATE 650 MG: 650 TABLET ORAL at 19:51

## 2023-02-17 RX ADMIN — MIDODRINE HYDROCHLORIDE 20 MG: 10 TABLET ORAL at 13:49

## 2023-02-17 RX ADMIN — ZOLPIDEM TARTRATE 5 MG: 5 TABLET ORAL at 19:51

## 2023-02-17 RX ADMIN — EPOETIN ALFA-EPBX 6000 UNITS: 3000 INJECTION, SOLUTION INTRAVENOUS; SUBCUTANEOUS at 11:32

## 2023-02-17 RX ADMIN — MIDODRINE HYDROCHLORIDE 20 MG: 10 TABLET ORAL at 07:32

## 2023-02-17 RX ADMIN — MIDODRINE HYDROCHLORIDE 20 MG: 10 TABLET ORAL at 01:24

## 2023-02-17 RX ADMIN — MIDODRINE HYDROCHLORIDE 20 MG: 10 TABLET ORAL at 19:51

## 2023-02-17 ASSESSMENT — PAIN SCALES - GENERAL
PAINLEVEL_OUTOF10: 8
PAINLEVEL_OUTOF10: 2
PAINLEVEL_OUTOF10: 2
PAINLEVEL_OUTOF10: 0

## 2023-02-17 ASSESSMENT — PAIN DESCRIPTION - DESCRIPTORS: DESCRIPTORS: ACHING

## 2023-02-17 ASSESSMENT — PAIN - FUNCTIONAL ASSESSMENT: PAIN_FUNCTIONAL_ASSESSMENT: ACTIVITIES ARE NOT PREVENTED

## 2023-02-17 ASSESSMENT — PAIN DESCRIPTION - ORIENTATION: ORIENTATION: MID

## 2023-02-17 ASSESSMENT — PAIN DESCRIPTION - LOCATION: LOCATION: ABDOMEN

## 2023-02-17 ASSESSMENT — PAIN SCALES - WONG BAKER: WONGBAKER_NUMERICALRESPONSE: 0

## 2023-02-17 NOTE — PROGRESS NOTES
[facility]  PHYSICAL THERAPY MISSED TREATMENT NOTE  [facility] 4B    Date: 2023  Patient Name: Lora Ozuna        MRN: [id number]   : 1960  (63 y.o.)  Gender: male   Referring Practitioner: Saadia Calderon DO  Diagnosis: Hypotension         REASON FOR MISSED TREATMENT:  Missed Treat.       Pts BP 79/64 + pt on dialysis, not appropriate for PT at this time

## 2023-02-17 NOTE — PROGRESS NOTES
[facility]  OCCUPATIONAL THERAPY MISSED TREATMENT NOTE  [facility] 4B  4B-03/003-A      Date: 2023  Patient Name: Cathy Lopes        CSN: [id number]   : 1960  (63 y.o.)  Gender: male   Referring Practitioner: Cheryl Montemayor DO  Diagnosis: hypotension         REASON FOR MISSED TREATMENT:  Pt in room receiving dialysis currently upon therapist arrival, will check back later this date as able.

## 2023-02-17 NOTE — CARE COORDINATION
2/17/23, 11:28 AM EST    DISCHARGE PLANNING EVALUATION     SW spoke with pts nurse this am, states physician was planning to call pts oncologist from [place] as requested by pts daughter concerning chemo and when pt will be able to restart his treatment. Nurse states pt should be getting close to discharge to [facility]. SW spoke with [facility] will not accept pt receiving chemo however, pt takes a chemo pill and will check on that. Sw call [facility] again, left message about pts daughters coming Saturday and want to complete POA and have pts Will witnessed. PC received from Jourdan Colbert with [facility], states [facility] can accept some chemo medication and will need to know what is prescribed prior to accepting. Pc received from Nurse, states pt states he takes 5 chemo pills and one shot and cannot say what name they are. Nurse states physician may call his oncologist Dr. Armond Chen. Cassie Tracy from [facility] or place new consult for Oncology.

## 2023-02-17 NOTE — FLOWSHEET NOTE
02/17/23 1125   Vital Signs   BP 98/77   Temp 97.5 °F (36.4 °C)   Resp 12   SpO2 93 %   Weight 129 lb 6.6 oz (58.7 kg)   Weight Method Bed scale   Percent Weight Change 0   Pain Assessment   Pain Assessment None - Denies Pain   Post-Hemodialysis Assessment   Post-Treatment Procedures Blood returned;Catheter Capped, clamped with Saline x2 ports   Machine Disinfection Process Acid/Vinegar Clean;Heat Disinfect; Exterior Machine Disinfection   Rinseback Volume (ml) 400 ml   Blood Volume Processed (Liters) 79.2 l/min   Dialyzer Clearance Lightly streaked   Duration of Treatment (minutes) 210 minutes   Hemodialysis Output (ml) 400 ml   Tolerated Treatment Fair   Interventions Taken Medication   Patient Response to Treatment toll fair   Bilateral Breath Sounds Clear;Diminished   Edema None   Time Off 1109   Patient Disposition Remain in ICU/ED   completed 3.5 hr HD TX and removed No fluid. pt tolerated HD TX fair. pt received 20mg Midodrine and receiving 8mcg of Levophed during for hypotension. CVC dressing is clean, dry and intact. report given to primary nurse, Dimitris Emanuel RN. record completed and printed to be scanned into pt's EMR.

## 2023-02-17 NOTE — PLAN OF CARE
Problem: Safety - Adult  Goal: Free from fall injury  Outcome: Progressing     Problem: Chronic Conditions and Co-morbidities  Goal: Patient's chronic conditions and co-morbidity symptoms are monitored and maintained or improved  Outcome: Progressing  Flowsheets (Taken 2/17/2023 0730)  Care Plan - Patient's Chronic Conditions and Co-Morbidity Symptoms are Monitored and Maintained or Improved:   Monitor and assess patient's chronic conditions and comorbid symptoms for stability, deterioration, or improvement   Collaborate with multidisciplinary team to address chronic and comorbid conditions and prevent exacerbation or deterioration   Update acute care plan with appropriate goals if chronic or comorbid symptoms are exacerbated and prevent overall improvement and discharge     Problem: Nutrition Deficit:  Goal: Optimize nutritional status  Outcome: Progressing  Flowsheets (Taken 2/17/2023 1147 by Christiano Saleh, RD, LD)  Nutrient intake appropriate for improving, restoring, or maintaining nutritional needs:   Assess nutritional status and recommend course of action   Monitor oral intake, labs, and treatment plans   Recommend appropriate diets, oral nutritional supplements, and vitamin/mineral supplements    Care plan reviewed with patient and family. Patient and family verbalize understanding of the plan of care and contribute to goal setting.

## 2023-02-17 NOTE — PLAN OF CARE
Problem: Safety - Adult  Goal: Free from fall injury  Outcome: Progressing     Problem: Chronic Conditions and Co-morbidities  Goal: Patient's chronic conditions and co-morbidity symptoms are monitored and maintained or improved  Outcome: Progressing  Flowsheets (Taken 2/16/2023 2000)  Care Plan - Patient's Chronic Conditions and Co-Morbidity Symptoms are Monitored and Maintained or Improved: Monitor and assess patient's chronic conditions and comorbid symptoms for stability, deterioration, or improvement

## 2023-02-17 NOTE — PROGRESS NOTES
[facility]   Nephrology progress note  2/17/2023, 9:02 AM      Pt Name:    Cathy Lopes  MRN:     [id number]     YOB: 1960  Admit Date:    2/6/2023  4:58 PM    Chief Complaint: Nephrology following for ESRD and HD    Subjective:  Patient was seen and examined this morning  Seen earlier today during rounds in ICU  On Levophed  Undergoing hemodialysis treatment at bedside      Objective:  24HR INTAKE/OUTPUT:    Intake/Output Summary (Last 24 hours) at 2/17/2023 0902  Last data filed at 2/17/2023 0304  Gross per 24 hour   Intake 351.2 ml   Output --   Net 351.2 ml           I/O last 3 completed shifts: In: 541.4 [P.O.:360; I.V.:181.4]  Out: -   No intake/output data recorded. Admission weight: 129 lb 6.6 oz (58.7 kg)  Wt Readings from Last 3 Encounters:   02/17/23 129 lb 6.6 oz (58.7 kg)   01/31/23 133 lb 12.8 oz (60.7 kg)   01/24/23 132 lb 3.2 oz (60 kg)        Vitals : Blood pressure in systolic 51-41E, MAP in 17B  Vitals:    02/17/23 0730 02/17/23 0739 02/17/23 0745 02/17/23 0800   BP: 91/68 91/68 93/68 85/62   Pulse: 77 77 77 78   Resp: 19  22 15   Temp:  97.8 °F (36.6 °C) 97.8 °F (36.6 °C)    TempSrc:   Oral    SpO2: 93%  94% 94%   Weight:       Height:           Physical examination  General Appearance: Ill-appearing, no acute distress  Awake and alert.   Mouth/Throat: Oral mucosa moist  Neck: No JVD  Lungs:  no use of accessory muscles  GI: soft, non-tender, no guarding  Extremities: No pitting LE edema    Medications:  Infusion:    norepinephrine 8 mcg/min (02/16/23 2040)    dextrose      sodium chloride       Meds:    epoetin jose-epbx  6,000 Units SubCUTAneous Once per day on Mon Wed Fri    fludrocortisone  0.2 mg Oral Daily    midodrine  20 mg Oral Q6H    zolpidem  5 mg Oral Nightly    famotidine (PEPCID) injection  20 mg IntraVENous Daily    calcium replacement protocol   Other RX Placeholder    sodium chloride flush  5-40 mL IntraVENous 2 times per day    sodium bicarbonate  650 mg Oral BID    vitamin D  50,000 Units Oral Weekly     Meds prn: oxyCODONE **OR** oxyCODONE, morphine, ondansetron, aluminum & magnesium hydroxide-simethicone, promethazine, diphenhydrAMINE, glucose, dextrose bolus **OR** dextrose bolus, glucagon (rDNA), dextrose, sodium chloride flush, sodium chloride, polyethylene glycol, albuterol     Lab Data :  CBC:   Recent Labs     02/15/23  0500 02/16/23  0500 02/17/23  0503   WBC 4.9 5.2 6.0   HGB 9.2* 9.7* 10.1*   HCT 26.5* 28.9* 29.3*   PLT 71* 75* 71*       CMP:  Recent Labs     02/15/23  0500 02/16/23  0500 02/16/23  1649 02/17/23  0503    138 137 137   K 3.3* 4.2 4.3  4.3 4.5   CL 96* 98 97* 98   CO2 23 25 25 24   BUN 38* 24* 29* 35*   CREATININE 3.8* 2.3* 2.8* 3.3*   GLUCOSE 64* 71 116* 100   CALCIUM 8.3* 8.1* 8.1* 8.4*   MG 2.2 2.3  --  2.1   PHOS  --   --  4.1  --        Hepatic:   Recent Labs     02/15/23  0500 02/16/23  0500 02/17/23  0503   LABALBU 3.3* 3.4* 3.2*   AST 44* 60* 41*   ALT 49 52 48   BILITOT 7.2* 8.1* 8.3*   ALKPHOS 1,192* 1,459* 1,579*           Assessment and Plan:  ESRD on hemodialysis. 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 Undergoing hemodialysis treatment at bedside  Patient remains on low-dose Levophed  Chronically hypotensive and asymptomatic    Metabolic acidosis. Resolved  Volume status. Reasonably stable. No evidence of peripheral edema. Did have paracentesis yesterday with 3 L removed  Chronic hypotension. On midodrine, continue with Florinef. 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 Continue with Retacrit  Chronic thrombocytopenia    Discussed with HD RN    Kaylee Zhao MD  [facility]    This report has been created using voice recognition software.  It may contain minor errors which are inherent in voice recognition technology

## 2023-02-17 NOTE — PROGRESS NOTES
Assessment: This was a spiritual care encounter in response to  request for ACP conversation with patient tomorrow, 2/18/23, when daughter is visiting patient. Patient, Terrial Gravely, was oriented and  alert, eating dinner at the time of  visit. Interventions:   provided active listening and facilitated reflection about illness and its impact.  provided information regarding  services, including availability for the completion of advance directives on 2/18, when patient's daughter is visiting.  also conveyed this to nursing staff, who agreed to call  when daughter is available. Outcomes:  Patient shared emotions regarding hospitalization and illness. He expressed gratitude for visit and plan to complete AD paperwork tomorrow. Plan:  Spiritual care team will remain available to support patient and family, PRN. [address] [place] [address]. [address] [place] [address] Alfredo Richardson, [address]  [phone]

## 2023-02-17 NOTE — CARE COORDINATION
2/17/23, 9:01 AM EST    DISCHARGE ON GOING EVALUATION    [facility] day: 11  Location: -03/003-A Reason for admit: Hypotension [I95.9]  Hypotension, unspecified hypotension type [I95.9]   Procedure:   2/6 CXR: Small left pleural effusion with left basal atelectasis  2/6 CVC LIJ  2/6 Repeat CXR: Left basilar subsegmental atelectasis  2/7 Paracentesis: 5.5L removed. 2/12 KUB: Mildly dilated small bowel loops with distal bowel gas suspicious for paralytic ileus. 2/13 CT abd/pelvis: Subtle omental caking/enhancement best visualized within the upper abdomen along the anterior abdominal wall with differential considerations including peritoneal carcinomatosis. Large volume intra-abdominal ascites. No bowel obstruction, perforation, or discrete fluid collection observed. Small bilateral pleural effusions and bilateral lower lobe consolidation, left greater than right. Question mild sclerosis involving the skeletal structures diffusely. No discrete bony lesion observed. 2/14 KUB: Ascites with underlying nonspecific bowel gas pattern. 2/14 CXR: Interval removal of nasogastric tube. Stable positioning of left IJ single lumen catheter and dual-lumen catheter via right IJ approach. Bilateral hypoventilatory changes. Normal heart size and central bony vasculature. Minimally increased left basilar atelectasis. 2/14 ECHO: Ejection fraction is visually estimated at 55%. Overall left ventricular function is normal.  Mild concentric left ventricular hypertrophy. Left pleural effusion. 2/15 US Abdomen limited 1. Large volume ascites. 2. Mild dilated common bile duct up to 0.7 cm.   3. Small gallstones. 4. Diffuse gallbladder wall thickening, nonspecific. This can be due to   non-biliary causes such as ascites, low protein state, or other numerous   etiologies. HIDA scan may be helpful. 5. Patent hepatic and portal venous vessels.  Recanalized paraumbilical   vein, which can be seen with portal hypertension  2/16 US Guided Paracentesis Successful ultrasound-guided paracentesis. 2/17 HD  Barriers to Discharge: Creatinine 3.3,Nephrology following for HD, Hgb 10.1, PT/OT,  Albuterol nebs, Retacrit, Pepcid, Florinef, Midodrine, Levophed drip, pain and nausea control. PCP: Shirley Peralta DO  Readmission Risk Score: 20.8%  Patient Goals/Plan/Treatment Preferences: From home with sig other, [place] following, checking on chemotherapy and when it is to be re-started. SW following. Will monitor.

## 2023-02-17 NOTE — PROGRESS NOTES
Comprehensive Nutrition Assessment    Type and Reason for Visit:  Reassess (po/supplement)    Nutrition Recommendations/Plan:   Consider renal MVI, folbee plus. ONS: Nepro BID. Continue current diet. Malnutrition Assessment:  Malnutrition Status:  Severe malnutrition (02/17/23 1153)    Context:  Chronic Illness     Findings of the 6 clinical characteristics of malnutrition:  Energy Intake:  75% or less estimated energy requirements for 1 month or longer  Weight Loss:   (patient with ascites, receives paracentesis and dialysis; 10.9% weight loss noted in past month)     Body Fat Loss:  Severe body fat loss Orbital, Triceps, Fat Overlying Ribs, Buccal region   Muscle Mass Loss:  Severe muscle mass loss Temples (temporalis), Clavicles (pectoralis & deltoids), Thigh (quadraceps), Calf (gastrocnemius)  Fluid Accumulation:  No significant fluid accumulation Ascites   Strength:  Not Performed    Nutrition Assessment:     Pt improving from a nutritional standpoint AEB diet resumed, eating more, and receptive to trial ONS. Remains at risk for further nutritional compromise r/t severe malnutrition, many meal intake less than 75%, admit with acute on chronic hypotension, vtach, recurrent ascites-s/p paracentesis 2/7/23-5.5 liters, 2/16/23-3 liters, suspected liver cirrhosis, increased nutrient needs to support known losses with dialysis, skin integrity issues, low BMI, systemic amyloidosis- on chemotherapy, and underlying medical condition (Hx: systemic amyloidosis, ESRD-hemodialysis started 12/2022, recurrent ascites-s/p paracentesis 12/21/22-6 liters, 12/26/22-5 liters, double hernia repair, smoking, CHF). Nutrition Related Findings:    Pt. Report/Treatments/Miscellaneous: Patient seen earlier this morning, receiving dialysis, wasn't able to eat breakfast prior. Reports he had paracentesis yesterday, ~ 3 liters removed, so doesn't feel as full. Meal intake variable.   Discussed no strawberry ONS available at this time here d/t supply chain issues. Agreed to trial different ONS. GI Status: BM 2/16/23  Pertinent Labs: 2/17/23: Sodium 137, Potassium 4.5, BUN 35, Creatinine 3.3, Mg 2.1, Glucose 100, Alk Phos 1579, AST 41  Pertinent Meds: pepcid, sodium bicarbonate, vitamin D, levophed    Wound Type: Pressure Injury, Stage I (coccyx)       Current Nutrition Intake & Therapies:    Average Meal Intake: %, 1-25%, 51-75%  Average Supplements Intake:  (not receiving)  ADULT DIET; Regular; 4 carb choices (60 gm/meal); Low Fat/Low Chol/High Fiber/TAMERA; Low Sodium (2 gm)  ADULT ORAL NUTRITION SUPPLEMENT; Breakfast, Dinner; Renal Oral Supplement    Anthropometric Measures:  Height: 5' 10\" (177.8 cm)  Ideal Body Weight (IBW): 166 lbs (75 kg)    Admission Body Weight: 129 lb 7 oz (58.7 kg) (2/6; No Edema)  Current Body Weight: 129 lb 6.6 oz (58.7 kg) (2/17/23 no edema, receiving hemodialysis at bedside, s/p paracentesis 2/16/23), 74.2 %  Weight Source: Bed Scale  Current BMI (kg/m2): 18.6  Usual Body Weight:  (~ 155# per pt. Per EMr: 9/6/22: 135#, 11/8/22: 130#3oz, 1/21/23: 129#12.8oz)     Weight Adjustment For: No Adjustment                 BMI Categories: Normal Weight (BMI 18.5-24. 9)    Estimated Daily Nutrient Needs:  Energy Requirements Based On: Kcal/kg  Weight Used for Energy Requirements: Current (55.9 kg)  Energy (kcal/day): 9012-1429 (30-35 g/kg)  Weight Used for Protein Requirements: Current (55.9 kg)  Protein (g/day): 84 grams or more         Nutrition Diagnosis:   Severe malnutrition related to inadequate protein-energy intake as evidenced by Criteria as identified in malnutrition assessment    Nutrition Interventions:   Food and/or Nutrient Delivery: Continue Current Diet, Start Oral Nutrition Supplement  Nutrition Education/Counseling: Education initiated (Encouraged oral intake, small frequent meals,  and ONS use.)  Coordination of Nutrition Care: Continue to monitor while inpatient       Goals:  Previous Goal Met: Progressing toward Goal(s)  Goals: PO intake 75% or greater, by next RD assessment       Nutrition Monitoring and Evaluation:   Behavioral-Environmental Outcomes: None Identified  Food/Nutrient Intake Outcomes: Food and Nutrient Intake, Supplement Intake  Physical Signs/Symptoms Outcomes: Biochemical Data, GI Status, Fluid Status or Edema, Meal Time Behavior, Nutrition Focused Physical Findings, Skin, Weight    Discharge Planning:     Too soon to determine     Willa Pagan RD, LD  Contact: [phone]

## 2023-02-18 VITALS
TEMPERATURE: 97.7 F | HEIGHT: 70 IN | OXYGEN SATURATION: 91 % | BODY MASS INDEX: 18.53 KG/M2 | SYSTOLIC BLOOD PRESSURE: 86 MMHG | RESPIRATION RATE: 17 BRPM | WEIGHT: 129.41 LBS | DIASTOLIC BLOOD PRESSURE: 61 MMHG | HEART RATE: 83 BPM

## 2023-02-18 LAB
ALBUMIN SERPL BCG-MCNC: 3 G/DL (ref 3.5–5.1)
ALP SERPL-CCNC: 1594 U/L (ref 38–126)
ALT SERPL W/O P-5'-P-CCNC: 45 U/L (ref 11–66)
ANION GAP SERPL CALC-SCNC: 13 MEQ/L (ref 8–16)
ANISOCYTOSIS BLD QL SMEAR: PRESENT
AST SERPL-CCNC: 37 U/L (ref 5–40)
BASOPHILS ABSOLUTE: 0 THOU/MM3 (ref 0–0.1)
BASOPHILS NFR BLD AUTO: 0.2 %
BILIRUB CONJ SERPL-MCNC: 5.1 MG/DL (ref 0–0.3)
BILIRUB SERPL-MCNC: 9.2 MG/DL (ref 0.3–1.2)
BUN SERPL-MCNC: 25 MG/DL (ref 7–22)
CA-I BLD ISE-SCNC: 1.01 MMOL/L (ref 1.12–1.32)
CALCIUM SERPL-MCNC: 8.3 MG/DL (ref 8.5–10.5)
CHLORIDE SERPL-SCNC: 98 MEQ/L (ref 98–111)
CO2 SERPL-SCNC: 26 MEQ/L (ref 23–33)
CREAT SERPL-MCNC: 2.3 MG/DL (ref 0.4–1.2)
DEPRECATED RDW RBC AUTO: 99.6 FL (ref 35–45)
EOSINOPHIL NFR BLD AUTO: 0 %
EOSINOPHILS ABSOLUTE: 0 THOU/MM3 (ref 0–0.4)
ERYTHROCYTE [DISTWIDTH] IN BLOOD BY AUTOMATED COUNT: 28.8 % (ref 11.5–14.5)
GFR SERPL CREATININE-BSD FRML MDRD: 32 ML/MIN/1.73M2
GLUCOSE SERPL-MCNC: 102 MG/DL (ref 70–108)
HCT VFR BLD AUTO: 30.5 % (ref 42–52)
HGB BLD-MCNC: 10.2 GM/DL (ref 14–18)
HOWELL-JOLLY BOD BLD QL SMEAR: PRESENT
IMM GRANULOCYTES # BLD AUTO: 0.01 THOU/MM3 (ref 0–0.07)
IMM GRANULOCYTES NFR BLD AUTO: 0.2 %
LYMPHOCYTES ABSOLUTE: 0.8 THOU/MM3 (ref 1–4.8)
LYMPHOCYTES NFR BLD AUTO: 18 %
MAGNESIUM SERPL-MCNC: 2 MG/DL (ref 1.6–2.4)
MCH RBC QN AUTO: 32.4 PG (ref 26–33)
MCHC RBC AUTO-ENTMCNC: 33.4 GM/DL (ref 32.2–35.5)
MCV RBC AUTO: 96.8 FL (ref 80–94)
MONOCYTES ABSOLUTE: 0.2 THOU/MM3 (ref 0.4–1.3)
MONOCYTES NFR BLD AUTO: 3.4 %
NEUTROPHILS NFR BLD AUTO: 78.2 %
NRBC BLD AUTO-RTO: 3 /100 WBC
PLATELET # BLD AUTO: 49 THOU/MM3 (ref 130–400)
PLATELET BLD QL SMEAR: ABNORMAL
PMV BLD AUTO: ABNORMAL FL (ref 9.4–12.4)
POIKILOCYTES: ABNORMAL
POLYCHROMASIA BLD QL SMEAR: ABNORMAL
POTASSIUM SERPL-SCNC: 4.3 MEQ/L (ref 3.5–5.2)
PROT SERPL-MCNC: 4.6 G/DL (ref 6.1–8)
RBC # BLD AUTO: 3.15 MILL/MM3 (ref 4.7–6.1)
SCHISTOCYTES BLD QL SMEAR: ABNORMAL
SEGMENTED NEUTROPHILS ABSOLUTE COUNT: 3.7 THOU/MM3 (ref 1.8–7.7)
SODIUM SERPL-SCNC: 137 MEQ/L (ref 135–145)
STOMATOCYTES: ABNORMAL
TARGETS BLD QL SMEAR: ABNORMAL
WBC # BLD AUTO: 4.7 THOU/MM3 (ref 4.8–10.8)

## 2023-02-18 PROCEDURE — 6370000000 HC RX 637 (ALT 250 FOR IP)

## 2023-02-18 PROCEDURE — 82248 BILIRUBIN DIRECT: CPT

## 2023-02-18 PROCEDURE — 99239 HOSP IP/OBS DSCHRG MGMT >30: CPT | Performed by: SURGERY

## 2023-02-18 PROCEDURE — 6370000000 HC RX 637 (ALT 250 FOR IP): Performed by: NURSE PRACTITIONER

## 2023-02-18 PROCEDURE — 99291 CRITICAL CARE FIRST HOUR: CPT | Performed by: SURGERY

## 2023-02-18 PROCEDURE — 85025 COMPLETE CBC W/AUTO DIFF WBC: CPT

## 2023-02-18 PROCEDURE — A4216 STERILE WATER/SALINE, 10 ML: HCPCS | Performed by: INTERNAL MEDICINE

## 2023-02-18 PROCEDURE — 2500000003 HC RX 250 WO HCPCS

## 2023-02-18 PROCEDURE — 83735 ASSAY OF MAGNESIUM: CPT

## 2023-02-18 PROCEDURE — 82330 ASSAY OF CALCIUM: CPT

## 2023-02-18 PROCEDURE — 6360000002 HC RX W HCPCS

## 2023-02-18 PROCEDURE — 99232 SBSQ HOSP IP/OBS MODERATE 35: CPT | Performed by: INTERNAL MEDICINE

## 2023-02-18 PROCEDURE — 80053 COMPREHEN METABOLIC PANEL: CPT

## 2023-02-18 PROCEDURE — 6370000000 HC RX 637 (ALT 250 FOR IP): Performed by: INTERNAL MEDICINE

## 2023-02-18 PROCEDURE — 2500000003 HC RX 250 WO HCPCS: Performed by: INTERNAL MEDICINE

## 2023-02-18 PROCEDURE — 36415 COLL VENOUS BLD VENIPUNCTURE: CPT

## 2023-02-18 PROCEDURE — 2580000003 HC RX 258

## 2023-02-18 PROCEDURE — 6360000002 HC RX W HCPCS: Performed by: STUDENT IN AN ORGANIZED HEALTH CARE EDUCATION/TRAINING PROGRAM

## 2023-02-18 PROCEDURE — 2580000003 HC RX 258: Performed by: INTERNAL MEDICINE

## 2023-02-18 RX ORDER — FAMOTIDINE 20 MG/1
20 TABLET, FILM COATED ORAL DAILY
Status: DISCONTINUED | OUTPATIENT
Start: 2023-02-18 | End: 2023-02-18 | Stop reason: CLARIF

## 2023-02-18 RX ORDER — CALCIUM GLUCONATE 20 MG/ML
2000 INJECTION, SOLUTION INTRAVENOUS ONCE
Status: COMPLETED | OUTPATIENT
Start: 2023-02-18 | End: 2023-02-18

## 2023-02-18 RX ORDER — METHYLPREDNISOLONE SODIUM SUCCINATE 125 MG/2ML
125 INJECTION, POWDER, LYOPHILIZED, FOR SOLUTION INTRAMUSCULAR; INTRAVENOUS EVERY 8 HOURS
Status: DISCONTINUED | OUTPATIENT
Start: 2023-02-18 | End: 2023-02-19 | Stop reason: HOSPADM

## 2023-02-18 RX ORDER — MIRTAZAPINE 7.5 MG/1
7.5 TABLET, FILM COATED ORAL NIGHTLY
Status: DISCONTINUED | OUTPATIENT
Start: 2023-02-18 | End: 2023-02-19 | Stop reason: HOSPADM

## 2023-02-18 RX ADMIN — MIDODRINE HYDROCHLORIDE 20 MG: 10 TABLET ORAL at 20:04

## 2023-02-18 RX ADMIN — SODIUM BICARBONATE 650 MG: 650 TABLET ORAL at 20:04

## 2023-02-18 RX ADMIN — FLUDROCORTISONE ACETATE 0.2 MG: 0.1 TABLET ORAL at 07:41

## 2023-02-18 RX ADMIN — FAMOTIDINE 20 MG: 10 INJECTION, SOLUTION INTRAVENOUS at 07:41

## 2023-02-18 RX ADMIN — MIDODRINE HYDROCHLORIDE 20 MG: 10 TABLET ORAL at 01:36

## 2023-02-18 RX ADMIN — SODIUM BICARBONATE 650 MG: 650 TABLET ORAL at 07:41

## 2023-02-18 RX ADMIN — METHYLPREDNISOLONE SODIUM SUCCINATE 125 MG: 125 INJECTION, POWDER, FOR SOLUTION INTRAMUSCULAR; INTRAVENOUS at 17:00

## 2023-02-18 RX ADMIN — Medication 10 MCG/MIN: at 01:35

## 2023-02-18 RX ADMIN — MIDODRINE HYDROCHLORIDE 20 MG: 10 TABLET ORAL at 14:43

## 2023-02-18 RX ADMIN — SODIUM CHLORIDE, PRESERVATIVE FREE 10 ML: 5 INJECTION INTRAVENOUS at 20:05

## 2023-02-18 RX ADMIN — MIDODRINE HYDROCHLORIDE 20 MG: 10 TABLET ORAL at 07:41

## 2023-02-18 RX ADMIN — CALCIUM GLUCONATE 2000 MG: 20 INJECTION, SOLUTION INTRAVENOUS at 12:47

## 2023-02-18 RX ADMIN — SODIUM CHLORIDE, PRESERVATIVE FREE 10 ML: 5 INJECTION INTRAVENOUS at 07:41

## 2023-02-18 ASSESSMENT — PAIN SCALES - WONG BAKER
WONGBAKER_NUMERICALRESPONSE: 0

## 2023-02-18 ASSESSMENT — PAIN SCALES - GENERAL
PAINLEVEL_OUTOF10: 0

## 2023-02-18 NOTE — PROGRESS NOTES
Oncology consult received to evaluate if patient a candidate for systemic chemotherapy vs consideration of palliative care/hospice. The patient has history of AL Amyloidosis followed by [facility] Dr. Erika Figueroa and receives chemotherapy locally in [facility]. He was evaluated at [facility] by Dr. Erika Figueroa on 12/7/22. He has kidney biopsy confirmed AL amyloidosis lambda light chain restriction. He has been recommended induction chemotherapy with daratumumab, cyclophosphamide, bortezomib and dexamethasone. Following completion of induction chemotherapy consideration of autologous stem cell transplant. The patient began treatment locally on 1/24/2023 and received cycle #1, day 8 on 1/31/2023. Further treatments have been on hold due to current hospitalization. On 2/6/23 patient transferred to [address] [place] [address] from OSH for hypotension. He has remained in ICU during hospitalization. He has required pressor support due to hypotension and currently on Levophed, midodrine, Florinef. He received IV Rocephin due to concern for possible SBP. Patient is being considered to be transferred to [place] due to refractory hypotension. Discussed with IM Resident Dr. Mercy Lugo. Recommend transfer to [facility] where patient's primary Medical Oncology team is and further evaluation of amyloidosis/hypotension. He is currently unable to receive chemotherapy at [address] [place] [address]. Estela's due to hypotension in ICU on pressor support, as well as, elevated bilirubin with total bili 9.2 on 2/18/2023. CT of abdomen/pelvis on 2/13/2023 showed hepatic steatosis, no liver lesions observed, no biliary ductal dilatation observed. -Recommend IV Solumedrol 125 mg every 8 hours. Patient is on GI prophylaxis. Plan made in collaboration with Dr. Najma Mena.    Electronically signed by   Arjun Fernandez PA-C on 2/18/2023 at 3:46 PM

## 2023-02-18 NOTE — PROGRESS NOTES
Pt is a 62y. o. male, propped up in bed visiting with two daughters, in 1B-18.  was called to room by Janet as family wanted to discuss AD's and Living Will.  Please refer to ACP note for additional context.     02/18/23 1634   Encounter Summary   Encounter Overview/Reason  Advance Care Planning   Service Provided For: Patient and family together   Referral/Consult From: Multi-disciplinary team   Support System Children   Last Encounter  02/18/23   Complexity of Encounter Moderate   Begin Time 1325   End Time  1348   Total Time Calculated 23 min   Advance Care Planning   Type ACP conversation   Assessment/Intervention/Outcome   Assessment Compromised coping;Passive   Intervention Prayer (assurance of)/[place];Nurtured Hope;Explored/Affirmed feelings, thoughts, concerns   Outcome Did not respond

## 2023-02-18 NOTE — CASE COMMUNICATION
Patient has biopsy-proven light chain (AL) systemic amyloidosis  He started systemic chemotherapy on 1/24/2023  Planned for 24 cycles, each 28 days long. Each cycle consists of 4 separate days of treatment that is done weekly. Treatment is done on day 1, day 8, day 15, and day 28 of each cycle. He is on weight based Perla-CyBorD for amyloidosis    His regimen consists of:  Darzalex Faspro 1,800 mg  Velcade 2.25 mg  Cytoxan 250 mg  Decadron (cycle specific dosing)    The following is a cause for holding treatment:    1) Grade 4 anemia;  2) Absolute Neutrophil Count (ANC) is LESS than 1000 per mm3;   3) Platelets (PLT) is LESS than 50,000 per mm3;  4) Febrile neutropenia (any grade)  5) Total bilirubin GREATER than 1.5 mg/dL; and/or  6) ALT/AST/ALK Phos GREATER than 3 times the upper limit of normal for dose adjustments. Patient has completed 2 days of chemotherapy for the first cycle. He was scheduled to undergo the 3rd day of cycle 1 on 2/7/2023 and the 4th day of cycle 1 on 2/14/2023. However these were missed due to current hospitalization. Cycle 2 was scheduled to begin on 2/21/2023. Electronically signed by Atul June M.D. on 02/18/23 at 10:44 AM  Internal Medicine/Critical Care Resident  PGY-3    Addendum Update    Discussed case with [facility]. Patient will benefit from being transferred to [facility] where his primary Oncology team is located. Transfer request initiated.     Electronically signed by Atul June M.D. on 02/18/23 at 7:29 PM  Internal Medicine Resident  PGY-3

## 2023-02-18 NOTE — ACP (ADVANCE CARE PLANNING)
Advance Care Planning     Advance Care Planning Inpatient Note  Spiritual Care Department    Today's Date: 2/18/2023  Unit: [facility]    Received request from IDT Member. Upon review of chart and communication with care team, Spiritual Care will defer advance care planning with patient at this time. . Patient and Child/Children was/were present in the room during visit. Goals of ACP Conversation:  Discuss advance care planning documents  Facilitate a discussion related to patient's goals of care as they align with the patient's values and beliefs. Health Care Decision Makers:     No healthcare decision makers have been documented. Click here to complete [address] including selection of the Healthcare Decision Maker Relationship (ie \"Primary\")  Summary:  Documented Next of Kin, per patient report    Advance Care Planning Documents (Patient Wishes):  None     Assessment:  Rosalie is a 62y. o. male, propped up in bed visiting with his two daughters, in 1B-18.  was requested by family, to discuss AD's as well as witness what was thought to be a Living Will, and ended up being a last will and testament.  explained that we aren't authorized to work with valentino and testaments, only with medically-centered AD's. One of the daughters mentioned that they had a conversation some time ago, and Nancy Sheikh wants her to be the POA. After discussion of AD documents, it was determined to pursue the [address]. Upon  orientation test for patient, he did not display full orientation to complete documents-thus it was postponed.  discussed the situation with children in the hallway outside his room, and explained the decision-makers heirarchy as well, upon request.  prayed for patient with daughters present and will check back a little later.     Interventions:  Provided education on documents for clarity and greater understanding  Discussed and provided education on state decision maker hierarchy  Deferred further AD conversation at this time, as patient not fully oriented. Will attempt again later in the day. Care Preferences Communicated:   No    Outcomes/Plan:  Will attempt AD conversation later in the day, pending patient orientation status.     Electronically signed by Papo Wulain Intern on 2/18/2023 at 4:36 PM

## 2023-02-18 NOTE — PROGRESS NOTES
[facility]   Nephrology progress note  2/18/2023, 11:22 AM      Pt Name:    Rakel Fernandez  MRN:     [id number]     YOB: 1960  Admit Date:    2/6/2023  4:58 PM    Chief Complaint: Nephrology following for ESRD and HD    Subjective:  Patient was seen and examined this morning  Seen earlier today during rounds in ICU  On Levophed      Objective:  24HR INTAKE/OUTPUT:    Intake/Output Summary (Last 24 hours) at 2/18/2023 1122  Last data filed at 2/18/2023 1020  Gross per 24 hour   Intake 687.75 ml   Output 400 ml   Net 287.75 ml           I/O last 3 completed shifts: In: 788.3 [P.O.:480; I.V.:308.3]  Out: 400   I/O this shift: In: 80 [I.V.:93]  Out: -    Admission weight: 129 lb 6.6 oz (58.7 kg)  Wt Readings from Last 3 Encounters:   02/17/23 129 lb 6.6 oz (58.7 kg)   01/31/23 133 lb 12.8 oz (60.7 kg)   01/24/23 132 lb 3.2 oz (60 kg)        Vitals :   Vitals:    02/18/23 0730 02/18/23 0745 02/18/23 0800 02/18/23 0900   BP: 90/73  84/65 (!) 79/58   Pulse: 86  85 82   Resp: 12  10 13   Temp:  97.6 °F (36.4 °C)     TempSrc:  Oral     SpO2: 91%  92% 93%   Weight:       Height:           Physical examination  General Appearance: Ill-appearing, no acute distress  Awake and alert.   Mouth/Throat: Oral mucosa moist  Neck: No JVD  Lungs:  no use of accessory muscles  GI: soft, non-tender, no guarding  Extremities: No pitting LE edema    Medications:  Infusion:    norepinephrine 8 mcg/min (02/18/23 1019)    dextrose      sodium chloride       Meds:    famotidine (PEPCID) injection  20 mg IntraVENous Daily    [Held by provider] epoetin jose-epbx  6,000 Units SubCUTAneous Once per day on Mon Wed Fri    fludrocortisone  0.2 mg Oral Daily    midodrine  20 mg Oral Q6H    zolpidem  5 mg Oral Nightly    calcium replacement protocol   Other RX Placeholder    sodium chloride flush  5-40 mL IntraVENous 2 times per day    sodium bicarbonate  650 mg Oral BID    vitamin D  50,000 Units Oral Weekly     Meds prn: oxyCODONE **OR** oxyCODONE, morphine, ondansetron, aluminum & magnesium hydroxide-simethicone, promethazine, diphenhydrAMINE, glucose, dextrose bolus **OR** dextrose bolus, glucagon (rDNA), dextrose, sodium chloride flush, sodium chloride, polyethylene glycol, albuterol     Lab Data :  CBC:   Recent Labs     02/16/23  0500 02/17/23  0503 02/18/23  0450   WBC 5.2 6.0 4.7*   HGB 9.7* 10.1* 10.2*   HCT 28.9* 29.3* 30.5*   PLT 75* 71* 49*       CMP:  Recent Labs     02/16/23  0500 02/16/23  1649 02/17/23  0503 02/18/23  0450    137 137 137   K 4.2 4.3  4.3 4.5 4.3   CL 98 97* 98 98   CO2 25 25 24 26   BUN 24* 29* 35* 25*   CREATININE 2.3* 2.8* 3.3* 2.3*   GLUCOSE 71 116* 100 102   CALCIUM 8.1* 8.1* 8.4* 8.3*   MG 2.3  --  2.1 2.0   PHOS  --  4.1  --   --        Hepatic:   Recent Labs     02/16/23  0500 02/17/23  0503 02/18/23  0450   LABALBU 3.4* 3.2* 3.0*   AST 60* 41* 37   ALT 52 48 45   BILITOT 8.1* 8.3* 9.2*   ALKPHOS 1,459* 1,579* 1,594*           Assessment and Plan:  ESRD on hemodialysis. 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 Patient remains on low-dose Levophed  Chronically hypotensive and asymptomatic    Metabolic acidosis. Resolved  Volume status. Reasonably stable. No evidence of peripheral edema. Did have paracentesis recently  Chronic hypotension. On midodrine, continue with Florinef. 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 Continue with Retacrit  Chronic thrombocytopenia  10. Disposition. For possible discharge to Julius Mosqueda MD  [facility]    This report has been created using voice recognition software.  It may contain minor errors which are inherent in voice recognition technology

## 2023-02-18 NOTE — PROGRESS NOTES
CRITICAL CARE PROGRESS NOTE    Patient:  Wilbur Martinez    Unit/Bed:4B-03/003-A  YOB: 1960  MRN: [id number]   PCP: Napoleon Briseno DO  Date of Admission: 2/6/2023  Chief Complaint:- Hypotension    Assessment and Plan:    Hypotensive, refractory to medications: Asymptomatic - Initially thought to be related to septic shock. Transferred to [facility] from outside of the facility and was hypotensive on arrival with ventricular arrhythmias in the 140s. 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 ESRD on hemodialysis, M,W,F schedule. Initially suspicious for SBP and was given ceftriaxone 2g. Culture and cell count of body fluid not suspicious for SBP at this time. We will continue to monitor patient. On midodrine 15 mg TID at home for blood pressure support. Midodirne was increased to 20 mg TID, 2/8. History of asymptomatic hypotension. Patient's hypotension is likely multifactorial. Patient has history of running systolically in the 95Q. Paracentesis, 2/7 with 5.5 L of ascites for drainage. Ceftriaxone 2g Q24 hrs, stopped 2/12. Decadron 10 mg, given 2/9/2023. Albumin 25 mg IV total 8 doses completed. Paracentesis 2/16, 3L of ascites fluid removed. Methylene blue 50 mg IV was ordered for vasoplegia               -Will continue to monitor patient  -Currently on Levophed for blood pressure support, MAP >55    -Continuing to try to wean levophed down as patient tolerates  -Midodrine 20 mg four times daily. Have been unsuccessful and difficult time weaning patient down off Levophed  -Continuing Florinef 0.2 mg daily  -IV Solu-Medrol 125 mg every 8 hours    2. Suspected liver cirrhosis: Likely 2/2 systemic amyloidosis. Waiting on liver biopsy for further evaluation of this. Elevated transaminase, alk phos, hypoalbuminemia. Patient is scheduled to have regular paracentesis. Patient reports he has received 2-3 previous paracenteses.  5.5 L of ascites was drained with paracentesis, 2/7. Patient tolerated the procedure well. We discussed paracentesis versus Pleurx catheter with patient. He wishes to proceed with paracentesis. Paracentesis, 2/16 removed 3 Liters. Likely will need regular paracentesis. Pleur-X catheter was discussed with patient, he does not wish to proceed with this at this time. 3. 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 On hemodialysis - M/W/F follows with Dr. Gabby Vidales. Hx of noncompliance with medications including p.o. bicarb, fluid intake and other medical visits in the past. Patient consulted for nephrology to start inpatient home dialysis. Patient underwent hemodialysis 2/7/, 2/10, 2/13, 2/15, 2/17    4. 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 Patient has evidence of systemic involvement of his organs. Patient recently started chemotherapy with Perla-CyBorD (Darzelex, Velcade, Cytoxan and Decadron) in late January 2023. Chemotherapy is to be for 24 cycles with each cycle consisting of 28 days. Every 7 days during each cycle patient receives treatment. He has undergone the first 2 treatments of cycle 1 however he has missed cycle 3 and 4 as he has been hospitalized during this time. Cycle 2 was to tentatively started on 2/21/2023.          -Follows with Dr. Dianne Curry outpatient. Had made attempts to contact with her however have not heard back.          -Per heme-onc recommendations weekly paracentesis, limited to 2 L with with albumin infusions         -Discussed case with [facility] Heme/Onc on 2/18/23. Their recommendations are to transfer patient to [facility] where his primary Medical Oncology team is. Thre was discussion about an autologous stem cell transplant. 5. Ventricular tachycardia, asymptomatic: Reported runs of ventricular tachycardia. Patient was given IV magnesium at outside facility. Patient received amiodarone bolus 300, started on amnio drip at 0.5. Continuing amiodarone drip at this time.  He is on telemetry, strict inputs and outputs, daily weights. We will continue to monitor patient's electrolytes. Previously evaluated by EP on 1/20/2023. Arrhythmia thought to be secondary to underlying cardiac amyloidosis with risk of SCD. EP recommended AICD. Not a candidate for class Ic or III antiarrhythmics. Previous admission discharged with a LifeVest was discussed. With follow-up with EP.         -Keep magnesium above 2.         -Keep potassium greater than 4    6. HFpEF: Not in exacerbation. HFpEF 50%, 1/16.           -We will continue to monitor.           -Continue telemetry, pulse ox, daily weights, strict I&O's  Malnutrition, severe protein calorie malnutrition: Likely due to patient's multiple comorbidities. Remeron 7.5 mg nightly added 2/18  Chronic normocytic anemia, mild: Patient has multiple chronic diseases including ESRD on HD, liver disease, systemic amyloidosis, continue current daily CBCs. Chronic thrombocytopenia, mild: Possibly due to suspected liver cirrhosis secondary to systemic amyloidosis. Continue to monitor with daily CBCs. Conditioning, debility: Multiple comorbidities including systemic amyloidosis, ESRD on HD, malnutrition, liver disease, ventricular tachycardia    HAGMA: Resolved. Anion gap, 15.0. Lactic acidosis and uremia likely due to ESRD. Patient is on HD. Home bicarb started p.o. We will continue to monitor, daily CBCs and BMPs ordered:     INITIAL H AND P AND ICU COURSE:  Per HPI, \"patient is 63years old male, brought from outside facility due to hypotension. Patient has past medical history of recently diagnosed with systemic amyloidosis, ESRD on hemodialysis, undifferentiated liver disease, newly diagnosis CHF preserved EF, nonsustained episodes of ventricular tachycardia. Patient states he felt sick and nauseous and went to the local hospital and transferred to [facility] for further evaluation and management.  Currently patient alert and oriented X3, complains of nausea, episodes of vomiting, feeling sick abdominal distention due to ascites. However he denies chest pain, heart palpitations, productive cough, dizziness, fevers, chills lightheadedness, dysuria/urinary frequency\" paracentesis was performed on 2/7/2023 with 5.5 L of ascites drained. Patient was also undergoing hemodialysis on 2/7, and 2/10. Patient has been on and continued to have to have his midodrine dose increased while attempting to wean down Levophed. Past Medical History: Systemic amyloidosis, CHF, ESRD, liver disease. Family History: Hypertension. Social History: Former smoker, 34+ years since history of smoking. History of quitting December 2022. No previous illicit drug use or alcohol use. ROS: No fever, chills, shortness of breath, headache, dizziness, weakness, chest pain, or palpitations. Patient denies any abdominal pain or tightness in his belly at. He denies nausea or vomiting. Scheduled Meds:   famotidine (PEPCID) injection  20 mg IntraVENous Daily    [Held by provider] epoetin jose-epbx  6,000 Units SubCUTAneous Once per day on Mon Wed Fri    fludrocortisone  0.2 mg Oral Daily    midodrine  20 mg Oral Q6H    zolpidem  5 mg Oral Nightly    calcium replacement protocol   Other RX Placeholder    sodium chloride flush  5-40 mL IntraVENous 2 times per day    sodium bicarbonate  650 mg Oral BID    vitamin D  50,000 Units Oral Weekly     Continuous Infusions:   norepinephrine 8 mcg/min (02/18/23 1019)    dextrose      sodium chloride       PHYSICAL EXAMINATION:  BP (!) 77/67   Pulse 80   Temp 97.8 °F (36.6 °C) (Oral)   Resp 14   Ht 5' 10\" (1.778 m)   Wt 129 lb 6.6 oz (58.7 kg)   SpO2 92%   BMI 18.57 kg/m²   GCS:   15  General: Chronically ill-appearing male. Appears older than stated age. Cachectic. Pale in appearance. HEENT: Normocephalic and atraumatic. Mild scleral icterus. PERR  Neck: Supple  Lungs: Clear to auscultation. No wheezes, rales or rhonchi's. No retractions  Cardiac: RRR.  No murmurs, gallops or rubs. No JVD. Abdomen: Soft. Nontender. Minimally distended. Normoactive bowel sounds. Extremities: No clubbing, cyanosis, or edema x 4. Vasculature: Capillary refill < 3 seconds. Palpable dorsalis pedis pulses. Skin:  Warm and dry. Multiple bruising. Psych:  Alert and oriented x3. Affect appropriate  Lymph: No supraclavicular adenopathy. Neurologic: No focal deficit. No seizures. Data: (All radiographs, tracings, PFTs, and imaging are personally viewed and interpreted unless otherwise noted). Sodium 137. K 4.5. Chloride 98. CO2 24. BUN 35. Creatine 3.3. Mag 2.1. Serum calcium 8.4  WBC 6.0, Hg 10.1, Plt 71. Telemetry shows NSE with a rate 77. Echo, 2/14 shows EF at 55% with overall LV function normal and mild concentric left ventricular hypertrophy  Abdominal ultrasound, 2/15 shows large volume ascites, mild dilated common bile duct up to 0.7 cm, small gallstones, diffuse gallbladder wall thickening nonspecific. Patient has patent hepatic and portal vein venous vessels and recanalized periumbilical vein which can be seen with portal hypertension    Seen with multidisciplinary ICU team.  Meets Continued ICU Level Care Criteria:    [x] Yes     Case and plan discussed with Dr. Amarjit Palomino. [address] [facility],[address], DO.     Electronically signed by Claudia Robbins MD, PGY-3  CRITICAL CARE SPECIALIST

## 2023-02-18 NOTE — PLAN OF CARE
Problem: Safety - Adult  Goal: Free from fall injury  2/18/2023 0817 by Mayda Sanabria RN  Outcome: Progressing     Problem: Pain  Goal: Verbalizes/displays adequate comfort level or baseline comfort level  Outcome: Progressing  Flowsheets  Taken 2/18/2023 0745 by Mayda Sanabria RN  Verbalizes/displays adequate comfort level or baseline comfort level:   Encourage patient to monitor pain and request assistance   Assess pain using appropriate pain scale   Administer analgesics based on type and severity of pain and evaluate response

## 2023-02-19 NOTE — PROGRESS NOTES
Patient belongings and transfer chart sent with transport team. Report given to transport staff Maile Lozano. Delta Zuluaga RN accepting nurse at [facility] updated of transfer from [address] [facility]

## 2023-02-19 NOTE — DISCHARGE SUMMARY
Critical Care Medicine Discharge Summary    Patient Identification:   Macario Suárez  : 1960  MRN: [id number]   Account: [de-identified]      Patient's PCP: Musa Moreno DO    Admit Date: 2023     Discharge Date: 2023    Admitting Physician: Thang Rendon MD     Discharge Physician: Minnie Fischer MD     HPI and Summarized Hospital Course: Macario Suárez is a 63 y.o. male admitted to [address] [place] [address] on 2023 for hypotension. Patient was transferred from outside facility. PMH is pertinent for kidney biopsy proven systemic light chain amyloidosis currently on chemotherapy, ESRD on HD, undifferentiated liver disease, chronic hypotension, chronic HF with preserved EF, nonsustained V. tach and generalized weakness and fatigue. Initially presented to the outside facility ED with complaints of abdominal distention, generalized weakness, fatigue as well as hypotension. Also reported some diarrhea. He was given IV fluids and started on Levophed. While still at the outside facility he subsequently developed wide-complex tachycardia. He was then transferred to [address] [place] [address] for further care. Initially admitted on 2023 patient underwent paracentesis the following day on 2023 with 5.5 L of ascitic fluid drained. During his hospital stay he has required intermittent bedside hemodialysis as well as paracentesis. He continued to have refractory hypotension. Unable to wean off Levophed. Patient was also on vasopressin but only for a couple of days. To further aid in weaning off Levophed patient was started on Florinef. IV methylene blue was also ordered for vasoplegia. Was given midodrine 20 mg 4 times daily. Despite these interventions the patient continued to have refractory hypotension and we were unable to wean off Levophed. There was high suspicion that his amyloidosis has caused autonomic dysfunction.  His liver decompensation is thought to be due from amyloid deposits in the liver. He had already seen electrophysiology prior to admission where they had felt that his ventricular arrhythmia is most likely related to underlying cardiac amyloidosis. He started systemic chemotherapy on 1/24/2023  Planned for 24 cycles, each 28 days long. Each cycle consists of 4 separate days of treatment that is done weekly. Treatment is done on day 1, day 8, day 15, and day 28 of each cycle. He is on weight based Perla-CyBorD for amyloidosis  Patient has completed 2 days of chemotherapy for the first cycle. He was scheduled to undergo the 3rd day of cycle 1 on 2/7/2023 and the 4th day of cycle 1 on 2/14/2023. However these were missed due to current hospitalization. Cycle 2 was scheduled to begin on 2/21/2023. His regimen consists of:  Darzalex Faspro 1,800 mg  Velcade 2.25 mg  Cytoxan 250 mg  Decadron (cycle specific dosing)    Given patient's overall systemic organ involvement with refractory hypotension dependent on pressor support we felt that appropriate that the patient be transferred to [facility] for further management, evaluation and care. His primary oncology team is there, follows with Dr. Carlos rKishna. Plan was for the patient to undergo consideration of autologous stem cell transplant after completion of induction chemotherapy. Transfer was initiated on 2/18/2023 to [facility]. Discussed the plan of care with the patient soon after the transfer process was initiated. The patient was stable for transfer/discharge - all consultants were contacted and in agreement with plan for discharge. Please see below or view chart for more details from hospital course. Hospital Diagnoses:    Refractory hypotension requiring levophed support   Decompensated liver disease  ESRD on HD  Recurrent nonsustained V.  Tach  HFpEF  Severe protein calorie malnourishment  Chronic normocytic anemia  Chronic thrombocytopenia  Deconditioned state    Please view chart for detailed course and treatment of above hospital diagnoses. The patient was seen and examined on day of discharge and this discharge summary is in conjunction with any daily progress note from day of discharge. Exam:     Vitals:  Vitals:    02/18/23 1945 02/18/23 2000 02/18/23 2030 02/18/23 2045   BP: 83/65 (!) 74/59  84/63   Pulse: 84 87 83 81   Resp: 13 18 17 18   Temp:  97.7 °F (36.5 °C)     TempSrc:  Oral     SpO2:   93% 92%   Weight:       Height:         Weight: Weight: 129 lb 6.6 oz (58.7 kg)     24 hour intake/output:  Intake/Output Summary (Last 24 hours) at 2/18/2023 2109  Last data filed at 2/18/2023 1020  Gross per 24 hour   Intake 128.12 ml   Output --   Net 128.12 ml     Constitutional: In no acute distress. Patient is oriented to person, place, and time. HENT:   Head: Normocephalic and atraumatic. Mouth/Throat: Oropharynx is clear and moist.  No oral thrush. Eyes: Conjunctivae are normal. Pupils are equal, round, and reactive to light. No scleral icterus. Neck: Neck supple. No JVD present. No tracheal deviation present. Cardiovascular: Normal rate, regular rhythm, normal heart sounds. No murmur heard. Pulmonary/Chest: Effort normal and breath sounds normal. No stridor. No respiratory distress. No wheezes. No rales. Patient exhibits no tenderness. Abdominal: Minimally distended. No tenderness on palpation  Musculoskeletal: Normal range of motion. Extremities: Patient exhibits no edema and no tenderness. Lymphadenopathy: No cervical adenopathy. Neurological: Patient is alert and oriented to person, place, and time. Skin: Skin is warm and dry. Patient is not diaphoretic. No rashes or lesions. Psychiatric: Patient exhibits a flat affect. Labs:  For convenience and continuity at follow-up the following most recent labs are provided:    CBC:    Lab Results   Component Value Date/Time    WBC 4.7 02/18/2023 04:50 AM    HGB 10.2 02/18/2023 04:50 AM    HCT 30.5 02/18/2023 04:50 AM    PLT 49 02/18/2023 04:50 AM Renal:    Lab Results   Component Value Date/Time     02/18/2023 04:50 AM    K 4.3 02/18/2023 04:50 AM    K 4.3 02/16/2023 04:49 PM    CL 98 02/18/2023 04:50 AM    CO2 26 02/18/2023 04:50 AM    BUN 25 02/18/2023 04:50 AM    CREATININE 2.3 02/18/2023 04:50 AM    CALCIUM 8.3 02/18/2023 04:50 AM    PHOS 4.1 02/16/2023 04:49 PM     Significant Diagnostic Studies    Radiology:   US GUIDED PARACENTESIS   Final Result   Successful ultrasound-guided paracentesis. **This report has been created using voice recognition software. It may contain minor errors which are inherent in voice recognition technology. **      Final report electronically signed by Dr. Vicente Nolen on 2/16/2023 11:19 AM      [address] organ? GALLBLADDER, LIVER   Final Result   Impression:   1. Large volume ascites. 2. Mild dilated common bile duct up to 0.7 cm.   3. Small gallstones. 4. Diffuse gallbladder wall thickening, nonspecific. This can be due to    non-biliary causes such as ascites, low protein state, or other numerous    etiologies. HIDA scan may be helpful. 5. Patent hepatic and portal venous vessels. Recanalized paraumbilical    vein, which can be seen with portal hypertension. This document has been electronically signed by: Bridgette Maldonado MD on    02/15/2023 05:16 AM      US DUP ABD PEL RETRO SCROT COMPLETE   Final Result      This study is dictated under same day abdominal ultrasound. Please see that report. Final report electronically signed by Dr. Carlos Barba on 2/15/2023 7:59 AM      XR ABDOMEN (KUB) (SINGLE AP VIEW)   Final Result   Impression:   Ascites with underlying nonspecific bowel gas pattern. This document has been electronically signed by: Ruslan Landeros MD on    02/14/2023 03:09 AM      XR CHEST PORTABLE   Final Result   Impression:   Slight change from comparison as above.       This document has been electronically signed by: Ruslan Landeros MD on 02/14/2023 03:11 AM      CT ABDOMEN PELVIS W WO CONTRAST Additional Contrast? Radiologist Recommendation   Final Result   1. Subtle omental caking/enhancement best visualized within the upper abdomen along the anterior abdominal wall (series 303, images 22 through 52) with differential considerations including peritoneal carcinomatosis. Large volume intra-abdominal ascites. No bowel obstruction, perforation, or discrete fluid collection observed. 2. Small bilateral pleural effusions and bilateral lower lobe consolidation, left greater than right. 3. Question mild sclerosis involving the skeletal structures diffusely. No discrete bony lesion observed. Chronic findings are discussed. **This report has been created using voice recognition software. It may contain minor errors which are inherent in voice recognition technology. **      Final report electronically signed by Dr Stephy Rodriguez on 2/13/2023 8:19 AM      XR CHEST PORTABLE   Final Result      Nasogastric tube as above. **This report has been created using voice recognition software. It may contain minor errors which are inherent in voice recognition technology. **      Final report electronically signed by Dr. Jay Heck on 2/12/2023 6:09 PM      XR ABDOMEN (KUB) (SINGLE AP VIEW)   Final Result      Mildly dilated small bowel loops with distal bowel gas suspicious for paralytic ileus.       Final report electronically signed by Dr. Jay Heck on 2/12/2023 2:26 PM      XR ABDOMEN FOR NG/OG/NE TUBE PLACEMENT   Final Result   Impression:      NG tube tip distal stomach      This document has been electronically signed by: Marquita Dyer MD on    02/11/2023 02:57 AM      XR ABDOMEN FOR NG/OG/NE TUBE PLACEMENT   Final Result   Impression:      NG tube coiled in proximal stomach      This document has been electronically signed by: Marquita Dyer MD on    02/10/2023 10:15 PM      XR CHEST PORTABLE   Final Result Impression:      Line placements as above      Left basilar subsegmental atelectasis      This document has been electronically signed by: Valeriy Linn MD on    02/06/2023 11:32 PM      XR CHEST PORTABLE   Final Result   Impression:      Line placements as above      Left basilar subsegmental atelectasis      This document has been electronically signed by: Valeriy Linn MD on    02/06/2023 11:34 PM      XR CHEST PORTABLE   Final Result   Impression:      Line placements as above      New left basilar subsegmental atelectasis      This document has been electronically signed by: Valeriy Linn MD on    02/06/2023 11:33 PM      XR CHEST PORTABLE   Final Result   Impression:      Line placements as above      New left basilar subsegmental atelectasis      This document has been electronically signed by: Valeriy Linn MD on    02/06/2023 11:32 PM      XR CHEST PORTABLE   Final Result   Impression:      Left central line tip extends into left neck and will need to be    repositioned      No acute infiltrates. This document has been electronically signed by: Valeriy Linn MD on    02/06/2023 11:04 PM         XR CHEST PORTABLE   Final Result   1. Small left pleural effusion with left basal atelectasis. **This report has been created using voice recognition software. It may contain minor errors which are inherent in voice recognition technology. **      Final report electronically signed by Dr Antwan Dan on 2/6/2023 10:13 PM        Consults:     IP CONSULT TO NEPHROLOGY  IP CONSULT TO SOCIAL WORK  IP CONSULT TO SPIRITUAL SERVICES  IP CONSULT TO PHARMACY  IP CONSULT TO ONCOLOGY    Disposition:    [] Home       [] TCU       [] Rehab       [] Psych       [] SNF       [] [place]       [x] Other- [facility] [address]    Condition at Discharge: Stable    Code Status:  Full Code     Patient Instructions:    Discharge lab work: None  Activity: activity as tolerated  Diet: ADULT DIET; Regular; 4 carb choices (60 gm/meal); Low Fat/Low Chol/High Fiber/TAMERA; Low Sodium (2 gm)  ADULT ORAL NUTRITION SUPPLEMENT; Breakfast, Dinner; Renal Oral Supplement      Follow-up visits:   No follow-up provider specified. Discharge Medications:        Medication List        ASK your doctor about these medications      loperamide 2 MG capsule  Commonly known as: IMODIUM  Take 1 capsule by mouth as needed for Diarrhea     midodrine 5 MG tablet  Commonly known as: PROAMATINE  Take 3 tablets by mouth every 8 (eight) hours     MULTIPLE VITAMIN PO     sodium bicarbonate 650 MG tablet  Take 1 tablet by mouth 2 times daily     Vitamin D (Ergocalciferol) 89560 units Caps  Take 50,000 Units by mouth once a week            Time Spent on discharge is roughly > 30 minutes in the examination, evaluation, counseling and review of medications and discharge plan. Thank you Michelle Latham DO for the opportunity to be involved in this patient's care. Please do not hesitate to reach out to me for any questions or clarifications needed regarding this patient's care.     Signed:    Electronically signed by Heidi Zuñiga MD on 2/18/2023  Internal Medicine Resident   PGY-3

## 2023-02-19 NOTE — PROGRESS NOTES
Daughter Candelario Garza made aware of patient's transfer to [facility]. All questions answered at this time.

## 2023-02-19 NOTE — PROGRESS NOTES
Report given to [address] ([phone]) at [facility]. Patient going to [place] [address] room [phone]. Transport to pick patient up between [phone]. Jason Osullivan made aware.

## 2023-02-20 NOTE — ADT AUTH CERT
Utilization Reviews       Sepsis and Other Febrile Illness, without Focal Infection - Care Day 12 (2/17/2023) by Sis Lindsey RN       Review Status Review Entered   Completed 2/20/2023 1601       Created By   Sis Lindsey RN      Criteria Review      Care Day: 12 Care Date: 2/17/2023 Level of Care: ICU    Guideline Day 2    Level Of Care    (X) ICU or floor    Clinical Status    ( ) * Hemodynamic stability    2/20/2023 4:01 PM EST by Yordan Summers: 98  IA: 86  BP: 54/43 ! Pain score  8    ( ) * Hypoxemia absent    2/20/2023 4:01 PM EST by Keyla Tobar      88% on ra    ( ) * Tachypnea absent    2/20/2023 4:01 PM EST by Keyla Tobar      RR: 5 ! Activity    ( ) Activity as tolerated    2/20/2023 4:01 PM EST by Keyla Tobar      Up w/ assist    Routes    (X) Parenteral or oral medications    2/20/2023 4:01 PM EST by Belinda Frtiz 0.9-93.8 ml/hr CONT. IV  Retacrit 6000 units Once MWF SC   Oxycodone 2.5mg q6 PRN PO x1    (X) Diet as tolerated    2/20/2023 4:01 PM EST by Keyla Tobar      ADULT DIET; Regular; 4 carb choices (60 gm/meal);  Low Fat/Low Chol/High Fiber/TAMERA; Low Sodium (2 gm)    Interventions    (X) WBC    2/20/2023 4:01 PM EST by Keyla Tobar      normal       Definitions for Care Day 12    Hemodynamic stability    ( ) Hemodynamic stability, as indicated by  1 or more  of the following :       ( ) Patient hemodynamically stable, as indicated by  ALL  of the following  (1) (2) (3) (4) (5):          (X) Tachycardia absent          (X) No evidence of inadequate perfusion (eg, no myocardial ischemia)          (X) No other hemodynamic abnormalities (eg, no Orthostatic hypotension)       Tachycardia absent    (X) Tachycardia absent, as indicated by  1 or more  of the following  (1) (2):       (X) Heart rate less than or equal to 100 beats per minute in adult or child 6 years or older       * Milestone   Additional Notes   DATE:  2/17/23  ICU PERTINENT UPDATES:   Remain on Levophed. Hypotensive. ABNL/PERTINENT LABS/RADIOLOGY/DIAGNOSTIC STUDIES:   Crea   3.3 H   BUN   35 H   GFR    21 ! Ca   8.4 L   AST  41 H   Alk Phos   1579 H   Protein    4.5 L   Albumin    3.2 L   David      8.3 H      RBC   3.08 L   H&H   10.1/29.3 L   Plt    71 L         PHYSICAL EXAM:   Lungs: Clear to auscultation. No wheezes, rales or rhonchi's. No retractions   Cardiac: RRR. No murmurs, gallops or rubs. No JVD. Abdomen: Soft. Nontender. Minimally distended. Normoactive bowel sounds. Extremities: No clubbing, cyanosis, or edema x 4. Skin:  Warm and dry. Multiple bruising. MD CONSULTS/ASSESSMENT AND PLAN:   INTENSIVE CARE:   Assessment and Plan: 1. Hypotensive, refractory to medications: Asymptomatic - Initially thought to be related to septic shock. Transferred to [facility] from outside of the facility and was hypotensive on arrival    -Continuing to try to wean levophed down as patient tolerates   -Continuing Florinef 0.2 mg daily      2. Suspected liver cirrhosis   -Waiting on liver biopsy for further evaluation of this.   -Likely will need regular paracentesis. Pleur-X catheter was discussed with patient, he does not wish to proceed with this at this time. 3. ESRD   - Patient underwent hemodialysis 2/7/, 2/10, 2/13, 2/15, 2/17         NEPHRO.:   Assessment and Plan:   1. ESRD on hemodialysis. 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     -Undergoing hemodialysis treatment at bedside     -Patient remains on low-dose Levophed     -Chronically hypotensive and asymptomatic   2. Volume status. Reasonably stable. No evidence of peripheral edema. Did have paracentesis yesterday with 3 L removed   3. Chronic hypotension.  Levophed per ICU         ORDERS:   Call for urine ouput less than 0.5mL/kg/hr, V/S q1.         PT/OT/SLP/CM ASSESSMENT OR NOTES:   PT:   Assessment:   Pts BP 79/64 + pt on dialysis, not appropriate for PT at this time         OT: Assessment:   Pt in room receiving dialysis currently upon therapist arrival         CM: Barriers to Discharge: Creatinine 3.3,Nephrology following for HD, Hgb 10.1, PT/OT, Albuterol nebs, Retacrit, Pepcid, Florinef, Midodrine, Levophed drip, pain and nausea control. Dialysis   Completed 3.5 hr HD TX and removed No fluid. pt tolerated HD TX fair. pt received 20mg Midodrine and receiving 8mcg of Levophed during for hypotension. CVC dressing is clean, dry and intact         Dietitian:   Nutrition Recommendations/Plan: 1. Consider renal MVI, folbee plus. 2.ONS: Nepro BID. 3.Continue current diet. Sepsis and Other Febrile Illness, without Focal Infection - Care Day 10 (2/15/2023) by Corrie Hurley RN       Review Status Review Entered   Completed 2/20/2023 1542       Created By   Corrie Hurley RN      Criteria Review      Care Day: 10 Care Date: 2/15/2023 Level of Care: ICU    Guideline Day 2    Level Of Care    (X) ICU or floor    Clinical Status    ( ) * Hemodynamic stability    2/20/2023 3:42 PM EST by Sangeeta Cam: 97.7  ND: 86  BP: 54/35 ! Pain score  10    ( ) * Hypoxemia absent    2/20/2023 3:42 PM EST by Monica Ayala      89% on ra    ( ) * Tachypnea absent    2/20/2023 3:42 PM EST by Monica Ayala      RR: 22    Activity    ( ) Activity as tolerated    2/20/2023 3:42 PM EST by Monica Ayala      Up w/ assist    Routes    (X) Parenteral or oral medications    2/20/2023 3:42 PM EST by Monica Ayala      Albumin 25g q6 IV   Pepcid 20mg Daily IV  Retacrit 6000 units Once MWF SC   Levophed 0.9-93.8 ml/hr CONT. IV  Morphine 1mg Once IV    (X) Diet as tolerated    2/20/2023 3:42 PM EST by Jolene Major DIET; Regular; 4 carb choices (60 gm/meal);  Low Fat/Low Chol/High Fiber/TAMERA; Low Sodium (2 gm)    Interventions    (X) WBC    2/20/2023 3:42 PM EST by Monica Ayala      normal       Definitions for Care Day 10    Hemodynamic stability ( ) Hemodynamic stability, as indicated by  1 or more  of the following :       ( ) Patient hemodynamically stable, as indicated by  ALL  of the following  (1) (2) (3) (4) (5):          (X) Tachycardia absent          (X) No evidence of inadequate perfusion (eg, no myocardial ischemia)          (X) No other hemodynamic abnormalities (eg, no Orthostatic hypotension)       Tachycardia absent    (X) Tachycardia absent, as indicated by  1 or more  of the following  (1) (2):       (X) Heart rate less than or equal to 100 beats per minute in adult or child 6 years or older       * Milestone   Additional Notes   DATE:  2/15/23  ICU         PERTINENT UPDATES:   Still on Levophed. Hypotensive. Patient has abdominal tightness and discomfort today. ABNL/PERTINENT LABS/RADIOLOGY/DIAGNOSTIC STUDIES:   Gluc   64 L      Crea   3.8 H   BUN    38 H      K   3.3 L   Cl  96 L   Ca  8.3 L      AST  44 H      Alk Phos   1192 H   Protein    4.4 L   Albumin    3.3 L   David. 7.2 H      RBC   2.79 L   H&H   9.2/26.5 L      CT ABD Limited/ CT Dup. Abd. Impression:   1. Large volume ascites. 2. Mild dilated common bile duct up to 0.7 cm.   3. Small gallstones. 4. Diffuse gallbladder wall thickening, nonspecific. This can be due to    non-biliary causes such as ascites, low protein state, or other numerous    etiologies. HIDA scan may be helpful. 5. Patent hepatic and portal venous vessels. Recanalized paraumbilical    vein, which can be seen with portal hypertension. PHYSICAL EXAM:   General:  Chronically ill appearing male. Appears older than stated age. Cachectic. Pale in appearance   Lungs: Clear to auscultation. Minimal wheezes in the right upper lobe. No rhonchi or rales no retractions   Cardiac: RRR. No murmurs, gallops, rubs. No JVD. Abdomen: Firm. Mild tenderness to palpation. Increasingly distended. Normoactive bowel sounds. Skin: Warm and dry. Minimal jaundice.  Multiple bruising         MD CONSULTS/ASSESSMENT AND PLAN:   NEPHRO.:   Assessment and Plan:   1. ESRD on hemodialysis. 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   -On Levophed-being titrated/weaned--currently on very low-dose   -Plan hemodialysis treatment today with 4K bath   -Continue to wean Levophed   2. Chronic hypotension. On midodrine, continue with Florinef. On low-dose Levophed today   3. Anemia in ESRD. Added Retacrit         MEDICATIONS:   Tylenol 650mg q6 PRN PO x1         ORDERS:   Call for urine ouput less than 0.5mL/kg/hr, V/S q1.         PT/OT/SLP/CM ASSESSMENT OR NOTES:   CM   Barriers to Discharge: Intensivist and Nephrology following. Resp culture positive for Christelle dubliniensis. Patient afebrile, low BP 83/67, on Levophed drip, HD Mon., Wed., and Fri., [de-identified] Mon., Wed., Fri., Florinef, Sodium Bicarbonate tabs, CBC and CMP in a.m., daily Magnesium levels, carb choice diet, aspiration and fall precautions, SCD's, PT/OT, SS, up with assistance. [id number], they currently do not have beds for pt. SW advised that pt is not ready for discharge and requested that they re-look at pt for possible admission. Dialysis   Stable 3.5 hour treatment. on levo throughout, 0 net uf. cath lines flushed with 0.9 ns, clamped         Sepsis and Other Febrile Illness, without Focal Infection - Care Day 8 (2/13/2023) by Bell Hernandez RN       Review Status Review Entered   Completed 2/20/2023 1512       Created By   Bell Hernandez RN      Criteria Review      Care Day: 8 Care Date: 2/13/2023 Level of Care: ICU    Guideline Day 2    Level Of Care    (X) ICU or floor    Clinical Status    ( ) * Hemodynamic stability    2/20/2023 3:12 PM EST by Yolanda Turcios: 97.5  MS: 116 !   BP: 155/82 !    ( ) * Hypoxemia absent    2/20/2023 3:12 PM EST by Bar Vick      88% on ra    ( ) * Tachypnea absent    2/20/2023 3:12 PM EST by Bar Vick      RR: 21    Activity    ( ) Activity as tolerated 2/20/2023 3:12 PM EST by Monica Ayala      Up w/ assist    Routes    (X) Parenteral or oral medications    2/20/2023 3:12 PM EST by Monica Ayala      Vasopressin 3-9 ml/hr CONT. IV  Levophed 0.9-93.8 ml/hr CONT. IV  Albumin 25g q6 IV   Pepcid 20mg Daily IV    Interventions    (X) WBC    2/20/2023 3:12 PM EST by Monica Ayala      normal       Definitions for Care Day 8    Hemodynamic stability    ( ) Hemodynamic stability, as indicated by  1 or more  of the following :       ( ) Patient hemodynamically stable, as indicated by  ALL  of the following  (1) (2) (3) (4) (5):          (X) Hypotension absent          (X) No evidence of inadequate perfusion (eg, no myocardial ischemia)          (X) No other hemodynamic abnormalities (eg, no Orthostatic hypotension)       Hypotension absent    (X) Hypotension absent, as indicated by  1 or more  of the following  (1) (2) (3) (4):       (X) SBP greater than or equal to 90 mm Hg and without recent decrease greater than 40 mm Hg from       baseline in adult or child 10 years or older       * Milestone   Additional Notes   DATE:  2/13/23  ICU         PERTINENT UPDATES:   Levophed was increased to 16 mcg evening of 2/12/2023 that this is been decreased to 11 mcg this morning. Patient is no longer on 2 L nasal cannula and is not complaining of shortness of breath. KUB was ordered that showed mild dilated small bowel loops with distal bowel gas suspicious for paralytic ileus. NG tube was placed with intermittent suction for treatment         ABNL/PERTINENT LABS/RADIOLOGY/DIAGNOSTIC STUDIES:   Cl   93 L      Gluc  111 H      BUN   56 H   Crea  5.2 H   GFR   12 ! Ca    8.1 L      RBC   3.24 L   H&H   9.4/26.1 L      ABG:   pH    7.49 H   PCO2   35   HCO3   26      CT ABD. Pelvis   Impression:   1.  Subtle omental caking/enhancement best visualized within the upper abdomen along the anterior abdominal wall (series 303, images 22 through 52) with differential considerations including peritoneal carcinomatosis. Large volume intra-abdominal ascites. No bowel obstruction, perforation, or discrete fluid collection observed. 2. Small bilateral pleural effusions and bilateral lower lobe consolidation, left greater than right. 3. Question mild sclerosis involving the skeletal structures diffusely. No discrete bony lesion observed. Chronic findings are discussed. EKG:   Vent. rate  127   Narrative & Impression:   Atrial fibrillation with rapid ventricular response with premature ventricular or aberrantly conducted complexes   Left axis deviation   Low voltage QRS, consider pulmonary disease, pericardial effusion, or normal variant   Inferior infarct (cited on or before 06-FEB-2023)   Possible Anterolateral infarct (cited on or before 06-FEB-2023)   Abnormal ECG         PHYSICAL EXAM:   Lungs: Clear to auscultation throughout all lung fields. No wheezes or rhonchi's. No retractions. Cardiac: RRR. No murmurs, rubs, gallops. No JVD. Abdomen: Mild tenderness to abdomen with palpation. Some increased firmness of abdomen. Mild to moderately distended. Extremities:  No clubbing, cyanosis, or edema x 4. Pale. Multiple bruises         MD CONSULTS/ASSESSMENT AND PLAN:   CRITICAL:   Assessment and Plan: 1. Hypotensive: Currently asymptomatic   -Nephrology is adding Florinef   -Albumin 25 mg IV every 6 hours for total of 8 doses          NEPHRO.:   Assessment and Plan:   1. ESRD on hemodialysis. 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     -On Levophed. Still requiring somewhat decreased dose this morning     -Patient is chronically hypotensive and asymptomatic     -We will continue to attempt to wean Levophed   2. Chronic hypotension. Patient is on very high doses of midodrine. Chronically he runs in 80s as outpatient. Okay to wean Levophed. We will add Florinef   3. 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 Elevated liver enzymes.   Recheck liver enzyme in a.m ORDERS:   Call for urine ouput less than 0.5mL/kg/hr, V/S q1.         PT/OT/SLP/CM ASSESSMENT OR NOTES:   PT:   Assessment:   Pt limited by BP at this time. Pt does c/o lightheaded and dizziness. Discharge Recommendations: Continue to assess pending progress         OT:   Assessment:   Pt did not tolerate PT session well earlier d/t low BP                     CM:   Barriers to Discharge: Transferred to , remains in ICU. Intensivist and Nephrology following. Resp culture positive for Christelle dubliniensis. Room air, Afebrile. BP low of 81/53. Remains on Levophed gtt. NG tube. CVC. Tessio. Pepcid iv daily. Florinef po daily (starting). Midodrine qid. Sodium bicarb tabs bid. Completed Rocephin. BUN 56, creatinine 5.2. Planning dialysis today. PT/OT         Dietitian:   Nutrition Recommendations/Plan: 1. Currently NPO   2. Will re-order ONS when diet is advanced to full liquids or greater         Dialysis   Signed   3.5 hour TX completed. Removed no fluid with dialysis with primary RN titrating levophed. Bilateral cath ports flushed with normal saline, clamped, and secured with tegos.  CVC drsg clean, dry, and intact        Sepsis and Other Febrile Illness, without Focal Infection - Care Day 6 (2/11/2023) by Parviz Ames RN       Review Status Review Entered   Completed 2/20/2023 1433       Created By   Parviz Ames RN      Criteria Review      Care Day: 6 Care Date: 2/11/2023 Level of Care: ICU    Guideline Day 2    Level Of Care    (X) ICU or floor    Clinical Status    ( ) * Hemodynamic stability    2/20/2023 2:33 PM EST by Josephine Clemente: 97.5 VA: 85 BP: 84/59 !    ( ) * Hypoxemia absent    2/20/2023 2:33 PM EST by Carlosimer [place]      89% on ra    (X) * Tachypnea absent    2/20/2023 2:33 PM EST by Casimer [place]      RR 12    Activity    ( ) Activity as tolerated    2/20/2023 2:33 PM EST by Casimer [place]      Up w/ assist    Routes    (X) Parenteral or oral medications 2/20/2023 2:33 PM EST by Jodi Roup 0.9-93.8 ml/hr CONT. IV    (X) Diet as tolerated    2/20/2023 2:33 PM EST by Theora Latus      ADULT DIET; Regular; Low Potassium (Less than 3000 mg/day); Low Phosphorus (Less than 1000 mg)    Interventions    (X) WBC    2/20/2023 2:33 PM EST by Theora Latus      normal    Medications    (X) Possible antimicrobial treatment    2/20/2023 2:33 PM EST by Theormargarito Lat      cefTRIAXone 2,000 mg Q24 IV       Definitions for Care Day 6    Hemodynamic stability    ( ) Hemodynamic stability, as indicated by  1 or more  of the following :       ( ) Patient hemodynamically stable, as indicated by  ALL  of the following  (1) (2) (3) (4) (5):          (X) Tachycardia absent          (X) No evidence of inadequate perfusion (eg, no myocardial ischemia)          (X) No other hemodynamic abnormalities (eg, no Orthostatic hypotension)       Tachycardia absent    (X) Tachycardia absent, as indicated by  1 or more  of the following  (1) (2):       (X) Heart rate less than or equal to 100 beats per minute in adult or child 6 years or older       Tachypnea absent    (X) Tachypnea absent, as indicated by respiratory rate of  1 or more  of the following  (1) (2):       (X) Less than or equal to 18 breaths per minute in adult or child 15years of age or older       * Milestone   Additional Notes   DATE:  2/11/23  ICU         PERTINENT UPDATES:   Patient is feeling better this morning. Levophed requirement up to 3 mcg. On max dose of Midodrine. Attempting to wean off Levophed. ABNL/PERTINENT LABS/RADIOLOGY/DIAGNOSTIC STUDIES:   BUN    31 H   Crea   3.4 H   GFR    20 ! RBC   3.03 L   H&H   9.6/26.8 L   Plt   77 L      XR ABD.  for NG/OG tube placement   Impression:   NG tube tip distal stomach         PHYSICAL EXAM:   General Appearance: ill-appearing, no acute distress   Lungs:  no use of accessory muscles   GI: soft, non-tender, no guarding   Extremities: No pitting LE edema         MD CONSULTS/ASSESSMENT AND PLAN:   CRITICAL   Assessment and Plan: 1. Ongoing Hypotension:   Midodrine was further increased to 25 mg TID. Decadron was attempted without success. Patient remains on Levophed, difficult wean. NEPHRO.:   Assessment and Plan:   1. ESRD on hemodialysis. 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 -Volume status stable electrolytes stable as well   -No acute need for dialysis today   -Closely follow electrolytes and renal function and reassess for dialysis needs tomorrow   2. Chronic hypotension. Continue with midodrine-dose. On extremely high doses wean Levophed         ORDERS:   Call for urine ouput less than 0.5mL/kg/hr   Aspiration & Fall Prec.

## 2023-02-21 ENCOUNTER — HOSPITAL ENCOUNTER (OUTPATIENT)
Dept: INFUSION THERAPY | Age: 63
End: 2023-02-21

## 2023-03-16 RX ORDER — CARVEDILOL 6.25 MG/1
TABLET ORAL
Qty: 180 TABLET | Refills: 1 | OUTPATIENT
Start: 2023-03-16

## 2023-10-21 NOTE — PROGRESS NOTES
Patient is a 63year old from [facility] ED with Dr Sage Sanders transferring. Patient with history of ESRD on hemodialysis, frequent paracentesis, and hypotension arrived with c/o weakness. Discharged from here 1/21/23. BP 73/49, then dipped down into the 60's so Levophed was initiated and 500 ml fluids given. Patient is on midodrine and is reportedly compliant with it. Total bilirubin 4.1, direct bilirubin 3.6, K+ 3.0, besides renal labs all other labs reported as not concerning. Patient did have a run of V-Tach, magnesium given. Patient was not dialyzed today.  Accepted under Dr Earline Ruiz to ICU normal...

## 2023-12-01 NOTE — PROGRESS NOTES
CRITICAL CARE PROGRESS NOTE      Patient:  Dioni Goodwin    Unit/Bed:4B-03/003-A  YOB: 1960  MRN: [id number]   PCP: Britt Camp DO  Date of Admission: 2/6/2023  Chief Complaint:- Hypotension    Assessment and Plan:    Hypotensive, refractory to medications: Asymptomatic - Initially thought to be related to septic shock. Transferred to [facility] from outside of the facility and was hypotensive on arrival with ventricular arrhythmias in the 140s. 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 ESRD on hemodialysis, M,W,F schedule. Initially suspicious for SBP and was given ceftriaxone 2g. Culture and cell count of body fluid not suspicious for SBP at this time. We will continue to monitor patient. On midodrine 15 mg TID at home for blood pressure support. Midodirne was increased to 20 mg TID, 2/8. History of asymptomatic hypotension. Patient's hypotension is likely multifactorial. Patient has history of running systolically in the 35I. Paracentesis, 2/7 with 5.5 L of ascites for drainage. Ceftriaxone 2g Q24 hrs, stopped 2/12. Decadron 10 mg, given 2/9/2023. Albumin 25 mg IV total 8 doses completed. Paracentesis 2/16, 3L of ascites fluid removed. Methylene blue 50 mg IV was ordered for vasoplegia               -Will continue to monitor patient  -Currently on Levophed for blood pressure support, MAP >55    -Continuing to try to wean levophed down as patient tolerates  -Midodrine 20 mg four times daily. Have been unsuccessful and difficult time weaning patient down off    Levophed  -Continuing Florinef 0.2 mg daily    2. Suspected liver cirrhosis: Likely 2/2 systemic amyloidosis. Waiting on liver biopsy for further evaluation of this. Elevated transaminase, alk phos, hypoalbuminemia. Patient is scheduled to have regular paracentesis. Patient reports he has received 2-3 previous paracenteses. 5.5 L of ascites was drained with paracentesis, 2/7.   Patient tolerated Patient is currently asymptomatic and is not interested in treatment   the procedure well. We discussed paracentesis versus Pleurx catheter with patient. He wishes to proceed with paracentesis. Paracentesis, 2/16 removed 3 Liters. Likely will need regular paracentesis. Pleur-X catheter was discussed with patient, he does not wish to proceed with this at this time. 3. 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 On hemodialysis - M/W/F follows with Dr. Long Gonzalez. Hx of noncompliance with medications including p.o. bicarb, fluid intake and other medical visits in the past. Patient consulted for nephrology to start inpatient home dialysis. Patient underwent hemodialysis 2/7/, 2/10, 2/13, 2/15, 2/17    4. Systemic amyloidosis: Initially diagnosed September 2022, patient is awaiting for liver biopsy this time. Per patient he reports compliance to medication treatments and visits.           -Follows with Dr. Tiara Harden outpatient. .          -Patient was started on chemotherapy regimen in 12/2022          -Per heme-onc recommendations weekly paracentesis, limited to 2 L with with albumin infusions          -Attempted to make contact with Dr. Tiara Harden. Have not heard back from her at this time. 5. Ventricular tachycardia, asymptomatic: Reported runs of ventricular tachycardia. Patient was given IV magnesium at outside facility. Patient received amiodarone bolus 300, started on amnio drip at 0.5. Continuing amiodarone drip at this time. He is on telemetry, strict inputs and outputs, daily weights. We will continue to monitor patient's electrolytes. Previously evaluated by EP on 1/20/2023. Arrhythmia thought to be secondary to underlying cardiac amyloidosis with risk of SCD. EP recommended AICD. Not a candidate for class Ic or III antiarrhythmics. Previous admission discharged with a LifeVest was discussed. With follow-up with EP.         -Keep magnesium above 2.         -Keep potassium greater than 4    6. HFpEF: Not in exacerbation. HFpEF 50%, 1/16.           -We will continue to monitor. -Continue telemetry, pulse ox, daily weights, strict I&O's  Malnutrition, severe protein calorie malnutrition: Likely due to patient's multiple comorbidities  Chronic normocytic anemia, mild: Patient has multiple chronic diseases including ESRD on HD, liver disease, systemic amyloidosis, continue current daily CBCs. Chronic thrombocytopenia, mild: Possibly due to suspected liver cirrhosis secondary to systemic amyloidosis. Continue to monitor with daily CBCs. Conditioning, debility: Multiple comorbidities including systemic amyloidosis, ESRD on HD, malnutrition, liver disease, ventricular tachycardia    HAGMA: Resolved. Anion gap, 15.0. Lactic acidosis and uremia likely due to ESRD. Patient is on HD. Home bicarb started p.o. We will continue to monitor, daily CBCs and BMPs ordered:     INITIAL H AND P AND ICU COURSE:  Per HPI, \"patient is 63years old male, brought from outside facility due to hypotension. Patient has past medical history of recently diagnosed with systemic amyloidosis, ESRD on hemodialysis, undifferentiated liver disease, newly diagnosis CHF preserved EF, nonsustained episodes of ventricular tachycardia. Patient states he felt sick and nauseous and went to the local hospital and transferred to [facility] for further evaluation and management. Currently patient alert and oriented X3, complains of nausea, episodes of vomiting, feeling sick abdominal distention due to ascites. However he denies chest pain, heart palpitations, productive cough, dizziness, fevers, chills lightheadedness, dysuria/urinary frequency\" paracentesis was performed on 2/7/2023 with 5.5 L of ascites drained. Patient was also undergoing hemodialysis on 2/7, and 2/10. Patient has been on and continued to have to have his midodrine dose increased while attempting to wean down Levophed. Past 24 hrs:   Patient was seen and evaluated at bedside this morning.  Patient underwent paracentesis by IR yesterday and had 3 L drained. Patient tolerated procedure well and he is notes improvement in his abdominal symptoms. Patient has had an increase in his appetite since then. He is currently undergoing dialysis at this time and tolerating it well. Dr. Mark Lopez reached out to Dr. Federico Singer office. Past Medical History: Systemic amyloidosis, CHF, ESRD, liver disease. Family History: Hypertension. Social History: Former smoker, 34+ years since history of smoking. History of quitting December 2022. No previous illicit drug use or alcohol use. ROS: No fever, chills, shortness of breath, headache, dizziness, weakness, chest pain, or palpitations. Patient denies any abdominal pain or tightness in his belly at. He denies nausea or vomiting. Scheduled Meds:   epoetin jose-epbx  6,000 Units SubCUTAneous Once per day on Mon Wed Fri    fludrocortisone  0.2 mg Oral Daily    midodrine  20 mg Oral Q6H    zolpidem  5 mg Oral Nightly    famotidine (PEPCID) injection  20 mg IntraVENous Daily    calcium replacement protocol   Other RX Placeholder    sodium chloride flush  5-40 mL IntraVENous 2 times per day    sodium bicarbonate  650 mg Oral BID    vitamin D  50,000 Units Oral Weekly     Continuous Infusions:   norepinephrine 8 mcg/min (02/16/23 2040)    dextrose      sodium chloride       PHYSICAL EXAMINATION:  T:  97.8. P:  77. RR:  13. B/P:  102/76. On room air. O2 Sat: 93.  I/O:  +690 cc in the last 24 hrs  Body mass index is 18.57 kg/m². GCS:   15  General: Chronically ill-appearing male. Appears older than stated age. Cachectic. Pale in appearance. HEENT: Normocephalic and atraumatic. Mild scleral icterus. PERR  Neck: Supple  Lungs: Clear to auscultation. No wheezes, rales or rhonchi's. No retractions  Cardiac: RRR. No murmurs, gallops or rubs. No JVD. Abdomen: Soft. Nontender. Minimally distended. Normoactive bowel sounds. Extremities: No clubbing, cyanosis, or edema x 4.     Vasculature: Capillary refill < 3 seconds. Palpable dorsalis pedis pulses. Skin:  Warm and dry. Multiple bruising. Psych:  Alert and oriented x3. Affect appropriate  Lymph: No supraclavicular adenopathy. Neurologic: No focal deficit. No seizures. Data: (All radiographs, tracings, PFTs, and imaging are personally viewed and interpreted unless otherwise noted). Sodium 137. K 4.5. Chloride 98. CO2 24. BUN 35. Creatine 3.3. Mag 2.1. Serum calcium 8.4  WBC 6.0, Hg 10.1, Plt 71. Telemetry shows NSE with a rate 77. Echo, 2/14 shows EF at 55% with overall LV function normal and mild concentric left ventricular hypertrophy  Abdominal ultrasound, 2/15 shows large volume ascites, mild dilated common bile duct up to 0.7 cm, small gallstones, diffuse gallbladder wall thickening nonspecific. Patient has patent hepatic and portal vein venous vessels and recanalized periumbilical vein which can be seen with portal hypertension      Seen with multidisciplinary ICU team.  Meets Continued ICU Level Care Criteria:    [x] Yes     Case and plan discussed with Dr. Beto Fischer. [address] [facility],Fourth [address], DO.       Electronically signed by Corrina Dorado DO, PGY-1  CRITICAL CARE SPECIALIST